# Patient Record
Sex: FEMALE | Race: WHITE | NOT HISPANIC OR LATINO | Employment: OTHER | URBAN - METROPOLITAN AREA
[De-identification: names, ages, dates, MRNs, and addresses within clinical notes are randomized per-mention and may not be internally consistent; named-entity substitution may affect disease eponyms.]

---

## 2017-01-03 ENCOUNTER — TELEPHONE (OUTPATIENT)
Dept: MEDICAL GROUP | Facility: PHYSICIAN GROUP | Age: 77
End: 2017-01-03

## 2017-01-03 ENCOUNTER — HOSPITAL ENCOUNTER (OUTPATIENT)
Facility: MEDICAL CENTER | Age: 77
End: 2017-01-03
Attending: INTERNAL MEDICINE
Payer: MEDICARE

## 2017-01-03 DIAGNOSIS — L84 FOOT CALLUS: ICD-10-CM

## 2017-01-03 PROCEDURE — 82274 ASSAY TEST FOR BLOOD FECAL: CPT

## 2017-01-03 NOTE — TELEPHONE ENCOUNTER
1. Caller Name: Reina                                       Call Back Number:       Patient approves a detailed voicemail message: yes     Patient requesting new referral to podiatry

## 2017-01-04 RX ORDER — PANTOPRAZOLE SODIUM 40 MG/1
TABLET, DELAYED RELEASE ORAL
Qty: 30 TAB | Refills: 0 | Status: SHIPPED | OUTPATIENT
Start: 2017-01-04 | End: 2017-01-31 | Stop reason: SDUPTHER

## 2017-01-05 DIAGNOSIS — Z12.11 SCREEN FOR COLON CANCER: ICD-10-CM

## 2017-01-07 LAB — HEMOCCULT STL QL IA: NEGATIVE

## 2017-01-09 ENCOUNTER — TELEPHONE (OUTPATIENT)
Dept: MEDICAL GROUP | Facility: PHYSICIAN GROUP | Age: 77
End: 2017-01-09

## 2017-01-09 NOTE — TELEPHONE ENCOUNTER
----- Message from Pablo Montalvo M.D. sent at 1/8/2017  5:21 PM PST -----  Your FIT test was negative which indicates with ~ 90% accuracy that there is no blood in the colon.  You can put off a colonoscopy for another year in light of these results.  - Dr. Montalvo

## 2017-01-09 NOTE — Clinical Note
January 9, 2017         Reina Munoz                     [address]  [place] [address]          Dear Reina:    The results of your recent stool cards are normal. This means they show no sign of blood in the stool.    If you have any questions or concerns, please don't hesitate to call.        Sincerely,          Pablo Montalvo M.D.    Electronically Signed

## 2017-01-31 RX ORDER — PANTOPRAZOLE SODIUM 40 MG/1
TABLET, DELAYED RELEASE ORAL
Qty: 90 TAB | Refills: 0 | Status: SHIPPED | OUTPATIENT
Start: 2017-01-31 | End: 2017-05-02 | Stop reason: SDUPTHER

## 2017-02-03 ENCOUNTER — OFFICE VISIT (OUTPATIENT)
Dept: MEDICAL GROUP | Facility: PHYSICIAN GROUP | Age: 77
End: 2017-02-03
Payer: MEDICARE

## 2017-02-03 VITALS
DIASTOLIC BLOOD PRESSURE: 98 MMHG | HEIGHT: 69 IN | WEIGHT: 195 LBS | SYSTOLIC BLOOD PRESSURE: 158 MMHG | OXYGEN SATURATION: 96 % | TEMPERATURE: 97.3 F | BODY MASS INDEX: 28.88 KG/M2 | HEART RATE: 54 BPM

## 2017-02-03 DIAGNOSIS — E03.8 OTHER SPECIFIED HYPOTHYROIDISM: ICD-10-CM

## 2017-02-03 DIAGNOSIS — E78.5 DYSLIPIDEMIA: ICD-10-CM

## 2017-02-03 DIAGNOSIS — R03.0 ELEVATED BP WITHOUT DIAGNOSIS OF HYPERTENSION: ICD-10-CM

## 2017-02-03 DIAGNOSIS — K21.9 GASTROESOPHAGEAL REFLUX DISEASE WITHOUT ESOPHAGITIS: ICD-10-CM

## 2017-02-03 PROCEDURE — 3014F SCREEN MAMMO DOC REV: CPT | Mod: 8P | Performed by: FAMILY MEDICINE

## 2017-02-03 PROCEDURE — 1101F PT FALLS ASSESS-DOCD LE1/YR: CPT | Performed by: FAMILY MEDICINE

## 2017-02-03 PROCEDURE — 3017F COLORECTAL CA SCREEN DOC REV: CPT | Performed by: FAMILY MEDICINE

## 2017-02-03 PROCEDURE — 99214 OFFICE O/P EST MOD 30 MIN: CPT | Performed by: FAMILY MEDICINE

## 2017-02-03 PROCEDURE — G8432 DEP SCR NOT DOC, RNG: HCPCS | Performed by: FAMILY MEDICINE

## 2017-02-03 PROCEDURE — 1036F TOBACCO NON-USER: CPT | Performed by: FAMILY MEDICINE

## 2017-02-03 PROCEDURE — G8484 FLU IMMUNIZE NO ADMIN: HCPCS | Performed by: FAMILY MEDICINE

## 2017-02-03 PROCEDURE — G8420 CALC BMI NORM PARAMETERS: HCPCS | Performed by: FAMILY MEDICINE

## 2017-02-03 PROCEDURE — 4040F PNEUMOC VAC/ADMIN/RCVD: CPT | Mod: 8P | Performed by: FAMILY MEDICINE

## 2017-02-03 NOTE — MR AVS SNAPSHOT
"Reina Munoz   2/3/2017 11:20 AM   Office Visit   MRN: [id number]    Department:  [facility]   Dept Phone:  [phone]    Description:  Female : 1940   Provider:  Shannen Eddy D.O.           Reason for Visit     Establish Care           Allergies as of 2/3/2017     Allergen Noted Reactions    Penicillins 2016       Sulfa Drugs 2016         You were diagnosed with     Gastroesophageal reflux disease without esophagitis   [276884]       Other specified hypothyroidism   [1865973]       Dyslipidemia   [491989]       Elevated BP without diagnosis of hypertension   [4056057]         Vital Signs     Blood Pressure Pulse Temperature Height Weight Body Mass Index    158/98 mmHg 54 36.3 °C (97.3 °F) 1.753 m (5' 9\") 88.451 kg (195 lb) 28.78 kg/m2    Oxygen Saturation Smoking Status                96% Never Smoker           Basic Information     Date Of Birth Sex Race Ethnicity Preferred Language    1940 Female White Non- English      Your appointments     Sep 11, 2017  2:40 PM   Established Patient with Shannen Eddy D.O.   [facility]    [address] [place] [id number] [phone]           You will be receiving a confirmation call a few days before your appointment from our automated call confirmation system.              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   2016 - Present    Elevated BP without diagnosis of hypertension R03.0   2/3/2017 - Present      Health Maintenance        Date Due Completion Dates    BONE DENSITY 2007 ---    MAMMOGRAM 2017 (Originally 1982) ---    IMM PNEUMOCOCCAL 65+ (ADULT) LOW/MEDIUM RISK SERIES (1 of 2 - PCV13) 2017 (Originally 2007) " ---    IMM INFLUENZA (1) 12/12/2017 (Originally 9/1/2016) 1/11/2016 (Declined)    Override on 1/11/2016: Patient Declined    IMM DTaP/Tdap/Td Vaccine (1 - Tdap) 12/12/2017 (Originally 11/30/1961) ---    IMM ZOSTER VACCINE 12/12/2017 (Originally 11/30/2002) ---    COLONOSCOPY 1/11/2026 1/11/2016 (Done)    Override on 1/11/2016: Done            Current Immunizations     No immunizations on file.      Below and/or attached are the medications your provider expects you to take. Review all of your home medications and newly ordered medications with your provider and/or pharmacist. Follow medication instructions as directed by your provider and/or pharmacist. Please keep your medication list with you and share with your provider. Update the information when medications are discontinued, doses are changed, or new medications (including over-the-counter products) are added; and carry medication information at all times in the event of emergency situations     Allergies:  PENICILLINS - (reactions not documented)     SULFA DRUGS - (reactions not documented)               Medications  Valid as of: February 03, 2017 - 11:48 AM    Generic Name Brand Name Tablet Size Instructions for use    Levothyroxine Sodium (Tab) SYNTHROID 75 MCG TAKE ONE TABLET BY MOUTH EVERY MORNING ON AN EMPTY STOMACH        Pantoprazole Sodium (Tablet Delayed Response) PROTONIX 40 MG TAKE ONE TABLET BY MOUTH DAILY        Simvastatin (Tab) ZOCOR 10 MG TAKE ONE TABLET BY MOUTH EVERY EVENING        .                 Medicines prescribed today were sent to:     [facility] #[id number] - [place], [place] - [address] [address]    Phone: [phone] Fax: [phone]    Open 24 Hours?: No      Medication refill instructions:       If your prescription bottle indicates you have medication refills left, it is not necessary to call your provider’s office. Please contact your pharmacy and they will refill your medication.      If your prescription bottle indicates you do not have any refills left, you may request refills at any time through one of the following ways: The online On[employer]ign system (except Urgent Care), by calling your provider’s office, or by asking your pharmacy to contact your provider’s office with a refill request. Medication refills are processed only during regular business hours and may not be available until the next business day. Your provider may request additional information or to have a follow-up visit with you prior to refilling your medication.   *Please Note: Medication refills are assigned a new Rx number when refilled electronically. Your pharmacy may indicate that no refills were authorized even though a new prescription for the same medication is available at the pharmacy. Please request the medicine by name with the pharmacy before contacting your provider for a refill.        Your To Do List     Future Labs/Procedures Complete By Expires    COMP METABOLIC PANEL  As directed 2/4/2018    FREE THYROXINE  As directed 2/4/2018    LIPID PROFILE  As directed 2/4/2018    TSH  As directed 2/4/2018    VITAMIN D,25 HYDROXY  As directed 2/4/2018         Oncodesign Access Code: Activation code not generated  Current Oncodesign Status: Active

## 2017-02-03 NOTE — ASSESSMENT & PLAN NOTE
Long-standing history. Patient reports 100% compliance with medication; she is currently on levothyroxine 75 µg daily. Patient denies any issues with temperature intolerance, depression, skin changes. Chart review shows that TSH and T4 at last check were within normal limits.

## 2017-02-03 NOTE — ASSESSMENT & PLAN NOTE
Long-standing history. Patient reports 100% compliance with medication; she is currently on Zocor 10 mg daily. Patient denies any issues with muscle cramps or weakness. Chart review shows a last lipid panel showed a normal total cholesterol but an elevated triglyceride level. Patient states that she tries to eat healthy and gets regular daily exercise.

## 2017-02-03 NOTE — ASSESSMENT & PLAN NOTE
Long-standing history. Patient reports 100% compliance with medication; she is currently on Protonix 40 mg daily. Patient reports that this manages her symptoms without any issue. Chart review shows that her kidney function is in the normal range.

## 2017-02-03 NOTE — PROGRESS NOTES
Subjective:   Reina Munoz is a 76 y.o. female here today for elevated TG; elevated BP; thyroid; GERD    Dyslipidemia  Long-standing history. Patient reports 100% compliance with medication; she is currently on Zocor 10 mg daily. Patient denies any issues with muscle cramps or weakness. Chart review shows a last lipid panel showed a normal total cholesterol but an elevated triglyceride level. Patient states that she tries to eat healthy and gets regular daily exercise.    Elevated BP without diagnosis of hypertension  Ongoing issue. Chart review shows that today's visit and her visit in December revealed elevated blood pressure with a systolic above 150. The patient denies any issues with headache, dizziness, chest pain. She does report that her home blood pressure monitor never reveals a high blood pressure.    Gastroesophageal reflux disease without esophagitis  Long-standing history. Patient reports 100% compliance with medication; she is currently on Protonix 40 mg daily. Patient reports that this manages her symptoms without any issue. Chart review shows that her kidney function is in the normal range.    Hypothyroidism  Long-standing history. Patient reports 100% compliance with medication; she is currently on levothyroxine 75 µg daily. Patient denies any issues with temperature intolerance, depression, skin changes. Chart review shows that TSH and T4 at last check were within normal limits.     Pt is here today to [facility]; she is transferring care from Dr. Willis.     Current medicines (including changes today)  Current Outpatient Prescriptions   Medication Sig Dispense Refill   • pantoprazole (PROTONIX) 40 MG Tablet Delayed Response TAKE ONE TABLET BY MOUTH DAILY 90 Tab 0   • simvastatin (ZOCOR) 10 MG Tab TAKE ONE TABLET BY MOUTH EVERY EVENING 30 Tab 2   • levothyroxine (SYNTHROID) 75 MCG Tab TAKE ONE TABLET BY MOUTH EVERY MORNING ON AN EMPTY STOMACH 30 Tab 5     No current facility-administered medications  "for this visit.     She  has a past medical history of Hypothyroidism; Dyslipidemia; and Pleomorphic adenoma of parotid gland (1992, 2004).    ROS   No chest pain, no shortness of breath, no abdominal pain       Objective:     Blood pressure 158/98, pulse 54, temperature 36.3 °C (97.3 °F), height 1.753 m (5' 9\"), weight 88.451 kg (195 lb), SpO2 96 %. Body mass index is 28.78 kg/(m^2).   Physical Exam:  Alert, oriented in no acute distress.  Eye contact is good, speech goal directed, affect calm  HEENT: conjunctiva non-injected, sclera non-icteric.  Pinna normal. TM pearly gray.   Oral mucous membranes pink and moist with no lesions.  Neck No adenopathy or masses in the neck or supraclavicular regions.  Lungs: clear to auscultation bilaterally with good excursion.  CV: regular rate and rhythm. Mild systolic ejection murmur noted  Abdomen: soft, nontender, No CVAT  Ext: no edema, color normal, vascularity normal, temperature normal  Neuro: CN 2-12 grossly intact      Assessment and Plan:   The following treatment plan was discussed     1. Gastroesophageal reflux disease without esophagitis      Stable. Continue current medications; patient denies need for refills. Monitor   2. Other specified hypothyroidism  TSH    FREE THYROXINE    Stable. Continue current medications; patient denies need for refill. Recheck labs in 6 months. Monitor   3. Dyslipidemia  COMP METABOLIC PANEL    LIPID PROFILE    VITAMIN D,25 HYDROXY    Stable. Continue current medications; patient denies need for refill. Recheck labs in 6 months. Monitor   4. Elevated BP without diagnosis of hypertension      Uncontrolled. Have asked patient to bring in her home blood pressure monitor for calibration. Recheck in 2 weeks. Monitor       Followup: Return in about 6 months (around 8/3/2017) for thyroid/lipids/labs, Short.            "

## 2017-02-03 NOTE — ASSESSMENT & PLAN NOTE
Ongoing issue. Chart review shows that today's visit and her visit in December revealed elevated blood pressure with a systolic above 150. The patient denies any issues with headache, dizziness, chest pain. She does report that her home blood pressure monitor never reveals a high blood pressure.

## 2017-02-10 ENCOUNTER — NON-PROVIDER VISIT (OUTPATIENT)
Dept: MEDICAL GROUP | Facility: PHYSICIAN GROUP | Age: 77
End: 2017-02-10
Payer: MEDICARE

## 2017-02-10 VITALS — DIASTOLIC BLOOD PRESSURE: 87 MMHG | SYSTOLIC BLOOD PRESSURE: 147 MMHG

## 2017-02-10 DIAGNOSIS — Z01.30 BLOOD PRESSURE CHECK: ICD-10-CM

## 2017-04-21 ENCOUNTER — TELEPHONE (OUTPATIENT)
Dept: PULMONOLOGY | Facility: HOSPICE | Age: 77
End: 2017-04-21

## 2017-04-21 DIAGNOSIS — G47.33 OBSTRUCTIVE SLEEP APNEA: ICD-10-CM

## 2017-04-21 NOTE — TELEPHONE ENCOUNTER
SWITCHING TO KEY FOR CPAP SUPPLIES    PLEASE SIGN UPDATED ORDER    SEND WITH NOTES, TESTING, AND DEMOS

## 2017-05-17 ENCOUNTER — SLEEP CENTER VISIT (OUTPATIENT)
Dept: SLEEP MEDICINE | Facility: MEDICAL CENTER | Age: 77
End: 2017-05-17
Payer: MEDICARE

## 2017-05-17 VITALS
WEIGHT: 200 LBS | HEART RATE: 54 BPM | DIASTOLIC BLOOD PRESSURE: 88 MMHG | OXYGEN SATURATION: 93 % | BODY MASS INDEX: 29.62 KG/M2 | HEIGHT: 69 IN | RESPIRATION RATE: 16 BRPM | SYSTOLIC BLOOD PRESSURE: 142 MMHG

## 2017-05-17 DIAGNOSIS — G47.33 OSA (OBSTRUCTIVE SLEEP APNEA): ICD-10-CM

## 2017-05-17 PROCEDURE — 99203 OFFICE O/P NEW LOW 30 MIN: CPT | Performed by: INTERNAL MEDICINE

## 2017-05-17 NOTE — PROGRESS NOTES
Chief Complaint   Patient presents with   • New Patient     LUKAS       HPI: This patient is a 76 y.o. Female who is referred for obstructive sleep apnea syndrome. She was diagnosed with LUKAS in [place] approximately 3 years ago, and prescribed AutoPap therapy. Her sleep study results were unavailable for review at time of consultation. CPAP compliance card confirms 100% usage on AutoPap 10-20 cm of water with mean pressures of 12 cm of water. She uses CPAP over 8 hours nightly, with normal AHI of 1.8. She sleeps great with CPAP, awakens feeling rested. Her [place] sleepiness score is 1. She is using a nasal mask, and requires changing DME to [facility] for CPAP supplies.      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      Social History Main Topics   • Smoking status: Never Smoker    • Smokeless tobacco: Never Used   • Alcohol Use: No   • Drug Use: No   • Sexual Activity:     Partners: Male     Other Topics Concern   • Not on file     Social History Narrative       Family History   Problem Relation Age of Onset   • Cancer Mother      multiple myeloma   • Heart Disease Mother    • Heart Attack Father    • Heart Disease Brother    • Lung Disease Brother    • Sleep Apnea Neg Hx        Current Outpatient Prescriptions on File Prior to Visit   Medication Sig Dispense Refill   • pantoprazole (PROTONIX) 40 MG Tablet Delayed Response TAKE ONE TABLET BY MOUTH DAILY 90 Tab 1   • levothyroxine (SYNTHROID) 75 MCG Tab TAKE ONE TABLET BY MOUTH EVERY MORNING ON AN EMPTY STOMACH 30 Tab 5   • simvastatin (ZOCOR) 10 MG Tab TAKE ONE TABLET BY MOUTH EVERY EVENING 30 Tab 5     No  "current facility-administered medications on file prior to visit.       Allergies: Penicillins and Sulfa drugs    ROS:   Constitutional: Denies fevers, chills, night sweats, fatigue or weight loss  Eyes: Denies vision loss, pain, drainage, double vision  Ears, Nose, Throat: Denies earache, difficulty hearing, tinnitus, nasal congestion, hoarseness  Cardiovascular: Denies chest pain, tightness, palpitations, orthopnea, +LEedema  Respiratory: Denies shortness of breath, cough, wheezing, hemoptysis  Sleep: Denies daytime sleepiness, snoring, apneas, insomnia, morning headaches  GI: Denies heartburn, dysphagia, nausea, abdominal pain, diarrhea or constipation  : Denies frequent urination, hematuria, discharge or painful urination  Musculoskeletal: Denies back pain, painful joints, sore muscles  Neurological: Denies weakness or headaches  Skin: No rashes    Blood pressure 142/88, pulse 54, resp. rate 16, height 1.753 m (5' 9.02\"), weight 90.719 kg (200 lb), SpO2 93 %.  Multi-Ox Readings  Multi Ox #1     O2 sat % at rest     O2 sat % on exertion     O2 sat average on exertion     Multi Ox #2     O2 sat % at rest     O2 sat % on exertion     O2 sat average on exertion       Oxygen Use     Oxygen Frequency     Duration of need     Is the patient mobile within the home?     CPAP Use?     BIPAP Use?     Servo Titration         Physical Exam:  Appearance: Well-nourished, well-developed, in no acute distress  HEENT: Normocephalic, atraumatic, white sclera, PERRLA, oropharynx clear, Mallampati 3  Neck: No adenopathy or masses  Respiratory: no intercostal retractions or accessory muscle use  Lungs auscultation: Clear to auscultation bilaterally  Cardiovascular: Regular rate rhythm. No murmurs, rubs or gallops.  + Compression stockings,+mild LE edema L>R  Abdomen: soft, nondistended  Gait: Normal  Digits: No clubbing, cyanosis  Motor: No focal deficits  Orientation: Oriented to time, person and place    Diagnosis:  1. LUKAS " (obstructive sleep apnea)     2. BMI 29.0-29.9,adult         Plan:  The patient has history of obstructive sleep apnea diagnosed in [place] 3 years ago. She shows excellent compliance and response to AutoPap 10-20 cm of water.   We will request her sleep study results from [place], and set her up with [facility] DME for her CPAP supplies.  Return in about 1 year (around 5/17/2018).

## 2017-05-17 NOTE — MR AVS SNAPSHOT
"Reina Munoz   2017 10:40 AM   Sleep Center Visit   MRN: [id number]    Department:  [facility]   Dept Phone:  [phone]    Description:  Female : 1940   Provider:  Roxanne Carmona M.D.           Reason for Visit     New Patient LUKAS      Allergies as of 2017     Allergen Noted Reactions    Penicillins 2016       Sulfa Drugs 2016         You were diagnosed with     LUKAS (obstructive sleep apnea)   [341849]       BMI 29.0-29.9,adult   [342790]         Vital Signs     Blood Pressure Pulse Respirations Height Weight Body Mass Index    142/88 mmHg 54 16 1.753 m (5' 9.02\") 90.719 kg (200 lb) 29.52 kg/m2    Oxygen Saturation Smoking Status                93% Never Smoker           Basic Information     Date Of Birth Sex Race Ethnicity Preferred Language    1940 Female White Non- English      Your appointments     Sep 11, 2017  2:40 PM   Established Patient with Shannen Eddy D.O.   [facility]    [address] [place] [id number] [phone]           You will be receiving a confirmation call a few days before your appointment from our automated call confirmation system.              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      Below and/or attached are the medications your provider expects you to take. Review all of your home medications and newly ordered medications with your provider and/or pharmacist. Follow medication instructions as directed by your provider and/or pharmacist. Please keep your medication list with you and share with your provider. Update the information when medications are discontinued, doses are changed, or new medications (including over-the-counter products) are added; and carry medication information at all times in the event of emergency situations     Allergies:  PENICILLINS - (reactions not documented)     SULFA DRUGS - (reactions not documented)               Medications  Valid as of: May 17, 2017 - 11:04 AM    Generic Name Brand Name Tablet Size Instructions for use    Levothyroxine Sodium (Tab) SYNTHROID 75 MCG TAKE ONE TABLET BY MOUTH EVERY MORNING ON AN EMPTY STOMACH        Pantoprazole Sodium (Tablet Delayed Response) PROTONIX 40 MG TAKE ONE TABLET BY MOUTH DAILY        Simvastatin (Tab) ZOCOR 10 MG TAKE ONE TABLET BY MOUTH EVERY EVENING        .                 Medicines prescribed today were sent to:     [facility] #[id number] [place], [place] - [address] [address]    Phone: [phone] Fax: [phone]    Open 24 Hours?: No      Medication refill instructions:       If your prescription bottle indicates you have medication refills left, it is not necessary to call your provider’s office. Please contact your pharmacy and they will refill your medication.    If your prescription bottle indicates you do not have any refills left, you may request refills at any time through one of the following ways: The online [employer] system (except Urgent Care), by calling your provider’s office, or by asking your pharmacy to contact  your provider’s office with a refill request. Medication refills are processed only during regular business hours and may not be available until the next business day. Your provider may request additional information or to have a follow-up visit with you prior to refilling your medication.   *Please Note: Medication refills are assigned a new Rx number when refilled electronically. Your pharmacy may indicate that no refills were authorized even though a new prescription for the same medication is available at the pharmacy. Please request the medicine by name with the pharmacy before contacting your provider for a refill.           [employer] Access Code: Activation code not generated  Current [employer]Pics (deja mi) Status: Active

## 2017-05-19 ENCOUNTER — APPOINTMENT (OUTPATIENT)
Dept: SLEEP MEDICINE | Facility: MEDICAL CENTER | Age: 77
End: 2017-05-19
Payer: MEDICARE

## 2017-06-07 ENCOUNTER — HOSPITAL ENCOUNTER (OUTPATIENT)
Dept: RADIOLOGY | Facility: MEDICAL CENTER | Age: 77
End: 2017-06-07
Attending: FAMILY MEDICINE
Payer: MEDICARE

## 2017-06-07 DIAGNOSIS — M79.662 PAIN OF LEFT LOWER LEG: ICD-10-CM

## 2017-06-07 PROCEDURE — 93971 EXTREMITY STUDY: CPT | Mod: LT

## 2017-06-12 ENCOUNTER — HOSPITAL ENCOUNTER (OUTPATIENT)
Dept: RADIOLOGY | Facility: MEDICAL CENTER | Age: 77
End: 2017-06-12
Attending: PHYSICIAN ASSISTANT
Payer: MEDICARE

## 2017-06-12 ENCOUNTER — HOSPITAL ENCOUNTER (OUTPATIENT)
Dept: LAB | Facility: MEDICAL CENTER | Age: 77
End: 2017-06-12
Attending: FAMILY MEDICINE
Payer: MEDICARE

## 2017-06-12 ENCOUNTER — OFFICE VISIT (OUTPATIENT)
Dept: URGENT CARE | Facility: PHYSICIAN GROUP | Age: 77
End: 2017-06-12
Payer: MEDICARE

## 2017-06-12 VITALS
HEART RATE: 53 BPM | DIASTOLIC BLOOD PRESSURE: 80 MMHG | OXYGEN SATURATION: 95 % | SYSTOLIC BLOOD PRESSURE: 140 MMHG | TEMPERATURE: 97.5 F

## 2017-06-12 DIAGNOSIS — M25.562 ACUTE PAIN OF LEFT KNEE: ICD-10-CM

## 2017-06-12 LAB
ALBUMIN SERPL BCP-MCNC: 4.3 G/DL (ref 3.2–4.9)
ALBUMIN/GLOB SERPL: 1.4 G/DL
ALP SERPL-CCNC: 75 U/L (ref 30–99)
ALT SERPL-CCNC: 44 U/L (ref 2–50)
ANION GAP SERPL CALC-SCNC: 7 MMOL/L (ref 0–11.9)
AST SERPL-CCNC: 42 U/L (ref 12–45)
BASOPHILS # BLD AUTO: 0.6 % (ref 0–1.8)
BASOPHILS # BLD: 0.03 K/UL (ref 0–0.12)
BILIRUB SERPL-MCNC: 0.7 MG/DL (ref 0.1–1.5)
BUN SERPL-MCNC: 15 MG/DL (ref 8–22)
CALCIUM SERPL-MCNC: 9.7 MG/DL (ref 8.5–10.5)
CHLORIDE SERPL-SCNC: 107 MMOL/L (ref 96–112)
CHOLEST SERPL-MCNC: 185 MG/DL (ref 100–199)
CO2 SERPL-SCNC: 27 MMOL/L (ref 20–33)
CREAT SERPL-MCNC: 0.89 MG/DL (ref 0.5–1.4)
EOSINOPHIL # BLD AUTO: 0.08 K/UL (ref 0–0.51)
EOSINOPHIL NFR BLD: 1.7 % (ref 0–6.9)
ERYTHROCYTE [DISTWIDTH] IN BLOOD BY AUTOMATED COUNT: 46.5 FL (ref 35.9–50)
GFR SERPL CREATININE-BSD FRML MDRD: >60 ML/MIN/1.73 M 2
GLOBULIN SER CALC-MCNC: 3 G/DL (ref 1.9–3.5)
GLUCOSE SERPL-MCNC: 90 MG/DL (ref 65–99)
HCT VFR BLD AUTO: 45.9 % (ref 37–47)
HDLC SERPL-MCNC: 52 MG/DL
HGB BLD-MCNC: 15.2 G/DL (ref 12–16)
IMM GRANULOCYTES # BLD AUTO: 0.01 K/UL (ref 0–0.11)
IMM GRANULOCYTES NFR BLD AUTO: 0.2 % (ref 0–0.9)
LDLC SERPL CALC-MCNC: 93 MG/DL
LYMPHOCYTES # BLD AUTO: 1.82 K/UL (ref 1–4.8)
LYMPHOCYTES NFR BLD: 39.2 % (ref 22–41)
MCH RBC QN AUTO: 30.6 PG (ref 27–33)
MCHC RBC AUTO-ENTMCNC: 33.1 G/DL (ref 33.6–35)
MCV RBC AUTO: 92.5 FL (ref 81.4–97.8)
MONOCYTES # BLD AUTO: 0.27 K/UL (ref 0–0.85)
MONOCYTES NFR BLD AUTO: 5.8 % (ref 0–13.4)
NEUTROPHILS # BLD AUTO: 2.43 K/UL (ref 2–7.15)
NEUTROPHILS NFR BLD: 52.5 % (ref 44–72)
NRBC # BLD AUTO: 0 K/UL
NRBC BLD AUTO-RTO: 0 /100 WBC
PLATELET # BLD AUTO: 166 K/UL (ref 164–446)
PMV BLD AUTO: 12.2 FL (ref 9–12.9)
POTASSIUM SERPL-SCNC: 3.8 MMOL/L (ref 3.6–5.5)
PROT SERPL-MCNC: 7.3 G/DL (ref 6–8.2)
RBC # BLD AUTO: 4.96 M/UL (ref 4.2–5.4)
SODIUM SERPL-SCNC: 141 MMOL/L (ref 135–145)
T4 FREE SERPL-MCNC: 0.84 NG/DL (ref 0.53–1.43)
TRIGL SERPL-MCNC: 199 MG/DL (ref 0–149)
TSH SERPL DL<=0.005 MIU/L-ACNC: 3.28 UIU/ML (ref 0.3–3.7)
WBC # BLD AUTO: 4.6 K/UL (ref 4.8–10.8)

## 2017-06-12 PROCEDURE — 73562 X-RAY EXAM OF KNEE 3: CPT | Mod: LT

## 2017-06-12 PROCEDURE — 99214 OFFICE O/P EST MOD 30 MIN: CPT | Performed by: PHYSICIAN ASSISTANT

## 2017-06-12 RX ORDER — NAPROXEN 500 MG/1
500 TABLET ORAL 2 TIMES DAILY WITH MEALS
Qty: 30 TAB | Refills: 0 | Status: SHIPPED | OUTPATIENT
Start: 2017-06-12 | End: 2018-03-14

## 2017-06-12 ASSESSMENT — PAIN SCALES - GENERAL: PAINLEVEL: 10=SEVERE PAIN

## 2017-06-12 NOTE — PROGRESS NOTES
Chief Complaint   Patient presents with   • Knee Pain     Left knee; Pain x 4 days; something popped in knee cap       HISTORY OF PRESENT ILLNESS: Patient is a 76 y.o. female who presents today for 4 days of left knee pain.  Patient reports that as she was walking she felt pain in her knee cap and then today she also stepped down wrong off of a curb and felt worsening pain and now she feels the pain is a 10/10 and can hardly bear weight on it.  She feels that it is swollen.   She denies previous history of injury to this knee or chronic issues with it.   No attempted interventions.     Patient Active Problem List    Diagnosis Date Noted   • Elevated BP without diagnosis of hypertension 2017   • Dyslipidemia 2016   • Gastroesophageal reflux disease without esophagitis 2016   • Tumor of soft tissue of neck 2016   • Hypothyroidism        Allergies:Penicillins and Sulfa drugs    Current Outpatient Prescriptions Ordered in [facility]   Medication Sig Dispense Refill   • pantoprazole (PROTONIX) 40 MG Tablet Delayed Response TAKE ONE TABLET BY MOUTH DAILY 90 Tab 1   • levothyroxine (SYNTHROID) 75 MCG Tab TAKE ONE TABLET BY MOUTH EVERY MORNING ON AN EMPTY STOMACH 30 Tab 5   • simvastatin (ZOCOR) 10 MG Tab TAKE ONE TABLET BY MOUTH EVERY EVENING 30 Tab 5     No current Epic-ordered facility-administered medications on file.       Past Medical History   Diagnosis Date   • Hypothyroidism    • Dyslipidemia    • Pleomorphic adenoma of parotid gland ,      recurrent, yearly MRI   • Sleep apnea        Social History   Substance Use Topics   • Smoking status: Never Smoker    • Smokeless tobacco: Never Used   • Alcohol Use: No       Family Status   Relation Status Death Age   • Mother     • Father       MI   • Brother       complications from heart surgery   • Brother     • Sister Alive    • Sister Alive    • Sister Alive    • Brother       suicide     Family History    Problem Relation Age of Onset   • Cancer Mother      multiple myeloma   • Heart Disease Mother    • Heart Attack Father    • Heart Disease Brother    • Lung Disease Brother    • Sleep Apnea Neg Hx        ROS:  Review of Systems   Constitutional: Negative for fever, chills, weight loss and malaise/fatigue.   HENT: Negative for ear pain, nosebleeds, congestion, sore throat and neck pain.    Eyes: Negative for blurred vision.   Respiratory: Negative for cough, sputum production, shortness of breath and wheezing.    Cardiovascular: Negative for chest pain, palpitations, orthopnea and leg swelling.   Musculoskeletal: SEE HPI  All other systems reviewed and are negative.       Exam:  Blood pressure 140/80, pulse 53, temperature 36.4 °C (97.5 °F), SpO2 95 %, not currently breastfeeding.  General:  Well nourished, well developed female in NAD  Eyes: PERRLA, EOM within normal limits, no conjunctival injection, no scleral icterus, visual fields and acuity grossly intact.  Mouth: reasonable hygiene, no erythema exudates or tonsillar enlargement.  Neck: no masses, range of motion within normal limits, no tracheal deviation. No lymphadenopathy  Pulmonary: Normal respiratory effort, no wheezes, crackles, or rhonchi.  Cardiovascular: regular rate and rhythm without murmurs, rubs, or gallops.  Skin: No visible rashes or lesion. Warm, pink, dry.   Extremities: no clubbing, cyanosis, or edema.  Left knee:  Mild swelling as compared to right knee diffusely. No ecchymosis.  TTP throughout however more laterally and superiorly.  Full flexion, pain with extension.  Unable to bear weight without pain.  Distally CMS is full and equal bilaterally.   Neuro: A&O x 3. Speech normal/clear.  Normal gait.     RAD    Impression        Negative knee series aside from a joint effusion.         Reading Provider Reading Date     Truman Watts M.D. Jun 12, 2017       Assessment/Plan:  1. Acute pain of left knee  DX-KNEE 3 VIEWS LEFT    naproxen  (NAPROSYN) 500 MG Tab        -suspect sprain.  -RICE therapy trial.  ACE wrap given,  crutches, patient shown how to use.   -Naproxen for inflammation, declines anything stronger for pain   Take with food and stay well hydrated  -patient admits she does not like to slow down but will trial rest as best as possible  -discussed Ortho or PCP follow up if no improvement in 7-10 days  -RTC precautions in meantime.     Supportive care, differential diagnoses, and indications for immediate follow-up discussed with patient.   Pathogenesis of diagnosis discussed including typical length and natural progression.   Instructed to return to clinic or nearest emergency department for any change in condition, further concerns, or worsening of symptoms.  Patient states understanding of the plan of care and discharge instructions.  Instructed to make an appointment, for follow up, with their primary care provider.      Terrie Muller PA-C

## 2017-08-10 ENCOUNTER — HOSPITAL ENCOUNTER (OUTPATIENT)
Dept: RADIOLOGY | Facility: MEDICAL CENTER | Age: 77
End: 2017-08-10
Attending: OTOLARYNGOLOGY
Payer: MEDICARE

## 2017-08-10 DIAGNOSIS — R22.1 NECK MASS: ICD-10-CM

## 2017-08-10 PROCEDURE — 70543 MRI ORBT/FAC/NCK W/O &W/DYE: CPT

## 2017-08-10 PROCEDURE — 700117 HCHG RX CONTRAST REV CODE 255: Performed by: OTOLARYNGOLOGY

## 2017-08-10 PROCEDURE — A9579 GAD-BASE MR CONTRAST NOS,1ML: HCPCS | Performed by: OTOLARYNGOLOGY

## 2017-08-10 RX ADMIN — GADODIAMIDE 20 ML: 287 INJECTION INTRAVENOUS at 13:30

## 2017-10-28 ENCOUNTER — HOSPITAL ENCOUNTER (OUTPATIENT)
Dept: RADIOLOGY | Facility: MEDICAL CENTER | Age: 77
End: 2017-10-28
Attending: ORTHOPAEDIC SURGERY
Payer: MEDICARE

## 2017-10-28 DIAGNOSIS — M17.12 OSTEOARTHRITIS OF LEFT KNEE, UNSPECIFIED OSTEOARTHRITIS TYPE: ICD-10-CM

## 2017-10-28 PROCEDURE — 73721 MRI JNT OF LWR EXTRE W/O DYE: CPT | Mod: LT

## 2018-01-09 RX ORDER — LEVOTHYROXINE SODIUM 0.07 MG/1
TABLET ORAL
Qty: 90 TAB | Refills: 0 | Status: SHIPPED | OUTPATIENT
Start: 2018-01-09 | End: 2019-02-19

## 2018-01-09 RX ORDER — PANTOPRAZOLE SODIUM 40 MG/1
TABLET, DELAYED RELEASE ORAL
Qty: 90 TAB | Refills: 0 | Status: SHIPPED | OUTPATIENT
Start: 2018-01-09 | End: 2019-02-19

## 2018-03-13 ENCOUNTER — PATIENT OUTREACH (OUTPATIENT)
Dept: HEALTH INFORMATION MANAGEMENT | Facility: OTHER | Age: 78
End: 2018-03-13

## 2018-03-13 NOTE — PROGRESS NOTES
1. Attempt #: 1    2. HealthConnect Verified: yes    3. Verify PCP: yes    4. Care Team Updated:       •   DME Company (gait device, O2, CPAP, etc.): YES       •   Other Specialists (eye doctor, derm, GYN, cardiology, endo, etc): YES    5.  Reviewed/Updated the following with patient:       •   Communication Preference Obtained? YES       •   Preferred Pharmacy? YES       •   Preferred Lab? YES       •   Family History (document living status of immediate family members and if + hx of cancer, diabetes, hypertension, hyperlipidemia, heart attack, stroke) YES. Was Abstract Encounter opened and chart updated? YES    6. [employer] Activation: already active    7. [employer] Zaira: no    8. Annual Wellness Visit Scheduling  Scheduling Status:Scheduled      9. Care Gap Scheduling (Attempt to Schedule EACH Overdue Care Gap!)     Health Maintenance Due   Topic Date Due   • Annual Wellness Visit  1940   • IMM DTaP/Tdap/Td Vaccine (1 - Tdap) 11/30/1961   • MAMMOGRAM  11/30/1982   • IMM ZOSTER VACCINE  11/30/2002   • BONE DENSITY  11/30/2007   • IMM PNEUMOCOCCAL 65+ (ADULT) LOW/MEDIUM RISK SERIES (1 of 2 - PCV13) 11/30/2007   • IMM INFLUENZA (1) 09/01/2017   • COLON CANCER SCREENING ANNUAL FIT  01/03/2018    discuss due maint with pcp    Scheduled patient for Annual Wellness Visit    10. Patient was advised: “This is a free wellness visit. The provider will screen for medical conditions to help you stay healthy. If you have other concerns to address you may be asked to discuss these at a separate visit or there may be an additional fee.”     11. Patient was informed to arrive 15 min prior to their scheduled appointment and bring in their medication bottles.

## 2018-03-14 ENCOUNTER — HOSPITAL ENCOUNTER (OUTPATIENT)
Dept: LAB | Facility: MEDICAL CENTER | Age: 78
End: 2018-03-14
Attending: NURSE PRACTITIONER
Payer: MEDICARE

## 2018-03-14 ENCOUNTER — OFFICE VISIT (OUTPATIENT)
Dept: MEDICAL GROUP | Facility: PHYSICIAN GROUP | Age: 78
End: 2018-03-14
Payer: MEDICARE

## 2018-03-14 VITALS
DIASTOLIC BLOOD PRESSURE: 82 MMHG | RESPIRATION RATE: 16 BRPM | TEMPERATURE: 97.1 F | HEART RATE: 50 BPM | BODY MASS INDEX: 29.23 KG/M2 | SYSTOLIC BLOOD PRESSURE: 146 MMHG | WEIGHT: 198 LBS | OXYGEN SATURATION: 96 %

## 2018-03-14 DIAGNOSIS — K21.9 GASTROESOPHAGEAL REFLUX DISEASE WITHOUT ESOPHAGITIS: ICD-10-CM

## 2018-03-14 DIAGNOSIS — I10 ESSENTIAL HYPERTENSION: ICD-10-CM

## 2018-03-14 DIAGNOSIS — E03.9 ACQUIRED HYPOTHYROIDISM: ICD-10-CM

## 2018-03-14 DIAGNOSIS — E78.5 DYSLIPIDEMIA: ICD-10-CM

## 2018-03-14 DIAGNOSIS — M79.89 LEFT LEG SWELLING: ICD-10-CM

## 2018-03-14 DIAGNOSIS — Z76.89 ENCOUNTER TO ESTABLISH CARE: ICD-10-CM

## 2018-03-14 DIAGNOSIS — Z12.31 VISIT FOR SCREENING MAMMOGRAM: ICD-10-CM

## 2018-03-14 DIAGNOSIS — Z12.11 SCREEN FOR COLON CANCER: ICD-10-CM

## 2018-03-14 LAB
ALBUMIN SERPL BCP-MCNC: 4.7 G/DL (ref 3.2–4.9)
ALBUMIN/GLOB SERPL: 1.6 G/DL
ALP SERPL-CCNC: 73 U/L (ref 30–99)
ALT SERPL-CCNC: 47 U/L (ref 2–50)
ANION GAP SERPL CALC-SCNC: 10 MMOL/L (ref 0–11.9)
AST SERPL-CCNC: 48 U/L (ref 12–45)
BASOPHILS # BLD AUTO: 0.4 % (ref 0–1.8)
BASOPHILS # BLD: 0.02 K/UL (ref 0–0.12)
BILIRUB SERPL-MCNC: 0.9 MG/DL (ref 0.1–1.5)
BUN SERPL-MCNC: 14 MG/DL (ref 8–22)
CALCIUM SERPL-MCNC: 9.9 MG/DL (ref 8.5–10.5)
CHLORIDE SERPL-SCNC: 106 MMOL/L (ref 96–112)
CHOLEST SERPL-MCNC: 199 MG/DL (ref 100–199)
CO2 SERPL-SCNC: 27 MMOL/L (ref 20–33)
CREAT SERPL-MCNC: 0.87 MG/DL (ref 0.5–1.4)
EOSINOPHIL # BLD AUTO: 0.05 K/UL (ref 0–0.51)
EOSINOPHIL NFR BLD: 1 % (ref 0–6.9)
ERYTHROCYTE [DISTWIDTH] IN BLOOD BY AUTOMATED COUNT: 46 FL (ref 35.9–50)
GLOBULIN SER CALC-MCNC: 2.9 G/DL (ref 1.9–3.5)
GLUCOSE SERPL-MCNC: 93 MG/DL (ref 65–99)
HCT VFR BLD AUTO: 46.4 % (ref 37–47)
HDLC SERPL-MCNC: 57 MG/DL
HGB BLD-MCNC: 15.2 G/DL (ref 12–16)
IMM GRANULOCYTES # BLD AUTO: 0.01 K/UL (ref 0–0.11)
IMM GRANULOCYTES NFR BLD AUTO: 0.2 % (ref 0–0.9)
LDLC SERPL CALC-MCNC: 104 MG/DL
LYMPHOCYTES # BLD AUTO: 2 K/UL (ref 1–4.8)
LYMPHOCYTES NFR BLD: 41.7 % (ref 22–41)
MCH RBC QN AUTO: 30.5 PG (ref 27–33)
MCHC RBC AUTO-ENTMCNC: 32.8 G/DL (ref 33.6–35)
MCV RBC AUTO: 93.2 FL (ref 81.4–97.8)
MONOCYTES # BLD AUTO: 0.24 K/UL (ref 0–0.85)
MONOCYTES NFR BLD AUTO: 5 % (ref 0–13.4)
NEUTROPHILS # BLD AUTO: 2.48 K/UL (ref 2–7.15)
NEUTROPHILS NFR BLD: 51.7 % (ref 44–72)
NRBC # BLD AUTO: 0 K/UL
NRBC BLD-RTO: 0 /100 WBC
PLATELET # BLD AUTO: 164 K/UL (ref 164–446)
PMV BLD AUTO: 12.9 FL (ref 9–12.9)
POTASSIUM SERPL-SCNC: 4 MMOL/L (ref 3.6–5.5)
PROT SERPL-MCNC: 7.6 G/DL (ref 6–8.2)
RBC # BLD AUTO: 4.98 M/UL (ref 4.2–5.4)
SODIUM SERPL-SCNC: 143 MMOL/L (ref 135–145)
T4 FREE SERPL-MCNC: 0.84 NG/DL (ref 0.53–1.43)
TRIGL SERPL-MCNC: 191 MG/DL (ref 0–149)
TSH SERPL DL<=0.005 MIU/L-ACNC: 2.01 UIU/ML (ref 0.38–5.33)
WBC # BLD AUTO: 4.8 K/UL (ref 4.8–10.8)

## 2018-03-14 PROCEDURE — 80061 LIPID PANEL: CPT

## 2018-03-14 PROCEDURE — 80053 COMPREHEN METABOLIC PANEL: CPT

## 2018-03-14 PROCEDURE — 84443 ASSAY THYROID STIM HORMONE: CPT

## 2018-03-14 PROCEDURE — 85025 COMPLETE CBC W/AUTO DIFF WBC: CPT

## 2018-03-14 PROCEDURE — 99203 OFFICE O/P NEW LOW 30 MIN: CPT | Performed by: NURSE PRACTITIONER

## 2018-03-14 PROCEDURE — 84439 ASSAY OF FREE THYROXINE: CPT

## 2018-03-14 PROCEDURE — 36415 COLL VENOUS BLD VENIPUNCTURE: CPT

## 2018-03-14 ASSESSMENT — PATIENT HEALTH QUESTIONNAIRE - PHQ9: CLINICAL INTERPRETATION OF PHQ2 SCORE: 0

## 2018-03-14 NOTE — PROGRESS NOTES
Chief Complaint   Patient presents with   • Establish Care     requesting lab work, new patient        HPI:    Reina Munoz is a 77 y.o. female here to establish care  Gastroesophageal reflux disease without esophagitis  Long-standing history of GERD with a normal EGD in the past. Has been well controlled with pantoprazole 40 mg daily. Denies any hoarseness, dysphagia, hematemesis.     Hypothyroidism  Long-standing hx of hypothyroid managed with levothyroxine 75 mcg daily. Last TSH stable June 2017. She has been losing her hair more over this past few months.     Dyslipidemia  Long-standing history of dyslipidemia managed with low intensity simvastatin 10 mg daily. No myalgias.    Ref. Range 6/12/2017 08:52   Cholesterol,Tot Latest Ref Range: 100 - 199 mg/dL 185   Triglycerides Latest Ref Range: 0 - 149 mg/dL 199 (H)   HDL Latest Ref Range: >=40 mg/dL 52   LDL Latest Ref Range: <100 mg/dL 93       Left leg swelling  Ongoing problem for a few years. Swelling only occurs in left leg. When laying supine, swelling goes down.     Hypertension  She has hx of elevated blood pressure readings but there has been no diagnosis of HTN. She used to monitor at home, but has not been doing this for many months.   Vitals 1/11/2016 1/20/2016 6/6/2016 12/12/2016 2/3/2017   SYSTOLIC 140 142 110 155 158   DIASTOLIC 84 94 64 88 98   PULSE 70 67 52 62 54     Vitals 2/10/2017 5/17/2017 6/12/2017 3/14/2018   SYSTOLIC 147 142 140 146   DIASTOLIC 87 88 80 82   PULSE  54 53 50     Current medicines (including changes today)  Current Outpatient Prescriptions   Medication Sig Dispense Refill   • pantoprazole (PROTONIX) 40 MG Tablet Delayed Response TAKE ONE TABLET BY MOUTH DAILY 90 Tab 0   • levothyroxine (SYNTHROID) 75 MCG Tab TAKE ONE TABLET BY MOUTH EVERY MORNING ON AN EMPTY STOMACH (SYNTHROID) 90 Tab 0   • simvastatin (ZOCOR) 10 MG Tab TAKE ONE TABLET BY MOUTH EVERY EVENING 30 Tab 5     No current facility-administered medications for  this visit.      She  has a past medical history of Chronic meniscal tear of knee; Dyslipidemia; GERD (gastroesophageal reflux disease); Hypothyroidism; Pleomorphic adenoma of parotid gland (, ); and Sleep apnea.  She  has a past surgical history that includes parotidectomy (Left, , ); cataract extraction with iol; and lumpectomy (Left).  Social History   Substance Use Topics   • Smoking status: Never Smoker   • Smokeless tobacco: Never Used   • Alcohol use No     Social History     Social History Narrative   • No narrative on file     Family History   Problem Relation Age of Onset   • Cancer Mother      multiple myeloma   • Heart Disease Mother    • Heart Attack Father    • Stroke Father    • Hypertension Father    • Heart Disease Brother    • Lung Disease Brother    • Sleep Apnea Neg Hx    • Diabetes Neg Hx      Family Status   Relation Status   • Mother     multiple myeloma   • Father     MI   • Brother     complications from heart surgery   • Brother    • Sister Alive   • Sister Alive   • Sister Alive   • Brother     suicide   • Neg Hx      Health Maintenance Topics with due status: Overdue       Topic Date Due    Annual Wellness Visit 1940    IMM DTaP/Tdap/Td Vaccine 1961    MAMMOGRAM 1982    IMM ZOSTER VACCINE 2002    BONE DENSITY 2007    IMM PNEUMOCOCCAL 65+ (ADULT) LOW/MEDIUM RISK SERIES 2007    IMM INFLUENZA 2017    COLON CANCER SCREENING ANNUAL FIT 2018        ROS  Gen: No weight gain or excessive fatigue  Neuro: No unusual headache or dizziness  CV: +LLE edema. No chest pain, ALMANZA  Pulm: No sob or dyspnea  Endo: +hair thinning. No change in nails/skin. No excessive fatigue or weight gain      Objective:     Blood pressure 146/82, pulse (!) 50, temperature 36.2 °C (97.1 °F), resp. rate 16, weight 89.8 kg (198 lb), SpO2 96 %. Body mass index is 29.23 kg/m².  Physical Exam:  Alert, oriented in no acute  distress.  Eye contact is good, speech goal directed, affect bright.  HEENT: EOMI, conjunctiva non-injected, sclera non-icteric. No lid edema or eye drainage.   Gross hearing intact   Neck: supple with no cervical or supraclavicular lymphadenopathy, palpable thyroid nodules or thyromegaly.  Lungs: unlabored, clear to auscultation bilaterally with good excursion.  CV: regular rate and rhythm, no murmurs, no carotid bruits  Lower extremities color normal, telangectasias present left more than right, 1+ NP LLE and hosea, temperature normal  Skin: No rashes or lesions in visible areas  Neuro: CNs grossly intact. Gait steady. Strength all extremities 5/5.         Assessment and Plan:   Assessment/Plan:  1. Encounter to establish care    2. Essential hypertension  Not controlled. Discussed that she has HTN. She will log bp daily for the next few weeks and bring log back in so she can visualize numbers for herself. Enc increasing physical activity. Likely will need anti-hypertensive added at next visit  - COMP METABOLIC PANEL; Future    3. Gastroesophageal reflux disease without esophagitis  Stable on PPI.   - CBC WITH DIFFERENTIAL; Future    4. Acquired hypothyroidism  Stable as of last year. TSH due again for monitoring  - TSH; Future  - FREE THYROXINE; Future    5. Dyslipidemia  Stable as of last year. Lipids due before AWV in a few weeks  - LIPID PROFILE; Future    6. Left leg swelling  Discussed likelihood of vascular insufficiency. Enc compression stockings and elevation     7. Screen for colon cancer  Kit provided today   - OCCULT BLOOD FECES IMMUNOASSAY; Future    8. Visit for screening mammogram  - MA-SCREEN MAMMO W/CAD-BILAT; Future       Follow up:  Return keep 3/30 appointment .    Educated in proper administration of medication(s) ordered today including safety, possible SE, risks, benefits, rationale and alternatives to therapy.   Supportive care, differential diagnoses, and indications for immediate follow-up  discussed with patient.    Pathogenesis of diagnosis discussed including typical length and natural progression.    Instructed to return to clinic or nearest emergency department for any change in condition, further concerns, or worsening of symptoms.  Patient states understanding of the plan of care and discharge instructions.    Please note that this dictation was created using voice recognition software. I have made every reasonable attempt to correct obvious errors, but I expect that there are errors of grammar and possibly content that I did not discover before finalizing the note.    Followup: Return keep 3/30 appointment . sooner should new symptoms or problems arise.

## 2018-03-14 NOTE — ASSESSMENT & PLAN NOTE
Ongoing problem for a few years. Swelling only occurs in left leg. When laying supine, swelling goes down.

## 2018-03-14 NOTE — ASSESSMENT & PLAN NOTE
She has hx of elevated blood pressure readings but there has been no diagnosis of HTN. She used to monitor at home, but has not been doing this for many months.   Vitals 1/11/2016 1/20/2016 6/6/2016 12/12/2016 2/3/2017   SYSTOLIC 140 142 110 155 158   DIASTOLIC 84 94 64 88 98   PULSE 70 67 52 62 54     Vitals 2/10/2017 5/17/2017 6/12/2017 3/14/2018   SYSTOLIC 147 142 140 146   DIASTOLIC 87 88 80 82   PULSE  54 53 50

## 2018-03-14 NOTE — ASSESSMENT & PLAN NOTE
Long-standing history of GERD with a normal EGD in the past. Has been well controlled with pantoprazole 40 mg daily. Denies any hoarseness, dysphagia, hematemesis.

## 2018-03-14 NOTE — ASSESSMENT & PLAN NOTE
Long-standing hx of hypothyroid managed with levothyroxine 75 mcg daily. Last TSH stable June 2017. She has been losing her hair more over this past few months.

## 2018-03-14 NOTE — ASSESSMENT & PLAN NOTE
Long-standing history of dyslipidemia managed with low intensity simvastatin 10 mg daily. No myalgias.    Ref. Range 6/12/2017 08:52   Cholesterol,Tot Latest Ref Range: 100 - 199 mg/dL 185   Triglycerides Latest Ref Range: 0 - 149 mg/dL 199 (H)   HDL Latest Ref Range: >=40 mg/dL 52   LDL Latest Ref Range: <100 mg/dL 93

## 2018-03-19 ENCOUNTER — HOSPITAL ENCOUNTER (OUTPATIENT)
Facility: MEDICAL CENTER | Age: 78
End: 2018-03-19
Attending: NURSE PRACTITIONER
Payer: MEDICARE

## 2018-03-19 PROCEDURE — 82274 ASSAY TEST FOR BLOOD FECAL: CPT

## 2018-03-25 DIAGNOSIS — Z12.11 SCREEN FOR COLON CANCER: ICD-10-CM

## 2018-03-25 LAB
AMBIGUOUS DTTM AMBI4: NORMAL
HEMOCCULT STL QL IA: NEGATIVE

## 2018-03-26 ENCOUNTER — PATIENT OUTREACH (OUTPATIENT)
Dept: HEALTH INFORMATION MANAGEMENT | Facility: OTHER | Age: 78
End: 2018-03-26

## 2018-03-26 NOTE — PROGRESS NOTES
1. Attempt #:Final  2. HealthConnect Verified: yes    3. Verify PCP: yes    4. Care Team Updated:       •   DME Company (gait device, O2, CPAP, etc.): NO       •   Other Specialists (eye doctor, derm, GYN, cardiology, endo, etc): YES    5.  Reviewed/Updated the following with patient:       •   Communication Preference Obtained? YES       •   Preferred Pharmacy? YES       •   Preferred Lab? YES       •   Family History (document living status of immediate family members and if + hx of cancer, diabetes, hypertension, hyperlipidemia, heart attack, stroke) YES. Was Abstract Encounter opened and chart updated? YES    6. [email or web address] Activation: already active    7. [email or web address] Zaira: yes    8. Annual Wellness Visit Scheduling  Scheduling Status:Scheduled      9. Care Gap Scheduling (Attempt to Schedule EACH Overdue Care Gap!)     Health Maintenance Due   Topic Date Due   • Annual Wellness Visit  1940   • IMM DTaP/Tdap/Td Vaccine (1 - Tdap) 11/30/1961   • MAMMOGRAM  11/30/1982 // Will schedule later.   • IMM ZOSTER VACCINE  11/30/2002   • BONE DENSITY  11/30/2007 //W ill schedule later   • IMM PNEUMOCOCCAL 65+ (ADULT) LOW/MEDIUM RISK SERIES (1 of 2 - PCV13) 11/30/2007   • IMM INFLUENZA (1) 09/01/2017        Scheduled patient for Annual Wellness Visit    10. Patient was advised: “This is a free wellness visit. The provider will screen for medical conditions to help you stay healthy. If you have other concerns to address you may be asked to discuss these at a separate visit or there may be an additional fee.”     11. Patient was informed to arrive 15 min prior to their scheduled appointment and bring in their medication bottles.

## 2018-04-26 ENCOUNTER — TELEPHONE (OUTPATIENT)
Dept: MEDICAL GROUP | Facility: PHYSICIAN GROUP | Age: 78
End: 2018-04-26

## 2018-04-26 NOTE — TELEPHONE ENCOUNTER
Future Appointments       Provider Department Center    4/30/2018 2:20 PM MAMADOU Maldonado; [facility] ANNUAL WELLNESS VISIT PRE-VISIT PLANNING WITH OUTREACH    1.  Immunizations were updated in Epic using WebIZ?:No WebIZ record       •  WebIZ Recommendations: No WebIZ record       •  Is patient due for Tdap? NO       •  Is patient due for Shingles?NO    2.  MDX printed for Provider? YES     NO CARE PATHS  OPEN ORDER FOR MAMMOGRAM    Health Maintenance Due   Topic Date Due   • Annual Wellness Visit  SCHEDULED FOR 4/30/18   • IMM DTaP/Tdap/Td Vaccine (1 - Tdap) 11/30/1961   • MAMMOGRAM  ORDERED ON 3/14/18   • BONE DENSITY  11/30/2007   • IMM PNEUMOCOCCAL 65+ (ADULT) LOW/MEDIUM RISK SERIES (1 of 2 - PCV13) 11/30/2007

## 2018-04-30 ENCOUNTER — OFFICE VISIT (OUTPATIENT)
Dept: MEDICAL GROUP | Facility: PHYSICIAN GROUP | Age: 78
End: 2018-04-30
Payer: MEDICARE

## 2018-04-30 VITALS
HEIGHT: 69 IN | DIASTOLIC BLOOD PRESSURE: 82 MMHG | BODY MASS INDEX: 28.88 KG/M2 | SYSTOLIC BLOOD PRESSURE: 124 MMHG | WEIGHT: 195 LBS | TEMPERATURE: 97.2 F | HEART RATE: 55 BPM | OXYGEN SATURATION: 95 %

## 2018-04-30 DIAGNOSIS — M79.89 LEFT LEG SWELLING: ICD-10-CM

## 2018-04-30 DIAGNOSIS — E78.5 DYSLIPIDEMIA: ICD-10-CM

## 2018-04-30 DIAGNOSIS — Z78.0 POSTMENOPAUSAL: ICD-10-CM

## 2018-04-30 DIAGNOSIS — D49.2 TUMOR OF SOFT TISSUE OF NECK: ICD-10-CM

## 2018-04-30 DIAGNOSIS — K21.9 GASTROESOPHAGEAL REFLUX DISEASE WITHOUT ESOPHAGITIS: ICD-10-CM

## 2018-04-30 DIAGNOSIS — Z00.00 MEDICARE ANNUAL WELLNESS VISIT, INITIAL: ICD-10-CM

## 2018-04-30 DIAGNOSIS — E03.9 ACQUIRED HYPOTHYROIDISM: ICD-10-CM

## 2018-04-30 PROBLEM — I10 HYPERTENSION: Status: RESOLVED | Noted: 2017-02-03 | Resolved: 2018-04-30

## 2018-04-30 PROCEDURE — 99999 PR ANNUAL WELLNESS VISIT-INCLUDES PPPS SUBSEQUE*: CPT | Performed by: NURSE PRACTITIONER

## 2018-04-30 PROCEDURE — G0439 PPPS, SUBSEQ VISIT: HCPCS | Performed by: NURSE PRACTITIONER

## 2018-04-30 ASSESSMENT — ACTIVITIES OF DAILY LIVING (ADL): BATHING_REQUIRES_ASSISTANCE: 0

## 2018-04-30 ASSESSMENT — PATIENT HEALTH QUESTIONNAIRE - PHQ9: CLINICAL INTERPRETATION OF PHQ2 SCORE: 0

## 2018-04-30 ASSESSMENT — PAIN SCALES - GENERAL: PAINLEVEL: NO PAIN

## 2018-04-30 NOTE — ASSESSMENT & PLAN NOTE
Chronic problem well controlled with pantoprazole. Followed by PCP. Monitor for worsening symptoms.

## 2018-04-30 NOTE — ASSESSMENT & PLAN NOTE
Chronic problem that remains stable managed with compression stockings and elevation. Followed by PCP. Monitor.

## 2018-04-30 NOTE — ASSESSMENT & PLAN NOTE
Chronic problem stable on levothyroxine based on TSH labs one month ago. Followed by PCP with TSH due in one year for monitoring.

## 2018-04-30 NOTE — ASSESSMENT & PLAN NOTE
Chronic problem stable. Followed by [facility] annually with MRI. Next MRI due Oct 2018.

## 2018-04-30 NOTE — PROGRESS NOTES
Chief Complaint   Patient presents with   • Annual Wellness Visit         HPI:  Reina is a 77 y.o. here for Medicare Annual Wellness Visit    Patient Active Problem List    Diagnosis Date Noted   • Left leg swelling 03/14/2018   • Dyslipidemia 12/12/2016   • Gastroesophageal reflux disease without esophagitis 12/12/2016   • Tumor of soft tissue of neck 12/12/2016   • Hypothyroidism        Current Outpatient Prescriptions   Medication Sig Dispense Refill   • pantoprazole (PROTONIX) 40 MG Tablet Delayed Response TAKE ONE TABLET BY MOUTH DAILY 90 Tab 0   • levothyroxine (SYNTHROID) 75 MCG Tab TAKE ONE TABLET BY MOUTH EVERY MORNING ON AN EMPTY STOMACH (SYNTHROID) 90 Tab 0   • simvastatin (ZOCOR) 10 MG Tab TAKE ONE TABLET BY MOUTH EVERY EVENING 30 Tab 5     No current facility-administered medications for this visit.         Patient is taking medications as noted in medication list.  Current supplements as per medication list.     Allergies: Penicillins and Sulfa drugs    Current social contact/activities: Rastafari, senior activities   Patient's perception of their health: excellent    Is patient current with immunizations? No, due for PREVNAR (PCV13)  and TDAP. Patient is interested in receiving NONE today.    She  reports that she has never smoked. She has never used smokeless tobacco. She reports that she does not drink alcohol or use drugs.  Counseling given: Not Answered        DPA/Advanced directive: Patient does not have an Advanced Directive.  A packet and workshop information was given on Advanced Directives.    ROS:    Gait: Uses no assistive device   Ostomy: No   Other tubes: No   Amputations: No   Chronic oxygen use: No   Last eye exam: 2018   Wears hearing aids: No   : Denies any urinary leakage during the last 6 months      Screening:  Depression Screening  Little interest or pleasure in doing things?  0 - not at all  Feeling down, depressed, or hopeless? 0 - not at all  Patient Health Questionnaire Score:  0    If depressive symptoms identified deferred to follow up visit unless specifically addressed in assessment and plan.    Interpretation of PHQ-9 Total Score   Score Severity   1-4 No Depression   5-9 Mild Depression   10-14 Moderate Depression   15-19 Moderately Severe Depression   20-27 Severe Depression    Screening for Cognitive Impairment  Three Minute Recall (apple, watch, karina)  3/3 Village, kitchen, baby  Draw clock face with all 12 numbers set to the hand to show 10 minutes past 11 o'clock  1 3 /5 time 3:40  If cognitive concerns identified, deferred for follow up unless specifically addressed in assessment and plan.    Fall Risk Assessment  Has the patient had two or more falls in the last year or any fall with injury in the last year?  No  If fall risk identified, deferred for follow up unless specifically addressed in assessment and plan.    Safety Assessment  Throw rugs on floor.  No  Handrails on all stairs.  Yes  Good lighting in all hallways.  Yes  Difficulty hearing.  No  Patient counseled about all safety risks that were identified.    Functional Assessment ADLs  Are there any barriers preventing you from cooking for yourself or meeting nutritional needs?  No.    Are there any barriers preventing you from driving safely or obtaining transportation?  No.    Are there any barriers preventing you from using a telephone or calling for help?  No.    Are there any barriers preventing you from shopping?  No.    Are there any barriers preventing you from taking care of your own finances?  No.    Are there any barriers preventing you from managing your medications?  No.    Are there any barriers preventing you from showering, bathing or dressing yourself? No.    Are you currently engaging any exercise or physical activity?  Yes.  Walks while working    Health Maintenance Summary                Annual Wellness Visit Overdue 1940     IMM DTaP/Tdap/Td Vaccine Overdue 11/30/1961     MAMMOGRAM Overdue  "11/30/1982     BONE DENSITY Overdue 11/30/2007     IMM PNEUMOCOCCAL 65+ (ADULT) LOW/MEDIUM RISK SERIES Overdue 11/30/2007     IMM INFLUENZA Next Due 9/1/2018      Patient Declined 1/11/2016     COLON CANCER SCREENING ANNUAL FIT Next Due 3/19/2019      Done 3/19/2018 OCCULT BLOOD FECES IMMUNOASSAY     Patient has more history with this topic...          Patient Care Team:  MAMADOU Maldonado as PCP - General (Family Medicine)  [facility] as Home Health Provider (DME Supplier)  Rafy Herndon M.D. as Consulting Physician (Ophthalmology)   Follows ENT at [facility], but unsure of name of provider     Social History   Substance Use Topics   • Smoking status: Never Smoker   • Smokeless tobacco: Never Used   • Alcohol use No     Family History   Problem Relation Age of Onset   • Cancer Mother      multiple myeloma   • Heart Disease Mother    • Heart Attack Father    • Stroke Father    • Heart Disease Father    • Heart Disease Brother    • Lung Disease Brother    • Stroke Sister    • Sleep Apnea Neg Hx    • Diabetes Neg Hx      She  has a past medical history of Chronic meniscal tear of knee; Dyslipidemia; GERD (gastroesophageal reflux disease); Hypothyroidism; Pleomorphic adenoma of parotid gland (1992, 2004); and Sleep apnea.   Past Surgical History:   Procedure Laterality Date   • CATARACT EXTRACTION WITH IOL     • LUMPECTOMY Left     non-cancerous   • PAROTIDECTOMY Left 1992, 2004    recurrent pleomorphic adenoma left parotid gland         Exam:   Blood pressure 124/82, pulse (!) 55, temperature 36.2 °C (97.2 °F), height 1.753 m (5' 9\"), weight 88.5 kg (195 lb), SpO2 95 %. Body mass index is 28.8 kg/m².    Hearing good.    Dentition good  Alert, oriented in no acute distress.  Eye contact is good, speech goal directed, affect calm      Assessment and Plan. The following treatment and monitoring plan is recommended:    1. Medicare annual wellness visit, initial  Annual Wellness Visit - Includes PPPS " Subsequent ()   2. Dyslipidemia  Chronic problem stable on simvastatin based on last lipids one month ago. Followed by PCP with next lipids due in one year   3. Gastroesophageal reflux disease without esophagitis  Chronic problem well controlled with pantoprazole. Followed by PCP. Monitor for worsening symptoms.    4. Acquired hypothyroidism  Chronic problem stable on levothyroxine based on TSH labs one month ago. Followed by PCP with TSH due in one year for monitoring.    5. Left leg swelling  Chronic problem that remains stable managed with compression stockings and elevation. Followed by PCP. Monitor.    6. Tumor of soft tissue of neck  Chronic problem stable. Followed by [facility] annually with MRI. Next MRI due Oct 2018.    7. Postmenopausal  DS-BONE DENSITY STUDY (DEXA)             Services suggested: No services needed at this time  Health Care Screening: Age-appropriate preventive services recommended by USPTF and ACIP covered by Medicare were discussed today. Services ordered if indicated and agreed upon by the patient.  Referrals offered: PT/OT/Nutrition counseling/Behavioral Health/Smoking cessation as per orders if indicated.    Discussion today about general wellness and lifestyle habits:    · Prevent falls and reduce trip hazards; Cautioned about securing or removing rugs.  · Have a working fire alarm and carbon monoxide detector;   · Engage in regular physical activity and social activities     Follow-up: Return in about 6 months (around 10/30/2018), or borderline HTN monitoring .     I attest that I saw the patient on this date. The attached note is mine, however, due to technical issues I am not showing as the author of the note.  SIMBA Maldonado.

## 2018-04-30 NOTE — ASSESSMENT & PLAN NOTE
Chronic problem stable on simvastatin based on last lipids one month ago. Followed by PCP with next lipids due in one year

## 2018-05-23 ENCOUNTER — HOSPITAL ENCOUNTER (OUTPATIENT)
Dept: RADIOLOGY | Facility: MEDICAL CENTER | Age: 78
End: 2018-05-23
Attending: NURSE PRACTITIONER
Payer: MEDICARE

## 2018-05-23 DIAGNOSIS — Z78.0 POSTMENOPAUSAL: ICD-10-CM

## 2018-05-23 DIAGNOSIS — Z12.31 VISIT FOR SCREENING MAMMOGRAM: ICD-10-CM

## 2018-05-23 PROCEDURE — 77067 SCR MAMMO BI INCL CAD: CPT

## 2018-05-23 PROCEDURE — 77080 DXA BONE DENSITY AXIAL: CPT

## 2018-05-29 ENCOUNTER — HOSPITAL ENCOUNTER (OUTPATIENT)
Dept: RADIOLOGY | Facility: MEDICAL CENTER | Age: 78
End: 2018-05-29

## 2018-05-30 ENCOUNTER — TELEPHONE (OUTPATIENT)
Dept: MEDICAL GROUP | Facility: PHYSICIAN GROUP | Age: 78
End: 2018-05-30

## 2018-05-30 NOTE — LETTER
May 30, 2018         Reina Munoz  [address]  [address]  [place] [address]        Dear Reina:  Your mammogram does show dense breast tissue but is otherwise normal. If there is no history of breast cancer, then based on age, you will not need to repeat this test any further.     Shannen Eddy D.O.   Covering for SIMBA Maldonado.     Below are the results from your recent visit:    Resulted Orders   MA-MAMMO SCREENING BILAT W/THERESA W/CAD    Narrative    5/23/2018 12:59 PM    HISTORY/REASON FOR EXAM:  Routine Mammographic Screening.      TECHNIQUE/EXAM DESCRIPTION:  Bilateral tomosynthesis screening mammography was performed with standard mammographic images generated from the data set. Images were reviewed and interpreted with CAD.    COMPARISON:   7/15/2013    FINDINGS:    There are scattered areas of fibroglandular density.  There is no dominant mass, suspicious calcification, or any secondary malignant sign.  Bilateral scattered benign calcifications are again seen.      Impression    1.  Breasts have scattered areas of fibroglandular density, with no radiographic evidence of malignancy.    2.  Screening mammogram in one year is recommended.      R2 Category 2: Benign Finding(s)           If you have any questions or concerns, please don't hesitate to call.    Sincerely,      MAMADOU Maldonado    Electronically Signed

## 2018-05-30 NOTE — TELEPHONE ENCOUNTER
----- Message from Shannen Eddy D.O. sent at 5/30/2018  7:49 AM PDT -----  Please advise pt that the mammogram does show dense breast tissue but is otherwise normal. If there is no history of breast cancer, then based on age, he will not need to repeat this test any further.    Shannen Eddy D.O.   Covering for MAMADOU Maldonado

## 2018-06-06 ENCOUNTER — OFFICE VISIT (OUTPATIENT)
Dept: URGENT CARE | Facility: CLINIC | Age: 78
End: 2018-06-06
Payer: MEDICARE

## 2018-06-06 VITALS
DIASTOLIC BLOOD PRESSURE: 80 MMHG | WEIGHT: 194 LBS | BODY MASS INDEX: 28.73 KG/M2 | SYSTOLIC BLOOD PRESSURE: 120 MMHG | HEART RATE: 62 BPM | OXYGEN SATURATION: 96 % | RESPIRATION RATE: 18 BRPM | HEIGHT: 69 IN | TEMPERATURE: 98.5 F

## 2018-06-06 DIAGNOSIS — H10.33 ACUTE BACTERIAL CONJUNCTIVITIS OF BOTH EYES: ICD-10-CM

## 2018-06-06 DIAGNOSIS — I87.2 VENOUS INSUFFICIENCY: ICD-10-CM

## 2018-06-06 DIAGNOSIS — J01.40 ACUTE NON-RECURRENT PANSINUSITIS: Primary | ICD-10-CM

## 2018-06-06 DIAGNOSIS — J06.9 URI WITH COUGH AND CONGESTION: ICD-10-CM

## 2018-06-06 PROCEDURE — 99214 OFFICE O/P EST MOD 30 MIN: CPT | Performed by: PHYSICIAN ASSISTANT

## 2018-06-06 RX ORDER — POLYMYXIN B SULFATE AND TRIMETHOPRIM 1; 10000 MG/ML; [USP'U]/ML
1 SOLUTION OPHTHALMIC EVERY 4 HOURS
Qty: 10 ML | Refills: 0 | Status: SHIPPED | OUTPATIENT
Start: 2018-06-06 | End: 2018-08-25

## 2018-06-06 RX ORDER — DOXYCYCLINE HYCLATE 100 MG
100 TABLET ORAL 2 TIMES DAILY
Qty: 14 TAB | Refills: 0 | Status: SHIPPED | OUTPATIENT
Start: 2018-06-06 | End: 2018-06-13

## 2018-06-06 RX ORDER — BENZONATATE 200 MG/1
200 CAPSULE ORAL 3 TIMES DAILY PRN
Qty: 60 CAP | Refills: 0 | Status: SHIPPED | OUTPATIENT
Start: 2018-06-06 | End: 2018-08-25

## 2018-06-06 NOTE — PROGRESS NOTES
Subjective:      Pt is a 77 y.o. female who presents with Sinus Problem (OVer 4 wks stuffy nose , postnasal dripping , dry cough , today puffy face .)            HPI  PT presents to  clinic today complaining of sore throat, pressure in ears, cough, red and watery eyes, fatigue, runny nose, post nasal drip, sinus pressure and headache. PT denies CP, SOB, NVD, abdominal pain, joint pain. PT states these symptoms began around 4 weeks ago. PT states the pain is a 5/10 with couging, aching in nature and worse at night.  Pt has not taken any RX medications for this condition. PT also noted chronic LLE edema in her leg which has a clear US for DVT and pt wishes to see specialist.  The pt's medication list, problem list, and allergies have been evaluated and reviewed during today's visit.    PMH:  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    • Dyslipidemia    • GERD (gastroesophageal reflux disease)    • Hypothyroidism    • Pleomorphic adenoma of parotid gland ,     recurrent, yearly MRI   • Sleep apnea        PSH:  Past Surgical History:   Procedure Laterality Date   • CATARACT EXTRACTION WITH IOL     • LUMPECTOMY Left     non-cancerous   • PAROTIDECTOMY Left 2004    recurrent pleomorphic adenoma left parotid gland       Fam Hx:    family history includes Cancer in her mother and sister; Heart Attack in her father; Heart Disease in her brother, father, and mother; Lung Disease in her brother; Stroke in her father and sister.  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      Social History Main Topics   • Smoking status: Never Smoker   • Smokeless tobacco: Never Used   • Alcohol use No   • Drug use: No   • Sexual activity: Yes     Partners: Male     Other Topics Concern   • Not on file     Social History Narrative   • No narrative on file         Medications:    Current Outpatient Prescriptions:   •  benzonatate (TESSALON) 200 MG capsule, Take 1 Cap by mouth 3 times a day as needed., Disp: 60 Cap, Rfl: 0  •  polymixin-trimethoprim (POLYTRIM) 87213-6.1 UNIT/ML-% Solution, Place 1 Drop in both eyes every 4 hours., Disp: 10 mL, Rfl: 0  •  doxycycline (VIBRAMYCIN) 100 MG Tab, Take 1 Tab by mouth 2 times a day for 7 days., Disp: 14 Tab, Rfl: 0  •  pantoprazole (PROTONIX) 40 MG Tablet Delayed Response, TAKE ONE TABLET BY MOUTH DAILY, Disp: 90 Tab, Rfl: 0  •  levothyroxine (SYNTHROID) 75 MCG Tab, TAKE ONE TABLET BY MOUTH EVERY MORNING ON AN EMPTY STOMACH (SYNTHROID), Disp: 90 Tab, Rfl: 0  •  simvastatin (ZOCOR) 10 MG Tab, TAKE ONE TABLET BY MOUTH EVERY EVENING, Disp: 30 Tab, Rfl: 5      Allergies:  Penicillins and Sulfa drugs    ROS  Review of Systems   Constitutional: Positive for  malaise/fatigue. Negative for fever.   HENT: Positive for congestion and sore throat.    Eyes: Negative for blurred vision, double vision and photophobia.   Respiratory: Positive for cough and sputum production. Negative for hemoptysis, shortness of breath and wheezing.    Cardiovascular: Negative for chest pain and palpitations.   Gastrointestinal: Negative for nausea, vomiting, abdominal pain, diarrhea and constipation.   Genitourinary: Negative for dysuria and flank pain.   Musculoskeletal: Negative for joint pain and myalgias. +LLE edema-chronic  Skin: Negative for itching and rash.   Neurological: Positive for headaches. Negative for dizziness and tingling.   Endo/Heme/Allergies: Does not bruise/bleed easily.   Psychiatric/Behavioral: Negative for  "depression. The patient is not nervous/anxious.           Objective:     /80   Pulse 62   Temp 36.9 °C (98.5 °F)   Resp 18   Ht 1.753 m (5' 9\")   Wt 88 kg (194 lb)   SpO2 96%   BMI 28.65 kg/m²      Physical Exam         Physical Exam   Constitutional: Pt is oriented to person, place, and time. Pt appears well-developed and well-nourished. No distress.   HENT:   Head: Normocephalic and atraumatic.   Right Ear: Hearing, tympanic membrane, external ear and ear canal normal.   Left Ear: Hearing, tympanic membrane, external ear and ear canal normal.   Nose: Mucosal edema, rhinorrhea and sinus tenderness present. No nose lacerations, nasal deformity, septal deviation or nasal septal hematoma. No epistaxis.  No foreign bodies. Right sinus exhibits maxillary sinus tenderness and frontal sinus tenderness. Left sinus exhibits maxillary sinus tenderness and frontal sinus tenderness.   Mouth/Throat: Oropharynx is clear and moist. No oropharyngeal exudate.   Eyes: EOM are normal. Conjunctiva on left and right are injected. Pupils are equal, round, and reactive to light. Right eye exhibits discharge. Left eye exhibits discharge. No scleral icterus.   Neck: Normal range of motion. Neck supple. No tracheal deviation present. No thyromegaly present.   Cardiovascular: Normal rate, regular rhythm, normal heart sounds and intact distal pulses.  Exam reveals no gallop and no friction rub.    No murmur heard.  Pulmonary/Chest: Effort normal and breath sounds normal. No respiratory distress. Pt has no wheezes. Pt has no rales. Pt exhibits no tenderness.   Abdominal: Soft. Bowel sounds are normal. Pt exhibits no distension and no mass. There is no tenderness. There is no rebound and no guarding.   Musculoskeletal: Normal range of motion. +LLE edema without changes to skin or tenderness  Lymphadenopathy:     Pt has no cervical adenopathy.   Neurological: Pt is alert and oriented to person, place, and time. Pt has normal " reflexes. Pt displays normal reflexes. No cranial nerve deficit. Pt exhibits normal muscle tone. Coordination normal.   Skin: Skin is warm and dry. No rash noted. No erythema.   Psychiatric: Pt has a normal mood and affect. Pt behavior is normal. Judgment and thought content normal.          Assessment/Plan:     1. Acute non-recurrent pansinusitis    - doxycycline (VIBRAMYCIN) 100 MG Tab; Take 1 Tab by mouth 2 times a day for 7 days.  Dispense: 14 Tab; Refill: 0    2. URI with cough and congestion    - benzonatate (TESSALON) 200 MG capsule; Take 1 Cap by mouth 3 times a day as needed.  Dispense: 60 Cap; Refill: 0    3. Acute bacterial conjunctivitis of both eyes    - polymixin-trimethoprim (POLYTRIM) 86142-1.1 UNIT/ML-% Solution; Place 1 Drop in both eyes every 4 hours.  Dispense: 10 mL; Refill: 0    4. Venous insufficiency-LLE chronic    - REFERRAL TO VASCULAR MEDICINE Reason for Referral: Established Vascular Disease    US has been performed on LLE swelling and cleared of DVT in past.  STRICT ER precautions discussed  PT wishes to see specialist regarding the LLE edema  Rest, fluids encouraged.  OTC decongestant for congestion/cough  AVS with medical info given.  Pt was in full understanding and agreement with the plan.  Follow-up as needed if symptoms worsen or fail to improve.

## 2018-06-13 ENCOUNTER — TELEPHONE (OUTPATIENT)
Dept: MEDICAL GROUP | Facility: PHYSICIAN GROUP | Age: 78
End: 2018-06-13

## 2018-06-13 ENCOUNTER — TELEPHONE (OUTPATIENT)
Dept: URGENT CARE | Facility: CLINIC | Age: 78
End: 2018-06-13

## 2018-06-13 RX ORDER — AMOXICILLIN AND CLAVULANATE POTASSIUM 875; 125 MG/1; MG/1
1 TABLET, FILM COATED ORAL 2 TIMES DAILY
Qty: 14 TAB | Refills: 0 | Status: SHIPPED | OUTPATIENT
Start: 2018-06-13 | End: 2018-08-25

## 2018-06-13 NOTE — TELEPHONE ENCOUNTER
Patient states she is not doing better and the antibiotic caused a severe upset stomach and sore tongue.  She has been informed your not here today and is going to try to get a hold of her PCP but if you get to it before she talks to her pcp she would like something different to be sent to her pharmacy for her sinus infection.

## 2018-06-14 NOTE — TELEPHONE ENCOUNTER
Patient called she stated that she has had a bad reaction to the doxycycline. Her tongue swell up and she had a lot of nausea. She would like for you to give her a new medication for the infection she has in her eyes.

## 2018-06-14 NOTE — TELEPHONE ENCOUNTER
Called patient to change antibiotic. States she had hives to penicillin in childhood years, but she has been able to tolerate amoxicillin without allergic side effects. I will send her in a prescription for Augmentin and have instructed her to have Benadryl on hand in case of any allergic reaction.    SIMBA Maldonado.

## 2018-06-15 ENCOUNTER — OFFICE VISIT (OUTPATIENT)
Dept: VASCULAR LAB | Facility: MEDICAL CENTER | Age: 78
End: 2018-06-15
Attending: INTERNAL MEDICINE
Payer: MEDICARE

## 2018-06-15 VITALS
WEIGHT: 200.5 LBS | DIASTOLIC BLOOD PRESSURE: 73 MMHG | SYSTOLIC BLOOD PRESSURE: 152 MMHG | BODY MASS INDEX: 29.7 KG/M2 | HEIGHT: 69 IN | HEART RATE: 63 BPM

## 2018-06-15 DIAGNOSIS — I87.2 EDEMA OF LEFT LOWER LEG DUE TO PERIPHERAL VENOUS INSUFFICIENCY: ICD-10-CM

## 2018-06-15 DIAGNOSIS — I87.2 VENOUS INSUFFICIENCY: Chronic | ICD-10-CM

## 2018-06-15 DIAGNOSIS — E78.5 DYSLIPIDEMIA: ICD-10-CM

## 2018-06-15 DIAGNOSIS — R60.0 EDEMA OF LEFT LOWER LEG DUE TO PERIPHERAL VENOUS INSUFFICIENCY: ICD-10-CM

## 2018-06-15 DIAGNOSIS — E03.9 ACQUIRED HYPOTHYROIDISM: ICD-10-CM

## 2018-06-15 PROCEDURE — 99202 OFFICE O/P NEW SF 15 MIN: CPT | Performed by: FAMILY MEDICINE

## 2018-06-15 PROCEDURE — 99214 OFFICE O/P EST MOD 30 MIN: CPT | Performed by: FAMILY MEDICINE

## 2018-06-15 ASSESSMENT — ENCOUNTER SYMPTOMS
COUGH: 0
NERVOUS/ANXIOUS: 0
TREMORS: 0
INSOMNIA: 0
WHEEZING: 0
DIZZINESS: 0
ABDOMINAL PAIN: 0
BLURRED VISION: 0
FEVER: 0
HEADACHES: 0
MYALGIAS: 0
PALPITATIONS: 0
CHILLS: 0
WEAKNESS: 0
SEIZURES: 0
SORE THROAT: 0
DIARRHEA: 0
ORTHOPNEA: 0
BLOOD IN STOOL: 0
VOMITING: 0
DEPRESSION: 0
DOUBLE VISION: 0
NAUSEA: 0
SHORTNESS OF BREATH: 0
HEMOPTYSIS: 0
BRUISES/BLEEDS EASILY: 0
FOCAL WEAKNESS: 0

## 2018-06-15 NOTE — PROGRESS NOTES
INITIAL VASCULAR VISIT  Subjective:   Reina Munoz is a 77 y.o. female who presents today 6/15/2018 for   Chief Complaint   Patient presents with   • New Patient     venous insufficiency   Referred by RAFAEL Yates from [facility].    HPI:  Seen by PCP and UC and evaluated and noted LLE edema.    Has noted worsening over last 6 mo.   Denies pain or radiation of pain.   Worsening with standing and notes increased fluid in foot and lower leg.  No blue toes, denies claudication.   Has associated meniscal tear Left knee, had MRI and eval by orthopedist.  No mechanical symptoms.    Had LLE u/s was negative in 6/2017 and 6/2016.   No hx of VTE or blood clotting disorder.   Has varicose veins for > 5 years.  Denies thrombosis or associated pain.  Has been using compression stockings OTC.  When wearing gets mild redness.     No recent changes to any meds.   Last TSH was normal.      No prior ASCVD.  Reports mild high BP but HBP log was normal.  Normal HBP 120s/70s.  Does not take anti-HTN meds.      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    • Dyslipidemia    • GERD (gastroesophageal reflux disease)    • Hypothyroidism    • Pleomorphic adenoma of parotid gland 1992, 2004    recurrent, yearly MRI   • Sleep apnea      Patient Active Problem List   Diagnosis   • Hypothyroidism   • Dyslipidemia   • Gastroesophageal reflux disease without esophagitis   • Tumor of soft tissue of neck   • Left leg swelling     Past Surgical History:   Procedure Laterality Date   • CATARACT EXTRACTION WITH IOL     • LUMPECTOMY Left     non-cancerous   • PAROTIDECTOMY Left 1992, 2004    recurrent pleomorphic adenoma left parotid gland     Family History   Problem Relation Age of Onset   • Cancer Mother      multiple myeloma   • Heart Disease Mother    • Heart Attack Father    • Stroke Father    • Heart Disease Father    • Heart Disease Brother    • Lung Disease Brother    • Stroke Sister       • Cancer Sister      breast   • Sleep Apnea Neg Hx    • Diabetes Neg Hx      History   Smoking Status   • Never Smoker   Smokeless Tobacco   • Never Used     Social History   Substance Use Topics   • Smoking status: Never Smoker   • Smokeless tobacco: Never Used   • Alcohol use No     Outpatient Encounter Prescriptions as of 6/15/2018   Medication Sig Dispense Refill   • amoxicillin-clavulanate (AUGMENTIN) 875-125 MG Tab Take 1 Tab by mouth 2 times a day. 14 Tab 0   • polymixin-trimethoprim (POLYTRIM) 67905-6.1 UNIT/ML-% Solution Place 1 Drop in both eyes every 4 hours. 10 mL 0   • levothyroxine (SYNTHROID) 75 MCG Tab TAKE ONE TABLET BY MOUTH EVERY MORNING ON AN EMPTY STOMACH (SYNTHROID) 90 Tab 0   • simvastatin (ZOCOR) 10 MG Tab TAKE ONE TABLET BY MOUTH EVERY EVENING 30 Tab 5   • benzonatate (TESSALON) 200 MG capsule Take 1 Cap by mouth 3 times a day as needed. 60 Cap 0   • pantoprazole (PROTONIX) 40 MG Tablet Delayed Response TAKE ONE TABLET BY MOUTH DAILY 90 Tab 0     No facility-administered encounter medications on file as of 6/15/2018.      Allergies   Allergen Reactions   • Doxycycline      Tongue swelling and nausea   • Penicillins      rash   • Sulfa Drugs      Cannot recall side effect     DIET AND EXERCISE:  Weight Change:stable, no fluctuations   Diet: common adult, low salt   Exercise: moderate regular exercise program     Review of Systems   Constitutional: Negative for chills, fever and malaise/fatigue.   HENT: Negative for nosebleeds, sore throat and tinnitus.    Eyes: Negative for blurred vision and double vision.   Respiratory: Negative for cough, hemoptysis, shortness of breath and wheezing.    Cardiovascular: Negative for chest pain, palpitations, orthopnea and leg swelling.   Gastrointestinal: Negative for abdominal pain, blood in stool, diarrhea, melena, nausea and vomiting.   Genitourinary: Negative for hematuria.   Musculoskeletal: Negative for joint pain and myalgias.   Skin: Negative for  "itching and rash.   Neurological: Negative for dizziness, tremors, focal weakness, seizures, weakness and headaches.   Endo/Heme/Allergies: Does not bruise/bleed easily.   Psychiatric/Behavioral: Negative for depression. The patient is not nervous/anxious and does not have insomnia.       Objective:     Vitals:    06/15/18 0826 06/15/18 0832   BP: 159/76 152/73   Pulse: 63 63   Weight: 91 kg (200 lb 9 oz) 90.9 kg (200 lb 8 oz)   Height: 1.753 m (5' 9\") 1.753 m (5' 9\")      BP Readings from Last 4 Encounters:   06/15/18 152/73   06/06/18 120/80   04/30/18 124/82   03/14/18 146/82     Pulse Readings from Last 4 Encounters:   06/15/18 63   06/06/18 62   04/30/18 (!) 55   03/14/18 (!) 50     Body mass index is 29.61 kg/m².   BMI Readings from Last 4 Encounters:   06/15/18 29.61 kg/m²   06/06/18 28.65 kg/m²   04/30/18 28.80 kg/m²   03/14/18 29.23 kg/m²     Physical Exam   Constitutional: She is oriented to person, place, and time. She appears well-developed and well-nourished. No distress.   HENT:   Head: Normocephalic and atraumatic.   Neck: Normal range of motion. Neck supple. No JVD present. Carotid bruit is not present. No thyromegaly present.   Cardiovascular: Normal rate, regular rhythm, normal heart sounds and intact distal pulses.  PMI is not displaced.  Exam reveals no gallop and no friction rub.    No murmur heard.  Pulses:       Carotid pulses are 2+ on the right side, and 2+ on the left side.       Radial pulses are 2+ on the right side, and 2+ on the left side.        Dorsalis pedis pulses are 2+ on the right side, and 2+ on the left side.        Posterior tibial pulses are 2+ on the right side, and 2+ on the left side.   Edema:  RLE:  0,  mid-tibial circ = 36.5  LLE:  1+ pitting to mid-tibial, mid-tib circ = 37.5  Spider telangectasia:     RLE:  scattered LLE: scattered   Varicosities:       RLE: diffuse, non-thrombosed, <3mm   LLE: diffuse, non-thrombosed, >3mm at calf, plethoric at thigh  Corona " phlebectatica:    RLE:  None      LLE:  None   Cording:       RLE:  None   LLE: None      Pulmonary/Chest: Effort normal and breath sounds normal.   Abdominal: Soft. Bowel sounds are normal. She exhibits no distension and no mass. There is no tenderness. There is no rebound and no guarding.   Musculoskeletal: Normal range of motion. She exhibits no edema or tenderness.   Neurological: She is alert and oriented to person, place, and time. No cranial nerve deficit. She exhibits normal muscle tone. Coordination normal.   Skin: Skin is warm and dry. No rash noted. She is not diaphoretic. No cyanosis. Nails show no clubbing.        Psychiatric: She has a normal mood and affect.     Lab Results   Component Value Date    CHOLSTRLTOT 199 03/14/2018     (H) 03/14/2018    HDL 57 03/14/2018    TRIGLYCERIDE 191 (H) 03/14/2018           Lab Results   Component Value Date    SODIUM 143 03/14/2018    POTASSIUM 4.0 03/14/2018    CHLORIDE 106 03/14/2018    CO2 27 03/14/2018    GLUCOSE 93 03/14/2018    BUN 14 03/14/2018    CREATININE 0.87 03/14/2018    IFAFRICA >60 03/14/2018    IFNOTAFR >60 03/14/2018        Lab Results   Component Value Date    WBC 4.8 03/14/2018    RBC 4.98 03/14/2018    HEMOGLOBIN 15.2 03/14/2018    HEMATOCRIT 46.4 03/14/2018    MCV 93.2 03/14/2018    MCH 30.5 03/14/2018    MCHC 32.8 (L) 03/14/2018    MPV 12.9 03/14/2018     VASCULAR IMAGING:     LLE DUPLEX 6/7/17  No deep venous thrombosis.    LLE DUPLEX 6/2016  1.  No evidence of Left  lower extremity deep venous thrombosis.  2.  There are no incidental findings.     Medical Decision Making:  Today's Assessment / Status / Plan:     1. Venous insufficiency  LE Venous Duplex    LLE   2. Edema of left lower leg due to peripheral venous insufficiency  LE Venous Duplex   3. Dyslipidemia      uncontrolled based upon NLA guidelines    4. Acquired hypothyroidism      stable      Patient Type: Primary Prevention    Etiology of Established CVD if Present:   None        Lipid Management: Qualifies for Statin Therapy Based on 2013 ACC/AHA Guidelines: yes  National Lipid Association classification:  ASCVD calculator (contraindicated if ASCVD+, [id number])   10yr-risk calc: 15.4%     - recommended for moderate to high intensity     Clinical evidence of ASCVD:  None   Major RFs:     1) Age (Men>44, women>54):  yes   2) Fhx early CAD (<55 men, <65 women):  no   3) cigarette smoking:  no   4) high BP >139/89 or on tx: no   5) Low HDL-C (<40 men, <50 women):  no    NLA Risk Category:   Moderate: 2 major RFs, risk scoring >10%, other risk scoring (CAC, advanced lipid, hsCRP)  Tx threshold:  non-HDL-C >159, LDL-C >129  Tx goal:  non-HDL <130, LDL-C <100 (optional: apoB<90)   At goal:  No, as of 3/2018: NFX=795, non-NMK=076  Tx plan:  - recheck lipid with possible advanced profile at next appointment   - continue moderate intensity simvastatin or transition to atorvastatin 10-20mg in the future should LDL continue >100, non-HDL >130    Blood Pressure Management:Goal: ACC/AHA (2017) goal <130/80  Home BP at goal:  yes  Office BP at goal:  no  Plan:   - continue home BP monitoring, reviewed correct technique:  Yes   - order 24h ABPM:  UNDECIDED, consider to eval for white coat HTN if persistent office>home BP    Glycemic Status: Normal  - continue heart healthy diet     Anti-Platelet/Anti-Coagulant Tx: due to CVI, recommend starting ASA 81mg     Smoking: continued complete avoidance of all tobacco products      Physical Activity: advised to engage in walking >150min/week    Weight Management and Nutrition: Dietary plan was discussed with patient at this visit including ongoing low CHO and fat, reduced salt diet     Other:   1) chronic venous insuff (moderate)  - check LLE venous reflux study  - start ASA   - horse chestnut seed extract 300mg BID for 3 to 6mo and assess efficacy  - compression stockings Rx 20-30mmHg given to wear as tolerated   - PM elevation of legs     2) varicose veins with  spiders - diffusely  - continue tx plan as above     Instructed to follow-up with PCP for remainder of adult medical needs: yes  We will partner with other providers in the management of established vascular disease and cardiometabolic risk factors.    Studies to Be Obtained:  Venous reflux study, LLE   Labs to Be Obtained:  Recheck CMP, lipid NMR at next appointment     Follow up in: 6 weeks with home BP log     Luis Miguel Ardon M.D.

## 2018-06-15 NOTE — PATIENT INSTRUCTIONS
1) start using compression stockings as tolerated     2) consider use of horse chestnut extract 300mg 2 times daily     3) check reflux vein study     4) keep BP log     5) follow-up in 6 to 8 weeks to review     6) continue all other medications, start aspirin 81mg daily with food

## 2018-06-19 ENCOUNTER — TELEPHONE (OUTPATIENT)
Dept: URGENT CARE | Facility: CLINIC | Age: 78
End: 2018-06-19

## 2018-06-19 NOTE — TELEPHONE ENCOUNTER
PT noted doxycycline caused her upset stomach and sore tongue and she had her PCP place her on Augmentin and is doing better now.   Updated chart with issue with Doxycycline.  Pt thankful for the phone call.  Jacob Londono PA-C

## 2018-06-20 ENCOUNTER — HOSPITAL ENCOUNTER (OUTPATIENT)
Dept: RADIOLOGY | Facility: MEDICAL CENTER | Age: 78
End: 2018-06-20
Attending: FAMILY MEDICINE
Payer: MEDICARE

## 2018-06-20 DIAGNOSIS — R60.0 EDEMA OF LEFT LOWER LEG DUE TO PERIPHERAL VENOUS INSUFFICIENCY: ICD-10-CM

## 2018-06-20 DIAGNOSIS — I87.2 EDEMA OF LEFT LOWER LEG DUE TO PERIPHERAL VENOUS INSUFFICIENCY: ICD-10-CM

## 2018-06-20 DIAGNOSIS — I87.2 VENOUS INSUFFICIENCY: Chronic | ICD-10-CM

## 2018-06-20 PROCEDURE — 93971 EXTREMITY STUDY: CPT

## 2018-06-21 ENCOUNTER — TELEPHONE (OUTPATIENT)
Dept: VASCULAR LAB | Facility: MEDICAL CENTER | Age: 78
End: 2018-06-21

## 2018-06-28 RX ORDER — SIMVASTATIN 10 MG
10 TABLET ORAL EVERY EVENING
Qty: 90 TAB | Refills: 3 | Status: SHIPPED | OUTPATIENT
Start: 2018-06-28 | End: 2019-05-16 | Stop reason: SDUPTHER

## 2018-08-23 ENCOUNTER — OFFICE VISIT (OUTPATIENT)
Dept: VASCULAR LAB | Facility: MEDICAL CENTER | Age: 78
End: 2018-08-23
Attending: FAMILY MEDICINE
Payer: MEDICARE

## 2018-08-23 VITALS
DIASTOLIC BLOOD PRESSURE: 76 MMHG | HEIGHT: 69 IN | HEART RATE: 61 BPM | BODY MASS INDEX: 29.62 KG/M2 | WEIGHT: 200 LBS | SYSTOLIC BLOOD PRESSURE: 131 MMHG

## 2018-08-23 DIAGNOSIS — I87.2 VENOUS INSUFFICIENCY: ICD-10-CM

## 2018-08-23 DIAGNOSIS — E03.9 ACQUIRED HYPOTHYROIDISM: ICD-10-CM

## 2018-08-23 DIAGNOSIS — R60.0 EDEMA OF LEFT LOWER LEG DUE TO PERIPHERAL VENOUS INSUFFICIENCY: ICD-10-CM

## 2018-08-23 DIAGNOSIS — I87.2 EDEMA OF LEFT LOWER LEG DUE TO PERIPHERAL VENOUS INSUFFICIENCY: ICD-10-CM

## 2018-08-23 DIAGNOSIS — E78.5 DYSLIPIDEMIA: ICD-10-CM

## 2018-08-23 PROCEDURE — 99212 OFFICE O/P EST SF 10 MIN: CPT | Performed by: FAMILY MEDICINE

## 2018-08-23 PROCEDURE — 99214 OFFICE O/P EST MOD 30 MIN: CPT | Performed by: FAMILY MEDICINE

## 2018-08-23 RX ORDER — ASPIRIN 81 MG/1
81 TABLET, CHEWABLE ORAL DAILY
COMMUNITY
End: 2018-08-25

## 2018-08-23 ASSESSMENT — ENCOUNTER SYMPTOMS
INSOMNIA: 0
ORTHOPNEA: 0
NAUSEA: 0
MYALGIAS: 0
SORE THROAT: 0
SEIZURES: 0
NERVOUS/ANXIOUS: 0
HEMOPTYSIS: 0
FEVER: 0
HEADACHES: 0
DIZZINESS: 0
DEPRESSION: 0
CHILLS: 0
TREMORS: 0
BLURRED VISION: 0
BRUISES/BLEEDS EASILY: 0
COUGH: 0
FOCAL WEAKNESS: 0
ABDOMINAL PAIN: 0
DOUBLE VISION: 0
SHORTNESS OF BREATH: 0
WEAKNESS: 0
BLOOD IN STOOL: 0
WHEEZING: 0
VOMITING: 0
PALPITATIONS: 0
DIARRHEA: 0

## 2018-08-23 NOTE — PATIENT INSTRUCTIONS
1) reduce salt, increase protein and reduce carbohydrations   2) use plate method   3) work on 10-15lb weight loss over next 1 year   3) keep BP log, looks ok for now   4) noticing low heart rate, call our office or PCP to discuss if any symptoms.  5) check labs and echocardiogram

## 2018-08-23 NOTE — PROGRESS NOTES
FOLLOW-UP VASCULAR MED VISIT  Subjective:   Reina Munoz is a 77 y.o. female who presents today 18 for   Chief Complaint   Patient presents with   • Follow-Up     Venous Insufficiency   Referred by RAFAEL Yates from [facility].    HPI:    LLE: started on compression hose and horse chestnut extract at last visit.  Felt recurrent nausea and stopped.  Made no difference.    No hx of VTE or blood clotting disorder.   Has varicose veins for > 5 years.  Denies thrombosis or associated pain.  Has been using compression stockings OTC.  When wearing gets mild redness.     HLD:  Continues on simvastatin w/o myalgia     BP:  No prior ASCVD.  Reports mild high BP but HBP log was normal.  Normal home BP lo-160/60-70s.  Average: 120s/70s.   Does not take anti-HTN meds.  Has lost     Seen by PCP and UC and evaluated and noted LLE edema.    Has noted worsening over last 6 mo.   Denies pain or radiation of pain.     ASCVD calculator (contraindicated if ASCVD+, [id number])   10yr-risk calc: 15.4%     - recommended for moderate to high intensity     Clinical evidence of ASCVD:  None   Major RFs:     1) Age (Men>44, women>54):  yes   2) Fhx early CAD (<55 men, <65 women):  no   3) cigarette smoking:  no   4) high BP >139/89 or on tx: no   5) Low HDL-C (<40 men, <50 women):  no    History   Smoking Status   • Never Smoker   Smokeless Tobacco   • Never Used     Social History   Substance Use Topics   • Smoking status: Never Smoker   • Smokeless tobacco: Never Used   • Alcohol use No     Outpatient Encounter Prescriptions as of 2018   Medication Sig Dispense Refill   • aspirin (ASA) 81 MG Chew Tab chewable tablet Take 81 mg by mouth every day.     • simvastatin (ZOCOR) 10 MG Tab Take 1 Tab by mouth every evening. 90 Tab 3   • pantoprazole (PROTONIX) 40 MG Tablet Delayed Response TAKE ONE TABLET BY MOUTH DAILY 90 Tab 0   • levothyroxine (SYNTHROID) 75 MCG Tab TAKE ONE TABLET BY MOUTH EVERY MORNING ON AN EMPTY  "STOMACH (SYNTHROID) 90 Tab 0   • amoxicillin-clavulanate (AUGMENTIN) 875-125 MG Tab Take 1 Tab by mouth 2 times a day. 14 Tab 0   • benzonatate (TESSALON) 200 MG capsule Take 1 Cap by mouth 3 times a day as needed. 60 Cap 0   • polymixin-trimethoprim (POLYTRIM) 00165-1.1 UNIT/ML-% Solution Place 1 Drop in both eyes every 4 hours. 10 mL 0     No facility-administered encounter medications on file as of 8/23/2018.      Allergies   Allergen Reactions   • Doxycycline      Tongue swelling and nausea   • Penicillins      rash   • Sulfa Drugs      Cannot recall side effect     DIET AND EXERCISE:  Weight Change:stable, no fluctuations   Diet: common adult, low salt   Exercise: moderate regular exercise program     Review of Systems   Constitutional: Negative for chills, fever and malaise/fatigue.   HENT: Negative for nosebleeds, sore throat and tinnitus.    Eyes: Negative for blurred vision and double vision.   Respiratory: Negative for cough, hemoptysis, shortness of breath and wheezing.    Cardiovascular: Negative for chest pain, palpitations, orthopnea and leg swelling.   Gastrointestinal: Negative for abdominal pain, blood in stool, diarrhea, melena, nausea and vomiting.   Genitourinary: Negative for hematuria.   Musculoskeletal: Negative for joint pain and myalgias.   Skin: Negative for itching and rash.   Neurological: Negative for dizziness, tremors, focal weakness, seizures, weakness and headaches.   Endo/Heme/Allergies: Does not bruise/bleed easily.   Psychiatric/Behavioral: Negative for depression. The patient is not nervous/anxious and does not have insomnia.       Objective:     Vitals:    08/23/18 0837 08/23/18 0845   BP: 142/71 131/76   Pulse: 61 61   Weight: 90.7 kg (200 lb) 90.7 kg (200 lb)   Height: 1.753 m (5' 9\") 1.753 m (5' 9\")      BP Readings from Last 4 Encounters:   08/23/18 131/76   06/15/18 152/73   06/06/18 120/80   04/30/18 124/82     Pulse Readings from Last 4 Encounters:   08/23/18 61   "   06/15/18 63   06/06/18 62   04/30/18 (!) 55     Body mass index is 29.53 kg/m².   BMI Readings from Last 4 Encounters:   08/23/18 29.53 kg/m²   06/15/18 29.61 kg/m²   06/06/18 28.65 kg/m²   04/30/18 28.80 kg/m²     Physical Exam   Constitutional: She is oriented to person, place, and time. She appears well-developed and well-nourished. No distress.   HENT:   Head: Normocephalic and atraumatic.   Neck: Normal range of motion. Neck supple. No JVD present. Carotid bruit is not present. No thyromegaly present.   Cardiovascular: Normal rate, regular rhythm, normal heart sounds and intact distal pulses.  PMI is not displaced.  Exam reveals no gallop and no friction rub.    No murmur heard.  Pulses:       Carotid pulses are 2+ on the right side, and 2+ on the left side.       Radial pulses are 2+ on the right side, and 2+ on the left side.        Dorsalis pedis pulses are 2+ on the right side, and 2+ on the left side.        Posterior tibial pulses are 2+ on the right side, and 2+ on the left side.   Edema:  RLE:  none  LLE:  Resolved, minimal   Spider telangectasia:     RLE:  scattered LLE: scattered, notable at dorsum of ankle  Varicosities:       RLE: diffuse, non-thrombosed, <3mm   LLE: diffuse, non-thrombosed, >3mm at calf, plethoric at thigh  Corona phlebectatica:    RLE:  None      LLE:  None   Cording:       RLE:  None   LLE: None      Pulmonary/Chest: Effort normal and breath sounds normal.   Abdominal: Soft. Bowel sounds are normal. She exhibits no distension and no mass. There is no tenderness. There is no rebound and no guarding.   Musculoskeletal: Normal range of motion. She exhibits no edema or tenderness.   Neurological: She is alert and oriented to person, place, and time. No cranial nerve deficit. She exhibits normal muscle tone. Coordination normal.   Skin: Skin is warm and dry. No rash noted. She is not diaphoretic. No cyanosis. Nails show no clubbing.        Psychiatric: She has a normal mood and  affect.     Lab Results   Component Value Date    CHOLSTRLTOT 199 03/14/2018     (H) 03/14/2018    HDL 57 03/14/2018    TRIGLYCERIDE 191 (H) 03/14/2018           Lab Results   Component Value Date    SODIUM 143 03/14/2018    POTASSIUM 4.0 03/14/2018    CHLORIDE 106 03/14/2018    CO2 27 03/14/2018    GLUCOSE 93 03/14/2018    BUN 14 03/14/2018    CREATININE 0.87 03/14/2018    IFAFRICA >60 03/14/2018    IFNOTAFR >60 03/14/2018        Lab Results   Component Value Date    WBC 4.8 03/14/2018    RBC 4.98 03/14/2018    HEMOGLOBIN 15.2 03/14/2018    HEMATOCRIT 46.4 03/14/2018    MCV 93.2 03/14/2018    MCH 30.5 03/14/2018    MCHC 32.8 (L) 03/14/2018    MPV 12.9 03/14/2018     VASCULAR IMAGING:     LLE DUPLEX 6/7/17  No deep venous thrombosis.    LLE DUPLEX 6/2016  1.  No evidence of Left  lower extremity deep venous thrombosis.  2.  There are no incidental findings.     BLE DUPLEX 6/20/18   Normal bilateral superficial and deep venous examination of the lower    extremities.    Medical Decision Making:  Today's Assessment / Status / Plan:     1. Venous insufficiency  BTYPE NATRIURETIC PEPTIDE   2. Edema of left lower leg due to peripheral venous insufficiency  BTYPE NATRIURETIC PEPTIDE    ECHOCARDIOGRAM COMP W/O CONT   3. Dyslipidemia  COMP METABOLIC PANEL    NMR LIPO PROFILE   4. Acquired hypothyroidism       Patient Type: Primary Prevention    Etiology of Established CVD if Present:   None     Lipid Management: Qualifies for Statin Therapy Based on 2013 ACC/AHA Guidelines: yes  NLA Risk Category:   Moderate: 2 major RFs, risk scoring >10%, other risk scoring (CAC, advanced lipid, hsCRP)  Tx threshold:  non-HDL-C >159, LDL-C >129  Tx goal:  non-HDL <130, LDL-C <100 (optional: apoB<90)   At goal:  No, as of 3/2018: KCS=516, non-JUY=237  Tx plan:  - check NMR lipoprofile   - continue moderate intensity simvastatin or transition to atorvastatin 10-20mg in the future should LDL continue >100, non-HDL >130    Blood  Pressure Management:Goal: ACC/AHA (2017) goal <130/80  Home BP at goal:  yes  Office BP at goal:  No  Overall good home averages.  Will continue to monitor   Plan:   - continue home BP monitoring, reviewed correct technique:  Yes   - order 24h ABPM:  UNDECIDED, consider to eval for white coat HTN if persistent office>home BP  - consider addition of ACEI/ARB vs thiazide if BP remains uncontrolled     Glycemic Status: Normal  - continue heart healthy diet     Anti-Platelet/Anti-Coagulant Tx: due to CVI, continue ASA 81mg     Smoking: continued complete avoidance of all tobacco products      Physical Activity: advised to engage in walking >150min/week    Weight Management and Nutrition: Dietary plan was discussed with patient at this visit including ongoing low CHO and fat, reduced salt diet.   Reviewed plate method   - target 10-15lb weight loss over next 6-12mo     Other:   1) chronic venous insuff (moderate)  - ASA 81mg  - stop horse chestnut due to intolerance   - compression stockings Rx 20-30mmHg given to wear as tolerated   - PM elevation of legs     2) edema, LLE - duplex is negative, does not appear to be primarily venous based upon studies  - check BNP and echo     3) varicose veins with spiders - diffusely  - continue tx plan as above     4) hypothyroidism - stable, continue levothyroxine 75mcg  - monitor labs annually and clinical symptomology     5) asymptomatic bradycardia - continue to monitor, call if sx   - avoid B-blockers and nonDHP-CCB   - consider cardiology evaluation     Instructed to follow-up with PCP for remainder of adult medical needs: yes  We will partner with other providers in the management of established vascular disease and cardiometabolic risk factors.    Studies to Be Obtained:  Echo  Labs to Be Obtained:  CMP, NMR, BNP    Follow up in: 3 months with GABBIE Ardon M.D.

## 2018-08-25 ENCOUNTER — APPOINTMENT (OUTPATIENT)
Dept: RADIOLOGY | Facility: MEDICAL CENTER | Age: 78
End: 2018-08-25
Attending: EMERGENCY MEDICINE
Payer: MEDICARE

## 2018-08-25 ENCOUNTER — HOSPITAL ENCOUNTER (OUTPATIENT)
Facility: MEDICAL CENTER | Age: 78
End: 2018-08-26
Attending: EMERGENCY MEDICINE | Admitting: INTERNAL MEDICINE
Payer: MEDICARE

## 2018-08-25 DIAGNOSIS — R55 NEAR SYNCOPE: ICD-10-CM

## 2018-08-25 PROBLEM — R42 POSTURAL DIZZINESS WITH PRESYNCOPE: Status: ACTIVE | Noted: 2018-08-25

## 2018-08-25 PROBLEM — R00.1 BRADYCARDIA: Status: ACTIVE | Noted: 2018-08-25

## 2018-08-25 LAB
ALBUMIN SERPL BCP-MCNC: 4.4 G/DL (ref 3.2–4.9)
ALBUMIN/GLOB SERPL: 1.6 G/DL
ALP SERPL-CCNC: 71 U/L (ref 30–99)
ALT SERPL-CCNC: 43 U/L (ref 2–50)
ANION GAP SERPL CALC-SCNC: 9 MMOL/L (ref 0–11.9)
APPEARANCE UR: CLEAR
AST SERPL-CCNC: 44 U/L (ref 12–45)
BASOPHILS # BLD AUTO: 0.9 % (ref 0–1.8)
BASOPHILS # BLD: 0.05 K/UL (ref 0–0.12)
BILIRUB SERPL-MCNC: 0.7 MG/DL (ref 0.1–1.5)
BILIRUB UR QL STRIP.AUTO: NEGATIVE
BNP SERPL-MCNC: 32 PG/ML (ref 0–100)
BUN SERPL-MCNC: 15 MG/DL (ref 8–22)
CALCIUM SERPL-MCNC: 9.6 MG/DL (ref 8.5–10.5)
CHLORIDE SERPL-SCNC: 106 MMOL/L (ref 96–112)
CO2 SERPL-SCNC: 26 MMOL/L (ref 20–33)
COLOR UR: YELLOW
CREAT SERPL-MCNC: 0.9 MG/DL (ref 0.5–1.4)
EKG IMPRESSION: NORMAL
EOSINOPHIL # BLD AUTO: 0.12 K/UL (ref 0–0.51)
EOSINOPHIL NFR BLD: 2.2 % (ref 0–6.9)
ERYTHROCYTE [DISTWIDTH] IN BLOOD BY AUTOMATED COUNT: 45 FL (ref 35.9–50)
GLOBULIN SER CALC-MCNC: 2.7 G/DL (ref 1.9–3.5)
GLUCOSE SERPL-MCNC: 119 MG/DL (ref 65–99)
GLUCOSE UR STRIP.AUTO-MCNC: NEGATIVE MG/DL
HCT VFR BLD AUTO: 42.8 % (ref 37–47)
HGB BLD-MCNC: 14.5 G/DL (ref 12–16)
IMM GRANULOCYTES # BLD AUTO: 0.01 K/UL (ref 0–0.11)
IMM GRANULOCYTES NFR BLD AUTO: 0.2 % (ref 0–0.9)
KETONES UR STRIP.AUTO-MCNC: NEGATIVE MG/DL
LEUKOCYTE ESTERASE UR QL STRIP.AUTO: NEGATIVE
LYMPHOCYTES # BLD AUTO: 1.83 K/UL (ref 1–4.8)
LYMPHOCYTES NFR BLD: 34.2 % (ref 22–41)
MCH RBC QN AUTO: 30.8 PG (ref 27–33)
MCHC RBC AUTO-ENTMCNC: 33.9 G/DL (ref 33.6–35)
MCV RBC AUTO: 90.9 FL (ref 81.4–97.8)
MICRO URNS: NORMAL
MONOCYTES # BLD AUTO: 0.32 K/UL (ref 0–0.85)
MONOCYTES NFR BLD AUTO: 6 % (ref 0–13.4)
NEUTROPHILS # BLD AUTO: 3.02 K/UL (ref 2–7.15)
NEUTROPHILS NFR BLD: 56.5 % (ref 44–72)
NITRITE UR QL STRIP.AUTO: NEGATIVE
NRBC # BLD AUTO: 0 K/UL
NRBC BLD-RTO: 0 /100 WBC
PH UR STRIP.AUTO: 7 [PH]
PLATELET # BLD AUTO: 159 K/UL (ref 164–446)
PMV BLD AUTO: 11.5 FL (ref 9–12.9)
POTASSIUM SERPL-SCNC: 3.8 MMOL/L (ref 3.6–5.5)
PROT SERPL-MCNC: 7.1 G/DL (ref 6–8.2)
PROT UR QL STRIP: NEGATIVE MG/DL
RBC # BLD AUTO: 4.71 M/UL (ref 4.2–5.4)
RBC UR QL AUTO: NEGATIVE
SODIUM SERPL-SCNC: 141 MMOL/L (ref 135–145)
SP GR UR STRIP.AUTO: 1.01
T4 FREE SERPL-MCNC: 0.8 NG/DL (ref 0.53–1.43)
TROPONIN I SERPL-MCNC: <0.01 NG/ML (ref 0–0.04)
TSH SERPL DL<=0.005 MIU/L-ACNC: 1.38 UIU/ML (ref 0.38–5.33)
UROBILINOGEN UR STRIP.AUTO-MCNC: 0.2 MG/DL
WBC # BLD AUTO: 5.4 K/UL (ref 4.8–10.8)

## 2018-08-25 PROCEDURE — 71045 X-RAY EXAM CHEST 1 VIEW: CPT

## 2018-08-25 PROCEDURE — 83880 ASSAY OF NATRIURETIC PEPTIDE: CPT

## 2018-08-25 PROCEDURE — 93005 ELECTROCARDIOGRAM TRACING: CPT | Performed by: EMERGENCY MEDICINE

## 2018-08-25 PROCEDURE — 85025 COMPLETE CBC W/AUTO DIFF WBC: CPT

## 2018-08-25 PROCEDURE — A9270 NON-COVERED ITEM OR SERVICE: HCPCS | Performed by: INTERNAL MEDICINE

## 2018-08-25 PROCEDURE — 80053 COMPREHEN METABOLIC PANEL: CPT

## 2018-08-25 PROCEDURE — G0378 HOSPITAL OBSERVATION PER HR: HCPCS

## 2018-08-25 PROCEDURE — 84443 ASSAY THYROID STIM HORMONE: CPT

## 2018-08-25 PROCEDURE — 84484 ASSAY OF TROPONIN QUANT: CPT

## 2018-08-25 PROCEDURE — 94660 CPAP INITIATION&MGMT: CPT

## 2018-08-25 PROCEDURE — 99220 PR INITIAL OBSERVATION CARE,LEVL III: CPT | Performed by: INTERNAL MEDICINE

## 2018-08-25 PROCEDURE — 700102 HCHG RX REV CODE 250 W/ 637 OVERRIDE(OP): Performed by: INTERNAL MEDICINE

## 2018-08-25 PROCEDURE — 36415 COLL VENOUS BLD VENIPUNCTURE: CPT

## 2018-08-25 PROCEDURE — 700105 HCHG RX REV CODE 258: Performed by: INTERNAL MEDICINE

## 2018-08-25 PROCEDURE — 81003 URINALYSIS AUTO W/O SCOPE: CPT

## 2018-08-25 PROCEDURE — 99285 EMERGENCY DEPT VISIT HI MDM: CPT

## 2018-08-25 PROCEDURE — 93005 ELECTROCARDIOGRAM TRACING: CPT

## 2018-08-25 PROCEDURE — 84439 ASSAY OF FREE THYROXINE: CPT

## 2018-08-25 RX ORDER — SODIUM CHLORIDE 9 MG/ML
INJECTION, SOLUTION INTRAVENOUS CONTINUOUS
Status: DISCONTINUED | OUTPATIENT
Start: 2018-08-25 | End: 2018-08-26 | Stop reason: HOSPADM

## 2018-08-25 RX ORDER — POLYETHYLENE GLYCOL 3350 17 G/17G
1 POWDER, FOR SOLUTION ORAL
Status: DISCONTINUED | OUTPATIENT
Start: 2018-08-25 | End: 2018-08-26 | Stop reason: HOSPADM

## 2018-08-25 RX ORDER — LEVOTHYROXINE SODIUM 0.07 MG/1
75 TABLET ORAL
Status: DISCONTINUED | OUTPATIENT
Start: 2018-08-26 | End: 2018-08-26 | Stop reason: HOSPADM

## 2018-08-25 RX ORDER — ONDANSETRON 2 MG/ML
4 INJECTION INTRAMUSCULAR; INTRAVENOUS EVERY 4 HOURS PRN
Status: DISCONTINUED | OUTPATIENT
Start: 2018-08-25 | End: 2018-08-26 | Stop reason: HOSPADM

## 2018-08-25 RX ORDER — ONDANSETRON 4 MG/1
4 TABLET, ORALLY DISINTEGRATING ORAL EVERY 4 HOURS PRN
Status: DISCONTINUED | OUTPATIENT
Start: 2018-08-25 | End: 2018-08-26 | Stop reason: HOSPADM

## 2018-08-25 RX ORDER — ACETAMINOPHEN 325 MG/1
650 TABLET ORAL EVERY 6 HOURS PRN
Status: DISCONTINUED | OUTPATIENT
Start: 2018-08-25 | End: 2018-08-26 | Stop reason: HOSPADM

## 2018-08-25 RX ORDER — AMOXICILLIN 250 MG
2 CAPSULE ORAL 2 TIMES DAILY
Status: DISCONTINUED | OUTPATIENT
Start: 2018-08-25 | End: 2018-08-26 | Stop reason: HOSPADM

## 2018-08-25 RX ORDER — OMEPRAZOLE 20 MG/1
20 CAPSULE, DELAYED RELEASE ORAL DAILY
Status: DISCONTINUED | OUTPATIENT
Start: 2018-08-26 | End: 2018-08-26 | Stop reason: HOSPADM

## 2018-08-25 RX ORDER — BISACODYL 10 MG
10 SUPPOSITORY, RECTAL RECTAL
Status: DISCONTINUED | OUTPATIENT
Start: 2018-08-25 | End: 2018-08-26 | Stop reason: HOSPADM

## 2018-08-25 RX ORDER — SIMVASTATIN 20 MG
10 TABLET ORAL EVERY EVENING
Status: DISCONTINUED | OUTPATIENT
Start: 2018-08-25 | End: 2018-08-26 | Stop reason: HOSPADM

## 2018-08-25 RX ADMIN — SIMVASTATIN 10 MG: 20 TABLET, FILM COATED ORAL at 21:22

## 2018-08-25 RX ADMIN — SODIUM CHLORIDE: 9 INJECTION, SOLUTION INTRAVENOUS at 18:46

## 2018-08-25 RX ADMIN — ACETAMINOPHEN 650 MG: 325 TABLET, FILM COATED ORAL at 18:52

## 2018-08-25 ASSESSMENT — PATIENT HEALTH QUESTIONNAIRE - PHQ9
SUM OF ALL RESPONSES TO PHQ9 QUESTIONS 1 AND 2: 0
2. FEELING DOWN, DEPRESSED, IRRITABLE, OR HOPELESS: NOT AT ALL
1. LITTLE INTEREST OR PLEASURE IN DOING THINGS: NOT AT ALL

## 2018-08-25 ASSESSMENT — ENCOUNTER SYMPTOMS
HEARTBURN: 0
FOCAL WEAKNESS: 0
EYE PAIN: 0
SHORTNESS OF BREATH: 0
FEVER: 0
INSOMNIA: 0
COUGH: 0
HEADACHES: 0
VOMITING: 0
ABDOMINAL PAIN: 0
STRIDOR: 0
NERVOUS/ANXIOUS: 0
DIZZINESS: 1
SEIZURES: 0
ORTHOPNEA: 0
PALPITATIONS: 0
EYE DISCHARGE: 0
BACK PAIN: 0
WEIGHT LOSS: 0
MYALGIAS: 0
DIARRHEA: 0
SPUTUM PRODUCTION: 0
DEPRESSION: 0
NECK PAIN: 0
BLURRED VISION: 0
CHILLS: 0
EYE REDNESS: 0
NAUSEA: 0

## 2018-08-25 ASSESSMENT — PAIN SCALES - GENERAL
PAINLEVEL_OUTOF10: 6
PAINLEVEL_OUTOF10: 0
PAINLEVEL_OUTOF10: 2
PAINLEVEL_OUTOF10: 0

## 2018-08-25 ASSESSMENT — LIFESTYLE VARIABLES
DO YOU DRINK ALCOHOL: NO
EVER_SMOKED: NEVER

## 2018-08-25 NOTE — ED PROVIDER NOTES
CHIEF COMPLAINT  Chief Complaint   Patient presents with   • Near Syncopal       HPI  Reina Munoz is a 77 y.o. female here for evaluation of near syncope and chest pain.  The patient states that she works at [employer], as a sample organizer, and has been standing for long hours.  She states that she does not attempt to stay hydrated, but sometimes this is difficult.  She states that she began to get dizzy and lightheaded, but had no headache or vomiting.  She denies any fever.  She states that she had a brief episode of right upper chest and right shoulder pain, that lasted seconds and was gone.  She had no diaphoresis, no shortness of breath.  She has no cardiac history, and has not been evaluated for any type of arrhythmia or chest pain in the past.  She does report that her father passed away at the age of 72 from a massive heart attack.  Nothing alleviates or exacerbates her symptoms, and at the time of this interview she is asymptomatic.    PAST MEDICAL HISTORY   has a past medical history of Chronic meniscal tear of knee; Dyslipidemia; GERD (gastroesophageal reflux disease); Hypothyroidism; Pleomorphic adenoma of parotid gland (1992, 2004); and Sleep apnea.    SOCIAL HISTORY  Social History     Social History Main Topics   • Smoking status: Never Smoker   • Smokeless tobacco: Never Used   • Alcohol use No   • Drug use: No   • Sexual activity: Yes     Partners: Male       SURGICAL HISTORY   has a past surgical history that includes parotidectomy (Left, 1992, 2004); cataract extraction with iol; and lumpectomy (Left).    CURRENT MEDICATIONS  Home Medications     Reviewed by Farzana Mejia (Pharmacy Tech) on 08/25/18 at 1625  Med List Status: Complete   Medication Last Dose Status   aspirin EC (ECOTRIN) 81 MG Tablet Delayed Response 8/24/2018 Active   levothyroxine (SYNTHROID) 75 MCG Tab 8/25/2018 Active   pantoprazole (PROTONIX) 40 MG Tablet Delayed Response 8/25/2018 Active   simvastatin (ZOCOR) 10  "MG Tab 8/24/2018 Active                ALLERGIES  Allergies   Allergen Reactions   • Doxycycline Nausea and Swelling     Rxn - 2018  Tongue swelling   • Penicillins Rash     Rxn - years ago   Pt tolerated Augmentin 6/2018   • Sulfa Drugs Rash     Rxn - many years ago to an unknown med       REVIEW OF SYSTEMS  See HPI for further details. Review of systems as above, otherwise all other systems are negative.     PHYSICAL EXAM  VITAL SIGNS: BP (!) 179/94   Pulse 72   Temp 36.6 °C (97.9 °F)   Resp 16   Ht 1.753 m (5' 9\")   Wt 91.7 kg (202 lb 2.6 oz)   SpO2 97%   BMI 29.85 kg/m²     Constitutional: Well developed, well nourished. No acute distress.  HEENT: Normocephalic, atraumatic. MMM  Neck: Supple, Full range of motion   Chest/Pulmonary:  No respiratory distress.  Equal expansion   Back; nontender midline, no CVAT.  Abdomen; soft, nontender, no guarding, no peritoneal signs.  Musculoskeletal: No deformity, no edema, neurovascular intact.   Neuro: Clear speech, appropriate, cooperative, cranial nerves II-XII grossly intact.  Psych: Normal mood and affect    DX-CHEST-LIMITED (1 VIEW)    (Results Pending)     Results for orders placed or performed during the hospital encounter of 08/25/18   CBC w/ Differential   Result Value Ref Range    WBC 5.4 4.8 - 10.8 K/uL    RBC 4.71 4.20 - 5.40 M/uL    Hemoglobin 14.5 12.0 - 16.0 g/dL    Hematocrit 42.8 37.0 - 47.0 %    MCV 90.9 81.4 - 97.8 fL    MCH 30.8 27.0 - 33.0 pg    MCHC 33.9 33.6 - 35.0 g/dL    RDW 45.0 35.9 - 50.0 fL    Platelet Count 159 (L) 164 - 446 K/uL    MPV 11.5 9.0 - 12.9 fL    Neutrophils-Polys 56.50 44.00 - 72.00 %    Lymphocytes 34.20 22.00 - 41.00 %    Monocytes 6.00 0.00 - 13.40 %    Eosinophils 2.20 0.00 - 6.90 %    Basophils 0.90 0.00 - 1.80 %    Immature Granulocytes 0.20 0.00 - 0.90 %    Nucleated RBC 0.00 /100 WBC    Neutrophils (Absolute) 3.02 2.00 - 7.15 K/uL    Lymphs (Absolute) 1.83 1.00 - 4.80 K/uL    Monos (Absolute) 0.32 0.00 - 0.85 K/uL    " Eos (Absolute) 0.12 0.00 - 0.51 K/uL    Baso (Absolute) 0.05 0.00 - 0.12 K/uL    Immature Granulocytes (abs) 0.01 0.00 - 0.11 K/uL    NRBC (Absolute) 0.00 K/uL   Complete Metabolic Panel (CMP)   Result Value Ref Range    Sodium 141 135 - 145 mmol/L    Potassium 3.8 3.6 - 5.5 mmol/L    Chloride 106 96 - 112 mmol/L    Co2 26 20 - 33 mmol/L    Anion Gap 9.0 0.0 - 11.9    Glucose 119 (H) 65 - 99 mg/dL    Bun 15 8 - 22 mg/dL    Creatinine 0.90 0.50 - 1.40 mg/dL    Calcium 9.6 8.5 - 10.5 mg/dL    AST(SGOT) 44 12 - 45 U/L    ALT(SGPT) 43 2 - 50 U/L    Alkaline Phosphatase 71 30 - 99 U/L    Total Bilirubin 0.7 0.1 - 1.5 mg/dL    Albumin 4.4 3.2 - 4.9 g/dL    Total Protein 7.1 6.0 - 8.2 g/dL    Globulin 2.7 1.9 - 3.5 g/dL    A-G Ratio 1.6 g/dL   Troponin STAT   Result Value Ref Range    Troponin I <0.01 0.00 - 0.04 ng/mL   Btype Natriuretic Peptide   Result Value Ref Range    B Natriuretic Peptide 32 0 - 100 pg/mL   ESTIMATED GFR   Result Value Ref Range    GFR If African American >60 >60 mL/min/1.73 m 2    GFR If Non African American >60 >60 mL/min/1.73 m 2   EKG (ER)   Result Value Ref Range    Report       [facility] Emergency Dept.    Test Date:  2018  Pt Name:    NIA MCLEAN                Department: ER  MRN:        [id number]                      Room:  Gender:     Female                       Technician: [id number]  :        [id number]                   Requested By:ER TRIAGE PROTOCOL  Order #:    [id number]                    Evelio MD:    Measurements  Intervals                                Axis  Rate:       65                           P:          63  TN:         148                          QRS:        5  QRSD:       100                          T:          128  QT:         492  QTc:        512    Interpretive Statements  SINUS RHYTHM  MULTIPLE VENTRICULAR PREMATURE COMPLEXES  NONSPECIFIC REPOL ABNORMALITY, DIFFUSE LEADS  PROLONGED QT INTERVAL  No previous ECG available for comparison              PROCEDURES     MEDICAL RECORD  I have reviewed patient's medical record and pertinent results are listed above.    COURSE & MEDICAL DECISION MAKING  I have reviewed any medical record information, laboratory studies and radiographic results as noted above.    I you have had any blood pressure issues while here in the emergency department, please see your doctor for a further evaluation or work up.    5:16 PM   will admit the pt to the obs unit.     Differential diagnoses include but not limited to: MI, PE, pneumonia, pneumothorax      FINAL IMPRESSION  1. Near syncope    Electronically signed by: Jakub Ronquillo, 8/25/2018 5:07 PM

## 2018-08-25 NOTE — ED TRIAGE NOTES
EKG completed in triage. Pt states that she was at work when she suddenly felt faint, SOB, weak, and sharp pain in right shoulder blade. -LOC. Pt denies CP.     Chief Complaint   Patient presents with   • Near Syncopal     Pt placed in lobby, updated on triage process. Pt educated to notified RN or triage tech if changes in condition.

## 2018-08-25 NOTE — ED NOTES
The Medication Reconciliation process has been completed by interviewing the patient    Allergies have been reviewed  Antibiotic use in 30 days - none    Home Pharmacy:  [facility]

## 2018-08-25 NOTE — ED NOTES
Pt ambulated from waiting room. Pt connected to monitor, IV started and blood sent to lab. Chart up for ERP eval

## 2018-08-26 VITALS
HEIGHT: 69 IN | DIASTOLIC BLOOD PRESSURE: 77 MMHG | BODY MASS INDEX: 29.39 KG/M2 | SYSTOLIC BLOOD PRESSURE: 159 MMHG | TEMPERATURE: 96.8 F | HEART RATE: 51 BPM | WEIGHT: 198.41 LBS | RESPIRATION RATE: 20 BRPM | OXYGEN SATURATION: 94 %

## 2018-08-26 PROBLEM — R55 POSTURAL DIZZINESS WITH PRESYNCOPE: Status: RESOLVED | Noted: 2018-08-25 | Resolved: 2018-08-26

## 2018-08-26 PROBLEM — R42 POSTURAL DIZZINESS WITH PRESYNCOPE: Status: RESOLVED | Noted: 2018-08-25 | Resolved: 2018-08-26

## 2018-08-26 LAB
ALBUMIN SERPL BCP-MCNC: 3.1 G/DL (ref 3.2–4.9)
ALBUMIN/GLOB SERPL: 1.4 G/DL
ALP SERPL-CCNC: 48 U/L (ref 30–99)
ALT SERPL-CCNC: 28 U/L (ref 2–50)
ANION GAP SERPL CALC-SCNC: 8 MMOL/L (ref 0–11.9)
AST SERPL-CCNC: 31 U/L (ref 12–45)
BILIRUB SERPL-MCNC: 0.8 MG/DL (ref 0.1–1.5)
BUN SERPL-MCNC: 12 MG/DL (ref 8–22)
CALCIUM SERPL-MCNC: 8 MG/DL (ref 8.5–10.5)
CHLORIDE SERPL-SCNC: 114 MMOL/L (ref 96–112)
CO2 SERPL-SCNC: 21 MMOL/L (ref 20–33)
CREAT SERPL-MCNC: 0.78 MG/DL (ref 0.5–1.4)
ERYTHROCYTE [DISTWIDTH] IN BLOOD BY AUTOMATED COUNT: 46.3 FL (ref 35.9–50)
GLOBULIN SER CALC-MCNC: 2.2 G/DL (ref 1.9–3.5)
GLUCOSE SERPL-MCNC: 93 MG/DL (ref 65–99)
HCT VFR BLD AUTO: 38.3 % (ref 37–47)
HGB BLD-MCNC: 13.1 G/DL (ref 12–16)
LV EJECT FRACT  99904: 75
LV EJECT FRACT MOD 2C 99903: 89.57
LV EJECT FRACT MOD 4C 99902: 66.53
LV EJECT FRACT MOD BP 99901: 82.3
MCH RBC QN AUTO: 31.6 PG (ref 27–33)
MCHC RBC AUTO-ENTMCNC: 34.2 G/DL (ref 33.6–35)
MCV RBC AUTO: 92.5 FL (ref 81.4–97.8)
PLATELET # BLD AUTO: 140 K/UL (ref 164–446)
PMV BLD AUTO: 11.4 FL (ref 9–12.9)
POTASSIUM SERPL-SCNC: 3.7 MMOL/L (ref 3.6–5.5)
PROT SERPL-MCNC: 5.3 G/DL (ref 6–8.2)
RBC # BLD AUTO: 4.14 M/UL (ref 4.2–5.4)
SODIUM SERPL-SCNC: 143 MMOL/L (ref 135–145)
WBC # BLD AUTO: 5 K/UL (ref 4.8–10.8)

## 2018-08-26 PROCEDURE — 700111 HCHG RX REV CODE 636 W/ 250 OVERRIDE (IP): Performed by: INTERNAL MEDICINE

## 2018-08-26 PROCEDURE — 700102 HCHG RX REV CODE 250 W/ 637 OVERRIDE(OP): Performed by: INTERNAL MEDICINE

## 2018-08-26 PROCEDURE — 94660 CPAP INITIATION&MGMT: CPT

## 2018-08-26 PROCEDURE — A9270 NON-COVERED ITEM OR SERVICE: HCPCS | Performed by: INTERNAL MEDICINE

## 2018-08-26 PROCEDURE — 36415 COLL VENOUS BLD VENIPUNCTURE: CPT

## 2018-08-26 PROCEDURE — 85027 COMPLETE CBC AUTOMATED: CPT

## 2018-08-26 PROCEDURE — 80053 COMPREHEN METABOLIC PANEL: CPT

## 2018-08-26 PROCEDURE — G0378 HOSPITAL OBSERVATION PER HR: HCPCS

## 2018-08-26 PROCEDURE — 700105 HCHG RX REV CODE 258: Performed by: INTERNAL MEDICINE

## 2018-08-26 PROCEDURE — 93306 TTE W/DOPPLER COMPLETE: CPT | Mod: 26 | Performed by: INTERNAL MEDICINE

## 2018-08-26 PROCEDURE — 99217 PR OBSERVATION CARE DISCHARGE: CPT | Performed by: INTERNAL MEDICINE

## 2018-08-26 PROCEDURE — 93306 TTE W/DOPPLER COMPLETE: CPT

## 2018-08-26 RX ADMIN — LEVOTHYROXINE SODIUM 75 MCG: 75 TABLET ORAL at 05:08

## 2018-08-26 RX ADMIN — ASPIRIN 81 MG: 81 TABLET, DELAYED RELEASE ORAL at 05:09

## 2018-08-26 RX ADMIN — SODIUM CHLORIDE: 9 INJECTION, SOLUTION INTRAVENOUS at 05:08

## 2018-08-26 RX ADMIN — OMEPRAZOLE 20 MG: 20 CAPSULE, DELAYED RELEASE ORAL at 05:09

## 2018-08-26 RX ADMIN — ENOXAPARIN SODIUM 40 MG: 40 INJECTION, SOLUTION INTRAVENOUS; SUBCUTANEOUS at 05:08

## 2018-08-26 ASSESSMENT — PAIN SCALES - GENERAL: PAINLEVEL_OUTOF10: 0

## 2018-08-26 NOTE — DISCHARGE SUMMARY
Discharge Summary    CHIEF COMPLAINT ON ADMISSION  Chief Complaint   Patient presents with   • Near Syncopal       Reason for Admission  Near Syncope     Admission Date  8/25/2018    CODE STATUS  Full Code    HPI & HOSPITAL COURSE  This is a 77 y.o. female with a past medical history of hypothyroidism, dyslipidemia here after having a episode of lightheadedness to the point of almost passing out.  On admission the patient's symptoms had resolved.  She was found to be bradycardic with a heart rate in the 40s-50s.  The patient maintain his heart rate over admission and was not symptomatic of this as it is likely her baseline heart rate.  Her troponin remained negative and her EKG was stable.  At this time she is able to ambulate at baseline with no further symptoms of dizziness or lightheadedness.  She was likely dehydrated and this is what was causing her symptoms.  She was fluid resuscitated.  She is encouraged to have adequate oral fluid intake as an outpatient.  At this time she has no further need for acute intervention.       Therefore, she is discharged in good and stable condition to home with close outpatient follow-up.    Discharge Date  08/26/2018    DISCHARGE DIAGNOSES  Active Problems:    Hypothyroidism POA: Yes    Dyslipidemia POA: Yes    Bradycardia POA: Unknown  Resolved Problems:    Postural dizziness with presyncope POA: Yes      FOLLOW UP  Future Appointments  Date Time Provider Department Center   9/6/2018 8:45 AM ECHO [facility] RM[facility] [place]   11/14/2018 1:20 PM VASCULAR NURSE PRACTITIONER [facility] None     No follow-up provider specified.    MEDICATIONS ON DISCHARGE     Medication List      CONTINUE taking these medications      Instructions   aspirin EC 81 MG Tbec  Commonly known as:  ECOTRIN   Take 81 mg by mouth every day.  Dose:  81 mg     levothyroxine 75 MCG Tabs  Commonly known as:  SYNTHROID   Doctor's comments:  Pt needs appointment for further refills  TAKE ONE TABLET BY MOUTH EVERY  MORNING ON AN EMPTY STOMACH (SYNTHROID)     pantoprazole 40 MG Tbec  Commonly known as:  PROTONIX   Doctor's comments:  Pt needs appointment for further refills  TAKE ONE TABLET BY MOUTH DAILY     simvastatin 10 MG Tabs  Commonly known as:  ZOCOR   Take 1 Tab by mouth every evening.  Dose:  10 mg            Allergies  Allergies   Allergen Reactions   • Doxycycline Nausea and Swelling     Rxn - 2018  Tongue swelling   • Penicillins Rash     Rxn - years ago   Pt tolerated Augmentin 6/2018   • Sulfa Drugs Rash     Rxn - many years ago to an unknown med       DIET  Orders Placed This Encounter   Procedures   • Diet Order Regular     Standing Status:   Standing     Number of Occurrences:   1     Order Specific Question:   Diet:     Answer:   Regular [1]       ACTIVITY  As tolerated.    LABORATORY  Lab Results   Component Value Date    SODIUM 143 08/26/2018    POTASSIUM 3.7 08/26/2018    CHLORIDE 114 (H) 08/26/2018    CO2 21 08/26/2018    GLUCOSE 93 08/26/2018    BUN 12 08/26/2018    CREATININE 0.78 08/26/2018        Lab Results   Component Value Date    WBC 5.0 08/26/2018    HEMOGLOBIN 13.1 08/26/2018    HEMATOCRIT 38.3 08/26/2018    PLATELETCT 140 (L) 08/26/2018        Total time of the discharge process was 36 minutes.

## 2018-08-26 NOTE — CARE PLAN
Problem: Safety  Goal: Will remain free from falls  Outcome: PROGRESSING AS EXPECTED  Interventions: Assess risk factors for fall (hypotension, bradycardia) Implement safety and fall precautions (fall risk signs in place, frequent rounding, clutter free environment, pt belongings within reach, non skid socks in place, call light within reach)          Problem: Knowledge Deficit  Goal: Knowledge of disease process/condition, treatment plan, diagnostic tests, and medications will improve  Outcome: PROGRESSING AS EXPECTED  Interventions: Assess patient's knowledge of condition. Explain and provide information on treatment plan, diagnostic tests and medications. Allow for questions and encourage to verbalize concerns.

## 2018-08-26 NOTE — ASSESSMENT & PLAN NOTE
Likely related to bradycardia/arrhythmia versus dehydration  Monitor on telemetry closely  Check TSH and free T4  Check echocardiogram  On IV fluids  We will need cardiac monitor upon discharge if above tests come back normal

## 2018-08-26 NOTE — PROGRESS NOTES
Patient requesting CPAP machine at bedtime. Paged Dr. Goncalves with orders made. RT notified, will set up machine.

## 2018-08-26 NOTE — H&P
Hospital Medicine History and Physical      Date of Service  8/25/2018    Chief Complaint  Chief Complaint   Patient presents with   • Near Syncopal       History of Presenting Illness  Tammy is a 77 y.o. female PMH of hypothyroid, dyslipidemia , who presents with above.  She stated that while she was at work, she started feeling dizziness and weakness.  She denied vertigo-like symptom.  She stated that because of lightheadedness she almost passed out.  She has a few similar episode in the past but not as severe as this one.  So she decided to come to ER.  Her symptom has resolved currently.  She stated that she only drinks about 4 glasses of water a day.  She denies any chest pain or palpitation.    Primary Care Physician  SIMBA Maldonado.      Code Status  Full code    Review of Systems  Review of Systems   Constitutional: Negative for chills, fever and weight loss.   HENT: Negative for congestion and nosebleeds.    Eyes: Negative for blurred vision, pain, discharge and redness.   Respiratory: Negative for cough, sputum production, shortness of breath and stridor.    Cardiovascular: Negative for chest pain, palpitations and orthopnea.   Gastrointestinal: Negative for abdominal pain, diarrhea, heartburn, nausea and vomiting.   Genitourinary: Negative for dysuria, frequency and urgency.   Musculoskeletal: Negative for back pain, myalgias and neck pain.   Skin: Negative for itching and rash.   Neurological: Positive for dizziness. Negative for focal weakness, seizures and headaches.   Psychiatric/Behavioral: Negative for depression. The patient is not nervous/anxious and does not have insomnia.      Please see HPI, all other systems were reviewed and are negative (AMA/CMS criteria)     Past Medical History  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    • Dyslipidemia    • GERD (gastroesophageal reflux disease)    • Hypothyroidism    • Pleomorphic adenoma of  parotid gland ,     recurrent, yearly MRI   • Sleep apnea        Surgical History  Past Surgical History:   Procedure Laterality Date   • CATARACT EXTRACTION WITH IOL     • LUMPECTOMY Left     non-cancerous   • PAROTIDECTOMY Left 2004    recurrent pleomorphic adenoma left parotid gland       Medications  No current facility-administered medications on file prior to encounter.      Current Outpatient Prescriptions on File Prior to Encounter   Medication Sig Dispense Refill   • simvastatin (ZOCOR) 10 MG Tab Take 1 Tab by mouth every evening. 90 Tab 3   • pantoprazole (PROTONIX) 40 MG Tablet Delayed Response TAKE ONE TABLET BY MOUTH DAILY 90 Tab 0   • levothyroxine (SYNTHROID) 75 MCG Tab TAKE ONE TABLET BY MOUTH EVERY MORNING ON AN EMPTY STOMACH (SYNTHROID) 90 Tab 0     Family History  Family History   Problem Relation Age of Onset   • Cancer Mother         multiple myeloma   • Heart Disease Mother    • Heart Attack Father    • Stroke Father    • Heart Disease Father    • Heart Disease Brother    • Lung Disease Brother    • Stroke Sister    • Cancer Sister         breast   • Sleep Apnea Neg Hx    • Diabetes Neg Hx          Social History  Social History   Substance Use Topics   • Smoking status: Never Smoker   • Smokeless tobacco: Never Used   • Alcohol use No       Allergies  Allergies   Allergen Reactions   • Doxycycline Nausea and Swelling     Rxn -   Tongue swelling   • Penicillins Rash     Rxn - years ago   Pt tolerated Augmentin 2018   • Sulfa Drugs Rash     Rxn - many years ago to an unknown med        Physical Exam  Laboratory   Hemodynamics  Temp (24hrs), Av.6 °C (97.9 °F), Min:36.6 °C (97.9 °F), Max:36.6 °C (97.9 °F)   Temperature: 36.6 °C (97.9 °F)  Pulse  Av  Min: 72  Max: 72    Blood Pressure : (!) 179/94      Respiratory      Respiration: 16, Pulse Oximetry: 97 %             Physical Exam   Constitutional: She is oriented to person, place, and time. No distress.   HENT:    Head: Normocephalic and atraumatic.   Mouth/Throat: Oropharynx is clear and moist.   Eyes: Pupils are equal, round, and reactive to light. Conjunctivae and EOM are normal.   Neck: Normal range of motion. Neck supple. No tracheal deviation present. No thyromegaly present.   Cardiovascular: Regular rhythm.    No murmur heard.  Bradycardia   Pulmonary/Chest: Effort normal and breath sounds normal. No respiratory distress. She has no wheezes.   Abdominal: Soft. Bowel sounds are normal. She exhibits no distension. There is no tenderness.   Musculoskeletal: She exhibits no edema or tenderness.   Neurological: She is alert and oriented to person, place, and time. No cranial nerve deficit.   Skin: Skin is warm and dry. She is not diaphoretic. No erythema.   Psychiatric: She has a normal mood and affect. Her behavior is normal. Thought content normal.       Recent Labs      08/25/18   1545   WBC  5.4   RBC  4.71   HEMOGLOBIN  14.5   HEMATOCRIT  42.8   MCV  90.9   MCH  30.8   MCHC  33.9   RDW  45.0   PLATELETCT  159*   MPV  11.5     Recent Labs      08/25/18   1545   SODIUM  141   POTASSIUM  3.8   CHLORIDE  106   CO2  26   GLUCOSE  119*   BUN  15   CREATININE  0.90   CALCIUM  9.6     Recent Labs      08/25/18   1545   ALTSGPT  43   ASTSGOT  44   ALKPHOSPHAT  71   TBILIRUBIN  0.7   GLUCOSE  119*         Recent Labs      08/25/18   1545   BNPBTYPENAT  32         Lab Results   Component Value Date    TROPONINI <0.01 08/25/2018       Imaging  DX-CHEST-LIMITED (1 VIEW)   Final Result         No acute cardiopulmonary abnormalities are identified.      ECHOCARDIOGRAM COMP W/O CONT    (Results Pending)     EKG  per my independant read:  QTc: 512, HR: 65, Normal Sinus Rhythm, no ST/T changes     Assessment/Plan     I anticipate this patient is appropriate for observation status at this time.    Postural dizziness with presyncope- (present on admission)   Assessment & Plan    Likely related to bradycardia/arrhythmia versus  dehydration  Monitor on telemetry closely  Check TSH and free T4  Check echocardiogram  On IV fluids  We will need cardiac monitor upon discharge if above tests come back normal        Bradycardia   Assessment & Plan    Plan as above        Dyslipidemia- (present on admission)   Assessment & Plan    On statin        Hypothyroidism- (present on admission)   Assessment & Plan    On levothyroxine            Prophylaxis:  lovenox

## 2018-08-26 NOTE — PROGRESS NOTES
1800 Pt into unit from ED via gurney transported by ED RN, on zoll monitor. Pt AOx4, ambulatory w/ steady gait, denies any dizziness, lightheadedness or SOB at the moment. Patient oriented to unit, room and BR location. Assisted to the bathroom. Urine sample collected and sent to lab. Weight and VS taken. Attached to cardiac monitoring. Admit profile and initial assessment done. Plan of care discussed w/ patient, verbalizes understanding. Safety and comfort measures provided. Fall precautions in place. Patient questions answered. Encouraged to verbalize feelings and needs. Call light within reach. Will continue to assess and monitor.

## 2018-08-26 NOTE — DISCHARGE INSTRUCTIONS
Discharge Instructions    Discharged to home by car with relative. Discharged via walking, hospital escort: Yes.  Special equipment needed: Not Applicable    Be sure to schedule a follow-up appointment with your primary care doctor or any specialists as instructed.     Discharge Plan:   Diet Plan: Discussed  Activity Level: Discussed  Confirmed Follow up Appointment: Patient to Call and Schedule Appointment  Confirmed Symptoms Management: Discussed  Medication Reconciliation Updated: Yes  Pneumococcal Vaccine Administered/Refused: Not given - Patient refused pneumococcal vaccine  Influenza Vaccine Indication: Patient Refuses, Indicated: Not available from distributor/    I understand that a diet low in cholesterol, fat, and sodium is recommended for good health. Unless I have been given specific instructions below for another diet, I accept this instruction as my diet prescription.   Other diet: low fat    Special Instructions: None    · Is patient discharged on Warfarin / Coumadin?   No   Dehydration, Adult  Dehydration is a condition in which there is not enough fluid or water in the body. This happens when you lose more fluids than you take in. Important organs, such as the kidneys, brain, and heart, cannot function without a proper amount of fluids. Any loss of fluids from the body can lead to dehydration.  Dehydration can range from mild to severe. This condition should be treated right away to prevent it from becoming severe.  What are the causes?  This condition may be caused by:  · Vomiting.  · Diarrhea.  · Excessive sweating, such as from heat exposure or exercise.  · Not drinking enough fluid, especially:  ¨ When ill.  ¨ While doing activity that requires a lot of energy.  · Excessive urination.  · Fever.  · Infection.  · Certain medicines, such as medicines that cause the body to lose excess fluid (diuretics).  · Inability to access safe drinking water.  · Reduced physical ability to get  adequate water and food.  What increases the risk?  This condition is more likely to develop in people:  · Who have a poorly controlled long-term (chronic) illness, such as diabetes, heart disease, or kidney disease.  · Who are age 65 or older.  · Who are disabled.  · Who live in a place with high altitude.  · Who play endurance sports.  What are the signs or symptoms?  Symptoms of mild dehydration may include:  · Thirst.  · Dry lips.  · Slightly dry mouth.  · Dry, warm skin.  · Dizziness.  Symptoms of moderate dehydration may include:  · Very dry mouth.  · Muscle cramps.  · Dark urine. Urine may be the color of tea.  · Decreased urine production.  · Decreased tear production.  · Heartbeat that is irregular or faster than normal (palpitations).  · Headache.  · Light-headedness, especially when you stand up from a sitting position.  · Fainting (syncope).  Symptoms of severe dehydration may include:  · Changes in skin, such as:  ¨ Cold and clammy skin.  ¨ Blotchy (mottled) or pale skin.  ¨ Skin that does not quickly return to normal after being lightly pinched and released (poor skin turgor).  · Changes in body fluids, such as:  ¨ Extreme thirst.  ¨ No tear production.  ¨ Inability to sweat when body temperature is high, such as in hot weather.  ¨ Very little urine production.  · Changes in vital signs, such as:  ¨ Weak pulse.  ¨ Pulse that is more than 100 beats a minute when sitting still.  ¨ Rapid breathing.  ¨ Low blood pressure.  · Other changes, such as:  ¨ Sunken eyes.  ¨ Cold hands and feet.  ¨ Confusion.  ¨ Lack of energy (lethargy).  ¨ Difficulty waking up from sleep.  ¨ Short-term weight loss.  ¨ Unconsciousness.  How is this diagnosed?  This condition is diagnosed based on your symptoms and a physical exam. Blood and urine tests may be done to help confirm the diagnosis.  How is this treated?  Treatment for this condition depends on the severity. Mild or moderate dehydration can often be treated at home.  Treatment should be started right away. Do not wait until dehydration becomes severe. Severe dehydration is an emergency and it needs to be treated in a hospital.  Treatment for mild dehydration may include:  · Drinking more fluids.  · Replacing salts and minerals in your blood (electrolytes) that you may have lost.  Treatment for moderate dehydration may include:  · Drinking an oral rehydration solution (ORS). This is a drink that helps you replace fluids and electrolytes (rehydrate). It can be found at pharmacies and retail stores.  Treatment for severe dehydration may include:  · Receiving fluids through an IV tube.  · Receiving an electrolyte solution through a feeding tube that is passed through your nose and into your stomach (nasogastric tube, or NG tube).  · Correcting any abnormalities in electrolytes.  · Treating the underlying cause of dehydration.  Follow these instructions at home:  · If directed by your health care provider, drink an ORS:  ¨ Make an ORS by following instructions on the package.  ¨ Start by drinking small amounts, about ½ cup (120 mL) every 5-10 minutes.  ¨ Slowly increase how much you drink until you have taken the amount recommended by your health care provider.  · Drink enough clear fluid to keep your urine clear or pale yellow. If you were told to drink an ORS, finish the ORS first, then start slowly drinking other clear fluids. Drink fluids such as:  ¨ Water. Do not drink only water. Doing that can lead to having too little salt (sodium) in the body (hyponatremia).  ¨ Ice chips.  ¨ Fruit juice that you have added water to (diluted fruit juice).  ¨ Low-calorie sports drinks.  · Avoid:  ¨ Alcohol.  ¨ Drinks that contain a lot of sugar. These include high-calorie sports drinks, fruit juice that is not diluted, and soda.  ¨ Caffeine.  ¨ Foods that are greasy or contain a lot of fat or sugar.  · Take over-the-counter and prescription medicines only as told by your health care  provider.  · Do not take sodium tablets. This can lead to having too much sodium in the body (hypernatremia).  · Eat foods that contain a healthy balance of electrolytes, such as bananas, oranges, potatoes, tomatoes, and spinach.  · Keep all follow-up visits as told by your health care provider. This is important.  Contact a health care provider if:  · You have abdominal pain that:  ¨ Gets worse.  ¨ Stays in one area ([facility]).  · You have a rash.  · You have a stiff neck.  · You are more irritable than usual.  · You are sleepier or more difficult to wake up than usual.  · You feel weak or dizzy.  · You feel very thirsty.  · You have urinated only a small amount of very dark urine over 6-8 hours.  Get help right away if:  · You have symptoms of severe dehydration.  · You cannot drink fluids without vomiting.  · Your symptoms get worse with treatment.  · You have a fever.  · You have a severe headache.  · You have vomiting or diarrhea that:  ¨ Gets worse.  ¨ Does not go away.  · You have blood or green matter (bile) in your vomit.  · You have blood in your stool. This may cause stool to look black and tarry.  · You have not urinated in 6-8 hours.  · You faint.  · Your heart rate while sitting still is over 100 beats a minute.  · You have trouble breathing.  This information is not intended to replace advice given to you by your health care provider. Make sure you discuss any questions you have with your health care provider.  Document Released: 12/18/2006 Document Revised: 07/14/2017 Document Reviewed: 02/10/2017  [employer] Interactive Patient Education © 2017 [employer]      Depression / Suicide Risk    As you are discharged from this [facility] facility, it is important to learn how to keep safe from harming yourself.    Recognize the warning signs:  · Abrupt changes in personality, positive or negative- including increase in energy   · Giving away possessions  · Change in eating patterns- significant weight  changes-  positive or negative  · Change in sleeping patterns- unable to sleep or sleeping all the time   · Unwillingness or inability to communicate  · Depression  · Unusual sadness, discouragement and loneliness  · Talk of wanting to die  · Neglect of personal appearance   · Rebelliousness- reckless behavior  · Withdrawal from people/activities they love  · Confusion- inability to concentrate     If you or a loved one observes any of these behaviors or has concerns about self-harm, here's what you can do:  · Talk about it- your feelings and reasons for harming yourself  · Remove any means that you might use to hurt yourself (examples: pills, rope, extension cords, firearm)  · Get professional help from the community (Mental Health, Substance Abuse, psychological counseling)  · Do not be alone:Call your Safe Contact- someone whom you trust who will be there for you.  · Call your local CRISIS HOTLINE 316-8899 or 194-776-6778  · Call your local Children's Mobile Crisis Response Team [place] (389) 295-1100 or www.Veebox  · Call the toll free National Suicide Prevention Hotlines   · National Suicide Prevention Lifeline 668-254-DTTE (8715)  · National Hope Line Network 800-SUICIDE (168-8521)

## 2018-08-30 ENCOUNTER — OFFICE VISIT (OUTPATIENT)
Dept: URGENT CARE | Facility: PHYSICIAN GROUP | Age: 78
End: 2018-08-30
Payer: MEDICARE

## 2018-08-30 VITALS
DIASTOLIC BLOOD PRESSURE: 90 MMHG | HEIGHT: 69 IN | WEIGHT: 200 LBS | SYSTOLIC BLOOD PRESSURE: 138 MMHG | RESPIRATION RATE: 14 BRPM | HEART RATE: 50 BPM | BODY MASS INDEX: 29.62 KG/M2 | OXYGEN SATURATION: 94 % | TEMPERATURE: 97 F

## 2018-08-30 DIAGNOSIS — R42 DIZZY SPELLS: ICD-10-CM

## 2018-08-30 PROCEDURE — 99214 OFFICE O/P EST MOD 30 MIN: CPT | Performed by: PHYSICIAN ASSISTANT

## 2018-08-30 ASSESSMENT — ENCOUNTER SYMPTOMS
NAUSEA: 0
PHOTOPHOBIA: 0
SEIZURES: 0
DOUBLE VISION: 0
COUGH: 0
CHILLS: 0
EYE PAIN: 0
PALPITATIONS: 0
HEADACHES: 0
DIZZINESS: 1
LOSS OF CONSCIOUSNESS: 0
FEVER: 0
SHORTNESS OF BREATH: 0
SENSORY CHANGE: 0
NECK PAIN: 0
VOMITING: 0
FOCAL WEAKNESS: 0
BLURRED VISION: 0

## 2018-08-30 NOTE — PATIENT INSTRUCTIONS
Vasovagal Syncope, Adult  Syncope, which is commonly known as fainting or passing out, is a temporary loss of consciousness. It occurs when the blood flow to the brain is reduced. Vasovagal syncope, also called neurocardiogenic syncope, is a fainting spell that happens when blood flow to the brain is reduced because of a sudden drop in heart rate and blood pressure.  Vasovagal syncope is usually harmless. However, you can get injured if you fall during a fainting spell.  What are the causes?  This condition is caused by a drop in heart rate and blood pressure, usually in response to a trigger. Many things and situations can trigger an episode, including:  · Pain.  · Fear.  · The sight of blood. This may occur during medical procedures, such as when blood is being drawn from a vein.  · Common activities, such as coughing, swallowing, stretching, or going to the bathroom.  · Emotional stress.  · Being in a confined space.  · Prolonged standing, especially in a warm environment.  · Lack of sleep or rest.  · Not eating for a long time.  · Not drinking enough liquids.  · Recent illness.  · Drinking alcohol.  · Taking drugs that affect blood pressure, such as marijuana, cocaine, opiates, or inhalants.  What are the signs or symptoms?  Before a fainting episode, you may:  · Feel dizzy or light-headed.  · Become pale.  · Sense that you are going to faint.  · Feel like the room is spinning.  · Only see directly ahead (tunnel vision).  · Feel sick to your stomach (nauseous).  · See spots.  · Slowly lose vision.  · Hear ringing in your ears.  · Have a headache.  · Feel warm and sweaty.  · Feel a sensation of pins and needles.  During the fainting spell, you may twitch or make jerky movements. Fainting spells usually last no longer than a few minutes before you wake up. If you get up too quickly before your body can recover, you may faint again.  How is this diagnosed?  This condition is diagnosed based on your symptoms, your  medical history, and a physical exam. Tests may be done to rule out other causes of fainting. Tests may include:  · Blood tests.  · Heart tests, such as an electrocardiogram (ECG), echocardiogram, or electrophysiology study.  · A test to check your response to changes in position (tilt table test).  How is this treated?  Usually, treatment is not needed for this condition. Your health care provider may suggest ways to help prevent fainting episodes. These may include:  · Drinking additional fluids if you are exposed to a trigger.  · Sitting or lying down if you notice signs that an episode is coming.  If your fainting spells continue, your health care provider may recommend that you:  · Take medicines to prevent fainting or to help reduce further episodes of fainting.  · Do certain exercises.  · Wear compression stockings.  · Have surgery to place a pacemaker in your body (rare).  Follow these instructions at home:  · Learn to identify the signs that an episode is coming.  · Sit or lie down at the first sign of a fainting spell. If you sit down, put your head down between your legs. If you lie down, swing your legs up in the air to increase blood flow to the brain.  · Avoid hot tubs and saunas.  · Avoid standing for a long time. If you have to stand for a long time, try:  ¨ Crossing your legs.  ¨ Flexing and stretching your leg muscles.  ¨ Squatting.  ¨ Moving your legs.  ¨ Bending over.  · Drink enough fluid to keep your urine clear or pale yellow.  · Make changes to your diet that your health care provider recommends. You may be told to:  ¨ Avoid caffeine.  ¨ Eat more salt.  · Take over-the-counter and prescription medicines only as told by your health care provider.  Contact a health care provider if:  · You continue to have fainting spells despite treatment.  · You faint more often despite treatment.  · You lose consciousness for more than a few minutes.  · You faint during or after exercising or after being  startled.  · You have twitching or jerky movements for longer than a few seconds during a fainting spell.  · You have an episode of twitching or jerky movements without fainting.  Get help right away if:  · A fainting spell leads to an injury or bleeding.  · You have new symptoms that occur with the fainting spells, such as:  ¨ Shortness of breath.  ¨ Chest pain.  ¨ Irregular heartbeat.  · You twitch or make jerky movements for more than 5 minutes.  · You twitch or make jerky movements during more than one fainting spell.  This information is not intended to replace advice given to you by your health care provider. Make sure you discuss any questions you have with your health care provider.  Document Released: 12/04/2013 Document Revised: 05/31/2017 Document Reviewed: 10/15/2016  [employer] Interactive Patient Education © 2017 [employer]

## 2018-08-30 NOTE — LETTER
August 31, 2018         Patient: Reina Munoz   YOB: 1940   Date of Visit: 8/30/2018           To Whom it May Concern:    Reina Munoz was seen in my clinic on 8/30/2018. She is excused from work 08/30/2018, 08/31/2018 and 9/03/2018.    If you have any questions or concerns, please don't hesitate to call.        Sincerely,           Devan Choudhary P.A.-C.  Electronically Signed

## 2018-08-31 NOTE — PROGRESS NOTES
Subjective:      Reina Munoz is a 77 y.o. female who presents with Dizziness (dry mouth, fatigue  )            Dizziness   This is a recurrent problem. The current episode started 1 to 4 weeks ago. The problem occurs intermittently. The problem has been waxing and waning. Pertinent negatives include no chest pain, chills, coughing, fever, headaches, nausea, neck pain or vomiting. The symptoms are aggravated by standing. She has tried nothing for the symptoms.       Review of Systems   Constitutional: Negative for chills and fever.   HENT: Negative for ear pain, hearing loss and tinnitus.    Eyes: Negative for blurred vision, double vision, photophobia and pain.   Respiratory: Negative for cough and shortness of breath.    Cardiovascular: Negative for chest pain and palpitations.   Gastrointestinal: Negative for nausea and vomiting.   Musculoskeletal: Negative for neck pain.   Neurological: Positive for dizziness. Negative for sensory change, focal weakness, seizures, loss of consciousness and headaches.   All other systems reviewed and are negative.    PMH:  has a past medical history of Chronic meniscal tear of knee; Dyslipidemia; GERD (gastroesophageal reflux disease); Hypothyroidism; Pleomorphic adenoma of parotid gland (1992, 2004); and Sleep apnea.  MEDS:   Current Outpatient Prescriptions:   •  aspirin EC (ECOTRIN) 81 MG Tablet Delayed Response, Take 81 mg by mouth every day., Disp: , Rfl:   •  simvastatin (ZOCOR) 10 MG Tab, Take 1 Tab by mouth every evening., Disp: 90 Tab, Rfl: 3  •  pantoprazole (PROTONIX) 40 MG Tablet Delayed Response, TAKE ONE TABLET BY MOUTH DAILY, Disp: 90 Tab, Rfl: 0  •  levothyroxine (SYNTHROID) 75 MCG Tab, TAKE ONE TABLET BY MOUTH EVERY MORNING ON AN EMPTY STOMACH (SYNTHROID), Disp: 90 Tab, Rfl: 0  ALLERGIES:   Allergies   Allergen Reactions   • Doxycycline Nausea and Swelling     Rxn - 2018  Tongue swelling   • Penicillins Rash     Rxn - years ago   Pt tolerated Augmentin  "6/2018   • Sulfa Drugs Rash     Rxn - many years ago to an unknown med     SURGHX:   Past Surgical History:   Procedure Laterality Date   • CATARACT EXTRACTION WITH IOL     • LUMPECTOMY Left     non-cancerous   • PAROTIDECTOMY Left 1992, 2004    recurrent pleomorphic adenoma left parotid gland     SOCHX:  reports that she has never smoked. She has never used smokeless tobacco. She reports that she does not drink alcohol or use drugs.  FH: Family history was reviewed, no pertinent findings to report  Medications, Allergies, and current problem list reviewed today in Epic       Objective:     /90   Pulse (!) 50   Temp 36.1 °C (97 °F)   Resp 14   Ht 1.753 m (5' 9\")   Wt 90.7 kg (200 lb)   SpO2 94%   BMI 29.53 kg/m²      Physical Exam   Constitutional: She is oriented to person, place, and time. She appears well-developed and well-nourished.   HENT:   Head: Normocephalic and atraumatic. Head is without raccoon's eyes, without Gallegos's sign and without contusion.   Right Ear: Hearing, tympanic membrane, external ear and ear canal normal.   Left Ear: Hearing, tympanic membrane, external ear and ear canal normal.   Nose: Nose normal.   Mouth/Throat: Oropharynx is clear and moist.   Eyes: Pupils are equal, round, and reactive to light. Conjunctivae and EOM are normal.   Neck: Normal range of motion. Neck supple.   Cardiovascular: Normal rate, regular rhythm and normal heart sounds.  Exam reveals no gallop and no friction rub.    No murmur heard.  Pulmonary/Chest: Effort normal and breath sounds normal.   Abdominal: Soft. Bowel sounds are normal.   Musculoskeletal: Normal range of motion.   Neurological: She is alert and oriented to person, place, and time. She has normal strength and normal reflexes. She displays normal reflexes. No cranial nerve deficit or sensory deficit. She exhibits normal muscle tone. She displays a negative Romberg sign. Coordination and gait normal. GCS eye subscore is 4. GCS verbal " subscore is 5. GCS motor subscore is 6.   CN II-XII intact. Cerebellar function  intact. Motor coordination intact.  strength strong and symmetrical bilaterally. Proprioception intact. Strength 5/5 equal in the upper and lower extremities. Facial features symmetric with equal movement. No focal deficits. Lower extremities normal- no sensory deficit. Distal n/v intact     Skin: Skin is warm and dry.   Psychiatric: She has a normal mood and affect. Her behavior is normal. Judgment and thought content normal.   Vitals reviewed.              Assessment/Plan:   Pt is a 77 yr old female who presents with intermittent dizzy spells for weeks.  She was seen and admitted in the hospital 7 days ago for this problem.  Numerous tests of her heart were done. No clinical signs of stroke are apparent.  She has scheduled [facility].  She states that she had an episode today at work after standing for a long time.  The dizzy spell lasted approx 20 min.  She is asymptomatic at this time. Vitals normal. Neuro exam normal.  Given her history of episodes occurring while standing for long periods and extensive workup in the hospital this could be vasovagal.    1. Dizzy spells  - Home care supportive care    Differential diagnosis, natural history, supportive care discussed. Follow-up with primary care provider within 7-10 days, emergency room precautions discussed.  Patient and/or family appears understanding of information.  Handout and review of patients diagnosis and treatment was discussed extensively.

## 2018-09-07 ENCOUNTER — OFFICE VISIT (OUTPATIENT)
Dept: VASCULAR LAB | Facility: MEDICAL CENTER | Age: 78
End: 2018-09-07
Attending: INTERNAL MEDICINE
Payer: MEDICARE

## 2018-09-07 VITALS
WEIGHT: 200 LBS | HEART RATE: 66 BPM | BODY MASS INDEX: 29.62 KG/M2 | HEIGHT: 69 IN | SYSTOLIC BLOOD PRESSURE: 151 MMHG | DIASTOLIC BLOOD PRESSURE: 84 MMHG

## 2018-09-07 DIAGNOSIS — I87.2 VENOUS INSUFFICIENCY: ICD-10-CM

## 2018-09-07 DIAGNOSIS — R55 NEAR SYNCOPE: ICD-10-CM

## 2018-09-07 DIAGNOSIS — R93.1 ECHOCARDIOGRAM ABNORMAL: ICD-10-CM

## 2018-09-07 DIAGNOSIS — R60.0 EDEMA OF LEFT LOWER LEG DUE TO PERIPHERAL VENOUS INSUFFICIENCY: ICD-10-CM

## 2018-09-07 DIAGNOSIS — E78.5 DYSLIPIDEMIA: ICD-10-CM

## 2018-09-07 DIAGNOSIS — I87.2 EDEMA OF LEFT LOWER LEG DUE TO PERIPHERAL VENOUS INSUFFICIENCY: ICD-10-CM

## 2018-09-07 DIAGNOSIS — R00.1 BRADYCARDIA: ICD-10-CM

## 2018-09-07 DIAGNOSIS — E03.9 ACQUIRED HYPOTHYROIDISM: ICD-10-CM

## 2018-09-07 PROCEDURE — 99214 OFFICE O/P EST MOD 30 MIN: CPT | Performed by: FAMILY MEDICINE

## 2018-09-07 PROCEDURE — 99212 OFFICE O/P EST SF 10 MIN: CPT | Performed by: FAMILY MEDICINE

## 2018-09-07 RX ORDER — LISINOPRIL 5 MG/1
5 TABLET ORAL DAILY
Qty: 90 TAB | Refills: 3 | Status: SHIPPED | OUTPATIENT
Start: 2018-09-07 | End: 2019-05-16 | Stop reason: SDUPTHER

## 2018-09-07 ASSESSMENT — ENCOUNTER SYMPTOMS
COUGH: 0
PALPITATIONS: 0
ORTHOPNEA: 0
VOMITING: 0
DIARRHEA: 0
HEMOPTYSIS: 0
INSOMNIA: 0
CHILLS: 0
NAUSEA: 0
HEADACHES: 0
WEAKNESS: 0
BRUISES/BLEEDS EASILY: 0
FEVER: 0
ABDOMINAL PAIN: 0
SORE THROAT: 0
DIZZINESS: 0
DEPRESSION: 0
DOUBLE VISION: 0
MYALGIAS: 0
BLOOD IN STOOL: 0
BLURRED VISION: 0
WHEEZING: 0
SEIZURES: 0
NERVOUS/ANXIOUS: 0
FOCAL WEAKNESS: 0
SHORTNESS OF BREATH: 0
TREMORS: 0

## 2018-09-07 NOTE — PROGRESS NOTES
FOLLOW-UP VASCULAR MED VISIT  Subjective:   Reina Munoz is a 77 y.o. female who presents today 18 for   Chief Complaint   Patient presents with   • Follow-Up     Venous Insufficiency   Referred by RAFAEL Yates from [facility].    HPI:    LLE edema:  Improving overall.  Tolerating compression hose.  Walking without pain. No hx of VTE or blood clotting disorder.   Has varicose veins for > 5 years.  Denies thrombosis or associated pain.  Has been using compression stockings OTC.  When wearing gets mild redness.     HLD:  Continues on simvastatin w/o myalgia     BP:  No prior ASCVD.  Reports mild high BP on home BP log in 130-140/80s  Normal home BP lo-160/60-70s.  Average: 120s/70s.   Does not take anti-HTN meds.      Seen by PCP and UC and evaluated and noted LLE edema.    Has noted worsening over last 6 mo.   Denies pain or radiation of pain.     ASCVD calculator (contraindicated if ASCVD+, [id number])   10yr-risk calc: 15.4%     - recommended for moderate to high intensity     Clinical evidence of ASCVD:  None   Major RFs:     1) Age (Men>44, women>54):  yes   2) Fhx early CAD (<55 men, <65 women):  no   3) cigarette smoking:  no   4) high BP >139/89 or on tx: no   5) Low HDL-C (<40 men, <50 women):  no    History   Smoking Status   • Never Smoker   Smokeless Tobacco   • Never Used     Social History   Substance Use Topics   • Smoking status: Never Smoker   • Smokeless tobacco: Never Used   • Alcohol use No     Outpatient Encounter Prescriptions as of 2018   Medication Sig Dispense Refill   • lisinopril (PRINIVIL) 5 MG Tab Take 1 Tab by mouth every day for 360 days. 90 Tab 3   • aspirin EC (ECOTRIN) 81 MG Tablet Delayed Response Take 81 mg by mouth every day.     • simvastatin (ZOCOR) 10 MG Tab Take 1 Tab by mouth every evening. 90 Tab 3   • pantoprazole (PROTONIX) 40 MG Tablet Delayed Response TAKE ONE TABLET BY MOUTH DAILY 90 Tab 0   • levothyroxine (SYNTHROID) 75 MCG Tab TAKE ONE TABLET BY  "MOUTH EVERY MORNING ON AN EMPTY STOMACH (SYNTHROID) 90 Tab 0     No facility-administered encounter medications on file as of 9/7/2018.      Allergies   Allergen Reactions   • Doxycycline Nausea and Swelling     Rxn - 2018  Tongue swelling   • Penicillins Rash     Rxn - years ago   Pt tolerated Augmentin 6/2018   • Sulfa Drugs Rash     Rxn - many years ago to an unknown med     DIET AND EXERCISE:  Weight Change:stable, no fluctuations   Diet: common adult, low salt   Exercise: moderate regular exercise program     Review of Systems   Constitutional: Negative for chills, fever and malaise/fatigue.   HENT: Negative for nosebleeds, sore throat and tinnitus.    Eyes: Negative for blurred vision and double vision.   Respiratory: Negative for cough, hemoptysis, shortness of breath and wheezing.    Cardiovascular: Negative for chest pain, palpitations, orthopnea and leg swelling.   Gastrointestinal: Negative for abdominal pain, blood in stool, diarrhea, melena, nausea and vomiting.   Genitourinary: Negative for hematuria.   Musculoskeletal: Negative for joint pain and myalgias.   Skin: Negative for itching and rash.   Neurological: Negative for dizziness, tremors, focal weakness, seizures, weakness and headaches.   Endo/Heme/Allergies: Does not bruise/bleed easily.   Psychiatric/Behavioral: Negative for depression. The patient is not nervous/anxious and does not have insomnia.       Objective:     Vitals:    09/07/18 0859 09/07/18 0904   BP: 158/79 151/84   Pulse: 62 66   Weight: 90.7 kg (200 lb) 90.7 kg (200 lb)   Height: 1.753 m (5' 9\") 1.753 m (5' 9\")      BP Readings from Last 4 Encounters:   09/07/18 151/84   08/30/18 138/90   08/26/18 159/77   08/23/18 131/76     Pulse Readings from Last 4 Encounters:   09/07/18 66   08/30/18 (!) 50   08/26/18 (!) 51   08/23/18 61     Body mass index is 29.53 kg/m².   BMI Readings from Last 4 Encounters:   09/07/18 29.53 kg/m²   08/30/18 29.53 kg/m²   08/25/18 29.30 kg/m²   08/23/18 " 29.53 kg/m²     Physical Exam   Constitutional: She is oriented to person, place, and time. She appears well-developed and well-nourished. No distress.   HENT:   Head: Normocephalic and atraumatic.   Neck: Normal range of motion. Neck supple. No JVD present. Carotid bruit is not present. No thyromegaly present.   Cardiovascular: Normal rate, regular rhythm, normal heart sounds and intact distal pulses.  PMI is not displaced.  Exam reveals no gallop and no friction rub.    No murmur heard.  Pulses:       Carotid pulses are 2+ on the right side, and 2+ on the left side.       Radial pulses are 2+ on the right side, and 2+ on the left side.        Dorsalis pedis pulses are 2+ on the right side, and 2+ on the left side.        Posterior tibial pulses are 2+ on the right side, and 2+ on the left side.   Edema:  RLE:  none  LLE:  Resolved, minimal   Spider telangectasia:     RLE:  scattered LLE: scattered, notable at dorsum of ankle  Varicosities:       RLE: diffuse, non-thrombosed, <3mm   LLE: diffuse, non-thrombosed, >3mm at calf, plethoric at thigh  Corona phlebectatica:    RLE:  None      LLE:  None   Cording:       RLE:  None   LLE: None      Pulmonary/Chest: Effort normal and breath sounds normal.   Abdominal: Soft. Bowel sounds are normal. She exhibits no distension and no mass. There is no tenderness. There is no rebound and no guarding.   Musculoskeletal: Normal range of motion. She exhibits no edema or tenderness.   Neurological: She is alert and oriented to person, place, and time. No cranial nerve deficit. She exhibits normal muscle tone. Coordination normal.   Skin: Skin is warm and dry. No rash noted. She is not diaphoretic. No cyanosis. Nails show no clubbing.        Psychiatric: She has a normal mood and affect.     Lab Results   Component Value Date    CHOLSTRLTOT 199 03/14/2018     (H) 03/14/2018    HDL 57 03/14/2018    TRIGLYCERIDE 191 (H) 03/14/2018           Lab Results   Component Value Date       SODIUM 143 08/26/2018    POTASSIUM 3.7 08/26/2018    CHLORIDE 114 (H) 08/26/2018    CO2 21 08/26/2018    GLUCOSE 93 08/26/2018    BUN 12 08/26/2018    CREATININE 0.78 08/26/2018    IFAFRICA >60 08/26/2018    IFNOTAFR >60 08/26/2018        Lab Results   Component Value Date    WBC 5.0 08/26/2018    RBC 4.14 (L) 08/26/2018    HEMOGLOBIN 13.1 08/26/2018    HEMATOCRIT 38.3 08/26/2018    MCV 92.5 08/26/2018    MCH 31.6 08/26/2018    MCHC 34.2 08/26/2018    MPV 11.4 08/26/2018     VASCULAR IMAGING:     LLE DUPLEX 6/7/17  No deep venous thrombosis.    LLE DUPLEX 6/2016  1.  No evidence of Left  lower extremity deep venous thrombosis.  2.  There are no incidental findings.     BLE DUPLEX 6/20/18   Normal bilateral superficial and deep venous examination of the lower    extremities.    ECHO 8/26/18  Hyperdynamic left ventricular systolic function.  Moderate mitral regurgitation.  Moderately elevated estimated right-sided pressures.  No prior study is available for comparison.    Medical Decision Making:  Today's Assessment / Status / Plan:     1. Venous insufficiency     2. Edema of left lower leg due to peripheral venous insufficiency     3. Dyslipidemia     4. Acquired hypothyroidism     5. Bradycardia  REFERRAL TO CARDIOLOGY   6. Near syncope  REFERRAL TO CARDIOLOGY   7. Echocardiogram abnormal  REFERRAL TO CARDIOLOGY     Patient Type: Primary Prevention    Etiology of Established CVD if Present:   None     Lipid Management: Qualifies for Statin Therapy Based on 2013 ACC/AHA Guidelines: yes  NLA Risk Category:   Moderate: 2 major RFs, risk scoring >10%, other risk scoring (CAC, advanced lipid, hsCRP)  Tx threshold:  non-HDL-C >159, LDL-C >129  Tx goal:  non-HDL <130, LDL-C <100 (optional: apoB<90)   At goal:  No, as of 3/2018: WQU=934, non-TBH=605  Tx plan:  - check NMR lipoprofile   - continue moderate intensity simvastatin or transition to atorvastatin 10-20mg in the future should LDL continue >100, non-HDL  >130    Blood Pressure Management:Goal: ACC/AHA (2017) goal <130/80  Home BP at goal:  yes  Office BP at goal:  No  Overall good home averages.  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   Reviewed plate method   - target 10-15lb weight loss over next 6-12mo     Other:   1) chronic venous insuff (moderate)  - ASA 81mg  - stop horse chestnut due to intolerance   - compression stockings Rx 20-30mmHg given to wear as tolerated   - PM elevation of legs     2) edema, LLE - duplex is negative, does not appear to be primarily venous based upon studies  - check BNP and echo     3) varicose veins with spiders - diffusely  - continue tx plan as above     4) hypothyroidism - stable, continue levothyroxine 75mcg  - monitor labs annually and clinical symptomology     5) asymptomatic bradycardia - continue to monitor, call if sx   - avoid B-blockers and nonDHP-CCB   - f/u with cardiology for additional evaluation      Instructed to follow-up with PCP for remainder of adult medical needs: yes  We will partner with other providers in the management of established vascular disease and cardiometabolic risk factors.    Studies to Be Obtained:  None  Labs to Be Obtained:  CMP, NMR, BNP    Follow up in: 3 months with GABBIE Ardon M.D.

## 2018-09-07 NOTE — PATIENT INSTRUCTIONS
1) start lisinopril 5mg once daily, keep monitoring BP     2) visit with cardiology to discuss your echo results and low Heart rate     3) complete last labs

## 2018-09-17 ENCOUNTER — APPOINTMENT (OUTPATIENT)
Dept: MEDICAL GROUP | Facility: PHYSICIAN GROUP | Age: 78
End: 2018-09-17
Payer: MEDICARE

## 2018-09-17 ENCOUNTER — HOSPITAL ENCOUNTER (OUTPATIENT)
Dept: LAB | Facility: MEDICAL CENTER | Age: 78
End: 2018-09-17
Attending: FAMILY MEDICINE
Payer: MEDICARE

## 2018-09-17 DIAGNOSIS — E78.5 DYSLIPIDEMIA: ICD-10-CM

## 2018-09-17 DIAGNOSIS — I87.2 VENOUS INSUFFICIENCY: ICD-10-CM

## 2018-09-17 DIAGNOSIS — I87.2 EDEMA OF LEFT LOWER LEG DUE TO PERIPHERAL VENOUS INSUFFICIENCY: ICD-10-CM

## 2018-09-17 DIAGNOSIS — R60.0 EDEMA OF LEFT LOWER LEG DUE TO PERIPHERAL VENOUS INSUFFICIENCY: ICD-10-CM

## 2018-09-17 LAB
ALBUMIN SERPL BCP-MCNC: 4.1 G/DL (ref 3.2–4.9)
ALBUMIN/GLOB SERPL: 1.5 G/DL
ALP SERPL-CCNC: 66 U/L (ref 30–99)
ALT SERPL-CCNC: 52 U/L (ref 2–50)
ANION GAP SERPL CALC-SCNC: 8 MMOL/L (ref 0–11.9)
AST SERPL-CCNC: 46 U/L (ref 12–45)
BILIRUB SERPL-MCNC: 0.8 MG/DL (ref 0.1–1.5)
BNP SERPL-MCNC: 27 PG/ML (ref 0–100)
BUN SERPL-MCNC: 13 MG/DL (ref 8–22)
CALCIUM SERPL-MCNC: 9.5 MG/DL (ref 8.5–10.5)
CHLORIDE SERPL-SCNC: 105 MMOL/L (ref 96–112)
CO2 SERPL-SCNC: 27 MMOL/L (ref 20–33)
CREAT SERPL-MCNC: 0.84 MG/DL (ref 0.5–1.4)
FASTING STATUS PATIENT QL REPORTED: NORMAL
GLOBULIN SER CALC-MCNC: 2.7 G/DL (ref 1.9–3.5)
GLUCOSE SERPL-MCNC: 85 MG/DL (ref 65–99)
POTASSIUM SERPL-SCNC: 4.4 MMOL/L (ref 3.6–5.5)
PROT SERPL-MCNC: 6.8 G/DL (ref 6–8.2)
SODIUM SERPL-SCNC: 140 MMOL/L (ref 135–145)

## 2018-09-17 PROCEDURE — 83704 LIPOPROTEIN BLD QUAN PART: CPT

## 2018-09-17 PROCEDURE — 36415 COLL VENOUS BLD VENIPUNCTURE: CPT

## 2018-09-17 PROCEDURE — 83880 ASSAY OF NATRIURETIC PEPTIDE: CPT

## 2018-09-17 PROCEDURE — 80053 COMPREHEN METABOLIC PANEL: CPT

## 2018-09-17 PROCEDURE — 80061 LIPID PANEL: CPT | Mod: XU

## 2018-09-21 LAB
CHOLEST SERPL-MCNC: 202 MG/DL
FASTING STATUS PATIENT QL REPORTED: NORMAL
HDL PARTICAL NO Q4363: 32.3 UMOL/L
HDL SIZE Q4361: 8.9 NM
HDLC SERPL-MCNC: 50 MG/DL (ref 40–59)
HLD.LARGE SERPL-SCNC: 6.3 UMOL/L
L VLDL PART NO Q4357: 6.7 NMOL/L
LDL SERPL QN: 21 NM
LDL SERPL-SCNC: 1476 NMOL/L
LDL SMALL SERPL-SCNC: 407 NMOL/L
LDLC SERPL CALC-MCNC: 111 MG/DL
PATHOLOGY STUDY: ABNORMAL
TRIGL SERPL-MCNC: 206 MG/DL (ref 30–149)
VLDL SIZE Q4362: 48.2 NM

## 2018-10-08 ENCOUNTER — TELEPHONE (OUTPATIENT)
Dept: MEDICAL GROUP | Facility: PHYSICIAN GROUP | Age: 78
End: 2018-10-08

## 2018-10-08 DIAGNOSIS — G47.30 SLEEP APNEA WITH USE OF CONTINUOUS POSITIVE AIRWAY PRESSURE (CPAP): ICD-10-CM

## 2018-10-08 NOTE — TELEPHONE ENCOUNTER
1. Caller Name: Reina Munoz                                           Call Back Number: [phone] (home)         Patient approves a detailed voicemail message: N\A    pt called she stated we need to [facility] a [facility] her yearly [facility]-Pack supplies . Their fax # is [phone]. please advise?

## 2018-10-09 NOTE — TELEPHONE ENCOUNTER
1. Caller Name: Reina Munoz                                           Call Back Number: [phone] (home)         Patient approves a detailed voicemail message: N\A    pt states she had a z-pack machine at home and yearly they sent her new filters and tubing and she needs an order [facility]ed [facility].

## 2018-10-11 PROBLEM — G47.30 SLEEP APNEA WITH USE OF CONTINUOUS POSITIVE AIRWAY PRESSURE (CPAP): Status: ACTIVE | Noted: 2018-10-11

## 2018-10-12 ENCOUNTER — OFFICE VISIT (OUTPATIENT)
Dept: URGENT CARE | Facility: CLINIC | Age: 78
End: 2018-10-12
Payer: MEDICARE

## 2018-10-12 VITALS
TEMPERATURE: 98.2 F | OXYGEN SATURATION: 95 % | WEIGHT: 199 LBS | BODY MASS INDEX: 29.47 KG/M2 | RESPIRATION RATE: 16 BRPM | HEIGHT: 69 IN | HEART RATE: 56 BPM | DIASTOLIC BLOOD PRESSURE: 72 MMHG | SYSTOLIC BLOOD PRESSURE: 138 MMHG

## 2018-10-12 DIAGNOSIS — Z28.21 INFLUENZA VACCINATION DECLINED: ICD-10-CM

## 2018-10-12 DIAGNOSIS — Z28.39 INFLUENZA VACCINATION NOT UP TO DATE: ICD-10-CM

## 2018-10-12 DIAGNOSIS — J01.90 ACUTE SINUSITIS, RECURRENCE NOT SPECIFIED, UNSPECIFIED LOCATION: ICD-10-CM

## 2018-10-12 PROCEDURE — 99214 OFFICE O/P EST MOD 30 MIN: CPT | Performed by: NURSE PRACTITIONER

## 2018-10-12 RX ORDER — AMOXICILLIN AND CLAVULANATE POTASSIUM 875; 125 MG/1; MG/1
1 TABLET, FILM COATED ORAL 2 TIMES DAILY
Qty: 14 TAB | Refills: 0 | Status: SHIPPED | OUTPATIENT
Start: 2018-10-12 | End: 2018-10-19

## 2018-10-12 RX ORDER — FLUTICASONE PROPIONATE 50 MCG
2 SPRAY, SUSPENSION (ML) NASAL DAILY
Qty: 1 BOTTLE | Refills: 0 | Status: SHIPPED | OUTPATIENT
Start: 2018-10-12 | End: 2019-07-09

## 2018-10-12 ASSESSMENT — ENCOUNTER SYMPTOMS
VOMITING: 0
HEADACHES: 0
NECK PAIN: 0
BLURRED VISION: 0
SORE THROAT: 1
SINUS PAIN: 1
FEVER: 0
DIZZINESS: 0
CHILLS: 1
PALPITATIONS: 0
BACK PAIN: 0
TINGLING: 0
NAUSEA: 0
EYE DISCHARGE: 0
EYE REDNESS: 0
STRIDOR: 0

## 2018-10-12 ASSESSMENT — LIFESTYLE VARIABLES: SUBSTANCE_ABUSE: 0

## 2018-10-12 NOTE — PROGRESS NOTES
Subjective:      Reina Munoz is a 77 y.o. female who presents with Sinus Problem (x3 days, also left ear pain, drainage, congestion, runny nose, cough, pt. states it is going down into her lungs and she wants to prevent bronchitis. )        PFSH reviewed and updated as necessary in EPIC electronic record with patient today.  Medications including OTC medications reviewed with patient.     Allergies   Allergen Reactions   • Doxycycline Nausea and Swelling     Rxn - 2018  Tongue swelling   • Penicillins Rash     Rxn - years ago   Pt tolerated Augmentin 6/2018   • Sulfa Drugs Rash     Rxn - many years ago to an unknown med         HPI this is a new problem. C/o sinus pain and pressure, chest congestion. Drainage in her throat and now coughing. + headache/ frontal and in her cheeks. + ear pain bilaterally. + non-productive cough. + sore throat with drainage.   Taking Vitamin C, garlic and hot tea to help her symptoms but nothing is helping.  She gets sinus problems and bronchitis twice a year and this is very similar but she tried to wait longer this time before coming into care. She was unable to get an appt with her PCP today or next week.   No other aggravating or alleviating factors.         Review of Systems   Constitutional: Positive for chills. Negative for fever and malaise/fatigue.   HENT: Positive for congestion, sinus pain and sore throat. Negative for ear discharge, ear pain and tinnitus.    Eyes: Negative for blurred vision, discharge and redness.   Respiratory: Negative for stridor.    Cardiovascular: Negative for chest pain, palpitations and leg swelling.   Gastrointestinal: Negative for nausea and vomiting.   Musculoskeletal: Negative for back pain and neck pain.   Neurological: Negative for dizziness, tingling and headaches.   Endo/Heme/Allergies: Negative for environmental allergies.   Psychiatric/Behavioral: Negative for substance abuse.          Objective:     /72 (BP Location: Right  "arm, Patient Position: Sitting, BP Cuff Size: Adult)   Pulse (!) 56   Temp 36.8 °C (98.2 °F) (Temporal)   Resp 16   Ht 1.753 m (5' 9\")   Wt 90.3 kg (199 lb)   SpO2 95%   Breastfeeding? No   BMI 29.39 kg/m²      Physical Exam   Constitutional: She is oriented to person, place, and time. Vital signs are normal. She appears well-developed and well-nourished.  Non-toxic appearance. No distress.   HENT:   Head: Normocephalic.   Right Ear: Hearing, tympanic membrane, external ear and ear canal normal.   Left Ear: Hearing, tympanic membrane, external ear and ear canal normal.   Nose: Sinus tenderness present. No mucosal edema or rhinorrhea. Right sinus exhibits frontal sinus tenderness. Right sinus exhibits no maxillary sinus tenderness. Left sinus exhibits frontal sinus tenderness. Left sinus exhibits no maxillary sinus tenderness.   Mouth/Throat: Uvula is midline. No oropharyngeal exudate.   Eyes: Pupils are equal, round, and reactive to light. Right conjunctiva is injected. Left conjunctiva is injected.   Neck: Trachea normal, normal range of motion, full passive range of motion without pain and phonation normal. Neck supple.   Cardiovascular: Normal rate, regular rhythm, intact distal pulses and normal pulses.  PMI is not displaced.    Pulmonary/Chest: Effort normal and breath sounds normal.   Lymphadenopathy:     She has no cervical adenopathy.        Right: No supraclavicular adenopathy present.        Left: No supraclavicular adenopathy present.   Neurological: She is alert and oriented to person, place, and time. Gait normal.   Skin: Skin is warm and dry. Capillary refill takes less than 2 seconds. She is not diaphoretic.   Psychiatric: She has a normal mood and affect. Her speech is normal and behavior is normal. Thought content normal. Cognition and memory are normal.   Nursing note and vitals reviewed.              Assessment/Plan:     1. Acute sinusitis, recurrence not specified, unspecified location  " amoxicillin-clavulanate (AUGMENTIN) 875-125 MG Tab    fluticasone (FLONASE) 50 MCG/ACT nasal spray   2. Influenza vaccination declined     3. Influenza vaccination not up to date       Aftercare instructions given to pt/ caregiver. .   Educated in proper administration of medication(s) ordered today including safety, possible SE, risks, benefits, rationale and alternatives to therapy.   Return to clinic or PCP  5-7 days if current symptoms are not resolving in a satisfactory manner or sooner if new or worsening symptoms occur.   Patient  advised differential diagnoses, signs and symptoms which would warrant further evaluation and /or emergent evaluation.    Patient was in agreement with this treatment plan and seemed to understand without barriers.   Questions were encouraged and answered to patients satisfaction.   Keep well hydrated   Humidifier at night prn

## 2018-10-29 ENCOUNTER — TELEPHONE (OUTPATIENT)
Dept: MEDICAL GROUP | Facility: PHYSICIAN GROUP | Age: 78
End: 2018-10-29

## 2018-10-29 NOTE — TELEPHONE ENCOUNTER
VOICEMAIL  1. Caller Name: Reina Munoz                      Call Back Number: [phone] (home)     2. Message: patient needs an order for her yearly c pap supplies, olvin put the form in your inbasket.     3. Patient approves office to leave a detailed voicemail/MyChart message: N\A

## 2018-12-03 ENCOUNTER — OFFICE VISIT (OUTPATIENT)
Dept: MEDICAL GROUP | Facility: LAB | Age: 78
End: 2018-12-03
Payer: MEDICARE

## 2018-12-03 VITALS
HEART RATE: 58 BPM | RESPIRATION RATE: 16 BRPM | HEIGHT: 69 IN | WEIGHT: 196.2 LBS | SYSTOLIC BLOOD PRESSURE: 122 MMHG | TEMPERATURE: 97.8 F | DIASTOLIC BLOOD PRESSURE: 68 MMHG | OXYGEN SATURATION: 95 % | BODY MASS INDEX: 29.06 KG/M2

## 2018-12-03 DIAGNOSIS — H65.92 LEFT NON-SUPPURATIVE OTITIS MEDIA: Primary | ICD-10-CM

## 2018-12-03 PROBLEM — H92.02 LEFT EAR PAIN: Status: ACTIVE | Noted: 2018-12-03

## 2018-12-03 PROCEDURE — 99214 OFFICE O/P EST MOD 30 MIN: CPT | Performed by: INTERNAL MEDICINE

## 2018-12-03 RX ORDER — AZITHROMYCIN 250 MG/1
250 TABLET, FILM COATED ORAL DAILY
Qty: 6 TAB | Refills: 0 | Status: SHIPPED | OUTPATIENT
Start: 2018-12-03 | End: 2018-12-08

## 2018-12-03 NOTE — PROGRESS NOTES
Chief Complaint   Patient presents with   • Otalgia     l ear pain       Subjective:     HPI:   Reina presents today with the following.    Left non-suppurative otitis media  New to discuss, uncontrolled problem.  Started about 3 days ago with a left ear pain, described as throbbing in nature on the inside of her ear, radiating to the back of her ear, 7/10 intensity, improved with taken Tylenol 1000 mg.  She has a history of a recent sinus infection that was treated mid October with Augmentin 7 days course.  Her sinuses are improving but she continued to have pressure-like sensation, postnasal drip that is yellowish and sometimes has bloody streaks on it.  She continued to use Flonase.    Denied fever, chills. denies left ear discharge but reported impaired hearing.  Denied cough, shortness of breath or wheezing.    Denies sick contacts.  She is allergic to doxycycline, penicillin and sulfa.      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 Take 2 tabs on day 1 6 Tab 0   • fluticasone (FLONASE) 50 MCG/ACT nasal spray Spray 2 Sprays in nose every day. 1 Bottle 0   • Misc. Devices Misc Tubing, mask of patient's choice, and filters for CPAP machine to [facility] 1 Each 0   • lisinopril (PRINIVIL) 5 MG Tab Take 1 Tab by mouth every day for 360 days. 90 Tab 3   • aspirin EC (ECOTRIN) 81 MG Tablet Delayed Response Take 81 mg by mouth every day.     • simvastatin (ZOCOR) 10 MG Tab Take 1 Tab by mouth every  evening. 90 Tab 3   • pantoprazole (PROTONIX) 40 MG Tablet Delayed Response TAKE ONE TABLET BY MOUTH DAILY 90 Tab 0   • levothyroxine (SYNTHROID) 75 MCG Tab TAKE ONE TABLET BY MOUTH EVERY MORNING ON AN EMPTY STOMACH (SYNTHROID) 90 Tab 0     No current facility-administered medications for this visit.        Allergies as of 12/03/2018 - Reviewed 12/03/2018   Allergen Reaction Noted   • Doxycycline Nausea and Swelling 06/13/2018   • Penicillins Rash 01/11/2016   • Sulfa drugs Rash 01/11/2016        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    • Dyslipidemia    • GERD (gastroesophageal reflux disease)    • Hypothyroidism    • Pleomorphic adenoma of parotid gland 1992, 2004    recurrent, yearly MRI   • Sleep apnea        Past Surgical History:   Procedure Laterality Date   • CATARACT EXTRACTION WITH IOL     • LUMPECTOMY Left     non-cancerous   • PAROTIDECTOMY Left 1992, 2004    recurrent pleomorphic adenoma left parotid gland       Social History   Substance Use Topics   • Smoking status: Never Smoker   • Smokeless tobacco: Never Used   • Alcohol use No       Family History   Problem Relation Age of Onset   • Cancer Mother         multiple myeloma   • Heart Disease Mother    • Heart Attack Father    • Stroke Father    • Heart Disease Father    • Heart Disease Brother    • Lung Disease Brother    • Stroke Sister    • Cancer Sister         breast   • Sleep Apnea Neg Hx    • Diabetes Neg Hx        ROS:     - Constitutional: Negative for fever, chills, unexpected weight change, and fatigue/generalized weakness.     - HEENT: Negative for headaches, vision changes, hearing changes, ear pain, ear discharge, sinus congestion, or sore throat.     - Respiratory: Negative for cough, sputum production, dyspnea and wheezing.    - Cardiovascular: Negative for chest pain or palpitations.      - Gastrointestinal: Negative for heartburn, nausea, vomiting, abdominal pain, diarrhea or  "constipation.     - Genitourinary: Negative for dysuria, polyuria or urinary urgency.    - Musculoskeletal: Negative for myalgias, back pain, and joint pain.     - Skin: Negative for rash, itching, cyanotic skin color change.     - Psychiatric/Behavioral: Negative for depression or suicidal/homicidal ideation.       Physical Exam:     Blood pressure 122/68, pulse (!) 58, temperature 36.6 °C (97.8 °F), temperature source Temporal, resp. rate 16, height 1.753 m (5' 9\"), weight 89 kg (196 lb 3.2 oz), SpO2 95 %, not currently breastfeeding. Body mass index is 28.97 kg/m².   Gen:         Alert and oriented, No apparent distress.  HEENT:    PERRLA, congeal erythema without edema or exudates.  Bilateral ear canals with mild erythema, left tympanic membrane is erythematous without bulging.  Noted a left parotid gland enlargement that is chronic per patient.  Neck:        No Lymphadenopathy or Bruits.  Lungs:     Clear to auscultation bilaterally  CV:          Regular rate and rhythm. No murmurs, rubs or gallops.       Ext:          No clubbing, cyanosis, edema.    Assessment and Plan:     78 y.o. female with the following issues.    1. Left non-suppurative otitis media  New, uncontrolled problem  Clinical evaluation is suggestive of acute otitis media, will proceed with a Z-Ivan as below.  Recommended Tylenol as needed for pain.  If no improvement for 1 week, should make a follow-up visit.      - azithromycin (ZITHROMAX Z-IVAN) 250 MG Tab; Take 1 Tab by mouth every day for 5 days. Take 2 tabs on day 1  Dispense: 6 Tab; Refill: 0    Follow Up:      Return for PRN with PCP.      Please note that this dictation was created using voice recognition software. I have made every reasonable attempt to correct obvious errors, but I expect that there are errors of grammar and possibly content that I did not discover before finalizing the note.    Signed by: Evangelina Giles M.D.  "

## 2018-12-03 NOTE — ASSESSMENT & PLAN NOTE
New to discuss, uncontrolled problem.  Started about 3 days ago with a left ear pain, described as throbbing in nature on the inside of her ear, radiating to the back of her ear, 7/10 intensity, improved with taken Tylenol 1000 mg.  She has a history of a recent sinus infection that was treated mid October with Augmentin 7 days course.  Her sinuses are improving but she continued to have pressure-like sensation, postnasal drip that is yellowish and sometimes has bloody streaks on it.  She continued to use Flonase.    Denied fever, chills. denies left ear discharge but reported impaired hearing.  Denied cough, shortness of breath or wheezing.    Denies sick contacts.  She is allergic to doxycycline, penicillin and sulfa.

## 2018-12-07 ENCOUNTER — TELEPHONE (OUTPATIENT)
Dept: MEDICAL GROUP | Facility: PHYSICIAN GROUP | Age: 78
End: 2018-12-07

## 2018-12-07 DIAGNOSIS — R00.1 BRADYCARDIA: ICD-10-CM

## 2018-12-07 NOTE — TELEPHONE ENCOUNTER
2. SPECIFIC Action To Be Taken: Referral pending, please sign.    3. Diagnosis/Clinical Reason for Request:R00.1, I10, I34.0    4. Specialty & Provider Name/Lab/Imaging Location: [facility] Cardiology Dr. Martinez    5. Is appointment scheduled for requested order/referral: yes - 12/11/18    Patient was not informed they will receive a return phone call from the office ONLY if there are any questions before processing their request. Advised to call back if they haven't received a call from the referral department in 5 days.

## 2019-01-28 ENCOUNTER — PATIENT OUTREACH (OUTPATIENT)
Dept: HEALTH INFORMATION MANAGEMENT | Facility: OTHER | Age: 79
End: 2019-01-28

## 2019-01-28 NOTE — PROGRESS NOTES
1. Attempt #:1    2. HealthConnect Verified: no    3. Verify PCP: yes    4. Review Care Team: yes    · 5. WebIZ Checked & Epic Updated: NA    6. Reviewed/Updated the following with patient:       •   Communication Preference Obtained? YES       •   Preferred Pharmacy? YES       •   Preferred Lab? YES       •   Family History (document living status of immediate family members and if + hx of cancer, diabetes, hypertension, hyperlipidemia, heart attack, stroke) YES    7. Annual Wellness Visit Scheduling  · Scheduling Status:Scheduled     8. Care Gap Scheduling (Attempt to Schedule EACH Overdue Care Gap!)     Health Maintenance Due   Topic Date Due   • IMM DTaP/Tdap/Td Vaccine (1 - Tdap) 11/30/1959   • IMM ZOSTER VACCINES (1 of 2) 11/30/1992   • IMM PNEUMOCOCCAL 65+ (ADULT) LOW/MEDIUM RISK SERIES (1 of 2 - PCV13) 11/30/2007   • IMM INFLUENZA (1) 09/01/2018        Scheduled patient for Annual Wellness Visit  Patient declined Immunizations: FLU, PNEUMOVAX (PPSV23) and TDAP.     9. Mobii Activation: already active    10. Mobii Zaira: no    11. Virtual Visits: no    12. Opt In to Text Messages: yes    13. Patient was advised: “This is a free wellness visit. The provider will screen for medical conditions to help you stay healthy. If you have other concerns to address you may be asked to discuss these at a separate visit or there may be an additional fee.”     14. Patient was informed to arrive 15 min prior to their scheduled appointment and bring in their medication bottles.

## 2019-02-08 ENCOUNTER — TELEPHONE (OUTPATIENT)
Dept: VASCULAR LAB | Facility: MEDICAL CENTER | Age: 79
End: 2019-02-08

## 2019-02-12 ENCOUNTER — TELEPHONE (OUTPATIENT)
Dept: MEDICAL GROUP | Facility: PHYSICIAN GROUP | Age: 79
End: 2019-02-12

## 2019-02-12 NOTE — TELEPHONE ENCOUNTER
1. Caller Name: Reina Munoz                                           Call Back Number: [phone] (home)         Patient approves a detailed voicemail message: N\A    pt called she is coming in on wednesday but would like an order put today to have her thyroid check so she can stop by tommorrow morning pt stated she stop taking her thyroid med three days ago because she had side effects like unable to sleep and feeling week. so now she wants to know if she even needs to be on her thyroid med. please advise?

## 2019-02-13 NOTE — TELEPHONE ENCOUNTER
She needs to be off thyroid medication for 6 weeks before we can order labs to make that determination    SIMBA Maldonado.

## 2019-02-19 ENCOUNTER — OFFICE VISIT (OUTPATIENT)
Dept: MEDICAL GROUP | Facility: PHYSICIAN GROUP | Age: 79
End: 2019-02-19
Payer: MEDICARE

## 2019-02-19 ENCOUNTER — TELEPHONE (OUTPATIENT)
Dept: MEDICAL GROUP | Facility: PHYSICIAN GROUP | Age: 79
End: 2019-02-19

## 2019-02-19 ENCOUNTER — HOSPITAL ENCOUNTER (OUTPATIENT)
Dept: LAB | Facility: MEDICAL CENTER | Age: 79
End: 2019-02-19
Attending: NURSE PRACTITIONER
Payer: MEDICARE

## 2019-02-19 VITALS
BODY MASS INDEX: 29.47 KG/M2 | HEART RATE: 64 BPM | SYSTOLIC BLOOD PRESSURE: 122 MMHG | TEMPERATURE: 97.4 F | OXYGEN SATURATION: 94 % | DIASTOLIC BLOOD PRESSURE: 82 MMHG | WEIGHT: 199 LBS | HEIGHT: 69 IN

## 2019-02-19 DIAGNOSIS — D49.2 TUMOR OF SOFT TISSUE OF NECK: ICD-10-CM

## 2019-02-19 DIAGNOSIS — E03.9 ACQUIRED HYPOTHYROIDISM: ICD-10-CM

## 2019-02-19 DIAGNOSIS — R00.1 BRADYCARDIA: ICD-10-CM

## 2019-02-19 DIAGNOSIS — M54.31 RIGHT SIDED SCIATICA: ICD-10-CM

## 2019-02-19 DIAGNOSIS — Z13.1 SCREENING FOR DIABETES MELLITUS: ICD-10-CM

## 2019-02-19 DIAGNOSIS — K21.9 GASTROESOPHAGEAL REFLUX DISEASE WITHOUT ESOPHAGITIS: ICD-10-CM

## 2019-02-19 DIAGNOSIS — R20.8 BURNING SENSATION OF FEET: ICD-10-CM

## 2019-02-19 PROBLEM — H65.92 LEFT NON-SUPPURATIVE OTITIS MEDIA: Status: RESOLVED | Noted: 2018-12-03 | Resolved: 2019-02-19

## 2019-02-19 LAB
ANION GAP SERPL CALC-SCNC: 6 MMOL/L (ref 0–11.9)
BUN SERPL-MCNC: 15 MG/DL (ref 8–22)
CALCIUM SERPL-MCNC: 10.6 MG/DL (ref 8.5–10.5)
CHLORIDE SERPL-SCNC: 105 MMOL/L (ref 96–112)
CO2 SERPL-SCNC: 29 MMOL/L (ref 20–33)
CREAT SERPL-MCNC: 0.86 MG/DL (ref 0.5–1.4)
EST. AVERAGE GLUCOSE BLD GHB EST-MCNC: 128 MG/DL
GLUCOSE SERPL-MCNC: 91 MG/DL (ref 65–99)
HBA1C MFR BLD: 6.1 % (ref 0–5.6)
POTASSIUM SERPL-SCNC: 4.3 MMOL/L (ref 3.6–5.5)
SODIUM SERPL-SCNC: 140 MMOL/L (ref 135–145)
T3 SERPL-MCNC: 108.1 NG/DL (ref 60–181)
T4 FREE SERPL-MCNC: 0.73 NG/DL (ref 0.53–1.43)
TSH SERPL DL<=0.005 MIU/L-ACNC: 4.83 UIU/ML (ref 0.38–5.33)
VIT B12 SERPL-MCNC: 1150 PG/ML (ref 211–911)

## 2019-02-19 PROCEDURE — 84443 ASSAY THYROID STIM HORMONE: CPT

## 2019-02-19 PROCEDURE — 8041 PR SCP AHA: Performed by: NURSE PRACTITIONER

## 2019-02-19 PROCEDURE — 80048 BASIC METABOLIC PNL TOTAL CA: CPT

## 2019-02-19 PROCEDURE — 84480 ASSAY TRIIODOTHYRONINE (T3): CPT

## 2019-02-19 PROCEDURE — 84439 ASSAY OF FREE THYROXINE: CPT

## 2019-02-19 PROCEDURE — 83036 HEMOGLOBIN GLYCOSYLATED A1C: CPT

## 2019-02-19 PROCEDURE — 82607 VITAMIN B-12: CPT

## 2019-02-19 PROCEDURE — 86038 ANTINUCLEAR ANTIBODIES: CPT

## 2019-02-19 PROCEDURE — 84425 ASSAY OF VITAMIN B-1: CPT

## 2019-02-19 PROCEDURE — 36415 COLL VENOUS BLD VENIPUNCTURE: CPT

## 2019-02-19 PROCEDURE — 99214 OFFICE O/P EST MOD 30 MIN: CPT | Performed by: NURSE PRACTITIONER

## 2019-02-19 RX ORDER — DIAZEPAM 5 MG/1
TABLET ORAL
Qty: 1 TAB | Refills: 0 | Status: SHIPPED
Start: 2019-02-19 | End: 2021-03-10 | Stop reason: SDUPTHER

## 2019-02-19 ASSESSMENT — PATIENT HEALTH QUESTIONNAIRE - PHQ9: CLINICAL INTERPRETATION OF PHQ2 SCORE: 0

## 2019-02-19 NOTE — ASSESSMENT & PLAN NOTE
This is a new problem to me. It started a few years ago. It comes and goes. It is both lower extremities equally. Not occurring daily. Having burning, but no numbness present.

## 2019-02-19 NOTE — TELEPHONE ENCOUNTER
DOCUMENTATION OF PAR STATUS:    1. Name of Medication & Dose: esomeprazole (NEXIUM) 20 MG capsule     2. Name of Prescription Coverage Company & phone #: [employer] part D [phone] Fax [phone]    3. Date Prior Auth Submitted: 02/19/19    4. What information was given to obtain insurance decision? Dx code and length of treatment    5. Prior Auth Status? Pending    6. Patient Notified: N\A    Cover my Meds:  [employer] [id number]

## 2019-02-19 NOTE — ASSESSMENT & PLAN NOTE
She has discontinued her levothyroxine on her own. She became tired of having insomnia and felt that maybe it was her thyroid medication.  She has been getting insomnia, dry skin, increased appetite, and weight gain. Sometimes she feels heart pounding.

## 2019-02-19 NOTE — ASSESSMENT & PLAN NOTE
Bradycardia has been stabilized with lisinopril 2.5 mg BID. She is doing well with this, but sometimes having heart pounding sensation very rarely. It only lasts a few seconds.

## 2019-02-19 NOTE — ASSESSMENT & PLAN NOTE
This is an ongoing very intermittent episode. She went on a road trip this past weekend and has developed right sided sciatica pain now for the past few days. She is doing home stretches and has contact with chiropractor who has helped her when this occurred before. Also taking Aleve occasionally.

## 2019-02-19 NOTE — ASSESSMENT & PLAN NOTE
Chronic problem stable. Followed by  [facility] with MRI. Next MRI was due Oct 2018, but she never followed up with this. They do not want to do surgery

## 2019-02-19 NOTE — PROGRESS NOTES
Subjective:     Chief Complaint   Patient presents with   • Thyroid Problem     pt stop taking thyroid med        HPI  Reina Munoz is a 78 y.o. female here today for concern about thyroid medication     Hypothyroidism  She has discontinued her levothyroxine on her own. She became tired of having insomnia and felt that maybe it was her thyroid medication.  She has been getting insomnia, dry skin, increased appetite, and weight gain. Sometimes she feels heart pounding.     Bradycardia  Bradycardia has been stabilized with lisinopril 2.5 mg BID. She is doing well with this, but sometimes having heart pounding sensation very rarely. It only lasts a few seconds.     Right sided sciatica  This is an ongoing very intermittent episode. She went on a road trip this past weekend and has developed right sided sciatica pain now for the past few days. She is doing home stretches and has contact with chiropractor who has helped her when this occurred before. Also taking Aleve occasionally.     Gastroesophageal reflux disease without esophagitis  Chronic problem that has been managed with pantoprazole for many years. She believes it is not working as well any longer. She is now experiencing more days of reflux symptoms. She does not coffee triggers this to be worse. Nexium works well. 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 Followed by [facility] with MRI. Next MRI was due Oct 2018, but she never followed up with this. They do not want to do surgery     Burning sensation of feet  This is a new problem to me. It started a few years ago. It comes and goes. It is both lower extremities equally. Not occurring daily. Having burning, but no numbness present.      Annual Health Assessment Questions:    1.  Are you currently engaging in any exercise or physical activity? No    2.  How would you describe your mood or emotional well-being today?  good    3.  Have you had any falls in the last year? No    4.  Have you noticed any problems with your balance or had difficulty walking? No    5.  In the last six months have you experienced any leakage of urine? No    6. DPA/Advanced Directive: Patient does not have an Advanced Directive.  A packet and workshop information was given on Advanced Directives.     Diagnoses of Acquired hypothyroidism, Bradycardia, Right sided sciatica, Gastroesophageal reflux disease without esophagitis, Tumor of soft tissue of neck, Burning sensation of feet, and Screening for diabetes mellitus were pertinent to this visit.    Allergies: Doxycycline; Penicillins; and Sulfa drugs  Current medicines (including changes today)  Current Outpatient Prescriptions   Medication Sig Dispense Refill   • esomeprazole (NEXIUM) 20 MG capsule Take 1 Cap by mouth every morning before breakfast. 90 Cap 3   • diazePAM (VALIUM) 5 MG Tab Take 1 tab po once 60 minutes before MRI 1 Tab 0   • fluticasone (FLONASE) 50 MCG/ACT nasal spray Spray 2 Sprays in nose every day. 1 Bottle 0   • Misc. Devices Misc Tubing, mask of patient's choice, and filters for CPAP machine to preferred homecare 1 Each 0   • lisinopril (PRINIVIL) 5 MG Tab Take 1 Tab by mouth every day for 360 days. 90 Tab 3   • aspirin EC (ECOTRIN) 81 MG Tablet Delayed Response Take 81 mg by mouth every day.     • simvastatin (ZOCOR) 10 MG Tab Take 1 Tab by mouth every evening. 90 Tab 3     No current facility-administered medications for this visit.        She  has a past medical history of Chronic meniscal tear of knee; Dyslipidemia; GERD (gastroesophageal reflux disease); Hypothyroidism; Pleomorphic adenoma of parotid gland (1992, 2004); and Sleep apnea.        ROS  As stated in HPI and additionally  Gen: No fatigue, malaise, weight loss  Neuro: +burning in feet. No numbness, tingling, or extremity weakness  GI: +reflux. No abd pain, hoarseness, dysphagia  CV: +pounding heart beat sometimes. No  "palpitations, chest pain, syncope, rapid heart rate, LE edema       Objective:     Blood pressure 122/82, pulse 64, temperature 36.3 °C (97.4 °F), temperature source Temporal, height 1.753 m (5' 9\"), weight 90.3 kg (199 lb), SpO2 94 %, not currently breastfeeding. Body mass index is 29.39 kg/m².  Physical Exam:  General: Alert, oriented, in no acute distress.  Eye contact is good, speech goal directed, affect calm  CNs grossly intact.  HEENT: conjunctiva non-injected, sclera non-icteric, EOMs intact. No lid edema or eye drainage.   Gross hearing intact.  Neck: Supple. No adenopathy or masses in the cervical or supraclavicular regions. No thyromegaly  CV: regular rate and rhythm.  Ext: no edema  Skin: No rashes or lesions in visible areas  Gait steady.     Assessment and Plan:   Assessment/Plan:  1. Acquired hypothyroidism  Check labs now. Determine consider placing back on same dose. Educated her on sx of hypo vs hyper   - TSH; Future  - FREE THYROXINE; Future  - TRIIDOTHYRONINE; Future    2. Bradycardia  stable  - Basic Metabolic Panel; Future    3. Right sided sciatica  She will f/u with chiropractor for this like she usually does    4. Gastroesophageal reflux disease without esophagitis  Not controlled. Switch to nexium as this has worked better for her than pantoprazole.   - esomeprazole (NEXIUM) 20 MG capsule; Take 1 Cap by mouth every morning before breakfast.  Dispense: 90 Cap; Refill: 3    5. Tumor of soft tissue of neck  MRI due for monitoring   - Basic Metabolic Panel; Future  - MR-SOFT TISSUE NECK-WITH & W/O; Future  - diazePAM (VALIUM) 5 MG Tab; Take 1 tab po once 60 minutes before MRI  Dispense: 1 Tab; Refill: 0    6. Burning sensation of feet  New problem. Check labs   - VITAMIN B12; Future  - HEMOGLOBIN A1C; Future  - VITAMIN B1; Future  - VIOLET REFLEXIVE PROFILE; Future    7. Screening for diabetes mellitus  - HEMOGLOBIN A1C; Future       Follow up:  Return keep may appointment.    Educated in proper " administration of medication(s) ordered today including safety, possible SE, risks, benefits, rationale and alternatives to therapy.   Supportive care, differential diagnoses, and indications for immediate follow-up discussed with patient.    Pathogenesis of diagnosis discussed including typical length and natural progression.    Instructed to return to clinic or nearest emergency department for any change in condition, further concerns, or worsening of symptoms.  Patient states understanding of the plan of care and discharge instructions.      Please note that this dictation was created using voice recognition software. I have made every reasonable attempt to correct obvious errors, but I expect that there are errors of grammar and possibly content that I did not discover before finalizing the note.    Followup: Return keep may appointment. sooner should new symptoms or problems arise.

## 2019-02-19 NOTE — ASSESSMENT & PLAN NOTE
Chronic problem that has been managed with pantoprazole for many years. She believes it is not working as well any longer. She is now experiencing more days of reflux symptoms. She does not coffee triggers this to be worse. Nexium works well. She feels this works better she has used the OTC dose

## 2019-02-21 LAB — NUCLEAR IGG SER QL IA: NORMAL

## 2019-02-22 LAB — VIT B1 BLD-MCNC: 137 NMOL/L (ref 70–180)

## 2019-02-26 NOTE — TELEPHONE ENCOUNTER
FINAL PRIOR AUTHORIZATION STATUS:    1.  Name of Medication & Dose: esomeprazole (NEXIUM) 20 MG capsule     2. Prior Auth Status: Approved through 02/24/2020     3. Action Taken: Pharmacy Notified: yes Patient Notified: N\A

## 2019-03-04 ENCOUNTER — PATIENT MESSAGE (OUTPATIENT)
Dept: MEDICAL GROUP | Facility: PHYSICIAN GROUP | Age: 79
End: 2019-03-04

## 2019-03-04 ENCOUNTER — TELEPHONE (OUTPATIENT)
Dept: VASCULAR LAB | Facility: MEDICAL CENTER | Age: 79
End: 2019-03-04

## 2019-03-04 DIAGNOSIS — R73.02 IMPAIRED GLUCOSE TOLERANCE: ICD-10-CM

## 2019-03-04 DIAGNOSIS — E03.9 ACQUIRED HYPOTHYROIDISM: ICD-10-CM

## 2019-03-06 ENCOUNTER — TELEPHONE (OUTPATIENT)
Dept: VASCULAR LAB | Facility: MEDICAL CENTER | Age: 79
End: 2019-03-06

## 2019-03-06 NOTE — TELEPHONE ENCOUNTER
Patient overdue for follow-up appt with vascular care.  Medical assistant has been unable to reach and reschedule  Multiple messages have been left  Await further patient contact.  Pending further contact, will defer all vascular care, including management of cardiac vascular risk factors, to PCP    Michael J. Bloch, MD  Vascular Care    Cc:  SERJIO Rg

## 2019-04-09 ENCOUNTER — HOSPITAL ENCOUNTER (OUTPATIENT)
Dept: RADIOLOGY | Facility: MEDICAL CENTER | Age: 79
End: 2019-04-09
Attending: NURSE PRACTITIONER
Payer: MEDICARE

## 2019-04-09 DIAGNOSIS — D49.2 TUMOR OF SOFT TISSUE OF NECK: ICD-10-CM

## 2019-04-09 PROCEDURE — 70543 MRI ORBT/FAC/NCK W/O &W/DYE: CPT

## 2019-04-09 PROCEDURE — 700117 HCHG RX CONTRAST REV CODE 255: Performed by: NURSE PRACTITIONER

## 2019-04-09 PROCEDURE — A9585 GADOBUTROL INJECTION: HCPCS | Performed by: NURSE PRACTITIONER

## 2019-04-09 RX ORDER — GADOBUTROL 604.72 MG/ML
9 INJECTION INTRAVENOUS ONCE
Status: COMPLETED | OUTPATIENT
Start: 2019-04-09 | End: 2019-04-09

## 2019-04-09 RX ADMIN — GADOBUTROL 9 ML: 604.72 INJECTION INTRAVENOUS at 11:08

## 2019-04-11 DIAGNOSIS — E04.1 LEFT THYROID NODULE: ICD-10-CM

## 2019-04-12 ENCOUNTER — PATIENT MESSAGE (OUTPATIENT)
Dept: MEDICAL GROUP | Facility: PHYSICIAN GROUP | Age: 79
End: 2019-04-12

## 2019-04-22 ENCOUNTER — OFFICE VISIT (OUTPATIENT)
Dept: MEDICAL GROUP | Facility: PHYSICIAN GROUP | Age: 79
End: 2019-04-22
Payer: MEDICARE

## 2019-04-22 VITALS
DIASTOLIC BLOOD PRESSURE: 72 MMHG | RESPIRATION RATE: 16 BRPM | HEART RATE: 54 BPM | OXYGEN SATURATION: 99 % | WEIGHT: 195 LBS | TEMPERATURE: 97 F | SYSTOLIC BLOOD PRESSURE: 118 MMHG | BODY MASS INDEX: 28.88 KG/M2 | HEIGHT: 69 IN

## 2019-04-22 DIAGNOSIS — H93.8X2 CONGESTION OF LEFT EAR: ICD-10-CM

## 2019-04-22 PROCEDURE — 99214 OFFICE O/P EST MOD 30 MIN: CPT | Performed by: NURSE PRACTITIONER

## 2019-04-22 RX ORDER — AZITHROMYCIN 250 MG/1
TABLET, FILM COATED ORAL
Qty: 6 TAB | Refills: 0 | Status: SHIPPED | OUTPATIENT
Start: 2019-04-22 | End: 2019-04-27

## 2019-04-22 NOTE — ASSESSMENT & PLAN NOTE
Reports 2 weeks of itching of ears, congestion and cough.  No fever reported at this time.  Has been taking allegra D without improvement.  Reports coughing up phlegm,  Left ear pressure, no drainage reported.  Headaches and facial pressure.  Reports that she gets this every year when the pollen starts.  Reports that a z pack has been helpful in the past.

## 2019-04-22 NOTE — PROGRESS NOTES
"Chief Complaint   Patient presents with   • Sinus Problem       Subjective:   Reina Munoz is a 78 y.o. Female patient of KAZ Martinez,  here today for evaluation and management of:    Congestion of left ear  Reports 2 weeks of itching of ears, congestion and cough.  No fever reported at this time.  Has been taking allegra D without improvement.  Reports coughing up phlegm,  Left ear pressure, no drainage reported.  Headaches and facial pressure.  Reports that she gets this every year when the pollen starts.  Reports that a z pack has been helpful in the past.           Current medicines (including changes today)  Current Outpatient Prescriptions   Medication Sig Dispense Refill   • azithromycin (ZITHROMAX) 250 MG Tab 2 tabs by mouth day 1, 1 tab by mouth days 2-5 6 Tab 0   • esomeprazole (NEXIUM) 20 MG capsule Take 1 Cap by mouth every morning before breakfast. 90 Cap 3   • fluticasone (FLONASE) 50 MCG/ACT nasal spray Spray 2 Sprays in nose every day. 1 Bottle 0   • Misc. Devices Misc Tubing, mask of patient's choice, and filters for CPAP machine to preferred homecare 1 Each 0   • lisinopril (PRINIVIL) 5 MG Tab Take 1 Tab by mouth every day for 360 days. 90 Tab 3   • aspirin EC (ECOTRIN) 81 MG Tablet Delayed Response Take 81 mg by mouth every day.     • simvastatin (ZOCOR) 10 MG Tab Take 1 Tab by mouth every evening. 90 Tab 3     No current facility-administered medications for this visit.      She  has a past medical history of Chronic meniscal tear of knee; Dyslipidemia; GERD (gastroesophageal reflux disease); Hypothyroidism; Pleomorphic adenoma of parotid gland (1992, 2004); and Sleep apnea.    ROS as stated in hpi  No chest pain, no shortness of breath, no abdominal pain       Objective:     /72 (BP Location: Left arm, Patient Position: Sitting, BP Cuff Size: Adult)   Pulse (!) 54   Temp 36.1 °C (97 °F) (Temporal)   Resp 16   Ht 1.753 m (5' 9\")   Wt 88.5 kg (195 lb)   SpO2 99%  Body mass " index is 28.8 kg/m². afebile  Physical Exam:  Constitutional: Alert, no distress.  Skin: Warm, dry, good turgor,no cyanosis, no rashes in visible areas.  Eye: Equal, round and reactive, conjunctiva clear, lids normal.  Ears: No tenderness, no discharge.  External canals are without any significant edema or erythema.  Tympanic membranes are without any inflammation, no effusion.  Gross auditory acuity is intact.  Nose: symmetrical.  Nasal passages red and swollen, yellow drainage   Mouth/Throat: lips without lesion.  Oropharynx clear.  Throat without erythema, exudates or tonsillar enlargement.  Neck: Trachea midline, no masses, no obvious thyroid enlargement.. No cervical or supraclavicular lymphadenopathy. Range of motion within normal limits.  Neuro: Cranial nerves 2-12 grossly intact.  No sensory deficit.  Respiratory: Unlabored respiratory effort, lungs clear to auscultation, no wheezes, no ronchi.  Cardiovascular: Normal S1, S2, no murmur, no edema.  Psych: Alert and oriented x3, normal affect and mood and judgement.        Assessment and Plan:   The following treatment plan was discussed    1. Congestion of left ear  This is a new problem to me.  Acute.  Most likely a allergic rhinosinusitis that has developed a secondary infection.  Continue allegra, add flonase and mucinex.  Will cover with Z pack.  Red flag warnings reviewed.  Monitor.       Followup: Return if symptoms worsen or fail to improve.         Educated in proper administration of medication(s) ordered today including safety, possible SE, risks, benefits, rationale and alternatives to therapy.     Please note that this dictation was created using voice recognition software. I have made every reasonable attempt to correct obvious errors, but I expect that there are errors of grammar and possibly content that I did not discover before finalizing the note.

## 2019-05-02 ENCOUNTER — APPOINTMENT (OUTPATIENT)
Dept: RADIOLOGY | Facility: MEDICAL CENTER | Age: 79
End: 2019-05-02
Attending: EMERGENCY MEDICINE
Payer: MEDICARE

## 2019-05-02 ENCOUNTER — HOSPITAL ENCOUNTER (EMERGENCY)
Facility: MEDICAL CENTER | Age: 79
End: 2019-05-02
Attending: EMERGENCY MEDICINE
Payer: MEDICARE

## 2019-05-02 ENCOUNTER — OFFICE VISIT (OUTPATIENT)
Dept: URGENT CARE | Facility: PHYSICIAN GROUP | Age: 79
End: 2019-05-02
Payer: MEDICARE

## 2019-05-02 VITALS
OXYGEN SATURATION: 93 % | HEART RATE: 52 BPM | RESPIRATION RATE: 17 BRPM | WEIGHT: 194 LBS | BODY MASS INDEX: 28.73 KG/M2 | HEIGHT: 69 IN | SYSTOLIC BLOOD PRESSURE: 180 MMHG | DIASTOLIC BLOOD PRESSURE: 100 MMHG | TEMPERATURE: 97.4 F

## 2019-05-02 VITALS
BODY MASS INDEX: 28.88 KG/M2 | HEIGHT: 69 IN | SYSTOLIC BLOOD PRESSURE: 138 MMHG | TEMPERATURE: 98.3 F | DIASTOLIC BLOOD PRESSURE: 88 MMHG | RESPIRATION RATE: 16 BRPM | HEART RATE: 64 BPM | WEIGHT: 195 LBS | OXYGEN SATURATION: 93 %

## 2019-05-02 DIAGNOSIS — R42 DIZZINESS: ICD-10-CM

## 2019-05-02 DIAGNOSIS — R07.9 CHEST PAIN, UNSPECIFIED TYPE: ICD-10-CM

## 2019-05-02 LAB
ALBUMIN SERPL BCP-MCNC: 4.5 G/DL (ref 3.2–4.9)
ALBUMIN/GLOB SERPL: 1.3 G/DL
ALP SERPL-CCNC: 89 U/L (ref 30–99)
ALT SERPL-CCNC: 62 U/L (ref 2–50)
ANION GAP SERPL CALC-SCNC: 11 MMOL/L (ref 0–11.9)
AST SERPL-CCNC: 59 U/L (ref 12–45)
BASOPHILS # BLD AUTO: 0.5 % (ref 0–1.8)
BASOPHILS # BLD: 0.03 K/UL (ref 0–0.12)
BILIRUB SERPL-MCNC: 0.8 MG/DL (ref 0.1–1.5)
BNP SERPL-MCNC: 42 PG/ML (ref 0–100)
BUN SERPL-MCNC: 17 MG/DL (ref 8–22)
CALCIUM SERPL-MCNC: 9.6 MG/DL (ref 8.4–10.2)
CHLORIDE SERPL-SCNC: 102 MMOL/L (ref 96–112)
CO2 SERPL-SCNC: 25 MMOL/L (ref 20–33)
CREAT SERPL-MCNC: 0.91 MG/DL (ref 0.5–1.4)
D DIMER PPP IA.FEU-MCNC: <0.4 UG/ML (FEU) (ref 0–0.5)
EKG IMPRESSION: NORMAL
EOSINOPHIL # BLD AUTO: 0.14 K/UL (ref 0–0.51)
EOSINOPHIL NFR BLD: 2.2 % (ref 0–6.9)
ERYTHROCYTE [DISTWIDTH] IN BLOOD BY AUTOMATED COUNT: 44.8 FL (ref 35.9–50)
GLOBULIN SER CALC-MCNC: 3.4 G/DL (ref 1.9–3.5)
GLUCOSE SERPL-MCNC: 102 MG/DL (ref 65–99)
HCT VFR BLD AUTO: 44.2 % (ref 37–47)
HGB BLD-MCNC: 14.9 G/DL (ref 12–16)
IMM GRANULOCYTES # BLD AUTO: 0.03 K/UL (ref 0–0.11)
IMM GRANULOCYTES NFR BLD AUTO: 0.5 % (ref 0–0.9)
LYMPHOCYTES # BLD AUTO: 1.98 K/UL (ref 1–4.8)
LYMPHOCYTES NFR BLD: 31.5 % (ref 22–41)
MCH RBC QN AUTO: 30.7 PG (ref 27–33)
MCHC RBC AUTO-ENTMCNC: 33.7 G/DL (ref 33.6–35)
MCV RBC AUTO: 91.1 FL (ref 81.4–97.8)
MONOCYTES # BLD AUTO: 0.41 K/UL (ref 0–0.85)
MONOCYTES NFR BLD AUTO: 6.5 % (ref 0–13.4)
NEUTROPHILS # BLD AUTO: 3.7 K/UL (ref 2–7.15)
NEUTROPHILS NFR BLD: 58.8 % (ref 44–72)
NRBC # BLD AUTO: 0 K/UL
NRBC BLD-RTO: 0 /100 WBC
PLATELET # BLD AUTO: 175 K/UL (ref 164–446)
PMV BLD AUTO: 11.1 FL (ref 9–12.9)
POTASSIUM SERPL-SCNC: 3.6 MMOL/L (ref 3.6–5.5)
PROT SERPL-MCNC: 7.9 G/DL (ref 6–8.2)
RBC # BLD AUTO: 4.85 M/UL (ref 4.2–5.4)
SODIUM SERPL-SCNC: 138 MMOL/L (ref 135–145)
TROPONIN I SERPL-MCNC: 0.02 NG/ML (ref 0–0.04)
TROPONIN I SERPL-MCNC: <0.02 NG/ML (ref 0–0.04)
WBC # BLD AUTO: 6.3 K/UL (ref 4.8–10.8)

## 2019-05-02 PROCEDURE — 36415 COLL VENOUS BLD VENIPUNCTURE: CPT

## 2019-05-02 PROCEDURE — 99213 OFFICE O/P EST LOW 20 MIN: CPT | Performed by: FAMILY MEDICINE

## 2019-05-02 PROCEDURE — 85379 FIBRIN DEGRADATION QUANT: CPT

## 2019-05-02 PROCEDURE — 80053 COMPREHEN METABOLIC PANEL: CPT

## 2019-05-02 PROCEDURE — 83880 ASSAY OF NATRIURETIC PEPTIDE: CPT

## 2019-05-02 PROCEDURE — 93005 ELECTROCARDIOGRAM TRACING: CPT

## 2019-05-02 PROCEDURE — 84484 ASSAY OF TROPONIN QUANT: CPT

## 2019-05-02 PROCEDURE — 85025 COMPLETE CBC W/AUTO DIFF WBC: CPT

## 2019-05-02 PROCEDURE — 99285 EMERGENCY DEPT VISIT HI MDM: CPT

## 2019-05-02 PROCEDURE — 71045 X-RAY EXAM CHEST 1 VIEW: CPT

## 2019-05-02 PROCEDURE — 93005 ELECTROCARDIOGRAM TRACING: CPT | Performed by: EMERGENCY MEDICINE

## 2019-05-02 ASSESSMENT — ENCOUNTER SYMPTOMS
FEVER: 0
VOMITING: 0
HEADACHES: 0
PALPITATIONS: 1
SORE THROAT: 0
SHORTNESS OF BREATH: 1

## 2019-05-02 ASSESSMENT — LIFESTYLE VARIABLES: DO YOU DRINK ALCOHOL: NO

## 2019-05-02 NOTE — PROGRESS NOTES
"Subjective:     Reina Munoz is a 78 y.o. female who presents for Hypertension (x today, fatigue, dizzy, sob, poss. hbp)    HPI  Pt presents for evaluation of a new problem   Pt with some fatigue and dizziness today while at work   She was feeling that she became short of breath quickly while walking around   Symptoms all became worse with exertion and improved with rest  Works at [employer] and had to leave work early due to this  Feels that her heart is beating irregularly at times  Denies any chest pain  Has not been feeling ill recently  Has had \"spells\" like this in the past and evaluated by the ER with a negative workup about a year ago      Review of Systems   Constitutional: Negative for fever.   HENT: Negative for sore throat.    Respiratory: Positive for shortness of breath.    Cardiovascular: Positive for palpitations and leg swelling. Negative for chest pain.   Gastrointestinal: Negative for vomiting.   Skin: Negative for rash.   Neurological: Negative for headaches.     PMH:  has a past medical history of Chronic meniscal tear of knee; Dyslipidemia; GERD (gastroesophageal reflux disease); Hypothyroidism; Pleomorphic adenoma of parotid gland (1992, 2004); and Sleep apnea.  MEDS:   Current Outpatient Prescriptions:   •  esomeprazole (NEXIUM) 20 MG capsule, Take 1 Cap by mouth every morning before breakfast., Disp: 90 Cap, Rfl: 3  •  lisinopril (PRINIVIL) 5 MG Tab, Take 1 Tab by mouth every day for 360 days., Disp: 90 Tab, Rfl: 3  •  aspirin EC (ECOTRIN) 81 MG Tablet Delayed Response, Take 81 mg by mouth every day., Disp: , Rfl:   •  simvastatin (ZOCOR) 10 MG Tab, Take 1 Tab by mouth every evening., Disp: 90 Tab, Rfl: 3  •  fluticasone (FLONASE) 50 MCG/ACT nasal spray, Spray 2 Sprays in nose every day., Disp: 1 Bottle, Rfl: 0  •  Misc. Devices Misc, Tubing, mask of patient's choice, and filters for CPAP machine to preferred homecare, Disp: 1 Each, Rfl: 0  ALLERGIES:   Allergies   Allergen Reactions   • " "Doxycycline Nausea and Swelling     Rxn - 2018  Tongue swelling   • Penicillins Rash     Rxn - years ago   Pt tolerated Augmentin 6/2018   • Sulfa Drugs Rash     Rxn - many years ago to an unknown med     SURGHX:   Past Surgical History:   Procedure Laterality Date   • CATARACT EXTRACTION WITH IOL     • LUMPECTOMY Left     non-cancerous   • PAROTIDECTOMY Left 1992, 2004    recurrent pleomorphic adenoma left parotid gland     SOCHX:  reports that she has never smoked. She has never used smokeless tobacco. She reports that she does not drink alcohol or use drugs.  FH: Family history was reviewed, not contributing to acute dizziness      Objective:   /88   Pulse 64   Temp 36.8 °C (98.3 °F) (Temporal)   Resp 16   Ht 1.753 m (5' 9\")   Wt 88.5 kg (195 lb)   SpO2 93%   BMI 28.80 kg/m²     Physical Exam   Constitutional: She appears well-developed and well-nourished. No distress.   HENT:   Head: Normocephalic and atraumatic.   Right Ear: External ear normal.   Left Ear: External ear normal.   Nose: Nose normal.   Eyes: Conjunctivae and EOM are normal. Right eye exhibits no discharge. Left eye exhibits no discharge. No scleral icterus.   Neck: Normal range of motion. No tracheal deviation present.   Cardiovascular:   3/6 systolic ejection murmur, intermittently has S4 heart tone present, normal rate   Pulmonary/Chest: Effort normal and breath sounds normal. No respiratory distress. She has no wheezes. She has no rales.   Neurological: She is alert. Coordination normal.   Skin: Skin is warm and dry. No rash noted. She is not diaphoretic.   Psychiatric: She has a normal mood and affect. Her behavior is normal. Judgment and thought content normal.     Assessment/Plan:   Assessment    1. Dizziness  Patient is a 78-year-old female with intermittent dizziness, shortness of breath, and irregular heart rate.  On exam, she does have an S4 heart tones present at times.  EKG with multiple PVCs with some minor ST depression " in the lateral leads.  Oxygen remaining above 91% in office.  Advised further evaluation in the ER as her symptoms are concerning.  Advised transportation with ambulance which patient declines.  Patient chose to be transported via personal car driven by her .

## 2019-05-02 NOTE — ED NOTES
"Pt is referred to our facility from [facility] for further evaluation and management of fatigue, dizziness, hypertension, and dyspnea at rest for an undetermined period of time.  She had to leave work earlier today, for worsening symptomatology.   Chief Complaint   Patient presents with   • Sent from Urgent Care   • Shortness of Breath   • Hypertension   • Palpitations     BP (!) 180/100   Pulse 70   Temp 36.3 °C (97.4 °F) (Temporal)   Resp 20   Ht 1.753 m (5' 9\")   Wt 88 kg (194 lb 0.1 oz)   SpO2 96%   BMI 28.65 kg/m²     "

## 2019-05-03 NOTE — ED PROVIDER NOTES
"ED Provider Note    ED Provider Note    Primary care provider: MAMADOU Maldonado  Means of arrival: Walk-in  History obtained from: Patient    CHIEF COMPLAINT  Chief Complaint   Patient presents with   • Sent from Urgent Care   • Shortness of Breath   • Hypertension   • Palpitations     Seen at 5:02 PM.   HPI  Reina Munoz is a 78 y.o. female who presents to the Emergency Department with shortness of breath.  Over the past week she has had a cough but it has slowly gotten better.  She was being treated with azithromycin and is completed the antibiotic.  Overall her symptoms have nearly resolved until today when she developed some shortness of breath at work.  The dyspnea began fairly suddenly.  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  She also felt a \"funny feeling in her chest \".  She denies any pain at this time or funny feeling currently.  She does not have any shortness of breath at rest at this time.    She denies any change in medications or change in diet.  She does not smoke or drink alcohol.  She has hypertension and takes 5 mg of lisinopril daily.    She denies any headache, visual changes, hemoptysis, chest pain, abdominal pain, nausea, vomiting, numbness, weakness, bleeding diathesis or impaired immunity.  No recent fevers or chills.    REVIEW OF SYSTEMS  See HPI,   Remainder of ROS negative.     PAST MEDICAL HISTORY   has a past medical history of Chronic meniscal tear of knee; Dyslipidemia; GERD (gastroesophageal reflux disease); Hypothyroidism; Pleomorphic adenoma of parotid gland (1992, 2004); and Sleep apnea.    SURGICAL HISTORY   has a past surgical history that includes parotidectomy (Left, 1992, 2004); cataract extraction with iol; and lumpectomy (Left).    SOCIAL HISTORY  Social History   Substance Use Topics   • Smoking status: Never Smoker   • Smokeless tobacco: Never Used   • Alcohol use " "No      History   Drug Use No       FAMILY HISTORY  Family History   Problem Relation Age of Onset   • Cancer Mother         multiple myeloma   • Heart Disease Mother    • Heart Attack Father    • Stroke Father    • Heart Disease Father    • Hypertension Father    • Heart Disease Brother    • Lung Disease Brother    • Stroke Sister    • Cancer Sister         breast   • Sleep Apnea Neg Hx    • Diabetes Neg Hx        CURRENT MEDICATIONS  Reviewed.  See Encounter Summary.     ALLERGIES  Allergies   Allergen Reactions   • Doxycycline Nausea and Swelling     Rxn - 2018  Tongue swelling   • Penicillins Rash     Rxn - years ago   Pt tolerated Augmentin 6/2018   • Sulfa Drugs Rash     Rxn - many years ago to an unknown med       PHYSICAL EXAM  VITAL SIGNS: BP (!) 180/100   Pulse (!) 52   Temp 36.3 °C (97.4 °F) (Temporal)   Resp 17   Ht 1.753 m (5' 9\")   Wt 88 kg (194 lb 0.1 oz)   SpO2 93%   BMI 28.65 kg/m²   Constitutional: Awake, alert in no apparent distress.  Well-appearing elderly female.  HENT: Normocephalic, Bilateral external ears normal. Nose normal.   Eyes: Conjunctiva normal, non-icteric, EOMI.    Thorax & Lungs: Easy unlabored respirations, Clear to ascultation bilaterally.  Cardiovascular: Regular rate, Regular rhythm, No murmurs, rubs or gallops.  Abdomen:  Soft, nontender, nondistended, normal active bowel sounds.   :    Skin: Visualized skin is  Dry, No erythema, No rash.   Musculoskeletal:   No cyanosis, clubbing or edema.  Neurologic: Alert, Grossly non-focal.   Psychiatric: Normal affect, Normal mood  Lymphatic:  No cervical LAD    EKG   12 lead Interpreted by me  Rhythm:  Normal sinus rhythm   Rate: 62  Axis: normal  Ectopy: none  Conduction: normal  ST Segments: no acute change  T Waves: no acute change  Clinical Impression:  flat T waves throughout, normal sinus rhythm, no changes compared to prior EKG    RADIOLOGY  DX-CHEST-PORTABLE (1 VIEW)   Final Result      No acute cardiac or pulmonary " abnormalities are identified.            COURSE & MEDICAL DECISION MAKING  Pertinent Labs & Imaging studies reviewed. (See chart for details)    Differential diagnoses include but are not limited to: ACS, PE, bronchitis    5:02 PM - Medical record reviewed, patient seen and examined at bedside.  Upon review the prior records, the patient does not have blood pressure is elevated as today.    5:36 PM-discussed the unremarkable test results thus far and treatment options of admission versus discharge.  The patient would like to go home, therefore we will do a second troponin and discharge her.  I contacted the ER  to arrange cardiology follow-up.      Decision Making:  This is a 78 y.o. year old female who presents with a funny feeling the chest with associated dyspnea on exertion.  She also has some elevated blood pressure.  The blood pressure in the emergency department has improved without treatment.  She does not have any signs of endorgan dysfunction.  There is no sign of hypertensive emergency.  Chest x-ray does not show signs of fluid overload or CHF.  BNP is less than 100 which rules out CHF.  D-dimer is negative and the patient is low well's criteria so this rules out acute pulmonary embolus.  Troponin is negative x2.  Chest x-ray does not show any signs of pneumonia, the patient recent completed a dose of antibiotics.  She does not have any wheezes, does not appear to be a respiratory issue.    Ultimately I see no emergent process, the etiology of the patient's dyspnea is unclear.  I offered admission for serial troponins and possible stress echo, the patient would like to go home and therefore the next best option would be to arrange prompt outpatient follow-up which has been done.  She is directed return for any severe chest pain, severe shortness of breath or any other concern.    Discharge Medications:  Discharge Medication List as of 5/2/2019  7:13 PM          The patient was discharged home  (see d/c instructions) and parent was told to return immediately for any signs or symptoms listed, or any worsening at all.  The patient's parent verbally agreed to the discharge precautions and follow-up plan which is documented in EPIC.    The patient's blood pressure is elevated today. >120/80. I have referred them to primary care for follow up.       FINAL IMPRESSION  1. Chest pain, unspecified type

## 2019-05-03 NOTE — ED NOTES
92% on RA  Pt discharged, reviewed all discharge instructions including medications and follow up, pt verbalizes understanding, and denies questions.   Escorted to lobby.

## 2019-05-16 ENCOUNTER — OFFICE VISIT (OUTPATIENT)
Dept: MEDICAL GROUP | Facility: PHYSICIAN GROUP | Age: 79
End: 2019-05-16
Payer: MEDICARE

## 2019-05-16 VITALS
HEIGHT: 69 IN | WEIGHT: 195 LBS | SYSTOLIC BLOOD PRESSURE: 130 MMHG | DIASTOLIC BLOOD PRESSURE: 82 MMHG | HEART RATE: 44 BPM | BODY MASS INDEX: 28.88 KG/M2 | OXYGEN SATURATION: 96 % | TEMPERATURE: 97.6 F

## 2019-05-16 DIAGNOSIS — E78.5 DYSLIPIDEMIA: ICD-10-CM

## 2019-05-16 DIAGNOSIS — R20.8 BURNING SENSATION OF FEET: ICD-10-CM

## 2019-05-16 DIAGNOSIS — Z00.00 MEDICARE ANNUAL WELLNESS VISIT, SUBSEQUENT: ICD-10-CM

## 2019-05-16 DIAGNOSIS — R00.1 BRADYCARDIA: ICD-10-CM

## 2019-05-16 DIAGNOSIS — G47.30 SLEEP APNEA WITH USE OF CONTINUOUS POSITIVE AIRWAY PRESSURE (CPAP): ICD-10-CM

## 2019-05-16 DIAGNOSIS — M54.31 RIGHT SIDED SCIATICA: ICD-10-CM

## 2019-05-16 DIAGNOSIS — E03.9 ACQUIRED HYPOTHYROIDISM: ICD-10-CM

## 2019-05-16 DIAGNOSIS — Z12.11 SCREEN FOR COLON CANCER: ICD-10-CM

## 2019-05-16 DIAGNOSIS — D49.2 TUMOR OF SOFT TISSUE OF NECK: ICD-10-CM

## 2019-05-16 DIAGNOSIS — K21.9 GASTROESOPHAGEAL REFLUX DISEASE WITHOUT ESOPHAGITIS: ICD-10-CM

## 2019-05-16 DIAGNOSIS — E04.1 LEFT THYROID NODULE: ICD-10-CM

## 2019-05-16 DIAGNOSIS — M79.89 LEFT LEG SWELLING: ICD-10-CM

## 2019-05-16 PROBLEM — H93.8X2 CONGESTION OF LEFT EAR: Status: RESOLVED | Noted: 2019-04-22 | Resolved: 2019-05-16

## 2019-05-16 PROCEDURE — G0439 PPPS, SUBSEQ VISIT: HCPCS | Performed by: NURSE PRACTITIONER

## 2019-05-16 RX ORDER — SIMVASTATIN 10 MG
10 TABLET ORAL EVERY EVENING
Qty: 90 TAB | Refills: 3 | Status: SHIPPED | OUTPATIENT
Start: 2019-05-16 | End: 2020-05-26 | Stop reason: SDUPTHER

## 2019-05-16 RX ORDER — LISINOPRIL 5 MG/1
2.5 TABLET ORAL 2 TIMES DAILY
Qty: 90 TAB | Refills: 3 | Status: SHIPPED | OUTPATIENT
Start: 2019-05-16 | End: 2019-05-16

## 2019-05-16 RX ORDER — LISINOPRIL 5 MG/1
5 TABLET ORAL DAILY
Qty: 90 TAB | Refills: 3 | Status: SHIPPED | OUTPATIENT
Start: 2019-05-16 | End: 2019-05-16 | Stop reason: SDUPTHER

## 2019-05-16 RX ORDER — LISINOPRIL 2.5 MG/1
2.5 TABLET ORAL 2 TIMES DAILY
Qty: 180 TAB | Refills: 3 | Status: SHIPPED | OUTPATIENT
Start: 2019-05-16 | End: 2019-07-09 | Stop reason: SDUPTHER

## 2019-05-16 ASSESSMENT — PATIENT HEALTH QUESTIONNAIRE - PHQ9: CLINICAL INTERPRETATION OF PHQ2 SCORE: 0

## 2019-05-16 ASSESSMENT — ENCOUNTER SYMPTOMS: GENERAL WELL-BEING: GOOD

## 2019-05-16 ASSESSMENT — ACTIVITIES OF DAILY LIVING (ADL): BATHING_REQUIRES_ASSISTANCE: 0

## 2019-05-16 NOTE — ASSESSMENT & PLAN NOTE
Chronic issue managed with lisinopril, followed by cardiology. Stable without symptoms, but has further testing ordered with her cardiologist

## 2019-05-16 NOTE — ASSESSMENT & PLAN NOTE
New problem. Currently followed by PCP. US has been ordered for monitoring to determine plan from here. She has to schedule this.

## 2019-05-16 NOTE — ASSESSMENT & PLAN NOTE
Chronic problem followed annually with MRI via [facility].  Last MRI completed 1 month ago that was stable.  Continue to monitor annually

## 2019-05-16 NOTE — ASSESSMENT & PLAN NOTE
Ongoing chronic issue intermittently occurring. Stable without treatment. Followed by PCP. Monitor for worsening.

## 2019-05-16 NOTE — ASSESSMENT & PLAN NOTE
Chronic problem well controlled with Nexium currently. Stable without red flag symptoms. Followed by PCP. Monitor

## 2019-05-16 NOTE — ASSESSMENT & PLAN NOTE
Chronic problem that remains stable managed with compression stockings and elevation. Followed by PCP and cardiology who has ordered venous ultrasound for monitoring.

## 2019-05-16 NOTE — ASSESSMENT & PLAN NOTE
Chronic problem well-controlled with CPAP.  Followed by pulmonology monitoring pressure settings on as-needed basis.

## 2019-05-16 NOTE — ASSESSMENT & PLAN NOTE
Chronic problem generally stable without symptoms with only occasional flares managed with home stretches when active. Followed by PCP. Monitor.

## 2019-05-16 NOTE — ASSESSMENT & PLAN NOTE
Chronic issue that is well controlled with simvastatin for treatment.  Last lipid panel completed in August for monitoring.  Followed by PCP

## 2019-05-16 NOTE — PROGRESS NOTES
Chief Complaint   Patient presents with   • Annual Wellness Visit         HPI:  Reina is a 78 y.o. 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 90 Tab 3   • lisinopril (PRINIVIL) 2.5 MG Tab Take 1 Tab by mouth 2 times a day. 180 Tab 3   • esomeprazole (NEXIUM) 20 MG capsule Take 1 Cap by mouth every morning before breakfast. 90 Cap 3   • fluticasone (FLONASE) 50 MCG/ACT nasal spray Spray 2 Sprays in nose every day. 1 Bottle 0   • aspirin EC (ECOTRIN) 81 MG Tablet Delayed Response Take 81 mg by mouth every day.       No current facility-administered medications for this visit.         Patient is taking medications as noted in medication list.  Current supplements as per medication list.     Allergies: Doxycycline; Penicillins; and Sulfa drugs    Current social contact/activities: works part time, visits family and friends, shopping     Is patient current with immunizations? No, due for PREVNAR (PCV13) , TDAP and SHINGRIX (Shingles). Patient is interested in receiving NONE today.    She  reports that she has never smoked. She has never used smokeless tobacco. She reports that she does not drink alcohol or use drugs.  Counseling given: Not Answered        DPA/Advanced directive: Patient does not have an Advanced Directive.  A packet and workshop information was given on Advanced Directives.    ROS:    Gait: Uses no assistive device   Ostomy: No    Other tubes: No   Amputations: No   Chronic oxygen use No   Last eye exam 2019   Wears hearing aids: No   : Denies any urinary leakage during the last 6 months      Screening:    Depression Screening    Little interest or pleasure in doing things?  0 - not at all  Feeling down, depressed, or hopeless? 0 - not at all  Patient Health Questionnaire Score: 0    If depressive symptoms identified deferred to follow up visit unless specifically addressed in assessment and plan.    Interpretation of PHQ-9 Total Score   Score Severity   1-4 No Depression   5-9 Mild Depression   10-14 Moderate Depression   15-19 Moderately Severe Depression   20-27 Severe Depression    Screening for Cognitive Impairment    Three Minute Recall (village, kitchen, baby)  3/3 Village, kitchen, baby  [place]tar clock face with all 12 numbers and set the hands to show 10 past 10.  Yes 3 /5 time 10:10  If cognitive concerns identified, deferred for follow up unless specifically addressed in assessment and plan.    Fall Risk Assessment    Has the patient had two or more falls in the last year or any fall with injury in the last year?  No  If fall risk identified, deferred for follow up unless specifically addressed in assessment and plan.    Safety Assessment    Throw rugs on floor.  No  Handrails on all stairs.  Yes  Good lighting in all hallways.  Yes  Difficulty hearing.  No  Patient counseled about all safety risks that were identified.    Functional Assessment ADLs    Are there any barriers preventing you from cooking for yourself or meeting nutritional needs?  No.    Are there any barriers preventing you from driving safely or obtaining transportation?  No.    Are there any barriers preventing you from using a telephone or calling for help?  No.    Are there any barriers preventing you from shopping?  No.    Are there any barriers preventing you from taking care of your own finances?  No.    Are there any barriers preventing you from managing  your medications?  No.    Are there any barriers preventing you from showering, bathing or dressing yourself?  No.    Are you currently engaging in any exercise or physical activity?  Yes.  Walking, works part time  What is your perception of your health?  Good.    Health Maintenance Summary                IMM DTaP/Tdap/Td Vaccine Overdue 11/30/1961     IMM ZOSTER VACCINES Overdue 11/30/1992     IMM PNEUMOCOCCAL 65+ (ADULT) LOW/MEDIUM RISK SERIES Overdue 11/30/2007     COLON CANCER SCREENING ANNUAL FIT Overdue 3/19/2019      Done 3/19/2018 OCCULT BLOOD FECES IMMUNOASSAY     Patient has more history with this topic...    Annual Wellness Visit Overdue 5/1/2019      Done 4/30/2018 Visit Dx: Medicare annual wellness visit, initial    MAMMOGRAM Next Due 5/23/2019      Done 5/23/2018 MA-MAMMO SCREENING BILAT W/TOMOSYNTHESIS W/CAD    IMM INFLUENZA Next Due 9/1/2019      Patient Declined 1/11/2016     BONE DENSITY Next Due 5/23/2023      Done 5/23/2018 DS-BONE DENSITY STUDY (DEXA)          Patient Care Team:  MAMADOU Maldonado as PCP - General (Family Medicine)  [facility] as Home Health Provider (DME Supplier)  Rafy Herndon M.D. as Consulting Physician (Ophthalmology)  Bry Martinez D.O. as Consulting Physician (Cardiology)  [facility] as Home Health Provider (DME Supplier)    Social History   Substance Use Topics   • Smoking status: Never Smoker   • Smokeless tobacco: Never Used   • Alcohol use No     Family History   Problem Relation Age of Onset   • Cancer Mother         multiple myeloma   • Heart Disease Mother    • Heart Attack Father    • Stroke Father    • Heart Disease Father    • Hypertension Father    • Heart Disease Brother    • Lung Disease Brother    • Stroke Sister    • Cancer Sister         breast   • Sleep Apnea Neg Hx    • Diabetes Neg Hx      She  has a past medical history of Chronic meniscal tear of knee; Dyslipidemia; GERD (gastroesophageal reflux disease);  "Hypothyroidism; Pleomorphic adenoma of parotid gland (1992, 2004); and Sleep apnea.   Past Surgical History:   Procedure Laterality Date   • CATARACT EXTRACTION WITH IOL     • LUMPECTOMY Left     non-cancerous   • PAROTIDECTOMY Left 1992, 2004    recurrent pleomorphic adenoma left parotid gland           Exam:     /82 (BP Location: Left arm, Patient Position: Sitting)   Pulse (!) 44   Temp 36.4 °C (97.6 °F)   Ht 1.753 m (5' 9\")   Wt 88.5 kg (195 lb)   SpO2 96%  Body mass index is 28.8 kg/m².    Hearing good.    Dentition good  Alert, oriented in no acute distress.  Eye contact is good, speech goal directed, affect calm      Assessment and Plan. The following treatment and monitoring plan is recommended:    1. Medicare annual wellness visit, subsequent     2. Dyslipidemia  Chronic issue that is well controlled with simvastatin for treatment.  Last lipid panel completed in August for monitoring.  Followed by PCP   3. Sleep apnea with use of continuous positive airway pressure (CPAP)  Chronic problem well-controlled with CPAP.  Followed by pulmonology monitoring pressure settings on as-needed basis.   4. Tumor of soft tissue of neck  Chronic problem followed annually with MRI via [facility].  Last MRI completed 1 month ago that was stable.  Continue to monitor annually   5. Bradycardia  Chronic issue managed with lisinopril, followed by cardiology. Stable without symptoms, but has further testing ordered with her cardiologist   6. Gastroesophageal reflux disease without esophagitis  Chronic problem well controlled with Nexium currently. Stable without red flag symptoms. Followed by PCP. Monitor    7. Left leg swelling  Chronic problem that remains stable managed with compression stockings and elevation. Followed by PCP and cardiology who has ordered venous ultrasound for monitoring.    8. Left thyroid nodule  New problem. Currently followed by PCP. US has been ordered for monitoring to determine plan from " here. She has to schedule this.    9. Right sided sciatica  Chronic problem generally stable without symptoms with only occasional flares managed with home stretches when active. Followed by PCP. Monitor.    10. Burning sensation of feet  Ongoing chronic issue intermittently occurring. Stable without treatment. Followed by PCP. Monitor for worsening.    11. Screen for colon cancer  Kit provided today   OCCULT BLOOD FECES IMMUNOASSAY       Services suggested: No services needed at this time  Health Care Screening recommendations as per orders if indicated.  Referrals offered: PT/OT/Nutrition counseling/Behavioral Health/Smoking cessation as per orders if indicated.    Discussion today about general wellness and lifestyle habits:    · Prevent falls and reduce trip hazards; Cautioned about securing or removing rugs.  · Have a working fire alarm and carbon monoxide detector;   · Engage in regular physical activity and social activities       Follow-up: Return to establish .

## 2019-05-22 ENCOUNTER — HOSPITAL ENCOUNTER (OUTPATIENT)
Facility: MEDICAL CENTER | Age: 79
End: 2019-05-22
Attending: NURSE PRACTITIONER
Payer: MEDICARE

## 2019-05-22 PROCEDURE — 82274 ASSAY TEST FOR BLOOD FECAL: CPT

## 2019-05-24 DIAGNOSIS — Z12.11 SCREEN FOR COLON CANCER: ICD-10-CM

## 2019-05-24 LAB
AMBIGUOUS DTTM AMBI4: NORMAL
HEMOCCULT STL QL IA: NEGATIVE

## 2019-06-18 ENCOUNTER — HOSPITAL ENCOUNTER (OUTPATIENT)
Dept: RADIOLOGY | Facility: MEDICAL CENTER | Age: 79
End: 2019-06-18
Attending: NURSE PRACTITIONER
Payer: MEDICARE

## 2019-06-18 ENCOUNTER — HOSPITAL ENCOUNTER (OUTPATIENT)
Dept: RADIOLOGY | Facility: MEDICAL CENTER | Age: 79
End: 2019-06-18
Attending: INTERNAL MEDICINE
Payer: MEDICARE

## 2019-06-18 DIAGNOSIS — E04.1 LEFT THYROID NODULE: ICD-10-CM

## 2019-06-18 DIAGNOSIS — R06.02 SHORTNESS OF BREATH: ICD-10-CM

## 2019-06-18 DIAGNOSIS — R60.9 EDEMA, UNSPECIFIED TYPE: ICD-10-CM

## 2019-06-18 PROCEDURE — 76536 US EXAM OF HEAD AND NECK: CPT

## 2019-06-18 PROCEDURE — 93971 EXTREMITY STUDY: CPT | Mod: LT

## 2019-06-20 ENCOUNTER — HOSPITAL ENCOUNTER (OUTPATIENT)
Dept: RADIOLOGY | Facility: MEDICAL CENTER | Age: 79
End: 2019-06-20
Attending: INTERNAL MEDICINE
Payer: MEDICARE

## 2019-06-20 DIAGNOSIS — R60.9 EDEMA, UNSPECIFIED TYPE: ICD-10-CM

## 2019-06-20 DIAGNOSIS — R06.02 SHORTNESS OF BREATH: ICD-10-CM

## 2019-06-20 PROCEDURE — 700111 HCHG RX REV CODE 636 W/ 250 OVERRIDE (IP)

## 2019-06-20 PROCEDURE — 78452 HT MUSCLE IMAGE SPECT MULT: CPT | Mod: 26 | Performed by: INTERNAL MEDICINE

## 2019-06-20 PROCEDURE — A9502 TC99M TETROFOSMIN: HCPCS

## 2019-06-20 PROCEDURE — 93018 CV STRESS TEST I&R ONLY: CPT | Performed by: INTERNAL MEDICINE

## 2019-06-20 RX ORDER — REGADENOSON 0.08 MG/ML
INJECTION, SOLUTION INTRAVENOUS
Status: COMPLETED
Start: 2019-06-20 | End: 2019-06-20

## 2019-06-20 RX ADMIN — REGADENOSON 0.4 MG: 0.08 INJECTION, SOLUTION INTRAVENOUS at 10:04

## 2019-06-20 NOTE — PROGRESS NOTES
Nursing care plan includes knowledge deficit, potential for discomfort, potential for compromised cardiac output.  POC includes teaching, comfort measures and reassurance, and access to code cart, cardiology stand by and availability of rapid response team.  Pt verbalizes good understanding of benefits and risks of pharmacological cardiac stress test.  Informed consent obtained.  Lexiscan given, pt developed the following signs/symptoms:sob, legs heavy, flushed, dizzy.    VS stable, symptoms resolved.  To waiting room, fluids and/or snack given, awaiting second scan.  Nursing goals met.

## 2019-06-24 ENCOUNTER — TELEPHONE (OUTPATIENT)
Dept: MEDICAL GROUP | Facility: PHYSICIAN GROUP | Age: 79
End: 2019-06-24

## 2019-06-24 NOTE — TELEPHONE ENCOUNTER
Future Appointments       Provider Department Center    7/9/2019 10:00 AM Joselin Kwon M.D. [facility] [place]        ESTABLISHED PATIENT PRE-VISIT PLANNING     Patient was NOT contacted to complete PVP.    1.  Reviewed notes from the last few office visits within the medical group: Yes 5/16/19    2.  If any orders were placed at last visit or intended to be done for this visit (i.e. 6 mos follow-up), do we have Results/Consult Notes?        •  Labs - Labs ordered, completed on 5/24/19 but still needs to complete half of the labs ordered and results are in chart.       •  Imaging - Imaging ordered, completed and results are in chart.       •  Referrals - No referrals were ordered at last office visit.    3. Is this appointment scheduled as a Hospital Follow-Up? No    4.  Immunizations were updated in Epic using WebIZ?: No WebIZ record    5.  Patient is due for the following Health Maintenance Topics:   Health Maintenance Due   Topic Date Due   • IMM DTaP/Tdap/Td Vaccine (1 - Tdap) 11/30/1961   • IMM ZOSTER VACCINES (1 of 2) 11/30/1992   • IMM PNEUMOCOCCAL 65+ (ADULT) LOW/MEDIUM RISK SERIES (1 of 2 - PCV13) 11/30/2007   • MAMMOGRAM  05/23/2019       6. Orders for overdue Health Maintenance topics pended in Pre-Charting? NO    7.  AHA (MDX) form printed for Provider? No, already completed    8.  Patient was NOT informed to arrive 15 min prior to their scheduled appointment and bring in their medication bottles.

## 2019-07-09 ENCOUNTER — OFFICE VISIT (OUTPATIENT)
Dept: MEDICAL GROUP | Facility: PHYSICIAN GROUP | Age: 79
End: 2019-07-09
Payer: MEDICARE

## 2019-07-09 ENCOUNTER — HOSPITAL ENCOUNTER (OUTPATIENT)
Dept: LAB | Facility: MEDICAL CENTER | Age: 79
End: 2019-07-09
Attending: FAMILY MEDICINE
Payer: MEDICARE

## 2019-07-09 VITALS
WEIGHT: 206 LBS | BODY MASS INDEX: 30.51 KG/M2 | HEIGHT: 69 IN | SYSTOLIC BLOOD PRESSURE: 130 MMHG | TEMPERATURE: 98.6 F | RESPIRATION RATE: 20 BRPM | OXYGEN SATURATION: 95 % | DIASTOLIC BLOOD PRESSURE: 78 MMHG | HEART RATE: 60 BPM

## 2019-07-09 DIAGNOSIS — E78.5 DYSLIPIDEMIA: ICD-10-CM

## 2019-07-09 DIAGNOSIS — E03.9 ACQUIRED HYPOTHYROIDISM: ICD-10-CM

## 2019-07-09 DIAGNOSIS — I10 ESSENTIAL HYPERTENSION: ICD-10-CM

## 2019-07-09 DIAGNOSIS — K21.9 GASTROESOPHAGEAL REFLUX DISEASE WITHOUT ESOPHAGITIS: ICD-10-CM

## 2019-07-09 LAB
T4 FREE SERPL-MCNC: 0.68 NG/DL (ref 0.53–1.43)
TSH SERPL DL<=0.005 MIU/L-ACNC: 6.36 UIU/ML (ref 0.38–5.33)

## 2019-07-09 PROCEDURE — 99214 OFFICE O/P EST MOD 30 MIN: CPT | Performed by: FAMILY MEDICINE

## 2019-07-09 PROCEDURE — 84443 ASSAY THYROID STIM HORMONE: CPT

## 2019-07-09 PROCEDURE — 84439 ASSAY OF FREE THYROXINE: CPT

## 2019-07-09 PROCEDURE — 36415 COLL VENOUS BLD VENIPUNCTURE: CPT

## 2019-07-09 RX ORDER — MELOXICAM 7.5 MG/1
7.5 TABLET ORAL
COMMUNITY
End: 2019-07-09

## 2019-07-09 RX ORDER — LEVOTHYROXINE SODIUM 0.07 MG/1
TABLET ORAL
COMMUNITY
Start: 2015-01-05 | End: 2019-07-09

## 2019-07-09 RX ORDER — FUROSEMIDE 20 MG/1
TABLET ORAL
COMMUNITY
End: 2019-07-09

## 2019-07-09 RX ORDER — LISINOPRIL 2.5 MG/1
2.5 TABLET ORAL 2 TIMES DAILY
Qty: 180 TAB | Refills: 3 | Status: SHIPPED | OUTPATIENT
Start: 2019-07-09 | End: 2020-11-16

## 2019-07-09 RX ORDER — SIMVASTATIN 10 MG
TABLET ORAL
COMMUNITY
End: 2019-07-09

## 2019-07-09 RX ORDER — SIMVASTATIN 10 MG
10 TABLET ORAL
COMMUNITY
Start: 2014-12-08 | End: 2019-07-09

## 2019-07-09 RX ORDER — PANTOPRAZOLE SODIUM 40 MG/1
TABLET, DELAYED RELEASE ORAL
COMMUNITY
End: 2019-08-13

## 2019-07-09 RX ORDER — TRIAMTERENE AND HYDROCHLOROTHIAZIDE 37.5; 25 MG/1; MG/1
CAPSULE ORAL
COMMUNITY
Start: 2019-05-14 | End: 2019-07-09

## 2019-07-09 RX ORDER — LEVOTHYROXINE SODIUM 0.07 MG/1
TABLET ORAL
COMMUNITY
End: 2019-07-09

## 2019-07-09 RX ORDER — LEVOTHYROXINE SODIUM 0.07 MG/1
75 TABLET ORAL
COMMUNITY
Start: 2015-10-24 | End: 2019-07-09

## 2019-07-09 RX ORDER — OMEPRAZOLE 20 MG/1
20 CAPSULE, DELAYED RELEASE ORAL
COMMUNITY
Start: 2015-10-24 | End: 2019-07-09

## 2019-07-09 RX ORDER — CHLORAL HYDRATE 500 MG
1000 CAPSULE ORAL
COMMUNITY
Start: 2014-02-27 | End: 2019-11-06

## 2019-07-09 NOTE — PROGRESS NOTES
CC:  hypertension    HISTORY OF THE PRESENT ILLNESS: Patient is a 78 y.o. female. This pleasant patient is here today to establish care.  She was previously followed by Romel Rg.    1.  Hypertension  Chronic medical problem.  She follows up with cardiology, Dr. Navdeep Chung.  Has an appointment coming up with him on July 12.  Mentions that she recently had a nuclear stress test done and he will discuss those reports with her.  He had also ordered a pulmonary function test but it does not seem that it was processed.  She is concerned that she has asthma.  Continues to follow with pulmonary for her obstructive sleep apnea.  Denies any chest pain or shortness of breath.  Currently taking lisinopril 2.5 mg twice a day.  She also has a prescription for Maxide 37.5-25 mg daily.  This was given to her a few months ago however she is currently not taking it.  Blood pressure today is 130/78.    2.  Hypothyroid  This is a chronic medical problem for her.  She was previously on levothyroxine 75 mcg daily.  Mentions that this was discontinued a few months ago.  Previous labs from every 2019 show TSH 4.8, free T4 0.73 and T3 of 108.1.  All within normal range.She had a recent thyroid ultrasound in June with results of small colloid cyst in the inferior left thyroid lobe.  No further follow-up was recommended.    3.  GERD  Chronic medical problem.  Currently alternating between Protonix and Nexium.  Denies any heartburn, nausea vomiting or abdominal pain.    4.  Dyslipidemia  Chronic medical problem.  Currently taking fish oil and Zocor 10 mg every evening.  Most recent labs from September 2018 total cholesterol 202, triglycerides 206, HDL 50, and .      Allergies: Doxycycline; Penicillins; and Sulfa drugs    Current Outpatient Prescriptions Ordered in [facility]   Medication Sig Dispense Refill   • Omega-3 Fatty Acids (FISH OIL) 1000 MG Cap capsule Take 1,000 mg by mouth.     • pantoprazole (PROTONIX) 40 MG Tablet Delayed  "Response pantoprazole 40 mg tablet,delayed release     • lisinopril (PRINIVIL) 2.5 MG Tab Take 1 Tab by mouth 2 times a day. 180 Tab 3   • simvastatin (ZOCOR) 10 MG Tab Take 1 Tab by mouth every evening. 90 Tab 3   • esomeprazole (NEXIUM) 20 MG capsule Take 1 Cap by mouth every morning before breakfast. 90 Cap 3     No current Epic-ordered facility-administered medications on file.          ROS:  See above and     - Constitutional: Negative for fever, chills, and fatigue   - Eyes:  Negative blurry vision or eye pain    - ENT: Negative for ear pain, rhinorrhea, sinus congestion, sore throat    - Respiratory: Negative for cough or shortness of breath    - Cardiovascular: Negative for chest pain, palpitations    - Gastrointestinal: Negative for heartburn, nausea, vomiting, abdominal pain,     - Genitourinary: Negative for dysuria    - Musculoskeletal: Negative for myalgias    - Skin: Negative for rash, itching    - Neurological: Negative for headaches, dizziness    - Endo:  Negative for increased thirst or polyuria    - Heme/Lymph: Does not bruise/bleed easily.     - Psychiatric/Behavioral: Negative for depression and anxiety   .  Exam: /78 (BP Location: Left arm, Patient Position: Sitting, BP Cuff Size: Adult)   Pulse 60   Temp 37 °C (98.6 °F) (Temporal)   Resp 20   Ht 1.753 m (5' 9\")   Wt 93.4 kg (206 lb)   SpO2 95%   BMI 30.42 kg/m²      General:  Normal appearing. No distress.  Eyes:  normal inspection of conjunctivae and lids, EOMI   ENMT:  External ears and nose are normal  Neck:  Supple   Pulmonary:  Clear to ausculation.  Normal effort. No rales, ronchi, or wheezing.  Cardiovascular:  Regular rate and rhythm, no LE edema  Abdomen:  Soft, nontender, nondistended. Normal bowel sounds.  Neurologic:  No tremors  Lymph:  No cervical, supraclavicular  lymph nodes are palpable  Skin:  Warm and dry.  No obvious lesions.  Musculoskeletal:  Normal gait. No extremity cyanosis, clubbing, or edema.  Psych:   " Normal mood and affect. Alert and oriented x3. Judgment and insight is normal.    Please note that this dictation was created using voice recognition software. I have made every reasonable attempt to correct obvious errors, but I expect that there are errors of grammar and possibly content that I did not discover before finalizing the note.      Assessment/Plan  Reina was seen today for establish care, asthma and medication refill.    Diagnoses and all orders for this visit:    Essential hypertension  Chronic medical problem.  Stable with lisinopril 2.5 mg twice a day.  Discussed with her that she should discuss with Dr. Chung regarding her Maxzide-     lisinopril (PRINIVIL) 2.5 MG Tab; Take 1 Tab by mouth 2 times a day.    Acquired hypothyroidism  Chronic medical problem.  Has been off medications for a few months now.  Recent thyroid ultrasound was benign.  We will recheck labs and determine if she needs to go back on thyroid replacement therapy.  -     TSH; Future  -     FREE THYROXINE; Future    Gastroesophageal reflux disease without esophagitis  Chronic medical condition.  Stable with meds.    Dyslipidemia  Chronic medical condition.  Stable.  Continue with fish oil and Zocor.      Return in about 6 weeks (around 8/20/2019) for hypothyroid.

## 2019-07-10 DIAGNOSIS — E03.8 SUBCLINICAL HYPOTHYROIDISM: ICD-10-CM

## 2019-07-29 ENCOUNTER — OFFICE VISIT (OUTPATIENT)
Dept: URGENT CARE | Facility: CLINIC | Age: 79
End: 2019-07-29
Payer: MEDICARE

## 2019-07-29 VITALS
HEIGHT: 69 IN | BODY MASS INDEX: 30.51 KG/M2 | TEMPERATURE: 98.4 F | WEIGHT: 206 LBS | OXYGEN SATURATION: 95 % | RESPIRATION RATE: 18 BRPM | HEART RATE: 68 BPM | SYSTOLIC BLOOD PRESSURE: 112 MMHG | DIASTOLIC BLOOD PRESSURE: 70 MMHG

## 2019-07-29 DIAGNOSIS — J01.41 ACUTE RECURRENT PANSINUSITIS: ICD-10-CM

## 2019-07-29 PROCEDURE — 99214 OFFICE O/P EST MOD 30 MIN: CPT | Performed by: PHYSICIAN ASSISTANT

## 2019-07-29 RX ORDER — FLUTICASONE PROPIONATE 50 MCG
1 SPRAY, SUSPENSION (ML) NASAL DAILY
Qty: 16 G | Refills: 0 | Status: SHIPPED
Start: 2019-07-29 | End: 2020-06-29

## 2019-07-29 RX ORDER — AMOXICILLIN AND CLAVULANATE POTASSIUM 875; 125 MG/1; MG/1
1 TABLET, FILM COATED ORAL 2 TIMES DAILY
Qty: 10 TAB | Refills: 0 | Status: SHIPPED | OUTPATIENT
Start: 2019-07-29 | End: 2019-08-03

## 2019-07-29 ASSESSMENT — ENCOUNTER SYMPTOMS
VOMITING: 0
HEADACHES: 1
FEVER: 0
EYE PAIN: 0
NAUSEA: 0
SHORTNESS OF BREATH: 0
DIARRHEA: 0
PALPITATIONS: 0
SINUS PRESSURE: 1
ABDOMINAL PAIN: 0
CHILLS: 0
MYALGIAS: 0
SINUS PAIN: 1
SORE THROAT: 1
COUGH: 0
BLURRED VISION: 0
DIZZINESS: 0

## 2019-07-29 NOTE — PROGRESS NOTES
Subjective:      Reina Munoz is a 78 y.o. female who presents with Sinus Problem    Sinus Problem   This is a new problem. The current episode started in the past 7 days. The problem has been gradually worsening since onset. There has been no fever. The pain is moderate. Associated symptoms include congestion, ear pain, headaches, sinus pressure, sneezing and a sore throat. Pertinent negatives include no chills, coughing or shortness of breath. Past treatments include oral decongestants. The treatment provided no relief.       Review of Systems   Constitutional: Positive for malaise/fatigue. Negative for chills and fever.   HENT: Positive for congestion, ear pain, sinus pain, sinus pressure, sneezing and sore throat.    Eyes: Negative for blurred vision and pain.   Respiratory: Negative for cough and shortness of breath.    Cardiovascular: Negative for chest pain and palpitations.   Gastrointestinal: Negative for abdominal pain, diarrhea, nausea and vomiting.   Musculoskeletal: Negative for myalgias.   Skin: Negative for rash.   Neurological: Positive for headaches. Negative for dizziness.   Endo/Heme/Allergies: Positive for environmental allergies.       PMH:  has a past medical history of Chronic meniscal tear of knee; Dyslipidemia; GERD (gastroesophageal reflux disease); Hypothyroidism; Pleomorphic adenoma of parotid gland (1992, 2004); and Sleep apnea.  MEDS:   Current Outpatient Prescriptions:   •  amoxicillin-clavulanate (AUGMENTIN) 875-125 MG Tab, Take 1 Tab by mouth 2 times a day for 5 days., Disp: 10 Tab, Rfl: 0  •  fluticasone (FLONASE) 50 MCG/ACT nasal spray, Spray 1 Spray in nose every day., Disp: 16 g, Rfl: 0  •  Omega-3 Fatty Acids (FISH OIL) 1000 MG Cap capsule, Take 1,000 mg by mouth., Disp: , Rfl:   •  lisinopril (PRINIVIL) 2.5 MG Tab, Take 1 Tab by mouth 2 times a day., Disp: 180 Tab, Rfl: 3  •  simvastatin (ZOCOR) 10 MG Tab, Take 1 Tab by mouth every evening., Disp: 90 Tab, Rfl: 3  •   "esomeprazole (NEXIUM) 20 MG capsule, Take 1 Cap by mouth every morning before breakfast., Disp: 90 Cap, Rfl: 3  •  pantoprazole (PROTONIX) 40 MG Tablet Delayed Response, pantoprazole 40 mg tablet,delayed release, Disp: , Rfl:   ALLERGIES:   Allergies   Allergen Reactions   • Doxycycline Nausea and Swelling     Rxn - 2018  Tongue swelling   • Penicillins Rash     Rxn - years ago   Pt tolerated Augmentin 6/2018   • Sulfa Drugs Rash     Rxn - many years ago to an unknown med     SURGHX:   Past Surgical History:   Procedure Laterality Date   • CATARACT EXTRACTION WITH IOL     • LUMPECTOMY Left     non-cancerous   • PAROTIDECTOMY Left 1992, 2004    recurrent pleomorphic adenoma left parotid gland     SOCHX:  reports that she has never smoked. She has never used smokeless tobacco. She reports that she does not drink alcohol or use drugs.  FH: Family history was reviewed, no pertinent findings to report     Objective:     /70   Pulse 68   Temp 36.9 °C (98.4 °F) (Temporal)   Resp 18   Ht 1.753 m (5' 9\")   Wt 93.4 kg (206 lb)   SpO2 95%   BMI 30.42 kg/m²        Physical Exam   Constitutional: She is oriented to person, place, and time. She appears well-developed and well-nourished.   HENT:   Head: Normocephalic and atraumatic.   Right Ear: Tympanic membrane, external ear and ear canal normal.   Left Ear: Tympanic membrane, external ear and ear canal normal.   Nose: Mucosal edema and rhinorrhea present. Right sinus exhibits frontal sinus tenderness. Right sinus exhibits no maxillary sinus tenderness. Left sinus exhibits maxillary sinus tenderness and frontal sinus tenderness.   Mouth/Throat: Uvula is midline, oropharynx is clear and moist and mucous membranes are normal.   Eyes: Pupils are equal, round, and reactive to light. Conjunctivae are normal.   Neck: Normal range of motion.   Cardiovascular: Normal rate, regular rhythm and normal heart sounds.    No murmur heard.  Pulmonary/Chest: Effort normal and breath " sounds normal. She has no wheezes.   Lymphadenopathy:     She has cervical adenopathy.   Neurological: She is alert and oriented to person, place, and time.   Skin: Skin is warm and dry. Capillary refill takes less than 2 seconds.   Psychiatric: She has a normal mood and affect. Her behavior is normal. Judgment normal.          Assessment/Plan:     1. Acute recurrent pansinusitis  - amoxicillin-clavulanate (AUGMENTIN) 875-125 MG Tab; Take 1 Tab by mouth 2 times a day for 5 days.  Dispense: 10 Tab; Refill: 0  - fluticasone (FLONASE) 50 MCG/ACT nasal spray; Spray 1 Spray in nose every day.  Dispense: 16 g; Refill: 0          Differential Diagnosis, natural history, and supportive care discussed. Return to the Urgent Care or follow up with your PCP if symptoms fail to resolve, or for any new or worsening symptoms. Emergency room precautions discussed. Patient and/or family appears understanding of information.

## 2019-07-29 NOTE — PATIENT INSTRUCTIONS

## 2019-08-05 ENCOUNTER — TELEPHONE (OUTPATIENT)
Dept: MEDICAL GROUP | Facility: PHYSICIAN GROUP | Age: 79
End: 2019-08-05

## 2019-08-05 NOTE — TELEPHONE ENCOUNTER
Future Appointments       Provider Department Center    8/27/2019 9:20 AM Joselin Kwon M.D. [facility] [place]        ESTABLISHED PATIENT PRE-VISIT PLANNING     Patient was NOT contacted to complete PVP.    1.  Reviewed notes from the last few office visits within the medical group: Yes 7/9/19    2.  If any orders were placed at last visit or intended to be done for this visit (i.e. 6 mos follow-up), do we have Results/Consult Notes?        •  Labs - Labs ordered, completed on 7/9/19 and results are in chart.       •  Imaging - Imaging was not ordered at last office visit.       •  Referrals - No referrals were ordered at last office visit.    3. Is this appointment scheduled as a Hospital Follow-Up? No    4.  Immunizations were updated in Epic using WebIZ?: No WebIZ record     5.  Patient is due for the following Health Maintenance Topics:   Health Maintenance Due   Topic Date Due   • Annual Wellness Visit  1940   • IMM DTaP/Tdap/Td Vaccine (1 - Tdap) 11/30/1961   • IMM ZOSTER VACCINES (1 of 2) 11/30/1992   • IMM PNEUMOCOCCAL VACCINE: 65+ Years (1 of 2 - PCV13) 11/30/2007   • MAMMOGRAM  05/23/2019     6. Orders for overdue Health Maintenance topics pended in Pre-Charting? NO    7.  AHA (MDX) form printed for Provider? No, already completed    8.  Patient was NOT informed to arrive 15 min prior to their scheduled appointment and bring in their medication bottles.

## 2019-08-07 ENCOUNTER — HOSPITAL ENCOUNTER (OUTPATIENT)
Dept: LAB | Facility: MEDICAL CENTER | Age: 79
End: 2019-08-07
Attending: NURSE PRACTITIONER
Payer: MEDICARE

## 2019-08-07 DIAGNOSIS — E03.9 ACQUIRED HYPOTHYROIDISM: ICD-10-CM

## 2019-08-07 DIAGNOSIS — R00.1 BRADYCARDIA: ICD-10-CM

## 2019-08-07 DIAGNOSIS — E78.5 DYSLIPIDEMIA: ICD-10-CM

## 2019-08-07 LAB
CHOLEST SERPL-MCNC: 209 MG/DL (ref 100–199)
FASTING STATUS PATIENT QL REPORTED: NORMAL
HDLC SERPL-MCNC: 44 MG/DL
LDLC SERPL CALC-MCNC: 124 MG/DL
T4 FREE SERPL-MCNC: 0.73 NG/DL (ref 0.53–1.43)
TRIGL SERPL-MCNC: 207 MG/DL (ref 0–149)
TSH SERPL DL<=0.005 MIU/L-ACNC: 5.97 UIU/ML (ref 0.38–5.33)

## 2019-08-07 PROCEDURE — 84443 ASSAY THYROID STIM HORMONE: CPT

## 2019-08-07 PROCEDURE — 80061 LIPID PANEL: CPT

## 2019-08-07 PROCEDURE — 36415 COLL VENOUS BLD VENIPUNCTURE: CPT

## 2019-08-07 PROCEDURE — 84439 ASSAY OF FREE THYROXINE: CPT

## 2019-08-13 ENCOUNTER — OFFICE VISIT (OUTPATIENT)
Dept: MEDICAL GROUP | Facility: PHYSICIAN GROUP | Age: 79
End: 2019-08-13
Payer: MEDICARE

## 2019-08-13 VITALS
RESPIRATION RATE: 12 BRPM | TEMPERATURE: 97.9 F | SYSTOLIC BLOOD PRESSURE: 130 MMHG | DIASTOLIC BLOOD PRESSURE: 72 MMHG | BODY MASS INDEX: 30.21 KG/M2 | OXYGEN SATURATION: 94 % | WEIGHT: 204 LBS | HEIGHT: 69 IN | HEART RATE: 60 BPM

## 2019-08-13 DIAGNOSIS — I10 ESSENTIAL HYPERTENSION: ICD-10-CM

## 2019-08-13 DIAGNOSIS — E78.5 DYSLIPIDEMIA: ICD-10-CM

## 2019-08-13 DIAGNOSIS — D11.0 PLEOMORPHIC ADENOMA OF PAROTID GLAND: ICD-10-CM

## 2019-08-13 DIAGNOSIS — E03.9 ACQUIRED HYPOTHYROIDISM: ICD-10-CM

## 2019-08-13 PROBLEM — M79.89 LEFT LEG SWELLING: Status: RESOLVED | Noted: 2018-03-14 | Resolved: 2019-08-13

## 2019-08-13 PROCEDURE — 99214 OFFICE O/P EST MOD 30 MIN: CPT | Performed by: FAMILY MEDICINE

## 2019-08-13 RX ORDER — TRIAMTERENE AND HYDROCHLOROTHIAZIDE 37.5; 25 MG/1; MG/1
CAPSULE ORAL
COMMUNITY
Start: 2019-05-14 | End: 2019-11-06

## 2019-08-13 RX ORDER — LEVOTHYROXINE SODIUM 0.03 MG/1
25 TABLET ORAL
Qty: 30 TAB | Refills: 3 | Status: SHIPPED | OUTPATIENT
Start: 2019-08-13 | End: 2019-11-07 | Stop reason: SDUPTHER

## 2019-08-13 NOTE — PROGRESS NOTES
cc: High cholesterol    Subjective:     Reina Munoz is a 78 y.o. female presenting for follow-up to discuss her chronic medical problems occluding high cholesterol and hypothyroid.  She was seen in the urgent care on July 29 and diagnosed with a sinus infection.  She completed a 5-day course of Augmentin.  Has been using Flonase as needed.  Is starting to feel better.    1. Pleomorphic adenoma of parotid gland  Chronic diagnosis.  She has a history of a left parotid pleomorphic adenoma s/p parotidectomy in 1992.  She had a recurrence in 2002 and underwent a revision procedure.  In 2016 she had a surveillance MRI that did show a mass in the left parotid with an FNA consistent with pleomorphic adenoma.  She was seen by ENT 2 years ago and had a recent follow-up on July 19 with Dr. Solitario Balderas.  Repeat MRI from April 2019 did show stable recurrent pleomorphic adenomas.  His recommendations were to follow-up with him in 2 years.      2. Dyslipidemia  Chronic medical problem.  Has not been taking Zocor for the last year.  Most recent labs from August 2019 were reviewed with her and a copy was given for her to discuss with her cardiologist today.    3. Acquired hypothyroidism  Chronic medical diagnosis.  She was previously on levothyroxine 75 mcg daily.  This was discontinued 6 months ago due to her labs being within the normal range.  She has noticed that she is lacking energy since being off this medication.  Upon reviewing her ENT consult patient notes there was a head and neck ultrasound done in June 2019 which did show a small collate cyst in the inferior left thyroid lobe.  Follow-up was recommended.  She did have some recent labs in August with a TSH of 5.97 and a free T4 0.73.      4. Essential hypertension  Chronic medical problem. Continues to check her blood pressure twice a day.  Mentions that at home this morning it was 122/64.  Denies any headaches, chest pain or lower extremity edema.  She has not  "been taking her lisinopril or Maxide on a routine basis.  Has an appointment with cardiology, Dr. Chung today.    Review of systems:  See above and negative for fever chills, chest pain, shortness of breath  Allergies   Allergen Reactions   • Doxycycline Nausea and Swelling     Rxn - 2018  Tongue swelling   • Penicillins Rash     Rxn - years ago   Pt tolerated Augmentin 6/2018   • Sulfa Drugs Rash     Rxn - many years ago to an unknown med         Current Outpatient Medications:   •  triamterene/hctz (MAXZIDE-25/DYAZIDE) 37.5-25 MG Cap, , Disp: , Rfl:   •  levothyroxine (SYNTHROID) 25 MCG Tab, Take 1 Tab by mouth Every morning on an empty stomach., Disp: 30 Tab, Rfl: 3  •  fluticasone (FLONASE) 50 MCG/ACT nasal spray, Spray 1 Spray in nose every day., Disp: 16 g, Rfl: 0  •  Omega-3 Fatty Acids (FISH OIL) 1000 MG Cap capsule, Take 1,000 mg by mouth., Disp: , Rfl:   •  lisinopril (PRINIVIL) 2.5 MG Tab, Take 1 Tab by mouth 2 times a day., Disp: 180 Tab, Rfl: 3  •  simvastatin (ZOCOR) 10 MG Tab, Take 1 Tab by mouth every evening., Disp: 90 Tab, Rfl: 3  •  esomeprazole (NEXIUM) 20 MG capsule, Take 1 Cap by mouth every morning before breakfast., Disp: 90 Cap, Rfl: 3    Allergies, past medical history, past surgical history, family history, social history reviewed and updated    Objective:     Vitals: /72 (BP Location: Right arm, Patient Position: Sitting, BP Cuff Size: Adult)   Pulse 60   Temp 36.6 °C (97.9 °F) (Temporal)   Resp 12   Ht 1.753 m (5' 9\")   Wt 92.5 kg (204 lb)   SpO2 94%   BMI 30.13 kg/m²   General:  Alert, pleasant, NAD  Eyes:  normal inspection of conjunctivae and lids, EOMI,   ENMT:  External ears and nose are normal.    Neck  supple,   Heart:  Regular rate and rhythm,  No LE edema  Respiratory: Normal respiratory effort, Clear to auscultation bilaterally.  Abdomen:   soft, Non-distended,   Skin:  Warm, dry, no rashes,   Musculoskeletal:  Normal gait, Normal digits and nails.  Neurological: " No tremors,   Psych:   Affect/mood is normal, judgement is good, memory is intact, grooming is appropriate.    Assessment/Plan:     Reina was seen today for follow-up and results.    Diagnoses and all orders for this visit:    Pleomorphic adenoma of parotid gland  Chronic diagnosis.  Stable.  Follow-up with ENT in 2 years.    Dyslipidemia  Chronic.  Not well controlled.  Encouraged to restart Zocor.    Acquired hypothyroidism  Chronic.  Discussed with her the options of monitoring labs or starting levothyroxine at a low dose.  We will start at 25 mcg and repeat labs in 6 weeks.  -     levothyroxine (SYNTHROID) 25 MCG Tab; Take 1 Tab by mouth Every morning on an empty stomach.  -     FREE THYROXINE; Future  -     TSH; Future    Essential hypertension  Chronic medical diagnosis.  Stable.  She is currently not taking lisinopril or Maxide. Encouraged to discuss with her cardiologist today.      Return in about 3 months (around 11/13/2019) for Subclinical hypothyroid.

## 2019-11-01 ENCOUNTER — HOSPITAL ENCOUNTER (OUTPATIENT)
Dept: LAB | Facility: MEDICAL CENTER | Age: 79
End: 2019-11-01
Attending: FAMILY MEDICINE
Payer: MEDICARE

## 2019-11-01 DIAGNOSIS — E03.8 SUBCLINICAL HYPOTHYROIDISM: ICD-10-CM

## 2019-11-01 DIAGNOSIS — E03.9 ACQUIRED HYPOTHYROIDISM: ICD-10-CM

## 2019-11-01 LAB
T4 FREE SERPL-MCNC: 0.8 NG/DL (ref 0.53–1.43)
TSH SERPL DL<=0.005 MIU/L-ACNC: 5.2 UIU/ML (ref 0.38–5.33)

## 2019-11-01 PROCEDURE — 84443 ASSAY THYROID STIM HORMONE: CPT

## 2019-11-01 PROCEDURE — 84439 ASSAY OF FREE THYROXINE: CPT

## 2019-11-01 PROCEDURE — 36415 COLL VENOUS BLD VENIPUNCTURE: CPT

## 2019-11-06 ENCOUNTER — OFFICE VISIT (OUTPATIENT)
Dept: MEDICAL GROUP | Facility: PHYSICIAN GROUP | Age: 79
End: 2019-11-06
Payer: MEDICARE

## 2019-11-06 ENCOUNTER — HOSPITAL ENCOUNTER (OUTPATIENT)
Facility: MEDICAL CENTER | Age: 79
End: 2019-11-06
Attending: FAMILY MEDICINE
Payer: MEDICARE

## 2019-11-06 VITALS
WEIGHT: 204 LBS | HEART RATE: 70 BPM | BODY MASS INDEX: 30.21 KG/M2 | HEIGHT: 69 IN | SYSTOLIC BLOOD PRESSURE: 152 MMHG | TEMPERATURE: 97.9 F | DIASTOLIC BLOOD PRESSURE: 90 MMHG | RESPIRATION RATE: 20 BRPM | OXYGEN SATURATION: 94 %

## 2019-11-06 DIAGNOSIS — E03.9 ACQUIRED HYPOTHYROIDISM: ICD-10-CM

## 2019-11-06 DIAGNOSIS — E78.5 DYSLIPIDEMIA: ICD-10-CM

## 2019-11-06 DIAGNOSIS — R30.0 DYSURIA: Primary | ICD-10-CM

## 2019-11-06 DIAGNOSIS — I10 ESSENTIAL HYPERTENSION: ICD-10-CM

## 2019-11-06 DIAGNOSIS — R30.0 DYSURIA: ICD-10-CM

## 2019-11-06 LAB
APPEARANCE UR: CLEAR
BILIRUB UR STRIP-MCNC: NEGATIVE MG/DL
COLOR UR AUTO: YELLOW
GLUCOSE UR STRIP.AUTO-MCNC: NEGATIVE MG/DL
KETONES UR STRIP.AUTO-MCNC: NEGATIVE MG/DL
LEUKOCYTE ESTERASE UR QL STRIP.AUTO: NEGATIVE
NITRITE UR QL STRIP.AUTO: NEGATIVE
PH UR STRIP.AUTO: 6.5 [PH] (ref 5–8)
PROT UR QL STRIP: NEGATIVE MG/DL
RBC UR QL AUTO: NEGATIVE
SP GR UR STRIP.AUTO: 1
UROBILINOGEN UR STRIP-MCNC: 0.2 MG/DL

## 2019-11-06 PROCEDURE — 99214 OFFICE O/P EST MOD 30 MIN: CPT | Performed by: FAMILY MEDICINE

## 2019-11-06 PROCEDURE — 87086 URINE CULTURE/COLONY COUNT: CPT

## 2019-11-06 PROCEDURE — 81002 URINALYSIS NONAUTO W/O SCOPE: CPT | Performed by: FAMILY MEDICINE

## 2019-11-06 NOTE — PROGRESS NOTES
cc:  hypothyroid    Subjective:     Reina Munoz is a 78 y.o. female presenting for follow-up of her thyroid and concern for a bladder infection.    1. Acquired hypothyroidism  Chronic medical diagnosis.  She had been taking levothyroxine 75 mcg in the past but this was discontinued.  Previous labs she was restarted at 25 mcg daily.  She denies any dry skin or hair loss.  Denies any fatigue.  Mentions that she is doing well on this dose.  Labs checked recently have improved TSH of 5.2 and free T4 normal at 0.80.    2. Essential hypertension  Chronic medical diagnosis.  Continues to take lisinopril 2.5 mg twice a day per recommendations of cardiology, Dr. Chung.  Denies any palpitations or chest pain.  She continues to check her blood pressure on a daily basis.  Last night it was 119/73.  However in the office today it is 152/90.  Mentions that she has not taken her medications this morning.  Does complain of infrequent headaches.    dysuria  New medical problem to the examiner.  Over the last 2 to 3 days she has been experiencing lower abdominal pain radiating to the lower back.  Denies any fevers or chills.  Does complain of some dysuria.  Denies any hematuria.  Does not recall when her last UTI was.  States that it was many many years ago.  Urinalysis checked in the office today is negative.    Dyslipidemia  Chronic medical diagnosis.  Currently taking simvastatin 10 mg daily.  Recent labs from August have total cholesterol 209, triglycerides 207, HDL 44, and .  Denies any lower extremity muscle cramps.    Review of systems:  See above  Allergies   Allergen Reactions   • Doxycycline Nausea and Swelling     Rxn - 2018  Tongue swelling   • Penicillins Rash     Rxn - years ago   Pt tolerated Augmentin 6/2018   • Sulfa Drugs Rash     Rxn - many years ago to an unknown med         Current Outpatient Medications:   •  Multiple Vitamins-Minerals (MULTIVITAMIN ADULT PO), Take  by mouth., Disp: , Rfl:   •   "KRILL OIL PO, Take  by mouth., Disp: , Rfl:   •  levothyroxine (SYNTHROID) 25 MCG Tab, Take 1 Tab by mouth Every morning on an empty stomach., Disp: 30 Tab, Rfl: 3  •  fluticasone (FLONASE) 50 MCG/ACT nasal spray, Spray 1 Spray in nose every day., Disp: 16 g, Rfl: 0  •  lisinopril (PRINIVIL) 2.5 MG Tab, Take 1 Tab by mouth 2 times a day., Disp: 180 Tab, Rfl: 3  •  simvastatin (ZOCOR) 10 MG Tab, Take 1 Tab by mouth every evening., Disp: 90 Tab, Rfl: 3  •  esomeprazole (NEXIUM) 20 MG capsule, Take 1 Cap by mouth every morning before breakfast., Disp: 90 Cap, Rfl: 3    Allergies, past medical history, past surgical history, family history, social history reviewed and updated    Objective:     Vitals: /90 (BP Location: Left arm, Patient Position: Sitting, BP Cuff Size: Adult)   Pulse 70   Temp 36.6 °C (97.9 °F) (Temporal)   Resp 20   Ht 1.753 m (5' 9\")   Wt 92.5 kg (204 lb)   SpO2 94%   BMI 30.13 kg/m²   General:  Alert, pleasant, NAD  Eyes:  normal inspection of conjunctivae and lids, EOMI,   ENMT:  External ears and nose are normal.    Neck  supple,   Heart:  Regular rate and rhythm,  No LE edema  Respiratory:  Normal respiratory effort, Clear to auscultation bilaterally.  Abdomen:   soft, Non-distended,   Skin:  Warm, dry, no rashes,   Musculoskeletal:  Normal gait, Normal digits and nails.  Neurological: No tremors,   Psych:   Affect/mood is normal, judgement is good, memory is intact, grooming is appropriate.    Assessment/Plan:     Reina was seen today for follow-up and cystitis.    Diagnoses and all orders for this visit:    Dysuria  New medical problem.  Not improving.  Urinalysis done in the office is negative.  We will go ahead and send for culture.  -     URINE CULTURE(NEW); Future  -     POCT Urinalysis    Acquired hypothyroidism  Chronic medical diagnosis.  Stable.  Continue with current dose of levothyroxine at 25 mcg daily.  -     TSH; Future  -     FREE THYROXINE; Future    Essential " hypertension  Chronic medical diagnosis.  Overall stable.  Continue with lisinopril 2.5 mg twice a day.  Encouraged not to skip doses.    Dyslipidemia  Chronic medical diagnosis.  Continue with simvastatin 10 mg daily.      Return in about 4 months (around 3/6/2020) for hypothryoid.  This note was created using voice recognition software (Dragon). The accuracy of the dictation is limited by the abilities of the software. I have reviewed the note prior to signing, however some errors in grammar and context are still possible. If you have any questions related to this note please do not hesitate to contact our office.

## 2019-11-07 DIAGNOSIS — E03.9 ACQUIRED HYPOTHYROIDISM: ICD-10-CM

## 2019-11-07 NOTE — TELEPHONE ENCOUNTER
Was the patient seen in the last year in this department? Yes LOV 11/06/19    Does patient have an active prescription for medications requested? No     Received Request Via: Pharmacy

## 2019-11-08 LAB
BACTERIA UR CULT: NORMAL
SIGNIFICANT IND 70042: NORMAL
SITE SITE: NORMAL
SOURCE SOURCE: NORMAL

## 2019-11-08 RX ORDER — LEVOTHYROXINE SODIUM 0.03 MG/1
25 TABLET ORAL
Qty: 90 TAB | Refills: 2 | Status: SHIPPED | OUTPATIENT
Start: 2019-11-08 | End: 2020-08-31

## 2019-11-08 NOTE — TELEPHONE ENCOUNTER
Requested Prescriptions     Pending Prescriptions Disp Refills   • levothyroxine (SYNTHROID) 25 MCG Tab 90 Tab 2     Sig: Take 1 Tab by mouth Every morning on an empty stomach.   Joselin Kwon M.D.

## 2019-11-15 ENCOUNTER — TELEPHONE (OUTPATIENT)
Dept: MEDICAL GROUP | Facility: PHYSICIAN GROUP | Age: 79
End: 2019-11-15

## 2019-12-05 DIAGNOSIS — E03.9 ACQUIRED HYPOTHYROIDISM: ICD-10-CM

## 2020-01-28 ENCOUNTER — TELEPHONE (OUTPATIENT)
Dept: CARDIOLOGY | Facility: MEDICAL CENTER | Age: 80
End: 2020-01-28

## 2020-01-28 NOTE — TELEPHONE ENCOUNTER
Records request faxed to [facility]  [phone]  For recent office visit notes, EKG, and any cardio related imaging & procedure reports.

## 2020-02-06 ENCOUNTER — OFFICE VISIT (OUTPATIENT)
Dept: CARDIOLOGY | Facility: MEDICAL CENTER | Age: 80
End: 2020-02-06
Payer: MEDICARE

## 2020-02-06 VITALS
HEART RATE: 61 BPM | OXYGEN SATURATION: 93 % | HEIGHT: 69 IN | DIASTOLIC BLOOD PRESSURE: 88 MMHG | SYSTOLIC BLOOD PRESSURE: 152 MMHG | WEIGHT: 203 LBS | BODY MASS INDEX: 30.07 KG/M2

## 2020-02-06 DIAGNOSIS — R00.1 BRADYCARDIA: Primary | ICD-10-CM

## 2020-02-06 DIAGNOSIS — I34.0 MITRAL VALVE INSUFFICIENCY, UNSPECIFIED ETIOLOGY: ICD-10-CM

## 2020-02-06 LAB — EKG IMPRESSION: NORMAL

## 2020-02-06 PROCEDURE — 93000 ELECTROCARDIOGRAM COMPLETE: CPT | Performed by: INTERNAL MEDICINE

## 2020-02-06 PROCEDURE — 99204 OFFICE O/P NEW MOD 45 MIN: CPT | Performed by: INTERNAL MEDICINE

## 2020-02-06 ASSESSMENT — ENCOUNTER SYMPTOMS
PND: 0
CHILLS: 0
INSOMNIA: 0
BLURRED VISION: 0
LOSS OF CONSCIOUSNESS: 0
DIZZINESS: 0
PALPITATIONS: 0
ABDOMINAL PAIN: 0
SHORTNESS OF BREATH: 0
ORTHOPNEA: 0
MYALGIAS: 0
FEVER: 0

## 2020-02-06 NOTE — PROGRESS NOTES
"Chief Complaint   Patient presents with   • Bradycardia     NP       Subjective:   Reina Munoz is a 79 y.o. female who presents today for a cardiology consultation to establish ongoing cardiac care.    The patient previously been followed by Dr. Navdeep Chung and Dr. Bry Martinez at [facility] for bradycardia and a \"leaky heart valve\".    The patient had been on long-term thyroid supplementation.  Last year she developed symptoms excessive heat and \"burning up\".  Her PCP at that time discontinued her thyroid.  She then developed episodes of near syncope with low heart rates on cardiac monitoring of down to 32.  She was discovered to be hypothyroid and was restarted on her thyroid medications with complete resolutions of her near syncopal episodes.    She also had a nuclear stress test in 6/2019 that showed a fixed anterior lateral defect and was told that everything was okay.    The patient works at [employer] and walks throughout the day without any symptoms of chest discomfort or shortness of breath.    The patient has additional significant past medical history of hypertension, LUKAS on CPAP and dyslipidemia.  Never smoked cigarettes.    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    • Dyslipidemia    • GERD (gastroesophageal reflux disease)    • Hypothyroidism     Resolved 2018   • Pleomorphic adenoma of parotid gland 1992, 2004    recurrent, yearly MRI   • Sleep apnea      Past Surgical History:   Procedure Laterality Date   • CATARACT EXTRACTION WITH IOL     • LUMPECTOMY Left     non-cancerous   • PAROTIDECTOMY Left 1992, 2004    recurrent pleomorphic adenoma left parotid gland     Family History   Problem Relation Age of Onset   • Cancer Mother         multiple myeloma   • Heart Disease Mother    • Heart Attack Father    • Stroke Father    • Heart Disease Father    • Hypertension Father    • Heart Disease Brother    • Lung Disease Brother    • Stroke " Sister    • Cancer Sister         breast   • Sleep Apnea Neg Hx    • Diabetes Neg Hx      Social History     Socioeconomic History   • Marital status:      Spouse name: Not on file   • Number of children: 3   • Years of education: Not on file   • Highest education level: Not on file   Occupational History   • 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   Social Needs   • Financial resource strain: Not on file   • Food insecurity:     Worry: Not on file     Inability: Not on file   • Transportation needs:     Medical: Not on file     Non-medical: Not on file   Tobacco Use   • Smoking status: Never Smoker   • Smokeless tobacco: Never Used   Substance and Sexual Activity   • Alcohol use: No     Alcohol/week: 0.0 oz   • Drug use: No   • Sexual activity: Yes     Partners: Male     Comment: , [employer], 3 kids   Lifestyle   • Physical activity:     Days per week: Not on file     Minutes per session: Not on file   • Stress: Not on file   Relationships   • Social connections:     Talks on phone: Not on file     Gets together: Not on file     Attends [employer] service: Not on file     Active member of club or organization: Not on file     Attends meetings of clubs or organizations: Not on file     Relationship status: Not on file   • Intimate partner violence:     Fear of current or ex partner: Not on file     Emotionally abused: Not on file     Physically abused: Not on file     Forced sexual activity: Not on file   Other Topics Concern   • Not on file   Social History Narrative   • Not on file     Allergies   Allergen Reactions   • Doxycycline Nausea and Swelling     Rxn - 2018  Tongue swelling   • Penicillins Rash     Rxn - years ago   Pt tolerated Augmentin 6/2018   • Sulfa Drugs Rash     Rxn - many years ago to an unknown med     Outpatient Encounter Medications as of 2/6/2020   Medication Sig Dispense Refill   • levothyroxine (SYNTHROID) 25 MCG Tab Take 1 Tab by mouth Every morning on an empty  "stomach. 90 Tab 2   • Multiple Vitamins-Minerals (MULTIVITAMIN ADULT PO) Take  by mouth.     • KRILL OIL PO Take  by mouth.     • fluticasone (FLONASE) 50 MCG/ACT nasal spray Spray 1 Spray in nose every day. 16 g 0   • lisinopril (PRINIVIL) 2.5 MG Tab Take 1 Tab by mouth 2 times a day. 180 Tab 3   • simvastatin (ZOCOR) 10 MG Tab Take 1 Tab by mouth every evening. 90 Tab 3   • esomeprazole (NEXIUM) 20 MG capsule Take 1 Cap by mouth every morning before breakfast. 90 Cap 3     No facility-administered encounter medications on file as of 2/6/2020.      Review of Systems   Constitutional: Negative for chills and fever.   HENT: Negative for congestion.    Eyes: Negative for blurred vision.   Respiratory: Negative for shortness of breath.    Cardiovascular: Negative for chest pain, palpitations, orthopnea, leg swelling and PND.   Gastrointestinal: Negative for abdominal pain.   Genitourinary: Negative for dysuria.   Musculoskeletal: Negative for joint pain and myalgias.   Skin: Negative for rash.   Neurological: Negative for dizziness and loss of consciousness.   Psychiatric/Behavioral: The patient does not have insomnia.         Objective:   /88 (BP Location: Left arm, Patient Position: Sitting, BP Cuff Size: Adult)   Pulse 61   Ht 1.753 m (5' 9\")   Wt 92.1 kg (203 lb)   SpO2 93%   BMI 29.98 kg/m²     Physical Exam   Constitutional: She is oriented to person, place, and time. She appears well-developed and well-nourished. No distress.   Eyes: Pupils are equal, round, and reactive to light. Conjunctivae and EOM are normal.   Neck: Normal range of motion. Neck supple. No JVD present.   Cardiovascular: Normal rate, regular rhythm, normal heart sounds and intact distal pulses. Exam reveals no gallop and no friction rub.   No murmur heard.  Pulses:       Carotid pulses are 2+ on the right side and 2+ on the left side.  Pulmonary/Chest: Effort normal and breath sounds normal. No accessory muscle usage. No " respiratory distress. She has no wheezes. She has no rales.   Abdominal: Soft. She exhibits no distension and no mass. There is no hepatosplenomegaly. There is no tenderness.   Musculoskeletal:         General: No edema.   Neurological: She is alert and oriented to person, place, and time.   Skin: Skin is warm, dry and intact. No rash noted. No cyanosis. Nails show no clubbing.   Psychiatric: She has a normal mood and affect. Her behavior is normal.   Vitals reviewed.    ECHOCARDIOGRAM 08/26/2018  Hyperdynamic left ventricular systolic function.  Moderate mitral regurgitation.  Moderately elevated estimated right-sided pressures.  No prior study is available for comparison.      MPI 06/20/2019  * Small sized, equivocal, mild severity, non-reversible defect in the mid    anterolateral wall. No reversible ischemia. SSS 1.  SDS 0.    * Normal left ventricular size, ejection fraction, and wall motion.    Calculated LVEF 68%.    ECG INTERPRETATION   Negative stress ECG for ischemia.    EKG 02/06/2020 normal sinus rhythm, rate 61.  Personally reviewed and interpreted.    Assessment:     1. Bradycardia  EKG   2. Mitral valve insufficiency, unspecified etiology         Medical Decision Making:  Today's Assessment / Status / Plan:     Assessment  1.  Near syncope bradycardia hypothyroid induced resolved on thyroid supplementation.  2.  Mitral regurgitation, moderate.  3.  Hypothyroidism.  4.  LUKAS on CPAP.  4.  Hypertension.  5.  Dyslipidemia.    Recommendation Discussion  1.  I reviewed her current cardiac status which is normal.  2.  I reviewed the echocardiogram images and results showing her mitral regurgitation which will be monitored clinically and if necessary with follow-up echocardiogram.  3.  Continue current therapy with no additional cardiac recommendations.  4. Follow-up 1 year.

## 2020-02-18 DIAGNOSIS — K21.9 GASTROESOPHAGEAL REFLUX DISEASE WITHOUT ESOPHAGITIS: ICD-10-CM

## 2020-02-18 NOTE — TELEPHONE ENCOUNTER
Received request via: Pharmacy     Was the patient seen in the last year in this department? Yes    Does the patient have an active prescription (recently filled or refills available) for medication(s) requested? No     Per insurance current Med is not a covered Item Please select alternative.

## 2020-02-18 NOTE — TELEPHONE ENCOUNTER
Requested Prescriptions     Pending Prescriptions Disp Refills   • esomeprazole (NEXIUM) 20 MG capsule 90 Cap 2     Sig: Take 1 Cap by mouth every morning before breakfast.   Joselin Kwon M.D.

## 2020-02-27 ENCOUNTER — HOSPITAL ENCOUNTER (OUTPATIENT)
Dept: LAB | Facility: MEDICAL CENTER | Age: 80
End: 2020-02-27
Attending: FAMILY MEDICINE
Payer: MEDICARE

## 2020-02-27 DIAGNOSIS — E03.9 ACQUIRED HYPOTHYROIDISM: ICD-10-CM

## 2020-02-27 LAB
T4 FREE SERPL-MCNC: 0.74 NG/DL (ref 0.53–1.43)
TSH SERPL DL<=0.005 MIU/L-ACNC: 6.43 UIU/ML (ref 0.38–5.33)

## 2020-02-27 PROCEDURE — 36415 COLL VENOUS BLD VENIPUNCTURE: CPT

## 2020-02-27 PROCEDURE — 84439 ASSAY OF FREE THYROXINE: CPT

## 2020-02-27 PROCEDURE — 84443 ASSAY THYROID STIM HORMONE: CPT

## 2020-03-10 ENCOUNTER — OFFICE VISIT (OUTPATIENT)
Dept: MEDICAL GROUP | Facility: PHYSICIAN GROUP | Age: 80
End: 2020-03-10
Payer: MEDICARE

## 2020-03-10 VITALS
SYSTOLIC BLOOD PRESSURE: 140 MMHG | HEIGHT: 69 IN | HEART RATE: 62 BPM | DIASTOLIC BLOOD PRESSURE: 90 MMHG | WEIGHT: 204 LBS | TEMPERATURE: 97.9 F | RESPIRATION RATE: 16 BRPM | OXYGEN SATURATION: 98 % | BODY MASS INDEX: 30.21 KG/M2

## 2020-03-10 DIAGNOSIS — R73.09 ELEVATED HEMOGLOBIN A1C: ICD-10-CM

## 2020-03-10 DIAGNOSIS — E78.5 DYSLIPIDEMIA: ICD-10-CM

## 2020-03-10 DIAGNOSIS — N95.1 MENOPAUSAL VAGINAL DRYNESS: ICD-10-CM

## 2020-03-10 DIAGNOSIS — E03.9 ACQUIRED HYPOTHYROIDISM: ICD-10-CM

## 2020-03-10 DIAGNOSIS — N64.4 BREAST PAIN: ICD-10-CM

## 2020-03-10 PROBLEM — R00.1 BRADYCARDIA: Status: RESOLVED | Noted: 2018-08-25 | Resolved: 2020-03-10

## 2020-03-10 PROCEDURE — 8041 PR SCP AHA: Performed by: FAMILY MEDICINE

## 2020-03-10 PROCEDURE — 99214 OFFICE O/P EST MOD 30 MIN: CPT | Performed by: FAMILY MEDICINE

## 2020-03-10 RX ORDER — LEVOTHYROXINE SODIUM 0.03 MG/1
TABLET ORAL
COMMUNITY
End: 2020-03-10

## 2020-03-10 RX ORDER — ESTRADIOL 10 UG/1
INSERT VAGINAL
Qty: 30 TAB | Refills: 1 | Status: SHIPPED
Start: 2020-03-10 | End: 2020-09-02

## 2020-03-10 ASSESSMENT — FIBROSIS 4 INDEX: FIB4 SCORE: 3.3

## 2020-03-10 ASSESSMENT — PATIENT HEALTH QUESTIONNAIRE - PHQ9: CLINICAL INTERPRETATION OF PHQ2 SCORE: 0

## 2020-03-10 NOTE — PROGRESS NOTES
Annual Health Assessment Questions:    1.  Are you currently engaging in any exercise or physical activity? Yes    2.  How would you describe your mood or emotional well-being today? Excellent    3.  Have you had any falls in the last year? No    4.  Have you noticed any problems with your balance or had difficulty walking? No    5.  In the last six months have you experienced any leakage of urine? No    6. DPA/Advanced Directive: Patient does not have an Advanced Directive.  A packet and workshop information was given on Advanced Directives.     cc: Hypothyroid and left breast pain    Subjective:     Reina Munoz is a 79 y.o. female presenting for follow-up of her hypothyroid and requesting an order for a mammogram as she has been experiencing left breast pain.    Left breast pain  This is a new medical problem which started approximately a few weeks ago.  Denies any history of trauma.  Mentions that approximately 20 years ago she did have a left breast biopsy which was benign.  Denies any discoloration or discharge.  Denies any lumps or tenderness in her axillary region.  Last mammogram was in May 2018.  She would like a referral for a new one.     Hypothyroid  Chronic medical diagnosis.  Currently taking Synthroid 25mcg.  Feels like she has more energy now.  Less hair loss now. Started on meds in August.  Was on it previusly in 2015.  Recent labs do show slightly increased TSH at 6.43 and free T4 0.74.    Dyslipidemia   Chronic medical diagnosis.  Currently taking simvastatin 10 mg and Krill oil.  Denies any lower extremity muscle cramps .  Recent labs from August 2019 have total cholesterol 209, triglycerides 207, HDL 44, and .    Elevated hemoglobin A1c   Chronic medical diagnosis.  Previous hemoglobin A1c from a year ago was at 6.1%.    Vaginal dryness  Chronic medical diagnosis.  She is to be on topical estrogen cream in the past.  Mentions that she use it approximately 4 years ago.  Requesting a  "new prescription.  Mentions that she has lots of dryness.  Denies any discharge.     Review of systems:  See above and negative for fever chills, shortness of breath.  Allergies   Allergen Reactions   • Doxycycline Nausea and Swelling     Rxn - 2018  Tongue swelling   • Penicillins Rash     Rxn - years ago   Pt tolerated Augmentin 6/2018   • Sulfa Drugs Rash     Rxn - many years ago to an unknown med         Current Outpatient Medications:   •  estradiol (VAGIFEM) 10 MCG Tab, Insert 1 tablet intravaginally once daily for 2 weeks, then insert 1 tablet intravaginally twice weekly., Disp: 30 Tab, Rfl: 1  •  esomeprazole (NEXIUM) 20 MG capsule, Take 1 Cap by mouth every morning before breakfast., Disp: 90 Cap, Rfl: 2  •  levothyroxine (SYNTHROID) 25 MCG Tab, Take 1 Tab by mouth Every morning on an empty stomach., Disp: 90 Tab, Rfl: 2  •  Multiple Vitamins-Minerals (MULTIVITAMIN ADULT PO), Take  by mouth., Disp: , Rfl:   •  KRILL OIL PO, Take  by mouth., Disp: , Rfl:   •  fluticasone (FLONASE) 50 MCG/ACT nasal spray, Spray 1 Spray in nose every day., Disp: 16 g, Rfl: 0  •  lisinopril (PRINIVIL) 2.5 MG Tab, Take 1 Tab by mouth 2 times a day., Disp: 180 Tab, Rfl: 3  •  simvastatin (ZOCOR) 10 MG Tab, Take 1 Tab by mouth every evening., Disp: 90 Tab, Rfl: 3    Allergies, past medical history, past surgical history, family history, social history reviewed and updated    Objective:     Vitals: /90 (BP Location: Left arm, Patient Position: Sitting, BP Cuff Size: Adult)   Pulse 62   Temp 36.6 °C (97.9 °F) (Temporal)   Resp 16   Ht 1.753 m (5' 9\")   Wt 92.5 kg (204 lb)   SpO2 98%   BMI 30.13 kg/m²   General:  Alert, pleasant, NAD  Eyes:  normal inspection of conjunctivae and lids, EOMI,   ENMT:  External ears and nose are normal.    Neck  supple,   Heart:  Regular rate and rhythm,  No LE edema  Breast: Breasts examined seated and supine. No skin changes, peau d'orange or nipple retraction. No discharge. No axillary " or supraclavicular adenopathy. No masses or nodularity palpable.   Respiratory:  Normal respiratory effort, Clear to auscultation bilaterally.  Abdomen:   soft, Non-distended,   Skin:  Warm, dry, no rashes,   Musculoskeletal:  Normal gait, Normal digits and nails.  Neurological: No tremors,   Psych:   Affect/mood is normal, judgement is good, memory is intact, grooming is appropriate.    A chaperone was offered to the patient during today's exam. Chaperone name: Gloria was present.    Assessment/Plan:     Reina was seen today for results and orders needed.    Diagnoses and all orders for this visit:    Breast pain  New medical problem.  Not improving.  We will start off by getting a mammogram.    Acquired hypothyroidism  Chronic diagnosis.  Recent labs do show increase in TSH.  However patient remains clinically stable and has noticed an improvement.  We will continue with current dose and repeat labs in 2 months  -     TSH; Future  -     FREE THYROXINE; Future    Dyslipidemia  Chronic medical diagnosis.  Not well controlled.  Continue with Zocor for now.  We will recheck labs.  -     Comp Metabolic Panel; Future  -     Lipid Profile; Future    Elevated hemoglobin A1c  Chronic medical diagnosis.  A1c is at 6.1%.  We will recheck labs.  Increased risk of developing diabetes discussed with patient in detail.  -     HEMOGLOBIN A1C; Future    Menopausal vaginal dryness  Chronic diagnosis.  Not improving.  Requesting refill for topical estrogen.  -     estradiol (VAGIFEM) 10 MCG Tab; Insert 1 tablet intravaginally once daily for 2 weeks, then insert 1 tablet intravaginally twice weekly.          Return in about 3 months (around 6/10/2020).  This note was created using voice recognition software (Dragon). The accuracy of the dictation is limited by the abilities of the software. I have reviewed the note prior to signing, however some errors in grammar and context are still possible. If you have any questions related to this  note please do not hesitate to contact our office.

## 2020-03-18 ENCOUNTER — TELEPHONE (OUTPATIENT)
Dept: HEALTH INFORMATION MANAGEMENT | Facility: OTHER | Age: 80
End: 2020-03-18

## 2020-03-18 NOTE — TELEPHONE ENCOUNTER
1. Caller Name: pt                      Call Back Number: [phone]  [facility] PCP or Specialty Provider: Yes         2.  Does patient have any active symptoms of respiratory illness (fever OR cough OR shortness of breath)? Yes, the patient reports the following respiratory symptoms: fever of at least 100.4°F (38°C) or greater, cough and shortness of breath. Some body aches yesterday, improved today.     3.  Does patient have any comoribidities? None     4.  In the last 30 days, has the patient traveled outside of the country OR in a high risk area within the  OR have any known contact with someone who has or is suspected to have COVID-19?  No.    5. Disposition: Advised to perform self care, monitor for worsening symptoms and to call back in 3 days if no improvement /Provided [phone]. [facility] number provided. Encouraged to go to urgent care if any worsening symptoms.     Note routed to PCP: CALEB only.

## 2020-03-24 ENCOUNTER — TELEPHONE (OUTPATIENT)
Dept: HEALTH INFORMATION MANAGEMENT | Facility: OTHER | Age: 80
End: 2020-03-24

## 2020-03-24 NOTE — TELEPHONE ENCOUNTER
1. Caller Name: Reina Munoz                    Call Back Number: [phone]  [facility] PCP or Specialty Provider: Yes         2.  Does patient have any active symptoms of respiratory illness (fever OR cough OR shortness of breath OR sore throat)? Yes, the patient reports the following respiratory symptoms: cough dry X 2 weeks. Diarrhea yesterday, low appetite. Fatigue. No fever    3.  Does patient have any comoribidities? None     4.  Has the patient traveled in the last 14 days OR had any known contact with someone who is suspected or confirmed to have COVID-19?  No.    5. Disposition: Advised to perform self care, monitor for worsening symptoms and to call back in 3 days if no improvement. Gave teledoc #    Note routed to [facility] Provider: CALEB only.

## 2020-05-26 DIAGNOSIS — E78.5 DYSLIPIDEMIA: ICD-10-CM

## 2020-05-26 RX ORDER — SIMVASTATIN 10 MG
10 TABLET ORAL EVERY EVENING
Qty: 90 TAB | Refills: 3 | Status: SHIPPED | OUTPATIENT
Start: 2020-05-26 | End: 2021-11-16 | Stop reason: SDUPTHER

## 2020-05-26 RX ORDER — SIMVASTATIN 10 MG
10 TABLET ORAL EVERY EVENING
Qty: 100 TAB | Refills: 2 | Status: SHIPPED
Start: 2020-05-26 | End: 2020-05-26

## 2020-05-26 NOTE — PROGRESS NOTES
Requested Prescriptions     Signed Prescriptions Disp Refills   • simvastatin (ZOCOR) 10 MG Tab 90 Tab 3     Sig: Take 1 Tab by mouth every evening.   Joselin Kwon M.D.

## 2020-05-26 NOTE — TELEPHONE ENCOUNTER
Requested Prescriptions     Pending Prescriptions Disp Refills   • simvastatin (ZOCOR) 10 MG Tab 100 Tab 2     Sig: Take 1 Tab by mouth every evening.   Joselin Kwon M.D.

## 2020-05-26 NOTE — TELEPHONE ENCOUNTER
Received request via: Pharmacy    Was the patient seen in the last year in this department? Yes LOV 04/14/2020    Does the patient have an active prescription (recently filled or refills available) for medication(s) requested? No

## 2020-06-19 ENCOUNTER — TELEPHONE (OUTPATIENT)
Dept: MEDICAL GROUP | Facility: PHYSICIAN GROUP | Age: 80
End: 2020-06-19

## 2020-06-19 NOTE — TELEPHONE ENCOUNTER
ESTABLISHED PATIENT PRE-VISIT PLANNING     Patient was contacted to complete PVP.    1.  Reviewed notes from the last few office visits within the medical group: Yes  LOV 04/14/20  2.  If any orders were placed at last visit or intended to be done for this visit (i.e. 6 mos follow-up), do we have Results/Consult Notes?        •  Labs - Labs ordered, NOT completed. Patient advised to complete prior to next appointment.       •  Imaging - Imaging was not ordered at last office visit.       •  Referrals - No referrals were ordered at last office visit.    3. Is this appointment scheduled as a Hospital Follow-Up? No    4.  Immunizations were updated in [facility] using WebIZ?: Yes       •  Web Iz Recommendations: SHINGRIX (Shingles)    5.  Patient is due for the following Health Maintenance Topics:   Health Maintenance Due   Topic Date Due   • IMM ZOSTER VACCINES (1 of 2) 11/30/1992   • MAMMOGRAM  05/23/2019   • Annual Wellness Visit  05/16/2020   • COLON CANCER SCREENING ANNUAL FIT  05/22/2020     6. Orders for overdue Health Maintenance topics pended in Pre-Charting? NO    7.  AHA (MDX) form printed for Provider? YES    8.  Patient was NOT informed to arrive 15 min prior to their scheduled appointment and bring in their medication bottles.

## 2020-06-23 ENCOUNTER — HOSPITAL ENCOUNTER (OUTPATIENT)
Dept: LAB | Facility: MEDICAL CENTER | Age: 80
End: 2020-06-23
Attending: FAMILY MEDICINE
Payer: MEDICARE

## 2020-06-23 DIAGNOSIS — E03.9 ACQUIRED HYPOTHYROIDISM: ICD-10-CM

## 2020-06-23 DIAGNOSIS — E78.5 DYSLIPIDEMIA: ICD-10-CM

## 2020-06-23 DIAGNOSIS — R73.09 ELEVATED HEMOGLOBIN A1C: ICD-10-CM

## 2020-06-23 LAB
25(OH)D3 SERPL-MCNC: 38 NG/ML (ref 30–100)
ALBUMIN SERPL BCP-MCNC: 4.2 G/DL (ref 3.2–4.9)
ALBUMIN/GLOB SERPL: 1.6 G/DL
ALP SERPL-CCNC: 69 U/L (ref 30–99)
ALT SERPL-CCNC: 47 U/L (ref 2–50)
ANION GAP SERPL CALC-SCNC: 11 MMOL/L (ref 7–16)
AST SERPL-CCNC: 48 U/L (ref 12–45)
BILIRUB SERPL-MCNC: 0.7 MG/DL (ref 0.1–1.5)
BUN SERPL-MCNC: 12 MG/DL (ref 8–22)
CALCIUM SERPL-MCNC: 9.7 MG/DL (ref 8.4–10.2)
CHLORIDE SERPL-SCNC: 106 MMOL/L (ref 96–112)
CHOLEST SERPL-MCNC: 183 MG/DL (ref 100–199)
CO2 SERPL-SCNC: 25 MMOL/L (ref 20–33)
CREAT SERPL-MCNC: 0.79 MG/DL (ref 0.5–1.4)
EST. AVERAGE GLUCOSE BLD GHB EST-MCNC: 123 MG/DL
FASTING STATUS PATIENT QL REPORTED: NORMAL
GLOBULIN SER CALC-MCNC: 2.7 G/DL (ref 1.9–3.5)
GLUCOSE SERPL-MCNC: 109 MG/DL (ref 65–99)
HBA1C MFR BLD: 5.9 % (ref 0–5.6)
HDLC SERPL-MCNC: 54 MG/DL
LDLC SERPL CALC-MCNC: 97 MG/DL
POTASSIUM SERPL-SCNC: 4.1 MMOL/L (ref 3.6–5.5)
PROT SERPL-MCNC: 6.9 G/DL (ref 6–8.2)
SODIUM SERPL-SCNC: 142 MMOL/L (ref 135–145)
T4 FREE SERPL-MCNC: 1.11 NG/DL (ref 0.93–1.7)
TRIGL SERPL-MCNC: 160 MG/DL (ref 0–149)
TSH SERPL DL<=0.005 MIU/L-ACNC: 6.15 UIU/ML (ref 0.38–5.33)

## 2020-06-23 PROCEDURE — 82306 VITAMIN D 25 HYDROXY: CPT

## 2020-06-23 PROCEDURE — 80061 LIPID PANEL: CPT

## 2020-06-23 PROCEDURE — 80053 COMPREHEN METABOLIC PANEL: CPT

## 2020-06-23 PROCEDURE — 84439 ASSAY OF FREE THYROXINE: CPT

## 2020-06-23 PROCEDURE — 36415 COLL VENOUS BLD VENIPUNCTURE: CPT

## 2020-06-23 PROCEDURE — 83036 HEMOGLOBIN GLYCOSYLATED A1C: CPT

## 2020-06-23 PROCEDURE — 84443 ASSAY THYROID STIM HORMONE: CPT

## 2020-06-29 ENCOUNTER — OFFICE VISIT (OUTPATIENT)
Dept: MEDICAL GROUP | Facility: PHYSICIAN GROUP | Age: 80
End: 2020-06-29
Payer: MEDICARE

## 2020-06-29 VITALS
WEIGHT: 205 LBS | HEART RATE: 70 BPM | SYSTOLIC BLOOD PRESSURE: 148 MMHG | OXYGEN SATURATION: 95 % | TEMPERATURE: 97.7 F | HEIGHT: 69 IN | BODY MASS INDEX: 30.36 KG/M2 | RESPIRATION RATE: 20 BRPM | DIASTOLIC BLOOD PRESSURE: 84 MMHG

## 2020-06-29 DIAGNOSIS — R73.09 ELEVATED HEMOGLOBIN A1C: ICD-10-CM

## 2020-06-29 DIAGNOSIS — Z12.12 SCREENING FOR COLORECTAL CANCER: ICD-10-CM

## 2020-06-29 DIAGNOSIS — E78.5 DYSLIPIDEMIA: ICD-10-CM

## 2020-06-29 DIAGNOSIS — I10 ESSENTIAL HYPERTENSION: ICD-10-CM

## 2020-06-29 DIAGNOSIS — M79.89 LEFT LEG SWELLING: ICD-10-CM

## 2020-06-29 DIAGNOSIS — E03.9 ACQUIRED HYPOTHYROIDISM: ICD-10-CM

## 2020-06-29 DIAGNOSIS — Z12.11 SCREENING FOR COLORECTAL CANCER: ICD-10-CM

## 2020-06-29 PROBLEM — R30.0 DYSURIA: Status: RESOLVED | Noted: 2019-11-06 | Resolved: 2020-06-29

## 2020-06-29 PROCEDURE — 99214 OFFICE O/P EST MOD 30 MIN: CPT | Performed by: FAMILY MEDICINE

## 2020-06-29 ASSESSMENT — FIBROSIS 4 INDEX: FIB4 SCORE: 3.08

## 2020-06-29 NOTE — PROGRESS NOTES
CC: Left leg edema and hypertension                                                                                                                               Reina presents today to discuss her left leg edema and hypertension.    Left leg swelling   Chronic medical diagnosis.  Hx left leg swelling x years. No pain. Hx left knee injections by Dr Pelletier 4 months ago with some relief. Pain going up and down hill or stairs.  Left leg Dopplers done in June 2019 were negative for blood clot.  She states that her sister who lives in [place] has been taking a natural medication to help with edema.  She will find out the name and let us know.    Hypertension  Chronic medical condition. BP today is 148/84.  Checks BP at home with range of 111-120s/70. Currently taking lisinopril 2.5 mg twice a day.  Denies symptoms of chest pain, headaches, or shortness of breath.     Dyslipidemia  Chronic medical condition.  Currently taking Zocor 10 mg.  Tolerating the medication well without any side effects.  States in the past she has had lower extremity muscle cramps with these medications patient denies chest pain or lower extremity muscle cramps.  Last set of labs from June 2020 were cholesterol improved at 183, triglycerides improved at 160, hdl 54, and ldl improved at 97..    Hypothyroid  Chronic condition.  Continues to take levothyroxine 25 mcg  Denies palpitations, skin changes, temperature intolerance, or changes in bowel habits. Last TSH was 6.15 and free T4 1.11 in June 2020.    Elevated hemoglobin A1c  Chronic medical diagnosis.  Recent labs do show an improvement in A1c down to 5.9%.  Previously it was 6.1% in 2019.        Patient Active Problem List    Diagnosis Date Noted   • Elevated hemoglobin A1c 03/10/2020   • Breast pain 03/10/2020   • Menopausal vaginal dryness 03/10/2020   • Mitral regurgitation 02/06/2020   • Pleomorphic adenoma of parotid gland 07/19/2019   • Left thyroid nodule 04/11/2019   • Right sided  "sciatica 02/19/2019   • Burning sensation of feet 02/19/2019   • Sleep apnea with use of continuous positive airway pressure (CPAP) 10/11/2018   • Left leg swelling 03/14/2018   • Essential hypertension 02/03/2017   • Dyslipidemia 12/12/2016   • Gastroesophageal reflux disease without esophagitis 12/12/2016   • 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 90 Tab 3   • esomeprazole (NEXIUM) 20 MG capsule Take 1 Cap by mouth every morning before breakfast. 90 Cap 2   • levothyroxine (SYNTHROID) 25 MCG Tab Take 1 Tab by mouth Every morning on an empty stomach. 90 Tab 2   • lisinopril (PRINIVIL) 2.5 MG Tab Take 1 Tab by mouth 2 times a day. 180 Tab 3   • estradiol (VAGIFEM) 10 MCG Tab Insert 1 tablet intravaginally once daily for 2 weeks, then insert 1 tablet intravaginally twice weekly. 30 Tab 1   • Multiple Vitamins-Minerals (MULTIVITAMIN ADULT PO) Take  by mouth.     • KRILL OIL PO Take  by mouth.       No current facility-administered medications for this visit.          Allergies as of 06/29/2020 - Reviewed 06/29/2020   Allergen Reaction Noted   • Doxycycline Nausea and Swelling 06/13/2018   • Penicillins Rash 01/11/2016   • Sulfa drugs Rash 01/11/2016          /84 (BP Location: Left arm, Patient Position: Sitting, BP Cuff Size: Adult)   Pulse 70   Temp 36.5 °C (97.7 °F) (Temporal)   Resp 20   Ht 1.753 m (5' 9\")   Wt 93 kg (205 lb)   SpO2 95%   BMI 30.27 kg/m²     Physical Exam:  Gen:         Alert and oriented, No apparent distress.  Neck:        supple, No Lymphadenopathy  Lungs:     Clear to auscultation bilaterally  CV:          Regular rate and rhythm. Left 1+ LE edema             Ext:          No clubbing, cyanosis, left knee tenderness      Assessment and Plan.   79 y.o. female with the following issues.    Reina was seen today for " follow-up, hypertension and edema.    Diagnoses and all orders for this visit:    Left leg swelling  Chronic.  Not improving.  She continues to follow-up with Ortho and is planning on following up with [facility] to see there is some improvement.  She will check with her sister for the name of the natural medication for edema and let us know.    Dyslipidemia  Chronic.  Improving.  Continue with Zocor.    Acquired hypothyroidism  Chronic.  TSH remains slightly elevated she is asymptomatic.  We will continue with current dose of levothyroxine at 25 mcg daily.    Essential hypertension  Chronic.  Not well controlled.  States that her home blood pressure readings are lower.  States Cardiologist has her on lisinopril twice a day.  Encouraged to continue checking blood pressures at home and to MyChart the readings in the next few weeks.    Screening for colorectal cancer  -     OCCULT BLOOD FECES IMMUNOASSAY (FIT); Future    Elevated hemoglobin A1c  Chronic.  Improving.  We will continue to monitor.

## 2020-07-09 ENCOUNTER — HOSPITAL ENCOUNTER (OUTPATIENT)
Dept: RADIOLOGY | Facility: MEDICAL CENTER | Age: 80
End: 2020-07-09
Attending: FAMILY MEDICINE
Payer: MEDICARE

## 2020-07-09 DIAGNOSIS — Z12.31 VISIT FOR SCREENING MAMMOGRAM: ICD-10-CM

## 2020-07-09 PROCEDURE — 77067 SCR MAMMO BI INCL CAD: CPT

## 2020-08-29 DIAGNOSIS — E03.9 ACQUIRED HYPOTHYROIDISM: ICD-10-CM

## 2020-08-31 RX ORDER — LEVOTHYROXINE SODIUM 0.03 MG/1
TABLET ORAL
Qty: 90 TAB | Refills: 3 | Status: SHIPPED | OUTPATIENT
Start: 2020-08-31 | End: 2021-08-16

## 2020-08-31 NOTE — TELEPHONE ENCOUNTER
Requested Prescriptions     Pending Prescriptions Disp Refills   • levothyroxine (SYNTHROID) 25 MCG Tab [Pharmacy Med Name: LEVOTHYROXINE 25 MCG TABLET] 90 Tab 3     Sig: TAKE ONE TABLET BY MOUTH EVERY MORNING ON AN EMPTY STOMACH   Joselin Kwon M.D.

## 2020-09-01 ENCOUNTER — TELEPHONE (OUTPATIENT)
Dept: MEDICAL GROUP | Facility: PHYSICIAN GROUP | Age: 80
End: 2020-09-01

## 2020-09-01 NOTE — TELEPHONE ENCOUNTER
Future Appointments       Provider Department Center    9/2/2020 2:00 PM Joselin Kwon M.D.; [facility]        ANNUAL WELLNESS VISIT PRE-VISIT PLANNING WITHOUT OUTREACH    1.  Reviewed note from last office visit with PCP: YES,  06/29/2020    2.  If any orders were placed at last visit, do we have Results/Consult Notes?        •  Labs - Labs ordered, NOT completed. Patient advised to complete prior to next appointment.       •  Imaging - Imaging was not ordered at last office visit.       •  Referrals - No referrals were ordered at last office visit.    3.  Immunizations were updated in [place] using WebIZ?: Yes       •  WebIZ Recommendations: FLU and SHINGRIX (Shingles)        •  Is patient due for Tdap? NO       •  Is patient due for Shingrix? YES. Patient was notified of copay/out of pocket cost.     4.  Patient is due for the following Health Maintenance Topics:   Health Maintenance Due   Topic Date Due   • IMM ZOSTER VACCINES (1 of 2) 11/30/1992   • Annual Wellness Visit  05/16/2020   • COLON CANCER SCREENING ANNUAL FIT  05/22/2020   • IMM INFLUENZA (1) 09/01/2020     5.  Reviewed/Updated the following with patient:       •   Preferred Pharmacy? YES       •   Preferred Lab? YES       •   Preferred Communication? YES       •   Allergies? YES       •   Medications? YES. Was Abstract Encounter opened and chart updated? YES       •   Social History? YES. Was Abstract Encounter opened and chart updated? YES       •   Family History (document living status of immediate family members and if + hx of  cancer, diabetes, hypertension, hyperlipidemia, heart attack, stroke) YES. Was Abstract Encounter opened and chart updated? YES    6.  Care Team Updated:       •   DME Company (gait device, O2, CPAP, etc.): YES,pt does have a CPAP machine, requesting referral to Pulmonary       •   Other Specialists (eye doctor, derm, GYN, cardiology, endo, etc): NO    7. Orders for overdue Health  Maintenance topics pended in Pre-Charting? NO    8.  Patient has the following Care Path diagnoses on Problem List:  NONE    9.  Patient was advised: “This is a free wellness visit. The provider will screen for medical conditions to help you stay healthy. If you have other concerns to address you may be asked to discuss these at a separate visit or there may be an additional fee.”     10.  Patient was informed to arrive 15 min prior to their scheduled appointment and bring in their medication bottles.

## 2020-09-02 ENCOUNTER — OFFICE VISIT (OUTPATIENT)
Dept: MEDICAL GROUP | Facility: PHYSICIAN GROUP | Age: 80
End: 2020-09-02
Payer: MEDICARE

## 2020-09-02 VITALS
OXYGEN SATURATION: 95 % | HEIGHT: 69 IN | BODY MASS INDEX: 29.92 KG/M2 | RESPIRATION RATE: 16 BRPM | HEART RATE: 64 BPM | DIASTOLIC BLOOD PRESSURE: 82 MMHG | WEIGHT: 202 LBS | TEMPERATURE: 98.1 F | SYSTOLIC BLOOD PRESSURE: 134 MMHG

## 2020-09-02 DIAGNOSIS — E04.1 LEFT THYROID NODULE: ICD-10-CM

## 2020-09-02 DIAGNOSIS — M79.89 LEFT LEG SWELLING: ICD-10-CM

## 2020-09-02 DIAGNOSIS — Z12.11 SCREEN FOR COLON CANCER: ICD-10-CM

## 2020-09-02 DIAGNOSIS — R74.9 ABNORMAL SERUM ENZYME LEVEL, UNSPECIFIED: ICD-10-CM

## 2020-09-02 DIAGNOSIS — Z00.00 MEDICARE ANNUAL WELLNESS VISIT, SUBSEQUENT: ICD-10-CM

## 2020-09-02 DIAGNOSIS — E03.9 ACQUIRED HYPOTHYROIDISM: ICD-10-CM

## 2020-09-02 DIAGNOSIS — E78.5 DYSLIPIDEMIA: ICD-10-CM

## 2020-09-02 DIAGNOSIS — D11.0 PLEOMORPHIC ADENOMA OF PAROTID GLAND: ICD-10-CM

## 2020-09-02 DIAGNOSIS — R73.09 ELEVATED HEMOGLOBIN A1C: ICD-10-CM

## 2020-09-02 DIAGNOSIS — I34.0 MITRAL VALVE INSUFFICIENCY, UNSPECIFIED ETIOLOGY: ICD-10-CM

## 2020-09-02 DIAGNOSIS — K21.9 GASTROESOPHAGEAL REFLUX DISEASE WITHOUT ESOPHAGITIS: ICD-10-CM

## 2020-09-02 DIAGNOSIS — I10 ESSENTIAL HYPERTENSION: ICD-10-CM

## 2020-09-02 DIAGNOSIS — G47.30 SLEEP APNEA WITH USE OF CONTINUOUS POSITIVE AIRWAY PRESSURE (CPAP): ICD-10-CM

## 2020-09-02 PROBLEM — N95.1 MENOPAUSAL VAGINAL DRYNESS: Status: RESOLVED | Noted: 2020-03-10 | Resolved: 2020-09-02

## 2020-09-02 PROBLEM — N64.4 BREAST PAIN: Status: RESOLVED | Noted: 2020-03-10 | Resolved: 2020-09-02

## 2020-09-02 PROBLEM — M54.31 RIGHT SIDED SCIATICA: Status: RESOLVED | Noted: 2019-02-19 | Resolved: 2020-09-02

## 2020-09-02 PROBLEM — R20.8 BURNING SENSATION OF FEET: Status: RESOLVED | Noted: 2019-02-19 | Resolved: 2020-09-02

## 2020-09-02 PROCEDURE — G0439 PPPS, SUBSEQ VISIT: HCPCS | Performed by: FAMILY MEDICINE

## 2020-09-02 ASSESSMENT — ACTIVITIES OF DAILY LIVING (ADL): BATHING_REQUIRES_ASSISTANCE: 0

## 2020-09-02 ASSESSMENT — PATIENT HEALTH QUESTIONNAIRE - PHQ9: CLINICAL INTERPRETATION OF PHQ2 SCORE: 0

## 2020-09-02 ASSESSMENT — ENCOUNTER SYMPTOMS: GENERAL WELL-BEING: EXCELLENT

## 2020-09-02 ASSESSMENT — FIBROSIS 4 INDEX: FIB4 SCORE: 3.08

## 2020-09-02 NOTE — PROGRESS NOTES
Chief Complaint   Patient presents with   • Annual Wellness Visit     HC/AWV         HPI:  Reina is a 79 y.o. 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 90 Tab 3   • esomeprazole (NEXIUM) 20 MG capsule Take 1 Cap by mouth every morning before breakfast. 90 Cap 2   • Multiple Vitamins-Minerals (MULTIVITAMIN ADULT PO) Take  by mouth.     • KRILL OIL PO Take  by mouth.     • lisinopril (PRINIVIL) 2.5 MG Tab Take 1 Tab by mouth 2 times a day. 180 Tab 3     No current facility-administered medications for this visit.         Patient is taking medications as noted in medication list.  Current supplements as per medication list.     Allergies: Doxycycline, Penicillins, and Sulfa drugs    Current social contact/activities: visits with friends and family, helps with food bank,      Is patient current with immunizations? No, due for does not want . Patient is interested in receiving does not want today.    She  reports that she has never smoked. She has never used smokeless tobacco. She reports that she does not drink alcohol or use drugs.  Counseling given: Not Answered        DPA/Advanced directive: Patient does not have an Advanced Directive.  A packet  and workshop information was given on Advanced Directives.    ROS:    Gait: Uses no assistive device   Ostomy: No   Other tubes: No   Amputations: No   Chronic oxygen use No   Last eye exam 08/2020   Wears hearing aids: No   : Denies any urinary leakage during the last 6 months    Screening:    Depression Screening    Little interest or pleasure in doing things?  0 - not at all  Feeling down, depressed, or hopeless? 0 - not at all  Trouble falling or staying asleep, or sleeping too much?     Feeling tired or having little energy?     Poor appetite or overeating?     Feeling bad about yourself - or that you are a failure or have let yourself or your family down?    Trouble concentrating on things, such as reading the newspaper or watching television?    Moving or speaking so slowly that other people could have noticed.  Or the opposite - being so fidgety or restless that you have been moving around a lot more than usual?     Thoughts that you would be better off dead, or of hurting yourself?     Patient Health Questionnaire Score:        If depressive symptoms identified deferred to follow up visit unless specifically addressed in assessment and plan.    Interpretation of PHQ-9 Total Score   Score Severity   1-4 No Depression   5-9 Mild Depression   10-14 Moderate Depression   15-19 Moderately Severe Depression   20-27 Severe Depression      Screening for Cognitive Impairment    Three Minute Recall (river, nation, finger)  3/3 River, nation, finger   3/3  Draw clock face with all 12 numbers and set the hands to show 10 past 11.  Yes Clock set to 11:10    5/5  If cognitive concerns identified, deferred for follow up unless specifically addressed in assessment and plan.    Fall Risk Assessment    Has the patient had two or more falls in the last year or any fall with injury in the last year?  No  If fall risk identified, deferred for follow up unless specifically addressed in assessment and plan.      Safety  Assessment    Throw rugs on floor.  No  Handrails on all stairs.  Yes  Good lighting in all hallways.  Yes  Difficulty hearing.  No  Patient counseled about all safety risks that were identified.    Functional Assessment ADLs    Are there any barriers preventing you from cooking for yourself or meeting nutritional needs?  No.    Are there any barriers preventing you from driving safely or obtaining transportation?  No.    Are there any barriers preventing you from using a telephone or calling for help?  No.    Are there any barriers preventing you from shopping?  No.    Are there any barriers preventing you from taking care of your own finances?  No.    Are there any barriers preventing you from managing your medications?    No.    Are there any barriers preventing you from showering, bathing or dressing yourself?  No.    Are you currently engaging in any exercise or physical activity?  Yes.  Walks, cooks, does a lot of chores   What is your perception of your health?  Excellent.    Health Maintenance Summary                Annual Wellness Visit Overdue 5/16/2020      Done 5/16/2019 Visit Dx: Medicare annual wellness visit, subsequent     Patient has more history with this topic...    COLON CANCER SCREENING ANNUAL FIT Overdue 5/22/2020      Done 5/22/2019 OCCULT BLOOD FECES IMMUNOASSAY     Patient has more history with this topic...    IMM INFLUENZA Overdue 9/1/2020      Patient Declined 1/11/2016     IMM DTaP/Tdap/Td Vaccine Postponed 11/6/2020 Originally 11/30/1959. Patient Refused    IMM PNEUMOCOCCAL VACCINE: 65+ Years Postponed 11/6/2020 Originally 11/30/2007. Patient Refused    MAMMOGRAM Next Due 7/9/2021      Done 7/9/2020 MA-SCREENING MAMMO BILAT W/TOMOSYNTHESIS W/CAD     Patient has more history with this topic...    BONE DENSITY Next Due 5/23/2023      Done 5/23/2018 DS-BONE DENSITY STUDY (DEXA)          Patient Care Team:  Joselin Kwon M.D. as PCP - General (Family Medicine)  [facility] as Home  "Health Provider (DME Supplier)  Rafy Herndon M.D. as Consulting Physician (Ophthalmology)  [facility] as Home Health Provider (DME Supplier)  Roxanne Carmona M.D. (Pulmonary Medicine)      Social History     Tobacco Use   • Smoking status: Never Smoker   • Smokeless tobacco: Never Used   Substance Use Topics   • Alcohol use: No     Alcohol/week: 0.0 oz   • Drug use: No     Family History   Problem Relation Age of Onset   • Cancer Mother         multiple myeloma   • Heart Disease Mother    • Heart Attack Father    • Stroke Father    • Heart Disease Father    • Hypertension Father    • Heart Disease Brother    • Lung Disease Brother    • Stroke Sister    • Cancer Sister         breast   • Sleep Apnea Neg Hx    • Diabetes Neg Hx      She  has a past medical history of Chronic meniscal tear of knee, Dyslipidemia, GERD (gastroesophageal reflux disease), Hypothyroidism, Pleomorphic adenoma of parotid gland (1992, 2004), and Sleep apnea.   Past Surgical History:   Procedure Laterality Date   • CATARACT EXTRACTION WITH IOL     • LUMPECTOMY Left     non-cancerous   • PAROTIDECTOMY Left 1992, 2004    recurrent pleomorphic adenoma left parotid gland         Exam:     /82 (BP Location: Left arm, Patient Position: Sitting, BP Cuff Size: Adult)   Pulse 64   Temp 36.7 °C (98.1 °F) (Temporal)   Resp 16   Ht 1.753 m (5' 9\")   Wt 91.6 kg (202 lb)   SpO2 95%  Body mass index is 29.83 kg/m².    Hearing good.    Dentition good  Alert, oriented in no acute distress.  Eye contact is good, speech goal directed, affect calm      Assessment and Plan. The following treatment and monitoring plan is recommended:    1. Medicare annual wellness visit, subsequent  - Subsequent Annual Wellness Visit - Includes PPPS ()    2. Essential hypertension  Chronic.  Stable with lisinopril 2.5 twice a day.  States that she has not taken her blood pressure medication today.  Many days she only takes it once a day.  - Subsequent " Annual Wellness Visit - Includes PPPS ()  - CBC WITH DIFFERENTIAL; Future  - FERRITIN; Future    3. Dyslipidemia  Chronic medical diagnosis.  Stable.  Continue with simvastatin 10 mg daily.  - Subsequent Annual Wellness Visit - Includes PPPS ()    4. Acquired hypothyroidism  Chronic medical diagnosis.  Previous TSH was slightly elevated with a normal free T4.  She continues to complain of hair loss going on for approximately the last year.  - Subsequent Annual Wellness Visit - Includes PPPS ()  - TSH WITH REFLEX TO FT4; Future    5. Sleep apnea with use of continuous positive airway pressure (CPAP)  Chronic medical diagnosis.  Requesting new CPAP.  States that she did have a sleep study done in [place].  That was several years ago when she lived there.  Will refer her back to pulmonary for sleep studies.  - REFERRAL TO PULMONARY AND SLEEP MEDICINE Sleep Medicine  - Subsequent Annual Wellness Visit - Includes PPPS ()    6. Gastroesophageal reflux disease without esophagitis  Chronic.  Stable.  Continue with Nexium.  - Subsequent Annual Wellness Visit - Includes PPPS ()  - VITAMIN B12; Future    7. Elevated hemoglobin A1c  Chronic.  Improving.  Recent labs have A1c improving to 5.9.  - Subsequent Annual Wellness Visit - Includes PPPS ()    8. Mitral valve insufficiency, unspecified etiology  Chronic. moderate mitral regurgitation with previous echocardiogram done in august 2018.   - Subsequent Annual Wellness Visit - Includes PPPS ()    9. Left thyroid nodule  Chronic diagnosis.  Had a thyroid sonogram in June 2019.  This was read as a small colloid cyst with no further follow-up recommended.  - Subsequent Annual Wellness Visit - Includes PPPS ()    10. Left leg swelling  Chronic medical diagnosis.  Has been going on for approximately 2 years.  She had an ultrasound of the left leg done 2019- for DVT.    11. 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   She continues to follow-up with ENT at [facility].  She is scheduled for repeat MRI next year.    12. Abnormal serum enzyme level, unspecified   Chronic.  Recent June labs have AST elevated but improving at 48.  Prior to that her AST and ALT are elevated at 59 and 62 respectively.  - FERRITIN; Future  - IRON/TOTAL IRON BIND; Future    13. Screen for colon cancer  - OCCULT BLOOD FECES IMMUNOASSAY; Future  1. Medicare annual wellness visit, subsequent  Subsequent Annual Wellness Visit - Includes PPPS ()   2. Essential hypertension  Subsequent Annual Wellness Visit - Includes PPPS ()    CBC WITH DIFFERENTIAL    FERRITIN   3. Dyslipidemia  Subsequent Annual Wellness Visit - Includes PPPS ()   4. Acquired hypothyroidism  Subsequent Annual Wellness Visit - Includes PPPS ()    TSH WITH REFLEX TO FT4   5. Sleep apnea with use of continuous positive airway pressure (CPAP)  REFERRAL TO PULMONARY AND SLEEP MEDICINE Sleep Medicine    Subsequent Annual Wellness Visit - Includes PPPS ()   6. Gastroesophageal reflux disease without esophagitis  Subsequent Annual Wellness Visit - Includes PPPS ()    VITAMIN B12   7. Elevated hemoglobin A1c  Subsequent Annual Wellness Visit - Includes PPPS ()   8. Mitral valve insufficiency, unspecified etiology  Subsequent Annual Wellness Visit - Includes PPPS ()   9. Left thyroid nodule  Subsequent Annual Wellness Visit - Includes PPPS ()   10. Left leg swelling     11. Pleomorphic adenoma of parotid gland     12. Abnormal serum enzyme level, unspecified   FERRITIN    IRON/TOTAL IRON BIND   13. Screen for colon cancer  OCCULT BLOOD FECES IMMUNOASSAY     Services suggested: No services needed at this time  Health Care Screening recommendations as per orders if indicated.  Referrals offered: PT/OT/Nutrition counseling/Behavioral Health/Smoking cessation as per orders if indicated.    Discussion today about general wellness and lifestyle habits:    · Prevent  falls and reduce trip hazards; Cautioned about securing or removing rugs.  · Have a working fire alarm and carbon monoxide detector;   · Engage in regular physical activity and social activities       Follow-up: Return in about 3 months (around 12/2/2020).

## 2020-09-09 ENCOUNTER — HOSPITAL ENCOUNTER (OUTPATIENT)
Dept: LAB | Facility: MEDICAL CENTER | Age: 80
End: 2020-09-09
Attending: FAMILY MEDICINE
Payer: MEDICARE

## 2020-09-09 ENCOUNTER — HOSPITAL ENCOUNTER (OUTPATIENT)
Facility: MEDICAL CENTER | Age: 80
End: 2020-09-09
Attending: FAMILY MEDICINE
Payer: MEDICARE

## 2020-09-09 DIAGNOSIS — I10 ESSENTIAL HYPERTENSION: ICD-10-CM

## 2020-09-09 DIAGNOSIS — K21.9 GASTROESOPHAGEAL REFLUX DISEASE WITHOUT ESOPHAGITIS: ICD-10-CM

## 2020-09-09 DIAGNOSIS — E03.9 ACQUIRED HYPOTHYROIDISM: ICD-10-CM

## 2020-09-09 DIAGNOSIS — R74.9 ABNORMAL SERUM ENZYME LEVEL, UNSPECIFIED: ICD-10-CM

## 2020-09-09 LAB
BASOPHILS # BLD AUTO: 0.6 % (ref 0–1.8)
BASOPHILS # BLD: 0.03 K/UL (ref 0–0.12)
EOSINOPHIL # BLD AUTO: 0.09 K/UL (ref 0–0.51)
EOSINOPHIL NFR BLD: 1.7 % (ref 0–6.9)
ERYTHROCYTE [DISTWIDTH] IN BLOOD BY AUTOMATED COUNT: 45.6 FL (ref 35.9–50)
FERRITIN SERPL-MCNC: 124 NG/ML (ref 10–291)
HCT VFR BLD AUTO: 40.6 % (ref 37–47)
HGB BLD-MCNC: 13.9 G/DL (ref 12–16)
IMM GRANULOCYTES # BLD AUTO: 0.01 K/UL (ref 0–0.11)
IMM GRANULOCYTES NFR BLD AUTO: 0.2 % (ref 0–0.9)
IRON SATN MFR SERPL: 20 % (ref 15–55)
IRON SERPL-MCNC: 67 UG/DL (ref 40–170)
LYMPHOCYTES # BLD AUTO: 2 K/UL (ref 1–4.8)
LYMPHOCYTES NFR BLD: 36.8 % (ref 22–41)
MCH RBC QN AUTO: 32.9 PG (ref 27–33)
MCHC RBC AUTO-ENTMCNC: 34.2 G/DL (ref 33.6–35)
MCV RBC AUTO: 96 FL (ref 81.4–97.8)
MONOCYTES # BLD AUTO: 0.31 K/UL (ref 0–0.85)
MONOCYTES NFR BLD AUTO: 5.7 % (ref 0–13.4)
NEUTROPHILS # BLD AUTO: 2.99 K/UL (ref 2–7.15)
NEUTROPHILS NFR BLD: 55 % (ref 44–72)
NRBC # BLD AUTO: 0 K/UL
NRBC BLD-RTO: 0 /100 WBC
PLATELET # BLD AUTO: 155 K/UL (ref 164–446)
PMV BLD AUTO: 11.6 FL (ref 9–12.9)
RBC # BLD AUTO: 4.23 M/UL (ref 4.2–5.4)
TIBC SERPL-MCNC: 328 UG/DL (ref 250–450)
TSH SERPL DL<=0.005 MIU/L-ACNC: 2.97 UIU/ML (ref 0.38–5.33)
UIBC SERPL-MCNC: 261 UG/DL (ref 110–370)
VIT B12 SERPL-MCNC: 1193 PG/ML (ref 211–911)
WBC # BLD AUTO: 5.4 K/UL (ref 4.8–10.8)

## 2020-09-09 PROCEDURE — 84443 ASSAY THYROID STIM HORMONE: CPT

## 2020-09-09 PROCEDURE — 85025 COMPLETE CBC W/AUTO DIFF WBC: CPT

## 2020-09-09 PROCEDURE — 82607 VITAMIN B-12: CPT

## 2020-09-09 PROCEDURE — 83540 ASSAY OF IRON: CPT

## 2020-09-09 PROCEDURE — 36415 COLL VENOUS BLD VENIPUNCTURE: CPT

## 2020-09-09 PROCEDURE — 83550 IRON BINDING TEST: CPT

## 2020-09-09 PROCEDURE — 82728 ASSAY OF FERRITIN: CPT

## 2020-09-09 PROCEDURE — 82274 ASSAY TEST FOR BLOOD FECAL: CPT

## 2020-09-10 DIAGNOSIS — Z12.11 SCREEN FOR COLON CANCER: ICD-10-CM

## 2020-09-12 LAB — HEMOCCULT STL QL IA: NEGATIVE

## 2020-11-14 DIAGNOSIS — I10 ESSENTIAL HYPERTENSION: ICD-10-CM

## 2020-11-15 DIAGNOSIS — K21.9 GASTROESOPHAGEAL REFLUX DISEASE WITHOUT ESOPHAGITIS: ICD-10-CM

## 2020-11-16 RX ORDER — LISINOPRIL 2.5 MG/1
TABLET ORAL
Qty: 200 TAB | Refills: 2 | Status: SHIPPED | OUTPATIENT
Start: 2020-11-16 | End: 2021-10-14

## 2020-11-16 NOTE — TELEPHONE ENCOUNTER
Requested Prescriptions     Pending Prescriptions Disp Refills   • lisinopril (PRINIVIL) 2.5 MG Tab [Pharmacy Med Name: LISINOPRIL 2.5 MG TABLET] 200 Tab 2     Sig: TAKE ONE TABLET BY MOUTH TWICE A DAY   Joselin Kwon M.D.

## 2020-11-16 NOTE — TELEPHONE ENCOUNTER
Requested Prescriptions     Pending Prescriptions Disp Refills   • esomeprazole (NEXIUM) 20 MG capsule [Pharmacy Med Name: ESOMEPRAZOLE MAG DR 20 MG CAP] 90 Cap 1     Sig: TAKE ONE CAPSULE BY MOUTH EVERY MORNING BEFORE BREAKFAST   Joselin Kwon M.D.

## 2020-12-02 ENCOUNTER — TELEPHONE (OUTPATIENT)
Dept: HEALTH INFORMATION MANAGEMENT | Facility: OTHER | Age: 80
End: 2020-12-02

## 2020-12-02 NOTE — TELEPHONE ENCOUNTER
Good morning Dr Kwon,    I received a message form your patient today and she is asking for a lab to get tested to see if she has the COVID antibody. She believes she may have had it earlier in the year. Please advise.    Thank you,    Christen QUIJANO

## 2020-12-02 NOTE — TELEPHONE ENCOUNTER
Hello,     Please call patient let her know that currently the antibody testing is not very accurate. If she would still like one, I can place the order.   We can also discuss this further at her upcoming appointment.    Thank You,  Dr Kwon

## 2020-12-07 ENCOUNTER — OFFICE VISIT (OUTPATIENT)
Dept: MEDICAL GROUP | Facility: PHYSICIAN GROUP | Age: 80
End: 2020-12-07
Payer: MEDICARE

## 2020-12-07 VITALS
BODY MASS INDEX: 29.92 KG/M2 | HEIGHT: 69 IN | OXYGEN SATURATION: 93 % | RESPIRATION RATE: 20 BRPM | DIASTOLIC BLOOD PRESSURE: 72 MMHG | TEMPERATURE: 97.2 F | HEART RATE: 68 BPM | WEIGHT: 202 LBS | SYSTOLIC BLOOD PRESSURE: 142 MMHG

## 2020-12-07 DIAGNOSIS — I10 ESSENTIAL HYPERTENSION: ICD-10-CM

## 2020-12-07 DIAGNOSIS — G62.9 NEUROPATHY: ICD-10-CM

## 2020-12-07 DIAGNOSIS — E03.9 ACQUIRED HYPOTHYROIDISM: ICD-10-CM

## 2020-12-07 DIAGNOSIS — R73.09 ELEVATED HEMOGLOBIN A1C: ICD-10-CM

## 2020-12-07 PROCEDURE — 99214 OFFICE O/P EST MOD 30 MIN: CPT | Performed by: FAMILY MEDICINE

## 2020-12-07 RX ORDER — CHLORAL HYDRATE 500 MG
1000 CAPSULE ORAL
COMMUNITY

## 2020-12-07 RX ORDER — CHOLECALCIFEROL (VITAMIN D3) 125 MCG
CAPSULE ORAL
COMMUNITY

## 2020-12-07 RX ORDER — CALCIUM CARBONATE/VITAMIN D3 500-10/5ML
LIQUID (ML) ORAL
COMMUNITY

## 2020-12-07 ASSESSMENT — FIBROSIS 4 INDEX: FIB4 SCORE: 3.52

## 2020-12-07 NOTE — PROGRESS NOTES
CC: nerve pain                                                                                                                             Reina presents today for nerve pain.H/o h pylori more than 10 years ago.  C/o feel nauseous, bloating.  Denies any abdominal pain.  h pylori was diagnosed with a blood test. Continues to take probiotics with some improvement.     neuropathy  Ongoing issue. Complaining of Neuropathy down both knees to feet.  C/o burning to feet. Has it more in colder temperatures.   Went to chiropractor and referred to acupuncture as has hx of back pain. Thought it could be related.  Has been with vascular, Dr Ardon, in 2018.  Was asked to start aspirin and compression stockings. States she had f/u with cards and wasTold not to start aspirin thru cards.  Will start compression stockings    Hypertension  Chronic medical condition. BP today is 142/72.  Hasn't taken lisinopril this morning, was waiting to take it after this appt. Currently taking lisinopril 2.5mg daily  Denies symptoms of chest pain, headaches, or shortness of breath.     Elevated hemoglobin A1c  Chronic medical diagnosis.  BMI 29.83.  Previous labs from June have improved A1c at 5.9%.    Hypothyroid  Chronic.  Currently taking levothyroxine 25 mcg daily.  Previous labs from September have improved TSH at 2.97.  Prior to that in June TSH was elevated at 6.15 with a normal free T4 at 1.11.    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     • Zinc 30 MG Cap Take  by mouth.     • Omega-3 Fatty Acids (FISH OIL) 1000 MG Cap capsule Take 1,000 mg by mouth 3 times a day, with meals.     • lisinopril (PRINIVIL) 2.5 MG Tab TAKE ONE TABLET BY MOUTH TWICE A  Tab 2   • esomeprazole (NEXIUM) 20 MG capsule TAKE ONE CAPSULE BY MOUTH EVERY MORNING BEFORE BREAKFAST 90 Cap 1   • levothyroxine (SYNTHROID) 25 MCG Tab TAKE ONE TABLET BY MOUTH EVERY MORNING ON AN EMPTY STOMACH 90 Tab 3   • simvastatin (ZOCOR) 10 MG Tab Take 1 Tab by mouth every evening. 90 Tab 3   • Multiple Vitamins-Minerals (MULTIVITAMIN ADULT PO) Take  by mouth.       No current facility-administered medications for this visit.          Allergies as of 12/07/2020 - Reviewed 12/07/2020   Allergen Reaction Noted   • Doxycycline Nausea and Swelling 06/13/2018   • Penicillins Rash 01/11/2016   • Sulfa drugs Rash 01/11/2016          /72 (BP Location: Right arm, Patient Position: Sitting, BP Cuff Size: Adult)   Pulse 68   Temp 36.2 °C (97.2 °F) (Temporal)   Resp 20   Ht 1.753 m (5' 9\")   Wt 91.6 kg (202 lb)   SpO2 93%   BMI 29.83 kg/m²     Constitutional: Alert, no distress, well-groomed.  Skin: Warm, dry, good turgor, no rashes in visible areas.  Eye: conjunctiva clear, lids normal.  ENMT: Lips without lesions, good dentition, moist mucous membranes.  Neck: supple  Respiratory: Unlabored respiratory effort, no cough.  Cardio: No LE edema  Abdomen:  no gross masses.  MSK: Normal gait, moves all extremities.  Neuro: no tremors  Psych: Alert and oriented x3, normal affect and mood.      Assessment and Plan.   80 y.o. female with the following issues.    Reina was seen today for follow-up, nerve pain and gi problem.    Diagnoses and all orders for " this visit:    Acquired hypothyroidism  Chronic.  Previous labs have been improving.  For now, continue with current dose of levothyroxine at 25 mcg.  Follow-up in 3 months and labs to be drawn prior to appointment.  -     TSH WITH REFLEX TO FT4; Future    Elevated hemoglobin A1c  Chronic.  Improving.  Continue to monitor.  -     HEMOGLOBIN A1C; Future    Neuropathy  Ongoing issue.  Patient continues to follow-up with chiropractor and has an appointment with acupuncture scheduled.  Discussed with patient referring back to vascular doctor, JOSE J, and or starting medication such as gabapentin.  For now she would like to hold off and see if there is improvement with her acupuncture appointment.    Essential hypertension  Chronic.  States that home blood pressures are overall stable.  Did not take BP medications this morning.  We will continue to monitor.    Follow up: 3 months

## 2021-01-11 DIAGNOSIS — Z23 NEED FOR VACCINATION: ICD-10-CM

## 2021-02-08 ENCOUNTER — PATIENT OUTREACH (OUTPATIENT)
Dept: HEALTH INFORMATION MANAGEMENT | Facility: OTHER | Age: 81
End: 2021-02-08

## 2021-02-10 ENCOUNTER — SLEEP CENTER VISIT (OUTPATIENT)
Dept: SLEEP MEDICINE | Facility: MEDICAL CENTER | Age: 81
End: 2021-02-10
Payer: MEDICARE

## 2021-02-10 VITALS
WEIGHT: 203.5 LBS | BODY MASS INDEX: 30.14 KG/M2 | HEIGHT: 69 IN | RESPIRATION RATE: 16 BRPM | DIASTOLIC BLOOD PRESSURE: 98 MMHG | OXYGEN SATURATION: 94 % | HEART RATE: 83 BPM | SYSTOLIC BLOOD PRESSURE: 160 MMHG

## 2021-02-10 DIAGNOSIS — I10 ESSENTIAL HYPERTENSION: ICD-10-CM

## 2021-02-10 DIAGNOSIS — G47.33 OSA (OBSTRUCTIVE SLEEP APNEA): ICD-10-CM

## 2021-02-10 PROCEDURE — 99203 OFFICE O/P NEW LOW 30 MIN: CPT | Performed by: INTERNAL MEDICINE

## 2021-02-10 RX ORDER — NEOMYCIN SULFATE, POLYMYXIN B SULFATE, AND DEXAMETHASONE 3.5; 10000; 1 MG/G; [USP'U]/G; MG/G
OINTMENT OPHTHALMIC
COMMUNITY
Start: 2021-01-19 | End: 2021-05-28

## 2021-02-10 ASSESSMENT — FIBROSIS 4 INDEX: FIB4 SCORE: 3.52

## 2021-02-10 NOTE — PROGRESS NOTES
Chief Complaint   Patient presents with   • New Patient      Referred 9/2/20 by Joselin Kwon M.D. for G47.30 (ICD-10-CM) - Sleep apnea with use of continuous positive airway pressure (CPAP)       HPI: This patient is a 80 y.o. female who is referred for obstructive sleep apnea.  Last visit was May 2017.She was diagnosed with LUKAS in [place] approximately 6 years ago, and prescribed AutoPap therapy. Her sleep study results were unavailable. CPAP compliance card confirms 100% usage on AutoPap 10-20 cm of water with mean pressures of 12 cm of water. She uses CPAP over 8 hours nightly, with normal AHI of 2.1. She sleeps great with CPAP, awakens feeling rested. Her [place] sleepiness score is 1. She is using a nasal mask.   Requires a new CPAP machine/supply.    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   Tobacco Use   • Smoking status: Never Smoker   • Smokeless tobacco: Never Used   Substance and Sexual Activity   • Alcohol use: No     Alcohol/week: 0.0 oz   • Drug use: No   • Sexual activity: Yes     Partners: Male     Comment: , [employer], 3 kids   Other Topics Concern   • Not on file   Social History Narrative   • Not on file     Social Determinants of Health     Financial Resource Strain:    • Difficulty of Paying Living Expenses:    Food Insecurity:    • Worried About Running Out of Food in the Last Year:    • Ran Out of Food in the Last Year:    Transportation Needs:    • Lack  of Transportation (Medical):    • Lack of Transportation (Non-Medical):    Physical Activity:    • Days of Exercise per Week:    • Minutes of Exercise per Session:    Stress:    • Feeling of Stress :    Social Connections:    • Frequency of Communication with Friends and Family:    • Frequency of Social Gatherings with Friends and Family:    • Attends Sabianism Services:    • Active Member of Clubs or Organizations:    • Attends Club or Organization Meetings:    • Marital Status:    Intimate Partner Violence:    • Fear of Current or Ex-Partner:    • Emotionally Abused:    • Physically Abused:    • Sexually Abused:        Family History   Problem Relation Age of Onset   • Cancer Mother         multiple myeloma   • Heart Disease Mother    • Heart Attack Father    • Stroke Father    • Heart Disease Father    • Hypertension Father    • Heart Disease Brother    • Lung Disease Brother    • Stroke Sister    • Cancer Sister         breast   • Sleep Apnea Neg Hx    • Diabetes Neg Hx        Current Outpatient Medications on File Prior to Visit   Medication Sig Dispense Refill   • Cholecalciferol (VITAMIN D) 125 MCG (5000 UT) Cap Take  by mouth.     • Zinc 30 MG Cap Take  by mouth.     • Omega-3 Fatty Acids (FISH OIL) 1000 MG Cap capsule Take 1,000 mg by mouth 3 times a day, with meals.     • lisinopril (PRINIVIL) 2.5 MG Tab TAKE ONE TABLET BY MOUTH TWICE A  Tab 2   • esomeprazole (NEXIUM) 20 MG capsule TAKE ONE CAPSULE BY MOUTH EVERY MORNING BEFORE BREAKFAST 90 Cap 1   • levothyroxine (SYNTHROID) 25 MCG Tab TAKE ONE TABLET BY MOUTH EVERY MORNING ON AN EMPTY STOMACH 90 Tab 3   • simvastatin (ZOCOR) 10 MG Tab Take 1 Tab by mouth every evening. 90 Tab 3   • Multiple Vitamins-Minerals (MULTIVITAMIN ADULT PO) Take  by mouth.     • neomycin-polymixin-dexamethasone (MAXITROL) 3.5-39522-1.1 Ointment ophthalmic ointment        No current facility-administered medications on file prior to visit.       Allergies: Doxycycline,  "Penicillins, and Sulfa drugs    ROS:   Constitutional: Denies fevers, chills, night sweats, fatigue or weight loss  Eyes: Denies vision loss, pain, drainage, double vision  Ears, Nose, Throat: Denies earache, difficulty hearing, tinnitus, nasal congestion, hoarseness  Cardiovascular: Denies chest pain, tightness, palpitations, orthopnea or edema  Respiratory: Denies shortness of breath, cough, wheezing, hemoptysis  Sleep: Denies daytime sleepiness, snoring, apneas, insomnia, morning headaches  GI: Denies heartburn, dysphagia, nausea, abdominal pain, diarrhea or constipation  : Denies frequent urination, hematuria, discharge or painful urination  Musculoskeletal: Denies back pain, painful joints, sore muscles  Neurological: Denies weakness or headaches  Skin: No rashes    /98 (BP Location: Right arm, Patient Position: Sitting, BP Cuff Size: Large adult)   Pulse 83   Resp 16   Ht 1.753 m (5' 9\")   Wt 92.3 kg (203 lb 8 oz)   SpO2 94%     Physical Exam:  Appearance: Well-nourished, well-developed, in no acute distress  HEENT: Normocephalic, atraumatic, white sclera, PERRLA, Mallampati 3  Neck: No adenopathy or masses  Respiratory: no intercostal retractions or accessory muscle use  Lungs auscultation: Clear to auscultation bilaterally  Cardiovascular: Regular rate rhythm. No murmurs, rubs or gallops.  No LE edema  Abdomen: soft, nondistended  Gait: Normal  Digits: No clubbing, cyanosis  Motor: No focal deficits  Orientation: Oriented to time, person and place    Diagnosis:  1. LUKAS (obstructive sleep apnea)  Polysomnography, 4 or More   2. Essential hypertension         Plan:  The patient has history of obstructive sleep apnea diagnosed in [place] 6 years ago.  Sleep studies are unavailable.  She has successfully been using CPAP therapy since then with compliance download showing good response to AutoPap 10-20 cm of water.  She requires a new CPAP machine.  She requires an updated polysomnogram " for requalification of CPAP therapy.  Return for after sleep study.

## 2021-02-23 ENCOUNTER — TELEPHONE (OUTPATIENT)
Dept: MEDICAL GROUP | Facility: PHYSICIAN GROUP | Age: 81
End: 2021-02-23

## 2021-02-24 DIAGNOSIS — K21.9 GASTROESOPHAGEAL REFLUX DISEASE WITHOUT ESOPHAGITIS: ICD-10-CM

## 2021-02-24 NOTE — TELEPHONE ENCOUNTER
Requested Prescriptions     Pending Prescriptions Disp Refills   • esomeprazole (NEXIUM) 20 MG capsule 100 capsule 2     Sig: Take 1 capsule by mouth every morning before breakfast.   Joselin Kwon M.D.

## 2021-03-04 ENCOUNTER — HOSPITAL ENCOUNTER (OUTPATIENT)
Dept: LAB | Facility: MEDICAL CENTER | Age: 81
End: 2021-03-04
Attending: FAMILY MEDICINE
Payer: MEDICARE

## 2021-03-04 DIAGNOSIS — E03.9 ACQUIRED HYPOTHYROIDISM: ICD-10-CM

## 2021-03-04 DIAGNOSIS — R73.09 ELEVATED HEMOGLOBIN A1C: ICD-10-CM

## 2021-03-04 LAB
EST. AVERAGE GLUCOSE BLD GHB EST-MCNC: 123 MG/DL
HBA1C MFR BLD: 5.9 % (ref 4–5.6)
T4 FREE SERPL-MCNC: 1.1 NG/DL (ref 0.93–1.7)
TSH SERPL DL<=0.005 MIU/L-ACNC: 6.58 UIU/ML (ref 0.38–5.33)

## 2021-03-04 PROCEDURE — 84439 ASSAY OF FREE THYROXINE: CPT

## 2021-03-04 PROCEDURE — 84443 ASSAY THYROID STIM HORMONE: CPT

## 2021-03-04 PROCEDURE — 36415 COLL VENOUS BLD VENIPUNCTURE: CPT

## 2021-03-04 PROCEDURE — 83036 HEMOGLOBIN GLYCOSYLATED A1C: CPT

## 2021-03-08 ENCOUNTER — APPOINTMENT (OUTPATIENT)
Dept: MEDICAL GROUP | Facility: PHYSICIAN GROUP | Age: 81
End: 2021-03-08
Payer: MEDICARE

## 2021-03-10 ENCOUNTER — OFFICE VISIT (OUTPATIENT)
Dept: MEDICAL GROUP | Facility: PHYSICIAN GROUP | Age: 81
End: 2021-03-10
Payer: MEDICARE

## 2021-03-10 VITALS
DIASTOLIC BLOOD PRESSURE: 70 MMHG | WEIGHT: 205.3 LBS | HEIGHT: 69 IN | HEART RATE: 86 BPM | TEMPERATURE: 97.9 F | OXYGEN SATURATION: 97 % | SYSTOLIC BLOOD PRESSURE: 128 MMHG | BODY MASS INDEX: 30.41 KG/M2

## 2021-03-10 DIAGNOSIS — D11.0 PLEOMORPHIC ADENOMA OF PAROTID GLAND: ICD-10-CM

## 2021-03-10 DIAGNOSIS — E78.5 DYSLIPIDEMIA: ICD-10-CM

## 2021-03-10 DIAGNOSIS — R73.09 ELEVATED HEMOGLOBIN A1C: ICD-10-CM

## 2021-03-10 DIAGNOSIS — Z23 NEED FOR VACCINATION: ICD-10-CM

## 2021-03-10 DIAGNOSIS — E03.9 ACQUIRED HYPOTHYROIDISM: ICD-10-CM

## 2021-03-10 PROCEDURE — G0009 ADMIN PNEUMOCOCCAL VACCINE: HCPCS | Performed by: FAMILY MEDICINE

## 2021-03-10 PROCEDURE — 99214 OFFICE O/P EST MOD 30 MIN: CPT | Mod: 25 | Performed by: FAMILY MEDICINE

## 2021-03-10 PROCEDURE — 90732 PPSV23 VACC 2 YRS+ SUBQ/IM: CPT | Performed by: FAMILY MEDICINE

## 2021-03-10 RX ORDER — DIAZEPAM 5 MG/1
TABLET ORAL
Qty: 3 TABLET | Refills: 0 | Status: SHIPPED | OUTPATIENT
Start: 2021-03-10 | End: 2022-10-11 | Stop reason: SDUPTHER

## 2021-03-10 ASSESSMENT — FIBROSIS 4 INDEX: FIB4 SCORE: 3.52

## 2021-03-10 ASSESSMENT — PATIENT HEALTH QUESTIONNAIRE - PHQ9: CLINICAL INTERPRETATION OF PHQ2 SCORE: 0

## 2021-03-11 NOTE — PROGRESS NOTES
CC:   Chief Complaint   Patient presents with   • Follow-Up     3 months    • Immunizations     Requesting Shingles and Pneumonia    • Herpes Zoster     On left nipple and both thighs.    • Medication Refill     Valium only 2 for the sleep study and MRI          HPI:   Reina presents today med refills and MRI orders.      Pleomorphic adenoma of parotid gland  Needs MRI ordered.   Head/neck  Thru Dr Padgett at [facility]  [phone]  Has a Sleep study 5/1/21  Requesting refills on valium.  Would like to take these prior to her studies      Current Outpatient Medications Ordered in Epic   Medication Sig Dispense Refill   • diazePAM (VALIUM) 5 MG Tab Take 1 tab po once 60 minutes before MRI 3 tablet 0   • esomeprazole (NEXIUM) 20 MG capsule Take 1 capsule by mouth every morning before breakfast. 100 capsule 2   • neomycin-polymixin-dexamethasone (MAXITROL) 3.5-04580-6.1 Ointment ophthalmic ointment      • Cholecalciferol (VITAMIN D) 125 MCG (5000 UT) Cap Take  by mouth.     • Zinc 30 MG Cap Take  by mouth.     • Omega-3 Fatty Acids (FISH OIL) 1000 MG Cap capsule Take 1,000 mg by mouth 3 times a day, with meals.     • lisinopril (PRINIVIL) 2.5 MG Tab TAKE ONE TABLET BY MOUTH TWICE A  Tab 2   • levothyroxine (SYNTHROID) 25 MCG Tab TAKE ONE TABLET BY MOUTH EVERY MORNING ON AN EMPTY STOMACH 90 Tab 3   • simvastatin (ZOCOR) 10 MG Tab Take 1 Tab by mouth every evening. 90 Tab 3   • Multiple Vitamins-Minerals (MULTIVITAMIN ADULT PO) Take  by mouth.       No current Epic-ordered facility-administered medications on file.       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    • Dyslipidemia    • GERD (gastroesophageal reflux disease)    • Hypothyroidism     Resolved 2018   • Pleomorphic adenoma of parotid gland 1992, 2004    recurrent, yearly MRI   • Sleep apnea        Social History     Tobacco Use   • Smoking status: Never Smoker   • Smokeless tobacco: Never Used   Substance  "Use Topics   • Alcohol use: No     Alcohol/week: 0.0 oz   • Drug use: No       Allergies:  Doxycycline, Penicillins, and Sulfa drugs        Objective:       Exam:  /70 (BP Location: Right arm, Patient Position: Sitting, BP Cuff Size: Adult)   Pulse 86   Temp 36.6 °C (97.9 °F) (Temporal)   Ht 1.753 m (5' 9\")   Wt 93.1 kg (205 lb 4.8 oz)   SpO2 97%   BMI 30.32 kg/m²   Body mass index is 30.32 kg/m².  Wt Readings from Last 4 Encounters:   03/10/21 93.1 kg (205 lb 4.8 oz)   02/10/21 92.3 kg (203 lb 8 oz)   12/07/20 91.6 kg (202 lb)   09/02/20 91.6 kg (202 lb)       Gen: Alert and oriented, No apparent distress. Appropriately groomed.  Neck: Neck is supple without lymphadenopathy.No thyromegaly.   Lungs: Normal effort, CTA bilaterally, no wheezes, rhonchi, or rales  CV: Regular rate and rhythm. No Lower extremity edema  Skin: No rash noted.      Assessment & Plan:     80 y.o. female with the following -     1. Pleomorphic adenoma of parotid gland  Ongoing medical problem.  She follows up with [facility] annually with MRI.  States that she does need orders for these prior to her appointment with her doctor at [facility].  She does have a history of 2 parotid tumors which were nonmalignant in the past.  She is requesting Valium prior to these studies.- MR-SOFT TISSUE NECK-WITH & W/O; Future  - diazePAM (VALIUM) 5 MG Tab; Take 1 tab po once 60 minutes before MRI  Dispense: 3 tablet; Refill: 0    Obtained and reviewed patient utilization report from [facility] on 3/10/2021 5:30 PM prior to writing prescriptions for controlled substances.   II, III, IV per [place] Bill .  Based on assessment of the report, the prescription is medically necessary.    2. Need for vaccination  - Pneumococal Polysaccharide Vaccine 23-Valent =>3YO SQ/IM    3. Elevated hemoglobin A1c  Chronic medical diagnosis.  Recent labs have hemoglobin A1c stable at 5.9%.  BMI is 30.32.  She continues to work with physical " therapy and will start being more active once her left hip pain is improved.  - HEMOGLOBIN A1C; Future    4. Acquired hypothyroidism  Chronic medical diagnosis.  Currently taking levothyroxine 50 mcg daily.  Recent labs have TSH slightly increased at 6.58 and free T4 at 1.10.  She has started taking over-the-counter biotin and has noticed that her hair has improved.  We discussed repeating these labs in 3 months and also discussed holding off on the biotin for at least 3 days prior to lab services.  - TSH WITH REFLEX TO FT4; Future    5. Dyslipidemia  -Chronic medical diagnosis.  Improving.  Fernanda labs that have improvement in total cholesterol down to 183 and LDL at 97.  Continues to take Zocor 10 mg.  We will recheck these labs in 3 months prior to her next appointment with me.    CBC WITH DIFFERENTIAL; Future  - Comp Metabolic Panel; Future  - Lipid Profile; Future      Return in about 3 months (around 6/10/2021) for Preventative / Physical.    Please note that this dictation was created using voice recognition software. I have made every reasonable attempt to correct obvious errors, but I expect that there are errors of grammar and possibly content that I did not discover before finalizing the note.

## 2021-04-29 ENCOUNTER — HOSPITAL ENCOUNTER (OUTPATIENT)
Dept: RADIOLOGY | Facility: MEDICAL CENTER | Age: 81
End: 2021-04-29
Attending: FAMILY MEDICINE
Payer: MEDICARE

## 2021-04-29 DIAGNOSIS — D11.0 PLEOMORPHIC ADENOMA OF PAROTID GLAND: ICD-10-CM

## 2021-04-29 PROCEDURE — 700117 HCHG RX CONTRAST REV CODE 255: Performed by: FAMILY MEDICINE

## 2021-04-29 PROCEDURE — 70543 MRI ORBT/FAC/NCK W/O &W/DYE: CPT | Mod: ME

## 2021-04-29 PROCEDURE — A9576 INJ PROHANCE MULTIPACK: HCPCS | Performed by: FAMILY MEDICINE

## 2021-04-29 RX ADMIN — GADOTERIDOL 20 ML: 279.3 INJECTION, SOLUTION INTRAVENOUS at 13:53

## 2021-05-03 ENCOUNTER — SLEEP STUDY (OUTPATIENT)
Dept: SLEEP MEDICINE | Facility: MEDICAL CENTER | Age: 81
End: 2021-05-03
Attending: INTERNAL MEDICINE
Payer: MEDICARE

## 2021-05-03 DIAGNOSIS — G47.33 OSA (OBSTRUCTIVE SLEEP APNEA): ICD-10-CM

## 2021-05-05 NOTE — PROCEDURES
Comments:  The patient underwent a diagnostic polysomnogram using the standard montage for measurement of parameters of sleep, respiratory events, movement abnormalities, and heart rate and rhythm.   A microphone was used to monitor snoring.  Interpretation:  Study start time was 10:53:00 PM. Diagnostic recording time was 6h 30.5m with a total sleep time of 2h 31.5m resulting in a sleep efficiency of 38.80%%.   Sleep latency from the start of the study was 103 minutes and the latency from sleep to REM was 229 minutes.  In total,57 arousals were scored for an arousal index of 22.6.  Respiratory:  There were a total of 26 apneas consisting of 26 obstructive apneas, 0 mixed apneas, and 0 central apneas. A total of 36 hypopneas were scored.  The apnea index was 10.30 per hour and the hypopnea index was 14.26 per hour resulting in an overall AHI of 24.55.  AHI during rem was 72.0 and AHI while supine was 33.16.  Oximetry:  There was a mean oxygen saturation of 92.0% with a minimum oxygen saturation of 83.0%. Time spent with oxygen saturations below 89% was 9.5 minutes.  Cardiac:  The highest heart rate seen while awake was 87 BPM while the highest heart rate during sleep was 66 BPM with an average sleeping heart rate of 52 BPM.  Limb Movements:  There were a total of 0 PLMs during sleep, of which 0 were PLMS arousals. This resulted in a PLMS index of 0.0 and a PLMS arousal index of 0.0.    The sleep efficiency was 38%.  Sleep architecture showed reduced stage III and REM sleep.  Sleep latency was prolonged at 103 minutes.  No periodic limb movements noted.  No arrhythmias noted.    Once asleep snoring was heard associated with obstructive hypopneas and apneas.  Overall AHI was 24/h and associated with desaturations to a cyril of 83%.      Interpretation:  Moderately severe LUKAS, AHI: 24/h with desaturations to 83% SPO2.  Split-night criteria was not met.    Recommendations:  An in laboratory CPAP titration polysomnogram  is advised.  If clinically appropriate AutoPap therapy could also be considered.

## 2021-05-10 ENCOUNTER — TELEPHONE (OUTPATIENT)
Dept: HEALTH INFORMATION MANAGEMENT | Facility: OTHER | Age: 81
End: 2021-05-10

## 2021-05-10 ENCOUNTER — PATIENT MESSAGE (OUTPATIENT)
Dept: SLEEP MEDICINE | Facility: MEDICAL CENTER | Age: 81
End: 2021-05-10

## 2021-05-10 DIAGNOSIS — G47.33 OSA (OBSTRUCTIVE SLEEP APNEA): ICD-10-CM

## 2021-05-10 PROCEDURE — 95810 POLYSOM 6/> YRS 4/> PARAM: CPT | Performed by: INTERNAL MEDICINE

## 2021-05-10 NOTE — TELEPHONE ENCOUNTER
Good Morning,    Reina requesting her results from her completed Sleep Study please.    Thank you,  [facility]

## 2021-05-28 ENCOUNTER — OFFICE VISIT (OUTPATIENT)
Dept: MEDICAL GROUP | Facility: PHYSICIAN GROUP | Age: 81
End: 2021-05-28
Payer: MEDICARE

## 2021-05-28 VITALS
HEIGHT: 69 IN | TEMPERATURE: 98.7 F | SYSTOLIC BLOOD PRESSURE: 126 MMHG | RESPIRATION RATE: 20 BRPM | DIASTOLIC BLOOD PRESSURE: 76 MMHG | OXYGEN SATURATION: 96 % | HEART RATE: 69 BPM | BODY MASS INDEX: 31.07 KG/M2 | WEIGHT: 209.8 LBS

## 2021-05-28 DIAGNOSIS — J06.9 VIRAL UPPER RESPIRATORY TRACT INFECTION: ICD-10-CM

## 2021-05-28 DIAGNOSIS — Z20.818 STREP THROAT EXPOSURE: ICD-10-CM

## 2021-05-28 DIAGNOSIS — J30.2 SEASONAL ALLERGIES: ICD-10-CM

## 2021-05-28 LAB
FLUAV+FLUBV AG SPEC QL IA: NORMAL
INT CON NEG: NEGATIVE
INT CON NEG: NEGATIVE
INT CON POS: POSITIVE
INT CON POS: POSITIVE
S PYO AG THROAT QL: NORMAL

## 2021-05-28 PROCEDURE — 87804 INFLUENZA ASSAY W/OPTIC: CPT | Performed by: FAMILY MEDICINE

## 2021-05-28 PROCEDURE — 99213 OFFICE O/P EST LOW 20 MIN: CPT | Performed by: FAMILY MEDICINE

## 2021-05-28 PROCEDURE — 87880 STREP A ASSAY W/OPTIC: CPT | Performed by: FAMILY MEDICINE

## 2021-05-28 RX ORDER — AZITHROMYCIN 250 MG/1
TABLET, FILM COATED ORAL
COMMUNITY
Start: 2021-05-22 | End: 2021-05-28

## 2021-05-28 ASSESSMENT — ENCOUNTER SYMPTOMS
VOMITING: 0
SINUS PAIN: 1
NECK PAIN: 0
PHOTOPHOBIA: 0
DIARRHEA: 0
FEVER: 0
WHEEZING: 1
BLURRED VISION: 0
CHILLS: 0
HEADACHES: 1
PALPITATIONS: 0
DOUBLE VISION: 0
HEMOPTYSIS: 0
COUGH: 1
EYE PAIN: 0
SHORTNESS OF BREATH: 0
SPUTUM PRODUCTION: 1
NAUSEA: 0
SORE THROAT: 1
DIZZINESS: 0
MYALGIAS: 0

## 2021-05-28 ASSESSMENT — FIBROSIS 4 INDEX: FIB4 SCORE: 3.52

## 2021-05-28 NOTE — PROGRESS NOTES
Subjective:     CC:   Chief Complaint   Patient presents with   • Bronchitis     x1 week ago, went to doctor in [place] px abx, sta   • Cough     x 1 week, cough with yellow phlegm,    • Headache     All day,        HISTORY OF THE PRESENT ILLNESS: Patient is a 80 y.o. female. This pleasant patient is here today due to acute cough and congestion. She went to Urgent Care in [place] on 5/22/21 and was given a Z-pack, she stated this did not help her. She has had a cough for one week along with congestion and headaches. She thinks she may have had a fever the first day of her symptoms starting. She denies chest pain, body aches, and chills.     Seasonal allergies  Highly allergic ragweed and takes OTC antihistamines and flonase    Strep throat exposure  Patient was exposed to grandson who has strep throat and has congestion, fatigue, and sore throat at visit today    Viral upper respiratory tract infection  Patient presents to clinic with cough, fatigue, sore throat, sinus congestion, and rhinorrhea x 1 week. Patient was in [place] visiting family when she started experiencing symptoms and went to urgent care where she was prescribed antibiotics (zpack) x 5 days which did not help her symptoms.Patient stated that decongestants and sleep have helped her symptoms. Patient stated she was exposed to her grandson who had strep throat, will r/o strep today in clinic. Due to patients symptoms and recent travel we will do an influenza swab as well. Discussed if sx worsen and she feels her chest congestion is worse or has shortness of breath we can order a chest xray to rule out PNA.       Current Outpatient Medications Ordered in Epic   Medication Sig Dispense Refill   • esomeprazole (NEXIUM) 20 MG capsule Take 1 capsule by mouth every morning before breakfast. 100 capsule 2   • Cholecalciferol (VITAMIN D) 125 MCG (5000 UT) Cap Take  by mouth.     • Zinc 30 MG Cap Take  by mouth.     • Omega-3 Fatty Acids (FISH OIL) 1000 MG Cap  "capsule Take 1,000 mg by mouth 3 times a day, with meals.     • lisinopril (PRINIVIL) 2.5 MG Tab TAKE ONE TABLET BY MOUTH TWICE A  Tab 2   • levothyroxine (SYNTHROID) 25 MCG Tab TAKE ONE TABLET BY MOUTH EVERY MORNING ON AN EMPTY STOMACH 90 Tab 3   • simvastatin (ZOCOR) 10 MG Tab Take 1 Tab by mouth every evening. 90 Tab 3   • Multiple Vitamins-Minerals (MULTIVITAMIN ADULT PO) Take  by mouth.     • azithromycin (ZITHROMAX) 250 MG Tab  (Patient not taking: Reported on 5/28/2021)     • neomycin-polymixin-dexamethasone (MAXITROL) 3.5-20799-9.1 Ointment ophthalmic ointment  (Patient not taking: Reported on 5/28/2021)       No current [facility]-ordered facility-administered medications on file.         Review of Systems   Constitutional: Positive for malaise/fatigue. Negative for chills and fever.   HENT: Positive for congestion, sinus pain and sore throat. Negative for ear pain and tinnitus.    Eyes: Negative for blurred vision, double vision, photophobia and pain.   Respiratory: Positive for cough, sputum production and wheezing. Negative for hemoptysis and shortness of breath.    Cardiovascular: Negative for chest pain, palpitations and leg swelling.   Gastrointestinal: Negative for diarrhea, nausea and vomiting.   Musculoskeletal: Negative for myalgias and neck pain.   Skin: Negative for rash.   Neurological: Positive for headaches. Negative for dizziness.         Objective:     Exam: /76 (BP Location: Right arm, Patient Position: Sitting, BP Cuff Size: Adult)   Pulse 69   Temp 37.1 °C (98.7 °F) (Temporal)   Resp 20   Ht 1.753 m (5' 9\")   Wt 95.2 kg (209 lb 12.8 oz)   SpO2 96%  Body mass index is 30.98 kg/m².    Physical Exam  HENT:      Nose: Congestion and rhinorrhea present.      Comments: Small left effusion d/t allergies  Eyes:      Pupils: Pupils are equal, round, and reactive to light.   Cardiovascular:      Rate and Rhythm: Normal rate and regular rhythm.      Pulses: Normal pulses.      Heart " sounds: Normal heart sounds.   Pulmonary:      Effort: No respiratory distress.      Breath sounds: Wheezing present.      Comments: Slight expiratory wheezing left lower lobe cleared with coughing  Chest:      Chest wall: No tenderness.   Musculoskeletal:      Cervical back: No rigidity.   Skin:     General: Skin is warm and dry.   Neurological:      Mental Status: She is oriented to person, place, and time.   Psychiatric:         Mood and Affect: Mood normal.         Behavior: Behavior normal.          A chaperone was offered to the patient during today's exam. Patient declined chaperone.        Assessment & Plan:   80 y.o. female with the following -    1. Seasonal allergies  Chronic ongoing issue, patient uses OTC antihistamines (Allegra) to manage symptoms. Discussed using OTC Flonase 1-2 sprays in each nostril daily for allergies.     2. Strep throat exposure  - POCT Rapid Strep A    3. Viral upper respiratory tract infection  Influenza A/B negative. Patient likely has URI of viral etiology. Recent azithromycin did not relieve patients symptoms that she was given at an urgent care. Discussed drinking hot tea with honey, bone broth, humidification, and OTC Tylenol/Ibuprofen for symptom management, and avoiding OTC cough suppressants if possible due to increasing blood pressure.  Discussed if symptoms worsen or she has increased chest congestion, chest tightness, or wheezing we can order chest xray. Will follow up with patient next week to make sure symptoms are resolving.   Other orders  - azithromycin (ZITHROMAX) 250 MG Tab;  (Patient not taking: Reported on 5/28/2021)  - POCT Influenza A/B         Please note that this dictation was created using voice recognition software. I have made every reasonable attempt to correct obvious errors, but I expect that there are errors of grammar and possibly content that I did not discover before finalizing the note.

## 2021-05-28 NOTE — ASSESSMENT & PLAN NOTE
Patient presents to clinic with cough, fatigue, sore throat, sinus congestion, and rhinorrhea x 1 week. Patient was in [place] visiting family when she started experiencing symptoms and went to urgent care where she was prescribed antibiotics (zpack) x 5 days which did not help her symptoms.Patient stated that decongestants and sleep have helped her symptoms. Patient stated she was exposed to her grandson who had strep throat, will r/o strep today in clinic. Due to patients symptoms and recent travel we will do an influenza swab as well. Discussed if sx worsen and she feels her chest congestion is worse or has shortness of breath we can order a chest xray to rule out PNA.

## 2021-05-28 NOTE — ASSESSMENT & PLAN NOTE
Patient was exposed to grandson who has strep throat and has congestion, fatigue, and sore throat at visit today

## 2021-06-01 ENCOUNTER — NURSE TRIAGE (OUTPATIENT)
Dept: HEALTH INFORMATION MANAGEMENT | Facility: OTHER | Age: 81
End: 2021-06-01

## 2021-06-01 ENCOUNTER — TELEPHONE (OUTPATIENT)
Dept: MEDICAL GROUP | Facility: PHYSICIAN GROUP | Age: 81
End: 2021-06-01

## 2021-06-01 ENCOUNTER — OFFICE VISIT (OUTPATIENT)
Dept: MEDICAL GROUP | Facility: PHYSICIAN GROUP | Age: 81
End: 2021-06-01
Payer: MEDICARE

## 2021-06-01 VITALS
TEMPERATURE: 97.7 F | RESPIRATION RATE: 18 BRPM | BODY MASS INDEX: 30.23 KG/M2 | DIASTOLIC BLOOD PRESSURE: 78 MMHG | HEART RATE: 60 BPM | SYSTOLIC BLOOD PRESSURE: 132 MMHG | HEIGHT: 69 IN | WEIGHT: 204.1 LBS | OXYGEN SATURATION: 97 %

## 2021-06-01 DIAGNOSIS — R05.8 COUGH PRODUCTIVE OF CLEAR SPUTUM: ICD-10-CM

## 2021-06-01 DIAGNOSIS — J06.9 VIRAL UPPER RESPIRATORY TRACT INFECTION: ICD-10-CM

## 2021-06-01 PROCEDURE — 99212 OFFICE O/P EST SF 10 MIN: CPT | Performed by: FAMILY MEDICINE

## 2021-06-01 RX ORDER — FLUTICASONE PROPIONATE 50 MCG
2 SPRAY, SUSPENSION (ML) NASAL DAILY
COMMUNITY

## 2021-06-01 RX ORDER — IBUPROFEN 200 MG
200 TABLET ORAL EVERY 6 HOURS PRN
COMMUNITY

## 2021-06-01 ASSESSMENT — ENCOUNTER SYMPTOMS
DOUBLE VISION: 0
DIARRHEA: 0
PALPITATIONS: 0
NECK PAIN: 0
BLURRED VISION: 0
SPUTUM PRODUCTION: 1
SORE THROAT: 0
DIZZINESS: 0
COUGH: 1
PHOTOPHOBIA: 0
EYE PAIN: 0
CHILLS: 0
VOMITING: 0
ABDOMINAL PAIN: 0
NAUSEA: 0
MYALGIAS: 0
SHORTNESS OF BREATH: 0
HEADACHES: 1
WHEEZING: 0
FEVER: 0
EYE DISCHARGE: 0

## 2021-06-01 ASSESSMENT — FIBROSIS 4 INDEX: FIB4 SCORE: 3.52

## 2021-06-01 NOTE — TELEPHONE ENCOUNTER
Regarding: NEXT LEVEL CARE AND CLEARANCE IN OFFICE VISIT  ----- Message from Shannen Yu sent at 6/1/2021  9:42 AM PDT -----  COUGHING/CONGESTION

## 2021-06-01 NOTE — TELEPHONE ENCOUNTER
Saw April David on 5/28, if no better over weekend will do chest x-ray, no better, still coughing & has congestion.

## 2021-06-01 NOTE — ASSESSMENT & PLAN NOTE
6/1/21: Patient presents to clinic with cough, fatigue, sinus/chest congestion, and rhinorrhea x 10 days. Tried z-pack 7 days ago from  in [place] that did not help symptoms. Denies SOB or increased/colored sputum. Patient stated sputum is white and mostly occurs in the morning and she is able to cough it up. Patient does have coarse lung sounds R upper and lower lobes, expiratory wheezes heard at visit on 5/28/21 in [facility] are no longer present. Whispered pectoriloquy negative,bronchophony negative.

## 2021-06-01 NOTE — PROGRESS NOTES
"Subjective:     CC:   Chief Complaint   Patient presents with   • Cough     raspy voice, pt sttes wheezing and rattling in lungs, spitting up clear phlegm   • Sinus Problem     loss of taste, yellow mucus, ear \"still feels stuffed up   • Headache     behind eyes, eyes feel swollen   • Chest Pain     congested, tired, Sun. sharp px, occ. pt states has gone down       HPI:   Reina presents today with on going cough x 10 days, chest and head congestion, and fatigue. Patient went to  10 days ago and was given Z-pack, patients symptoms worsened, she then came to the clinic on 5/28/21 for her URI. Patient at that visit declined chest xray and stated she would contact me if symptoms worsened. Patient has tried Tylenol, Ibuprofen, and decongestants for her cough and congestion with some relief from this. Patient feels like her chest congestion is worse today on 6/1/21. Discussed trying medication such as Tessalon for some relief to which she does not want to take. She would like to continue to take OTC Tylenol and decongestants to see if this will continue to help. Patient stated she will get the chest xray done if she is still has chest congestion and cough by tomorrow morning, she is hesitant to take medicines that are not needed and to get chest xray if it isn't absolutely necessary due to wait and copay.     Viral upper respiratory tract infection  6/1/21: Patient presents to clinic with cough, fatigue, sinus/chest congestion, and rhinorrhea x 10 days. Tried z-pack 7 days ago from  in [place] that did not help symptoms. Denies SOB or increased/colored sputum. Patient stated sputum is white and mostly occurs in the morning and she is able to cough it up. Patient does have coarse lung sounds R upper and lower lobes, expiratory wheezes heard at visit on 5/28/21 in [facility] are no longer present. Whispered pectoriloquy negative,bronchophony negative.        Current Outpatient Medications Ordered in Epic   Medication Sig " "Dispense Refill   • fluticasone (FLONASE) 50 MCG/ACT nasal spray Administer 2 Sprays into affected nostril(S) every day.     • ibuprofen (MOTRIN) 200 MG Tab Take 200 mg by mouth every 6 hours as needed.     • esomeprazole (NEXIUM) 20 MG capsule Take 1 capsule by mouth every morning before breakfast. 100 capsule 2   • Cholecalciferol (VITAMIN D) 125 MCG (5000 UT) Cap Take  by mouth.     • Zinc 30 MG Cap Take  by mouth.     • Omega-3 Fatty Acids (FISH OIL) 1000 MG Cap capsule Take 1,000 mg by mouth 3 times a day, with meals.     • lisinopril (PRINIVIL) 2.5 MG Tab TAKE ONE TABLET BY MOUTH TWICE A  Tab 2   • levothyroxine (SYNTHROID) 25 MCG Tab TAKE ONE TABLET BY MOUTH EVERY MORNING ON AN EMPTY STOMACH 90 Tab 3   • simvastatin (ZOCOR) 10 MG Tab Take 1 Tab by mouth every evening. 90 Tab 3   • Multiple Vitamins-Minerals (MULTIVITAMIN ADULT PO) Take  by mouth.       No current Epic-ordered facility-administered medications on file.       Review of Systems   Constitutional: Positive for malaise/fatigue. Negative for chills and fever.   HENT: Positive for congestion. Negative for sore throat.    Eyes: Negative for blurred vision, double vision, photophobia, pain and discharge.   Respiratory: Positive for cough and sputum production. Negative for shortness of breath and wheezing.         White sputum   Cardiovascular: Negative for chest pain and palpitations.   Gastrointestinal: Negative for abdominal pain, diarrhea, nausea and vomiting.   Musculoskeletal: Negative for myalgias and neck pain.   Neurological: Positive for headaches. Negative for dizziness.         Objective:     Exam:  /78 (BP Location: Right arm, Patient Position: Sitting, BP Cuff Size: Adult)   Pulse 60   Temp 36.5 °C (97.7 °F) (Temporal)   Resp 18   Ht 1.753 m (5' 9\")   Wt 92.6 kg (204 lb 1.6 oz)   SpO2 97%   BMI 30.14 kg/m²  Body mass index is 30.14 kg/m².    Physical Exam  Constitutional:       Appearance: Normal appearance.   HENT:    "   Head: Normocephalic.      Right Ear: Tympanic membrane, ear canal and external ear normal. There is no impacted cerumen.      Left Ear: Tympanic membrane, ear canal and external ear normal. There is no impacted cerumen.      Nose: Congestion and rhinorrhea present.      Mouth/Throat:      Mouth: Mucous membranes are moist.      Pharynx: Oropharynx is clear. No oropharyngeal exudate or posterior oropharyngeal erythema.   Eyes:      Pupils: Pupils are equal, round, and reactive to light.   Cardiovascular:      Rate and Rhythm: Normal rate and regular rhythm.      Pulses: Normal pulses.      Heart sounds: Normal heart sounds.   Pulmonary:      Effort: No respiratory distress.      Breath sounds: No stridor. Rhonchi present. No wheezing or rales.      Comments: Whispered pectoriloquy negative   Bronchophony negative  Chest:      Chest wall: No tenderness.   Musculoskeletal:      Cervical back: Normal range of motion. No rigidity.   Lymphadenopathy:      Cervical: No cervical adenopathy.   Skin:     General: Skin is warm and dry.      Coloration: Skin is not jaundiced or pale.   Neurological:      Mental Status: She is alert and oriented to person, place, and time.   Psychiatric:         Mood and Affect: Mood normal.      Comments: fatigued          A chaperone was offered to the patient during today's exam. Patient declined chaperone.      Assessment & Plan:     80 y.o. female with the following -     1. Cough productive of clear sputum  Acute issue, ongoing x 10 days. Recent travel to [place] to see family, Z-pack given at  that did not help problem. Discussed if she does not feel better by tomorrow morning to go get chest xray that has been ordered.  - DX-CHEST-2 VIEWS; Future    2. Viral upper respiratory tract infection  Discussed with patient to continue drinking hot tea with honey, bone broth, humidification, and OTC Tylenol/Ibuprofen for symptom management, OTC decongestants such as Guaifenesin , but check BP  at home to make sure BP does not become high. Patient to follow up if symptoms worsen or as needed.       Other orders  - fluticasone (FLONASE) 50 MCG/ACT nasal spray; Administer 2 Sprays into affected nostril(S) every day.  - ibuprofen (MOTRIN) 200 MG Tab; Take 200 mg by mouth every 6 hours as needed.     Follow up if symptoms worsen or as needed.   AVW 6/9/21    Please note that this dictation was created using voice recognition software. I have made every reasonable attempt to correct obvious errors, but I expect that there are errors of grammar and possibly content that I did not discover before finalizing the note.

## 2021-06-02 ENCOUNTER — TELEPHONE (OUTPATIENT)
Dept: MEDICAL GROUP | Facility: PHYSICIAN GROUP | Age: 81
End: 2021-06-02

## 2021-06-02 ENCOUNTER — HOSPITAL ENCOUNTER (OUTPATIENT)
Dept: RADIOLOGY | Facility: MEDICAL CENTER | Age: 81
End: 2021-06-02
Attending: FAMILY MEDICINE
Payer: MEDICARE

## 2021-06-02 DIAGNOSIS — R05.8 COUGH PRODUCTIVE OF CLEAR SPUTUM: ICD-10-CM

## 2021-06-02 PROCEDURE — 71046 X-RAY EXAM CHEST 2 VIEWS: CPT

## 2021-06-02 NOTE — TELEPHONE ENCOUNTER
----- Message from MAMADOU Valencia sent at 6/2/2021  1:50 PM PDT -----  Your chest xray results are back and it does not show you have Pneumonia. It does show mild enlargement of the heart which can be due to many things. We can discuss this further at your visit next week and talk about follow up testing that might need to be done. Continue to rest, drink plenty of fluids/tea with honey, take the Tylenol and Ibuprofen as needed, humidification etc to help with your respiratory symptoms.  Your symptoms should start to get better, not worse. If you feel that your symptoms worsen, please let me know and we will try an antibiotic, with it lasting longer than 10 days and worsening it would lead me to believe it would be bacterial at that point. Thank you,  April MCCURDY

## 2021-06-02 NOTE — TELEPHONE ENCOUNTER
RN to call patient with results.   Your chest xray results are back and it does not show you have Pneumonia. It does show mild enlargement of the heart which can be due to many things. We can discuss this further at your visit next week and talk about follow up testing that might need to be done. Continue to rest, drink plenty of fluids/tea with honey, take the Tylenol and Ibuprofen as needed, humidification etc to help with your respiratory symptoms.  Your symptoms should start to get better, not worse. If you feel that your symptoms worsen, please let me know and we will try an antibiotic, with it lasting longer than 10 days and worsening it would lead me to believe it would be bacterial at that point. Thank you,  April MCCURDY

## 2021-06-02 NOTE — TELEPHONE ENCOUNTER
Called pt, she reports symptoms have not worsened, but haven't really improved either. Gave home remedy recommendations. Pt has increased fluids, doesn't have a humidifier- but makes sure she cleans her CPAP daily and that provides some humidity. Denies fever. Cough is not causing abdominal pain, unproductive cough. Denies SOB.   Pt agreeable to being called tomorrow to re-check symptoms.     Advised of enlarged heart and that Rachana David will discuss with her at next appt- pt accepting of explanation.

## 2021-06-03 ENCOUNTER — TELEPHONE (OUTPATIENT)
Dept: EMERGENCY MEDICINE | Facility: MEDICAL CENTER | Age: 81
End: 2021-06-03

## 2021-06-03 NOTE — PROGRESS NOTES
"Called pt to check up on her symptoms. Pt reports she \"feels about the same\". Reports she has been resting more, continues to push fluid intake, and told me   "

## 2021-06-05 ENCOUNTER — HOSPITAL ENCOUNTER (OUTPATIENT)
Facility: MEDICAL CENTER | Age: 81
End: 2021-06-05
Attending: PHYSICIAN ASSISTANT
Payer: MEDICARE

## 2021-06-05 ENCOUNTER — OFFICE VISIT (OUTPATIENT)
Dept: URGENT CARE | Facility: CLINIC | Age: 81
End: 2021-06-05
Payer: MEDICARE

## 2021-06-05 VITALS
HEIGHT: 69 IN | BODY MASS INDEX: 30.21 KG/M2 | WEIGHT: 204 LBS | RESPIRATION RATE: 16 BRPM | DIASTOLIC BLOOD PRESSURE: 94 MMHG | HEART RATE: 71 BPM | SYSTOLIC BLOOD PRESSURE: 160 MMHG | OXYGEN SATURATION: 97 % | TEMPERATURE: 98.8 F

## 2021-06-05 DIAGNOSIS — R05.9 COUGH: ICD-10-CM

## 2021-06-05 PROCEDURE — U0005 INFEC AGEN DETEC AMPLI PROBE: HCPCS

## 2021-06-05 PROCEDURE — U0003 INFECTIOUS AGENT DETECTION BY NUCLEIC ACID (DNA OR RNA); SEVERE ACUTE RESPIRATORY SYNDROME CORONAVIRUS 2 (SARS-COV-2) (CORONAVIRUS DISEASE [COVID-19]), AMPLIFIED PROBE TECHNIQUE, MAKING USE OF HIGH THROUGHPUT TECHNOLOGIES AS DESCRIBED BY CMS-2020-01-R: HCPCS

## 2021-06-05 PROCEDURE — 99213 OFFICE O/P EST LOW 20 MIN: CPT | Performed by: PHYSICIAN ASSISTANT

## 2021-06-05 ASSESSMENT — FIBROSIS 4 INDEX: FIB4 SCORE: 3.52

## 2021-06-06 LAB
COVID ORDER STATUS COVID19: NORMAL
SARS-COV-2 RNA RESP QL NAA+PROBE: NOTDETECTED
SPECIMEN SOURCE: NORMAL

## 2021-06-06 ASSESSMENT — ENCOUNTER SYMPTOMS
SPUTUM PRODUCTION: 0
WHEEZING: 0
HEMOPTYSIS: 0
CHILLS: 0
COUGH: 1
SHORTNESS OF BREATH: 0
FEVER: 0
PALPITATIONS: 0
SORE THROAT: 0
HEADACHES: 1

## 2021-06-06 NOTE — PROGRESS NOTES
Subjective:   Reina Munoz is a 80 y.o. female who presents for Cough (x2 wks ), Runny Nose, Rib Pain, and Headache      Cough  This is a new problem. Episode onset: 2 weeks ago. The problem has been unchanged. The cough is non-productive. Associated symptoms include headaches. Pertinent negatives include no chest pain, chills, ear pain, fever, hemoptysis, sore throat, shortness of breath or wheezing. Nothing aggravates the symptoms. She has tried OTC cough suppressant for the symptoms. The treatment provided mild relief.       Review of Systems   Constitutional: Positive for malaise/fatigue. Negative for chills and fever.   HENT: Positive for congestion. Negative for ear pain and sore throat.    Respiratory: Positive for cough. Negative for hemoptysis, sputum production, shortness of breath and wheezing.    Cardiovascular: Negative for chest pain and palpitations.   Neurological: Positive for headaches.   All other systems reviewed and are negative.      Medications:    • esomeprazole  • fish oil Caps  • fluticasone  • ibuprofen Tabs  • levothyroxine Tabs  • lisinopril Tabs  • MULTIVITAMIN ADULT PO  • simvastatin Tabs  • Vitamin D Caps  • Zinc Caps    Allergies: Doxycycline, Penicillins, and Sulfa drugs    Problem List: Reina Munoz does not have any pertinent problems on file.    Surgical History:  Past Surgical History:   Procedure Laterality Date   • CATARACT EXTRACTION WITH IOL     • LUMPECTOMY Left     non-cancerous   • PAROTIDECTOMY Left 1992, 2004    recurrent pleomorphic adenoma left parotid gland       Past Social Hx: Reina Munoz  reports that she has never smoked. She has never used smokeless tobacco. She reports that she does not drink alcohol and does not use drugs.     Past Family Hx:  Reina Munoz family history includes Cancer in her mother and sister; Heart Attack in her father; Heart Disease in her brother, father, and mother; Hypertension in her father; Lung Disease in her  "brother; Stroke in her father and sister.     Problem list, medications, and allergies reviewed by myself today in Epic.     Objective:     Blood Pressure 160/94   Pulse 71   Temperature 37.1 °C (98.8 °F) (Temporal)   Respiration 16   Height 1.753 m (5' 9\")   Weight 92.5 kg (204 lb)   Oxygen Saturation 97%   Body Mass Index 30.13 kg/m²     Physical Exam  Vitals reviewed.   Constitutional:       General: She is not in acute distress.     Appearance: She is well-developed. She is not ill-appearing or toxic-appearing.      Interventions: She is not intubated.  HENT:      Head: Normocephalic and atraumatic.      Right Ear: Hearing, tympanic membrane, ear canal and external ear normal.      Left Ear: Hearing, tympanic membrane, ear canal and external ear normal.      Nose: Nose normal.      Mouth/Throat:      Pharynx: Uvula midline.   Eyes:      General: Lids are normal.      Conjunctiva/sclera: Conjunctivae normal.   Cardiovascular:      Rate and Rhythm: Regular rhythm.      Heart sounds: Normal heart sounds, S1 normal and S2 normal. No murmur heard.   No friction rub. No gallop.    Pulmonary:      Effort: Pulmonary effort is normal. No tachypnea, bradypnea, accessory muscle usage or respiratory distress. She is not intubated.      Breath sounds: Normal breath sounds. No decreased breath sounds, wheezing, rhonchi or rales.   Chest:      Chest wall: No tenderness.   Musculoskeletal:         General: Normal range of motion.      Cervical back: Full passive range of motion without pain, normal range of motion and neck supple.   Skin:     General: Skin is warm and dry.   Neurological:      Mental Status: She is alert and oriented to person, place, and time.   Psychiatric:         Speech: Speech normal.         Behavior: Behavior normal.         Thought Content: Thought content normal.         Judgment: Judgment normal.         Assessment/Plan:     Medical Decision Making/Comments     Pt is a 80 yr old female who " presents for evaluation of cough.  Pt states she has had a cough for 2 weeks and has been seen twice for this issue.  Previously on azithromycin with no relief.  Rapid strep and flu were negative.  Chest x-ray done with no abnormalities.  She is not vaccinated against Covid.  Pt denies SOB or fever.  PMH: no history of tobacco exposure, CHF, COPD, asthma , history of thrombosis. Vital signs are normal.  Pt appears well and is in no acute distress.  Lung auscultation is clear with no signs of consolidation or wheezing.  No pitting edema of the lower extremities.  With no signs of tachycardia, tachypnea, fever, or rales likely does not have pneumonia. Pt is low risk of PE per Wells criteria.  We will swab for Covid and have her continue over-the-counter cough suppressants as needed.     Diagnosis and associated orders     1. Cough  SARS-CoV-2 PCR (24 hour In-House): Collect NP swab in V[facility]              Differential diagnosis, natural history, supportive care, and indications for immediate follow-up discussed.    Advised the patient to follow-up with the primary care physician for recheck, reevaluation, and consideration of further management.    Please note that this dictation was created using voice recognition software. I have made a reasonable attempt to correct obvious errors, but I expect that there are errors of grammar and possibly content that I did not discover before finalizing the note.

## 2021-06-07 ENCOUNTER — OFFICE VISIT (OUTPATIENT)
Dept: MEDICAL GROUP | Facility: PHYSICIAN GROUP | Age: 81
End: 2021-06-07
Payer: MEDICARE

## 2021-06-07 VITALS
TEMPERATURE: 98.4 F | HEART RATE: 60 BPM | HEIGHT: 68 IN | BODY MASS INDEX: 30.96 KG/M2 | OXYGEN SATURATION: 95 % | SYSTOLIC BLOOD PRESSURE: 130 MMHG | WEIGHT: 204.3 LBS | DIASTOLIC BLOOD PRESSURE: 76 MMHG

## 2021-06-07 DIAGNOSIS — J01.80 ACUTE NON-RECURRENT SINUSITIS OF OTHER SINUS: ICD-10-CM

## 2021-06-07 PROBLEM — Z20.818 STREP THROAT EXPOSURE: Status: RESOLVED | Noted: 2021-05-28 | Resolved: 2021-06-07

## 2021-06-07 PROBLEM — J06.9 VIRAL UPPER RESPIRATORY TRACT INFECTION: Status: RESOLVED | Noted: 2021-05-28 | Resolved: 2021-06-07

## 2021-06-07 PROCEDURE — 99214 OFFICE O/P EST MOD 30 MIN: CPT | Performed by: FAMILY MEDICINE

## 2021-06-07 ASSESSMENT — FIBROSIS 4 INDEX: FIB4 SCORE: 3.52

## 2021-06-07 NOTE — PROGRESS NOTES
CC:   Chief Complaint   Patient presents with   • Follow-Up     Coughing past 2 weeks still not resolved.        HPI:   Reina presents today for follow up for her cough and headaches.  She traveled to [place] approximately 3 weeks ago on May 17 and while there she developed headaches and sinus symptoms.  Had a cough.  She was seen in urgent care on May 22 in [place] and at that time she was given a course of Zithromax for sinusitis.  And asked to use over-the-counter Zyrtec and Flonase as well as Mucinex as needed.  She did not notice any improvement in her symptoms and then was seen by one of her providers on May 28 she was not having any improvement in her symptoms.  That time she was tested for strep and flu both of which were negative.  She then presented back 3 days later on June 1 not having any improvement.  At that time a chest x-ray was ordered with findings consistent mild cardiomegaly.  She was seen in urgent care 2 days ago on June 6 and at that time a Covid test was negative.  She is feeling better today.  States that her runny nose has improved.  Her cough is better today she has not taken any DayQuil since yesterday.  She denies any fevers but has been having some chills at home.  Denies any nausea vomiting or dysphagia.  She has noticed a decrease in her appetite.  Her last headache was approximately 2 to 3 days ago. Her right rib pain is improving. hasnt had it in several days.  She does feel like her left ear is clogged up.      Current Outpatient Medications Ordered in Epic   Medication Sig Dispense Refill   • fluticasone (FLONASE) 50 MCG/ACT nasal spray Administer 2 Sprays into affected nostril(S) every day.     • ibuprofen (MOTRIN) 200 MG Tab Take 200 mg by mouth every 6 hours as needed.     • esomeprazole (NEXIUM) 20 MG capsule Take 1 capsule by mouth every morning before breakfast. 100 capsule 2   • Cholecalciferol (VITAMIN D) 125 MCG (5000 UT) Cap Take  by mouth.     • Zinc 30 MG Cap Take  by  "mouth.     • Omega-3 Fatty Acids (FISH OIL) 1000 MG Cap capsule Take 1,000 mg by mouth 3 times a day, with meals.     • lisinopril (PRINIVIL) 2.5 MG Tab TAKE ONE TABLET BY MOUTH TWICE A  Tab 2   • levothyroxine (SYNTHROID) 25 MCG Tab TAKE ONE TABLET BY MOUTH EVERY MORNING ON AN EMPTY STOMACH 90 Tab 3   • simvastatin (ZOCOR) 10 MG Tab Take 1 Tab by mouth every evening. 90 Tab 3   • Multiple Vitamins-Minerals (MULTIVITAMIN ADULT PO) Take  by mouth.       No current Epic-ordered facility-administered medications on file.       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    • Dyslipidemia    • GERD (gastroesophageal reflux disease)    • Hypothyroidism     Resolved 2018   • Pleomorphic adenoma of parotid gland 1992, 2004    recurrent, yearly MRI   • Sleep apnea        Social History     Tobacco Use   • Smoking status: Never Smoker   • Smokeless tobacco: Never Used   Vaping Use   • Vaping Use: Never used   Substance Use Topics   • Alcohol use: No     Alcohol/week: 0.0 oz   • Drug use: No       Allergies:  Doxycycline, Penicillins, and Sulfa drugs      Objective:       Exam:  /76 (BP Location: Right arm, Patient Position: Sitting, BP Cuff Size: Adult)   Pulse 60   Temp 36.9 °C (98.4 °F) (Temporal)   Ht 1.727 m (5' 8\")   Wt 92.7 kg (204 lb 4.8 oz)   SpO2 95%   BMI 31.06 kg/m²   Body mass index is 31.06 kg/m².  Wt Readings from Last 4 Encounters:   06/07/21 92.7 kg (204 lb 4.8 oz)   06/05/21 92.5 kg (204 lb)   06/01/21 92.6 kg (204 lb 1.6 oz)   05/28/21 95.2 kg (209 lb 12.8 oz)       Gen: Alert and oriented, No apparent distress. Appropriately groomed.  Left ear effusion, nontender over frontal and maxillary sinuses  Neck: Neck is supple without lymphadenopathy.No thyromegaly.   Lungs: Normal effort, CTA bilaterally, no rhonchi, or rales, few expiratory wheezes  CV: Regular rate and rhythm. No Lower extremity edema  Skin: No rash noted.      Assessment & Plan: "     80 y.o. female with the following -     1. Acute non-recurrent sinusitis of other sinus  Ongoing issue.  Symptoms appear to be improving.  Covid, strep and flu tests were negative.  Chest xray with no acute findings.  Continue with flonase and dayquil as needed. ED/ UC  Precautions dw patient, I.e. SOB, chest pain, fevers chill, etc. For now, she'll continue to monitor symptoms and keep me updated via mychart and f/u in one week.  D/w patient if symptoms worsen, then will check labs, repeat cxr and possibly treat with new antibiotics.       I spent a total of 44 minutes with record review, exam, communication with the patient, communication with other providers, and documentation of this encounter.      Return in about 1 week (around 6/14/2021) for cough.    Please note that this dictation was created using voice recognition software. I have made every reasonable attempt to correct obvious errors, but I expect that there are errors of grammar and possibly content that I did not discover before finalizing the note.

## 2021-06-15 ENCOUNTER — APPOINTMENT (OUTPATIENT)
Dept: MEDICAL GROUP | Facility: PHYSICIAN GROUP | Age: 81
End: 2021-06-15
Payer: MEDICARE

## 2021-07-21 ENCOUNTER — OFFICE VISIT (OUTPATIENT)
Dept: MEDICAL GROUP | Facility: PHYSICIAN GROUP | Age: 81
End: 2021-07-21
Payer: MEDICARE

## 2021-07-21 VITALS
HEART RATE: 66 BPM | DIASTOLIC BLOOD PRESSURE: 78 MMHG | BODY MASS INDEX: 31.27 KG/M2 | RESPIRATION RATE: 16 BRPM | SYSTOLIC BLOOD PRESSURE: 122 MMHG | WEIGHT: 206.3 LBS | TEMPERATURE: 97.9 F | OXYGEN SATURATION: 95 % | HEIGHT: 68 IN

## 2021-07-21 DIAGNOSIS — J01.80 ACUTE NON-RECURRENT SINUSITIS OF OTHER SINUS: ICD-10-CM

## 2021-07-21 PROCEDURE — 99214 OFFICE O/P EST MOD 30 MIN: CPT | Performed by: FAMILY MEDICINE

## 2021-07-21 RX ORDER — AMOXICILLIN AND CLAVULANATE POTASSIUM 875; 125 MG/1; MG/1
1 TABLET, FILM COATED ORAL 2 TIMES DAILY
Qty: 14 TABLET | Refills: 0 | Status: SHIPPED | OUTPATIENT
Start: 2021-07-21 | End: 2021-07-28

## 2021-07-21 ASSESSMENT — ENCOUNTER SYMPTOMS
WHEEZING: 0
SHORTNESS OF BREATH: 0
PALPITATIONS: 0
HEADACHES: 1
FEVER: 0
MYALGIAS: 0
DOUBLE VISION: 0
BLURRED VISION: 0
CHILLS: 0
SORE THROAT: 1
COUGH: 0
SINUS PAIN: 1
SPUTUM PRODUCTION: 0
DIZZINESS: 0

## 2021-07-21 ASSESSMENT — FIBROSIS 4 INDEX: FIB4 SCORE: 3.52

## 2021-07-21 NOTE — PROGRESS NOTES
"Subjective:     CC:   Chief Complaint   Patient presents with   • Otalgia     Fluid, ache, left ear, radiates down eustachian tube, fullness, \"comes and goes\" has had ear surgury   • Pharyngitis     horse, hayley,        HPI:   Reina presents today with complaints of left ear pain that is intermittent x 14 days.    Other acute sinusitis  Patient has been having sinus congestion, headaches, and ear pain intermittently over the last few months. She has been taking Flonase and OTC antihistamine to help with her congestion which has helped but reports she cannot seem to get over the sinus headaches and congestion fully. Patient reports her symptoms have worsened over the last 2 weeks and now is having left ear pain. Denies fever, chills, or drainage from ears. Patient has hx of sinus infections in the past, in 2018 patient tolerated Augmentin for sinus infection. Will give 7 days of Augmentin for acute sinusitis. Discussed using humidifier, saline sprays, tylenol, or heating pad to help with congestion and pain.       Current Outpatient Medications Ordered in Epic   Medication Sig Dispense Refill   • amoxicillin-clavulanate (AUGMENTIN) 875-125 MG Tab Take 1 tablet by mouth 2 times a day for 7 days. 14 tablet 0   • fluticasone (FLONASE) 50 MCG/ACT nasal spray Administer 2 Sprays into affected nostril(S) every day.     • ibuprofen (MOTRIN) 200 MG Tab Take 200 mg by mouth every 6 hours as needed.     • esomeprazole (NEXIUM) 20 MG capsule Take 1 capsule by mouth every morning before breakfast. 100 capsule 2   • Cholecalciferol (VITAMIN D) 125 MCG (5000 UT) Cap Take  by mouth.     • Zinc 30 MG Cap Take  by mouth.     • Omega-3 Fatty Acids (FISH OIL) 1000 MG Cap capsule Take 1,000 mg by mouth 3 times a day, with meals.     • lisinopril (PRINIVIL) 2.5 MG Tab TAKE ONE TABLET BY MOUTH TWICE A  Tab 2   • levothyroxine (SYNTHROID) 25 MCG Tab TAKE ONE TABLET BY MOUTH EVERY MORNING ON AN EMPTY STOMACH 90 Tab 3   • " "simvastatin (ZOCOR) 10 MG Tab Take 1 Tab by mouth every evening. 90 Tab 3   • Multiple Vitamins-Minerals (MULTIVITAMIN ADULT PO) Take  by mouth.       No current Epic-ordered facility-administered medications on file.       Health Maintenance: declined order for mammogram today    Review of Systems   Constitutional: Positive for malaise/fatigue. Negative for chills and fever.   HENT: Positive for congestion, ear pain, nosebleeds, sinus pain, sore throat and tinnitus. Negative for ear discharge.    Eyes: Negative for blurred vision and double vision.   Respiratory: Negative for cough, sputum production, shortness of breath and wheezing.    Cardiovascular: Negative for chest pain and palpitations.   Musculoskeletal: Negative for myalgias.   Neurological: Positive for headaches. Negative for dizziness.         Objective:     Exam:  /78 (BP Location: Right arm, Patient Position: Sitting, BP Cuff Size: Adult)   Pulse 66   Temp 36.6 °C (97.9 °F) (Temporal)   Resp 16   Ht 1.727 m (5' 8\")   Wt 93.6 kg (206 lb 4.8 oz)   SpO2 95%   BMI 31.37 kg/m²  Body mass index is 31.37 kg/m².    Physical Exam  Vitals reviewed.   Constitutional:       Appearance: Normal appearance.   HENT:      Right Ear: Tympanic membrane, ear canal and external ear normal. There is no impacted cerumen. No mastoid tenderness. Tympanic membrane is not perforated, erythematous, retracted or bulging. Tympanic membrane has normal mobility.      Left Ear: External ear normal. There is no impacted cerumen. No mastoid tenderness. Tympanic membrane is not perforated, erythematous or retracted. Tympanic membrane has normal mobility.      Ears:      Comments: Left ear effusion     Nose: Congestion and rhinorrhea present. Rhinorrhea is bloody.      Right Turbinates: Not pale.      Left Turbinates: Not pale.      Right Sinus: Maxillary sinus tenderness and frontal sinus tenderness present.      Left Sinus: Frontal sinus tenderness present.      " Mouth/Throat:      Mouth: Mucous membranes are moist.      Pharynx: Oropharynx is clear.   Eyes:      Conjunctiva/sclera: Conjunctivae normal.      Pupils: Pupils are equal, round, and reactive to light.   Cardiovascular:      Rate and Rhythm: Normal rate and regular rhythm.      Pulses: Normal pulses.      Heart sounds: Normal heart sounds.   Pulmonary:      Effort: Pulmonary effort is normal.      Breath sounds: Normal breath sounds.   Abdominal:      General: Abdomen is flat. Bowel sounds are normal.   Musculoskeletal:         General: Normal range of motion.   Lymphadenopathy:      Head:      Right side of head: No submental, submandibular, tonsillar, preauricular, posterior auricular or occipital adenopathy.      Left side of head: No submental, submandibular, tonsillar, preauricular, posterior auricular or occipital adenopathy.      Cervical: No cervical adenopathy.      Right cervical: No superficial cervical adenopathy.     Left cervical: No superficial cervical adenopathy.   Skin:     General: Skin is warm and dry.      Coloration: Skin is not jaundiced or pale.   Neurological:      Mental Status: She is alert and oriented to person, place, and time.   Psychiatric:         Mood and Affect: Mood normal.         Behavior: Behavior normal.          A chaperone was offered to the patient during today's exam. Patient declined chaperone.        Assessment & Plan:     80 y.o. female with the following -     1. Acute non-recurrent sinusitis of other sinus  Chronic issue, patient has been having sinus congestion intermittently since June, worsened over the last 14 days. Cont flonase and otc antihistamine. Patient has tolerated Augmentin in 2018 for sinusitis, will start Augmentin BID x 7 days. Discussed if she has reaction to let me know and we can change to cephalosporin if needed. Pt has doxycycline and sulfa allergy listed. Discussed if symptoms do not improve or return to let us know or schedule f/u appointment.    - amoxicillin-clavulanate (AUGMENTIN) 875-125 MG Tab; Take 1 tablet by mouth 2 times a day for 7 days.  Dispense: 14 tablet; Refill: 0       I spent a total of 35 minutes with record review, exam, communication with the patient, communication with other providers, and documentation of this encounter.      Return for schedule after 9/3/21 for AWV.    Please note that this dictation was created using voice recognition software. I have made every reasonable attempt to correct obvious errors, but I expect that there are errors of grammar and possibly content that I did not discover before finalizing the note.

## 2021-07-21 NOTE — ASSESSMENT & PLAN NOTE
Patient has been having sinus congestion, headaches, and ear pain intermittently over the last few months. She has been taking Flonase and OTC antihistamine to help with her congestion which has helped but reports she cannot seem to get over the sinus headaches and congestion fully. Patient reports her symptoms have worsened over the last 2 weeks and now is having left ear pain. Denies fever, chills, or drainage from ears. Patient has hx of sinus infections in the past, in 2018 patient tolerated Augmentin for sinus infection. Will give 7 days of Augmentin for acute sinusitis. Discussed using humidifier, saline sprays, tylenol, or heating pad to help with congestion and pain.

## 2021-08-16 ENCOUNTER — PATIENT MESSAGE (OUTPATIENT)
Dept: HEALTH INFORMATION MANAGEMENT | Facility: OTHER | Age: 81
End: 2021-08-16

## 2021-08-16 DIAGNOSIS — E03.9 ACQUIRED HYPOTHYROIDISM: ICD-10-CM

## 2021-08-16 RX ORDER — LEVOTHYROXINE SODIUM 0.03 MG/1
TABLET ORAL
Qty: 100 TABLET | Refills: 3 | Status: SHIPPED | OUTPATIENT
Start: 2021-08-16 | End: 2021-09-08 | Stop reason: SDUPTHER

## 2021-08-16 NOTE — TELEPHONE ENCOUNTER
Requested Prescriptions     Pending Prescriptions Disp Refills   • levothyroxine (SYNTHROID) 25 MCG Tab [Pharmacy Med Name: LEVOTHYROXINE 25 MCG TABLET] 100 Tablet 3     Sig: TAKE 1 TABLET BY MOUTH EVERY MORNING ON AN EMPTY STOMACH   Joselin Kwon M.D.

## 2021-08-16 NOTE — TELEPHONE ENCOUNTER
Received request via: Pharmacy    Was the patient seen in the last year in this department? Yes  7/12/21    Does the patient have an active prescription (recently filled or refills available) for medication(s) requested? No

## 2021-09-03 ENCOUNTER — HOSPITAL ENCOUNTER (OUTPATIENT)
Dept: LAB | Facility: MEDICAL CENTER | Age: 81
End: 2021-09-03
Attending: FAMILY MEDICINE
Payer: MEDICARE

## 2021-09-03 DIAGNOSIS — E03.9 ACQUIRED HYPOTHYROIDISM: ICD-10-CM

## 2021-09-03 DIAGNOSIS — R73.09 ELEVATED HEMOGLOBIN A1C: ICD-10-CM

## 2021-09-03 DIAGNOSIS — E78.5 DYSLIPIDEMIA: ICD-10-CM

## 2021-09-03 LAB
ALBUMIN SERPL BCP-MCNC: 4.2 G/DL (ref 3.2–4.9)
ALBUMIN/GLOB SERPL: 1.9 G/DL
ALP SERPL-CCNC: 81 U/L (ref 30–99)
ALT SERPL-CCNC: 75 U/L (ref 2–50)
ANION GAP SERPL CALC-SCNC: 10 MMOL/L (ref 7–16)
AST SERPL-CCNC: 93 U/L (ref 12–45)
BASOPHILS # BLD AUTO: 0.7 % (ref 0–1.8)
BASOPHILS # BLD: 0.03 K/UL (ref 0–0.12)
BILIRUB SERPL-MCNC: 0.8 MG/DL (ref 0.1–1.5)
BUN SERPL-MCNC: 12 MG/DL (ref 8–22)
CALCIUM SERPL-MCNC: 9.5 MG/DL (ref 8.4–10.2)
CHLORIDE SERPL-SCNC: 107 MMOL/L (ref 96–112)
CHOLEST SERPL-MCNC: 179 MG/DL (ref 100–199)
CO2 SERPL-SCNC: 24 MMOL/L (ref 20–33)
CREAT SERPL-MCNC: 0.8 MG/DL (ref 0.5–1.4)
EOSINOPHIL # BLD AUTO: 0.12 K/UL (ref 0–0.51)
EOSINOPHIL NFR BLD: 2.7 % (ref 0–6.9)
ERYTHROCYTE [DISTWIDTH] IN BLOOD BY AUTOMATED COUNT: 46.4 FL (ref 35.9–50)
EST. AVERAGE GLUCOSE BLD GHB EST-MCNC: 126 MG/DL
FASTING STATUS PATIENT QL REPORTED: NORMAL
GLOBULIN SER CALC-MCNC: 2.2 G/DL (ref 1.9–3.5)
GLUCOSE SERPL-MCNC: 105 MG/DL (ref 65–99)
HBA1C MFR BLD: 6 % (ref 4–5.6)
HCT VFR BLD AUTO: 42.6 % (ref 37–47)
HDLC SERPL-MCNC: 55 MG/DL
HGB BLD-MCNC: 14.4 G/DL (ref 12–16)
IMM GRANULOCYTES # BLD AUTO: 0.01 K/UL (ref 0–0.11)
IMM GRANULOCYTES NFR BLD AUTO: 0.2 % (ref 0–0.9)
LDLC SERPL CALC-MCNC: 89 MG/DL
LYMPHOCYTES # BLD AUTO: 1.74 K/UL (ref 1–4.8)
LYMPHOCYTES NFR BLD: 39.5 % (ref 22–41)
MCH RBC QN AUTO: 31.5 PG (ref 27–33)
MCHC RBC AUTO-ENTMCNC: 33.8 G/DL (ref 33.6–35)
MCV RBC AUTO: 93.2 FL (ref 81.4–97.8)
MONOCYTES # BLD AUTO: 0.31 K/UL (ref 0–0.85)
MONOCYTES NFR BLD AUTO: 7 % (ref 0–13.4)
NEUTROPHILS # BLD AUTO: 2.2 K/UL (ref 2–7.15)
NEUTROPHILS NFR BLD: 49.9 % (ref 44–72)
NRBC # BLD AUTO: 0 K/UL
NRBC BLD-RTO: 0 /100 WBC
PLATELET # BLD AUTO: 156 K/UL (ref 164–446)
PMV BLD AUTO: 11 FL (ref 9–12.9)
POTASSIUM SERPL-SCNC: 4.1 MMOL/L (ref 3.6–5.5)
PROT SERPL-MCNC: 6.4 G/DL (ref 6–8.2)
RBC # BLD AUTO: 4.57 M/UL (ref 4.2–5.4)
SODIUM SERPL-SCNC: 141 MMOL/L (ref 135–145)
T4 FREE SERPL-MCNC: 1.12 NG/DL (ref 0.93–1.7)
TRIGL SERPL-MCNC: 174 MG/DL (ref 0–149)
TSH SERPL DL<=0.005 MIU/L-ACNC: 5.77 UIU/ML (ref 0.38–5.33)
WBC # BLD AUTO: 4.4 K/UL (ref 4.8–10.8)

## 2021-09-03 PROCEDURE — 84443 ASSAY THYROID STIM HORMONE: CPT

## 2021-09-03 PROCEDURE — 36415 COLL VENOUS BLD VENIPUNCTURE: CPT

## 2021-09-03 PROCEDURE — 80053 COMPREHEN METABOLIC PANEL: CPT

## 2021-09-03 PROCEDURE — 85025 COMPLETE CBC W/AUTO DIFF WBC: CPT

## 2021-09-03 PROCEDURE — 84439 ASSAY OF FREE THYROXINE: CPT

## 2021-09-03 PROCEDURE — 83036 HEMOGLOBIN GLYCOSYLATED A1C: CPT

## 2021-09-03 PROCEDURE — 80061 LIPID PANEL: CPT

## 2021-09-07 ENCOUNTER — TELEPHONE (OUTPATIENT)
Dept: MEDICAL GROUP | Facility: PHYSICIAN GROUP | Age: 81
End: 2021-09-07

## 2021-09-07 NOTE — TELEPHONE ENCOUNTER
ANNUAL WELLNESS VISIT PRE-VISIT PLANNING    1.  Reviewed notes from the last office visit: Yes    2.  If any orders were ordered or intended to be done prior to visit (i.e. 6 mos follow-up), do we have results/consult notes or has patient scheduled?        •  Labs - Labs were not ordered at last office visit.  Note: If patient appointment is for lab review and patient did not complete labs, check with provider if OK to reschedule patient until labs completed.       •  Imaging - Imaging was not ordered at last office visit.       •  Referrals - No referrals were ordered at last office visit.    3.  Immunizations were updated in Epic using Reconcile Outside Information activity? Yes       •  Is patient due for Tdap? NO       •  Is patient due for Shingrix? NO    4.  Patient is due for the following Health Maintenance Topics:   Health Maintenance Due   Topic Date Due   • COVID-19 Vaccine (1) Never done   • IMM DTaP/Tdap/Td Vaccine (1 - Tdap) Never done   • MAMMOGRAM  07/09/2021   • IMM INFLUENZA (1) 09/01/2021   • Annual Wellness Visit  09/03/2021       - Patient already has appointment scheduled for Annual Wellness Visit (AWV).    5.  Reviewed/Updated the following with patient:       •   Preferred Pharmacy? Yes       •   Preferred Lab? Yes       •   Preferred Communication? Yes       •   Allergies? Yes       •   Medications? YES. Was Abstract Encounter opened and chart updated? YES       •   Social History? No       •   Family History (document living status of immediate family members and if + hx of  cancer, diabetes, hypertension, hyperlipidemia, heart attack, stroke) No    6.  Care Team Updated:       •   DME Company (gait device, O2, CPAP, etc.): NO       •   Other Specialists (eye doctor, derm, GYN, cardiology, endo, etc): NO    7.  Patient was advised: “This is a free wellness visit. The provider will screen for medical conditions to help you stay healthy. If you have other concerns to address you may be asked  to discuss these at a separate visit or there may be an additional fee.”     8.  AHA (Puls[employer]) form printed for Provider? No, already completed

## 2021-09-08 ENCOUNTER — OFFICE VISIT (OUTPATIENT)
Dept: MEDICAL GROUP | Facility: PHYSICIAN GROUP | Age: 81
End: 2021-09-08
Payer: MEDICARE

## 2021-09-08 VITALS
WEIGHT: 208 LBS | TEMPERATURE: 97.3 F | HEIGHT: 68 IN | HEART RATE: 60 BPM | BODY MASS INDEX: 31.52 KG/M2 | SYSTOLIC BLOOD PRESSURE: 118 MMHG | DIASTOLIC BLOOD PRESSURE: 66 MMHG | RESPIRATION RATE: 14 BRPM | OXYGEN SATURATION: 97 %

## 2021-09-08 DIAGNOSIS — E03.9 ACQUIRED HYPOTHYROIDISM: ICD-10-CM

## 2021-09-08 DIAGNOSIS — R10.11 RUQ PAIN: ICD-10-CM

## 2021-09-08 DIAGNOSIS — I34.0 MITRAL VALVE INSUFFICIENCY, UNSPECIFIED ETIOLOGY: ICD-10-CM

## 2021-09-08 DIAGNOSIS — D69.6 THROMBOCYTOPENIA (HCC): ICD-10-CM

## 2021-09-08 DIAGNOSIS — G47.30 SLEEP APNEA WITH USE OF CONTINUOUS POSITIVE AIRWAY PRESSURE (CPAP): ICD-10-CM

## 2021-09-08 PROCEDURE — 99214 OFFICE O/P EST MOD 30 MIN: CPT | Performed by: FAMILY MEDICINE

## 2021-09-08 RX ORDER — LEVOTHYROXINE SODIUM 0.03 MG/1
25 TABLET ORAL EVERY MORNING
Qty: 100 TABLET | Refills: 3 | Status: SHIPPED | OUTPATIENT
Start: 2021-09-08 | End: 2022-10-27 | Stop reason: SDUPTHER

## 2021-09-08 ASSESSMENT — FIBROSIS 4 INDEX: FIB4 SCORE: 5.37

## 2021-09-08 NOTE — ASSESSMENT & PLAN NOTE
Chronic medical diagnosis.  Currently taking levothyroxine 25 mcg daily.  Recent labs with improvement in TSH levels.

## 2021-09-08 NOTE — PROGRESS NOTES
CC:   Chief Complaint   Patient presents with   • Dizziness     Felt like an episode on Saturday 9/4 at [employer]    • Leg Swelling     Both legs. Ongoing issue worse the past couple weeks.          HPI:   Reina presents today for an annual wellness visit.  However she had some acute concerns and we have switched her visit to discuss her dizziness and leg swelling.    States that she has a hx of Syncopal episode 3 years ago which required hospitalization.  Today she mentions that she Was at [employer] [facility] [employer] 4 days ago and felt lightheaded and faint. Had taken bp med that morning. [place] better after OJ given.  Was double masked at that time.  She's unsure what to make of this episode.  Was following up with cards in the past but not currently.     Sleep apnea with use of continuous positive airway pressure (CPAP)  Chronic.  Able to use cpap for 8 hours every night.     Mitral regurgitation  Chronic medical diagnosis.  Her last echocardiogram was in 2018.  She was last seen by cardiology in February 2020 with recommendations to follow-up in 1 year.    Continues to get SOB when she walks.     Thrombocytopenia (HCC)  Chronic issue. Denies any nosebleeds or easy bruising. Most recent labs with platelets in the 156k range.     Acquired hypothyroidism  Chronic medical diagnosis.  Currently taking levothyroxine 25 mcg daily.  Recent labs with improvement in TSH levels.    RUQ pain  New medical problem for the last month. Notices it after she eats.  This does not happen on a daily basis.  She denies any nausea vomiting.  Not need to take any medications for this.      Current Outpatient Medications Ordered in Epic   Medication Sig Dispense Refill   • levothyroxine (SYNTHROID) 25 MCG Tab Take 1 Tablet by mouth every morning. ON A EMPTY STOMACH 100 Tablet 3   • fluticasone (FLONASE) 50 MCG/ACT nasal spray Administer 2 Sprays into affected nostril(S) every day.     • ibuprofen (MOTRIN) 200 MG Tab Take 200 mg by mouth every 6 hours as  "needed.     • esomeprazole (NEXIUM) 20 MG capsule Take 1 capsule by mouth every morning before breakfast. 100 capsule 2   • Cholecalciferol (VITAMIN D) 125 MCG (5000 UT) Cap Take  by mouth.     • Zinc 30 MG Cap Take  by mouth.     • Omega-3 Fatty Acids (FISH OIL) 1000 MG Cap capsule Take 1,000 mg by mouth 3 times a day, with meals.     • lisinopril (PRINIVIL) 2.5 MG Tab TAKE ONE TABLET BY MOUTH TWICE A  Tab 2   • simvastatin (ZOCOR) 10 MG Tab Take 1 Tab by mouth every evening. 90 Tab 3   • Multiple Vitamins-Minerals (MULTIVITAMIN ADULT PO) Take  by mouth.       No current Epic-ordered facility-administered medications on file.       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    • Dyslipidemia    • GERD (gastroesophageal reflux disease)    • Hypothyroidism     Resolved 2018   • Pleomorphic adenoma of parotid gland 1992, 2004    recurrent, yearly MRI   • Sleep apnea        Social History     Tobacco Use   • Smoking status: Never Smoker   • Smokeless tobacco: Never Used   Vaping Use   • Vaping Use: Never used   Substance Use Topics   • Alcohol use: No     Alcohol/week: 0.0 oz   • Drug use: No       Allergies:  Doxycycline, Penicillins, and Sulfa drugs      Objective:       Exam:  /66 (BP Location: Right arm, Patient Position: Sitting, BP Cuff Size: Adult)   Pulse 60   Temp 36.3 °C (97.3 °F) (Temporal)   Resp 14   Ht 1.727 m (5' 8\")   Wt 94.3 kg (208 lb)   SpO2 97%   BMI 31.63 kg/m²   Body mass index is 31.63 kg/m².  Wt Readings from Last 4 Encounters:   09/08/21 94.3 kg (208 lb)   07/21/21 93.6 kg (206 lb 4.8 oz)   06/07/21 92.7 kg (204 lb 4.8 oz)   06/05/21 92.5 kg (204 lb)       Gen: Alert and oriented, No apparent distress. Appropriately groomed.  Neck: Neck is supple without lymphadenopathy.No thyromegaly.   Lungs: Normal effort, CTA bilaterally, no wheezes, rhonchi, or rales  CV: Regular rate and rhythm. Bilateral 1 +Lower extremity edema  ABD: " soft, normal bowel sounds, negative Pichardo's sign, nontender  Skin: No rash noted.      Assessment & Plan:     80 y.o. female with the following -     1. Sleep apnea with use of continuous positive airway pressure (CPAP)  Chronic. Stable.  Able to be compliant with CPAP.    2. Mitral valve insufficiency, unspecified etiology  Chronic issue.  Last echo was done 3 years ago.  has been almost 1.5 years since seen by cards.  Encouraged to f/u with cards. Will check carotid and echo.  - US-CAROTID DOPPLER BILAT; Future  - EC-ECHOCARDIOGRAM COMPLETE W/O CONT; Future    3. Thrombocytopenia (HCC)  Chronic issue. Aysmptomatic. Will recheck labs  - CBC WITH DIFFERENTIAL; Future  - Comp Metabolic Panel; Future    4. RUQ pain  New issue. Recent labs with increase in transaminases.  Will recheck labs and check RUQ sonogram.  Encouraged to avoid tylenol .    - US-RUQ; Future    5. Acquired hypothyroidism  -chronic issue. Improving.  C/w current dose of levothyoxine.   levothyroxine (SYNTHROID) 25 MCG Tab; Take 1 Tablet by mouth every morning. ON A EMPTY STOMACH  Dispense: 100 Tablet; Refill: 3      Return in about 5 weeks (around 10/13/2021).    Please note that this dictation was created using voice recognition software. I have made every reasonable attempt to correct obvious errors, but I expect that there are errors of grammar and possibly content that I did not discover before finalizing the note.

## 2021-09-08 NOTE — ASSESSMENT & PLAN NOTE
Chronic medical diagnosis.  Her last echocardiogram was in 2018.  She was last seen by cardiology in February 2020 with recommendations to follow-up in 1 year.    Continues to get SOB when she walks.

## 2021-09-16 ENCOUNTER — HOSPITAL ENCOUNTER (OUTPATIENT)
Dept: CARDIOLOGY | Facility: MEDICAL CENTER | Age: 81
End: 2021-09-16
Attending: FAMILY MEDICINE
Payer: MEDICARE

## 2021-09-16 DIAGNOSIS — I34.0 MITRAL VALVE INSUFFICIENCY, UNSPECIFIED ETIOLOGY: ICD-10-CM

## 2021-09-16 LAB
LV EJECT FRACT  99904: 75
LV EJECT FRACT MOD 2C 99903: 81.6
LV EJECT FRACT MOD 4C 99902: 72.38
LV EJECT FRACT MOD BP 99901: 77.36

## 2021-09-16 PROCEDURE — 93306 TTE W/DOPPLER COMPLETE: CPT

## 2021-09-16 PROCEDURE — 93306 TTE W/DOPPLER COMPLETE: CPT | Mod: 26 | Performed by: INTERNAL MEDICINE

## 2021-09-17 ENCOUNTER — TELEPHONE (OUTPATIENT)
Dept: HEALTH INFORMATION MANAGEMENT | Facility: OTHER | Age: 81
End: 2021-09-17

## 2021-09-17 NOTE — TELEPHONE ENCOUNTER
Member would like to schedule appointment with cardiologist sooner. Transferred member to Specialty scheduling with [facility]. Also assisted member with rescheduling PCP appointment. No further assistance needed at this time.

## 2021-10-01 ENCOUNTER — HOSPITAL ENCOUNTER (OUTPATIENT)
Dept: RADIOLOGY | Facility: MEDICAL CENTER | Age: 81
End: 2021-10-01
Attending: FAMILY MEDICINE
Payer: MEDICARE

## 2021-10-01 DIAGNOSIS — I34.0 MITRAL VALVE INSUFFICIENCY, UNSPECIFIED ETIOLOGY: ICD-10-CM

## 2021-10-01 DIAGNOSIS — R10.11 RUQ PAIN: ICD-10-CM

## 2021-10-01 PROCEDURE — 76705 ECHO EXAM OF ABDOMEN: CPT

## 2021-10-01 PROCEDURE — 93880 EXTRACRANIAL BILAT STUDY: CPT

## 2021-10-06 ENCOUNTER — TELEPHONE (OUTPATIENT)
Dept: MEDICAL GROUP | Facility: PHYSICIAN GROUP | Age: 81
End: 2021-10-06

## 2021-10-06 NOTE — TELEPHONE ENCOUNTER
1. Caller Name: Reina Munoz                        Call Back Number: [phone] (home)       How would the patient prefer to be contacted with a response: Phone call do NOT leave a detailed message    Patient called and LVM regarding her Labs. She will be getting some done tomorrow and would like to add a Covid Antibody Test to her Labs.Please Advise

## 2021-10-06 NOTE — TELEPHONE ENCOUNTER
Richard Elena- Does Dr. Kwon normally do this?     Quick review of the chart shows patient is unvaccinated, I do not see a positive swab for Covid. If she thinks she was infected in the past there may be antibodies present for 3-9 months, hard to say. It is not recommended to check without a clear reason and there are extensive questions that have to be answered for this to be ordered.    Let me know what you think. Thanks.

## 2021-10-07 ENCOUNTER — HOSPITAL ENCOUNTER (OUTPATIENT)
Dept: LAB | Facility: MEDICAL CENTER | Age: 81
End: 2021-10-07
Attending: FAMILY MEDICINE
Payer: MEDICARE

## 2021-10-07 DIAGNOSIS — D69.6 THROMBOCYTOPENIA (HCC): ICD-10-CM

## 2021-10-07 LAB
ALBUMIN SERPL BCP-MCNC: 4.3 G/DL (ref 3.2–4.9)
ALBUMIN/GLOB SERPL: 1.6 G/DL
ALP SERPL-CCNC: 84 U/L (ref 30–99)
ALT SERPL-CCNC: 65 U/L (ref 2–50)
ANION GAP SERPL CALC-SCNC: 11 MMOL/L (ref 7–16)
AST SERPL-CCNC: 66 U/L (ref 12–45)
BASOPHILS # BLD AUTO: 0.9 % (ref 0–1.8)
BASOPHILS # BLD: 0.04 K/UL (ref 0–0.12)
BILIRUB SERPL-MCNC: 0.7 MG/DL (ref 0.1–1.5)
BUN SERPL-MCNC: 13 MG/DL (ref 8–22)
CALCIUM SERPL-MCNC: 9.6 MG/DL (ref 8.4–10.2)
CHLORIDE SERPL-SCNC: 105 MMOL/L (ref 96–112)
CO2 SERPL-SCNC: 26 MMOL/L (ref 20–33)
CREAT SERPL-MCNC: 0.88 MG/DL (ref 0.5–1.4)
EOSINOPHIL # BLD AUTO: 0.1 K/UL (ref 0–0.51)
EOSINOPHIL NFR BLD: 2.2 % (ref 0–6.9)
ERYTHROCYTE [DISTWIDTH] IN BLOOD BY AUTOMATED COUNT: 46.4 FL (ref 35.9–50)
FASTING STATUS PATIENT QL REPORTED: NORMAL
GLOBULIN SER CALC-MCNC: 2.7 G/DL (ref 1.9–3.5)
GLUCOSE SERPL-MCNC: 109 MG/DL (ref 65–99)
HCT VFR BLD AUTO: 42.4 % (ref 37–47)
HGB BLD-MCNC: 14.2 G/DL (ref 12–16)
IMM GRANULOCYTES # BLD AUTO: 0.01 K/UL (ref 0–0.11)
IMM GRANULOCYTES NFR BLD AUTO: 0.2 % (ref 0–0.9)
LYMPHOCYTES # BLD AUTO: 1.91 K/UL (ref 1–4.8)
LYMPHOCYTES NFR BLD: 42.7 % (ref 22–41)
MCH RBC QN AUTO: 31.3 PG (ref 27–33)
MCHC RBC AUTO-ENTMCNC: 33.5 G/DL (ref 33.6–35)
MCV RBC AUTO: 93.4 FL (ref 81.4–97.8)
MONOCYTES # BLD AUTO: 0.38 K/UL (ref 0–0.85)
MONOCYTES NFR BLD AUTO: 8.5 % (ref 0–13.4)
NEUTROPHILS # BLD AUTO: 2.03 K/UL (ref 2–7.15)
NEUTROPHILS NFR BLD: 45.5 % (ref 44–72)
NRBC # BLD AUTO: 0 K/UL
NRBC BLD-RTO: 0 /100 WBC
PLATELET # BLD AUTO: 158 K/UL (ref 164–446)
PMV BLD AUTO: 11.6 FL (ref 9–12.9)
POTASSIUM SERPL-SCNC: 4.3 MMOL/L (ref 3.6–5.5)
PROT SERPL-MCNC: 7 G/DL (ref 6–8.2)
RBC # BLD AUTO: 4.54 M/UL (ref 4.2–5.4)
SODIUM SERPL-SCNC: 142 MMOL/L (ref 135–145)
WBC # BLD AUTO: 4.5 K/UL (ref 4.8–10.8)

## 2021-10-07 PROCEDURE — 85025 COMPLETE CBC W/AUTO DIFF WBC: CPT

## 2021-10-07 PROCEDURE — 36415 COLL VENOUS BLD VENIPUNCTURE: CPT

## 2021-10-07 PROCEDURE — 80053 COMPREHEN METABOLIC PANEL: CPT

## 2021-10-07 NOTE — TELEPHONE ENCOUNTER
Patient would like to discuss this with Dr. Kwon next week during her appointment since they have discussed her Covid experience in the past.

## 2021-10-11 ENCOUNTER — TELEPHONE (OUTPATIENT)
Dept: MEDICAL GROUP | Facility: PHYSICIAN GROUP | Age: 81
End: 2021-10-11

## 2021-10-11 NOTE — TELEPHONE ENCOUNTER
ESTABLISHED PATIENT PRE-VISIT PLANNING     Patient was contacted to complete PVP.     Note: Patient will not be contacted if there is no indication to call.     1.  Reviewed notes from the last few office visits within the medical group: Yes    2.  If any orders were placed at last visit or intended to be done for this visit (i.e. 6 mos follow-up), do we have Results/Consult Notes?         •  Labs - Labs ordered, completed on 10/7/2021 and results are in chart.  Note: If patient appointment is for lab review and patient did not complete labs, check with provider if OK to reschedule patient until labs completed.       •  Imaging - Imaging ordered, completed and results are in chart.       •  Referrals - No referrals were ordered at last office visit.    3. Is this appointment scheduled as a Hospital Follow-Up? No    4.  Immunizations were updated in Epic using Reconcile Outside Information activity? Yes    5.  Patient is due for the following Health Maintenance Topics:   Health Maintenance Due   Topic Date Due   • COVID-19 Vaccine (1) Never done   • IMM DTaP/Tdap/Td Vaccine (1 - Tdap) Never done   • MAMMOGRAM  07/09/2021   • IMM INFLUENZA (1) 09/01/2021   • Annual Wellness Visit  09/03/2021       AWV scheduled on 10/18/2021 at 1:20 pm    6.  AHA (Pulse8) form printed for Provider? No, already completed

## 2021-10-13 ENCOUNTER — OFFICE VISIT (OUTPATIENT)
Dept: MEDICAL GROUP | Facility: PHYSICIAN GROUP | Age: 81
End: 2021-10-13
Payer: MEDICARE

## 2021-10-13 VITALS
BODY MASS INDEX: 31.08 KG/M2 | DIASTOLIC BLOOD PRESSURE: 88 MMHG | OXYGEN SATURATION: 96 % | HEIGHT: 68 IN | RESPIRATION RATE: 12 BRPM | HEART RATE: 72 BPM | SYSTOLIC BLOOD PRESSURE: 130 MMHG | TEMPERATURE: 98.3 F | WEIGHT: 205.1 LBS

## 2021-10-13 DIAGNOSIS — R79.89 ELEVATED LIVER FUNCTION TESTS: ICD-10-CM

## 2021-10-13 DIAGNOSIS — E03.9 ACQUIRED HYPOTHYROIDISM: ICD-10-CM

## 2021-10-13 DIAGNOSIS — Z20.828 EXPOSURE TO SARS-ASSOCIATED CORONAVIRUS: ICD-10-CM

## 2021-10-13 DIAGNOSIS — Z12.31 ENCOUNTER FOR SCREENING MAMMOGRAM FOR BREAST CANCER: ICD-10-CM

## 2021-10-13 PROCEDURE — 99214 OFFICE O/P EST MOD 30 MIN: CPT | Mod: CS | Performed by: FAMILY MEDICINE

## 2021-10-13 ASSESSMENT — FIBROSIS 4 INDEX: FIB4 SCORE: 4.04

## 2021-10-13 NOTE — ASSESSMENT & PLAN NOTE
Chronic medical diagnosis.  She has been noted to have elevated transaminases probably the last 5 years.  Her previous right upper quadrant sonogram did have findings consistent with hepatic steatosis and or cirrhosis.  Over the last month she has noticed an improvement in her right upper quadrant pain.

## 2021-10-13 NOTE — ASSESSMENT & PLAN NOTE
Chronic medical diagnosis.  Currently taking levothyroxine 25 mcg daily.  Her last set of labs from September did have improvement in TSH with normal free T4.  States that she is feeling well

## 2021-10-13 NOTE — PROGRESS NOTES
CC:   Chief Complaint   Patient presents with   • Follow-Up     5 weeks.    • Lab Results         HPI:   Reina presents today for a 5-week follow-up visit.  She is feeling much better and here to discuss lab/imaging results.  States that she may be switching insurance plans next year and asking if I am on the [employer] plan. She also reminds me that she was very ill in June and is wondering is she had Covid at that time.  Hasn't received the vaccine. She also was able to review  Her echo results with Dr Millard .    Acquired hypothyroidism  Chronic medical diagnosis.  Currently taking levothyroxine 25 mcg daily.  Her last set of labs from September did have improvement in TSH with normal free T4.  States that she is feeling well    Elevated liver function tests  Chronic medical diagnosis.  She has been noted to have elevated transaminases probably the last 5 years.  Her previous right upper quadrant sonogram did have findings consistent with hepatic steatosis and or cirrhosis.  Over the last month she has noticed an improvement in her right upper quadrant pain.      Current Outpatient Medications Ordered in Epic   Medication Sig Dispense Refill   • levothyroxine (SYNTHROID) 25 MCG Tab Take 1 Tablet by mouth every morning. ON A EMPTY STOMACH 100 Tablet 3   • fluticasone (FLONASE) 50 MCG/ACT nasal spray Administer 2 Sprays into affected nostril(S) every day.     • ibuprofen (MOTRIN) 200 MG Tab Take 200 mg by mouth every 6 hours as needed.     • esomeprazole (NEXIUM) 20 MG capsule Take 1 capsule by mouth every morning before breakfast. 100 capsule 2   • Cholecalciferol (VITAMIN D) 125 MCG (5000 UT) Cap Take  by mouth.     • Zinc 30 MG Cap Take  by mouth.     • Omega-3 Fatty Acids (FISH OIL) 1000 MG Cap capsule Take 1,000 mg by mouth 3 times a day, with meals.     • simvastatin (ZOCOR) 10 MG Tab Take 1 Tab by mouth every evening. 90 Tab 3   • Multiple Vitamins-Minerals (MULTIVITAMIN ADULT PO) Take  by mouth.     •  "lisinopril (PRINIVIL) 2.5 MG Tab TAKE 1 TABLET BY MOUTH TWICE A  Tablet 2     No current Epic-ordered facility-administered medications on file.       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    • Dyslipidemia    • GERD (gastroesophageal reflux disease)    • Hypothyroidism     Resolved 2018   • Pleomorphic adenoma of parotid gland 1992, 2004    recurrent, yearly MRI   • Sleep apnea        Social History     Tobacco Use   • Smoking status: Never Smoker   • Smokeless tobacco: Never Used   Vaping Use   • Vaping Use: Never used   Substance Use Topics   • Alcohol use: No     Alcohol/week: 0.0 oz   • Drug use: No       Allergies:  Doxycycline, Penicillins, and Sulfa drugs    Health Maintenance: declining covid vaccine today. Would like antibodies checked first      Objective:       Exam:  /88 (BP Location: Right arm, Patient Position: Sitting, BP Cuff Size: Adult)   Pulse 72   Temp 36.8 °C (98.3 °F) (Temporal)   Resp 12   Ht 1.727 m (5' 8\")   Wt 93 kg (205 lb 1.6 oz)   SpO2 96%   BMI 31.19 kg/m²   Body mass index is 31.19 kg/m².  Wt Readings from Last 4 Encounters:   10/13/21 93 kg (205 lb 1.6 oz)   09/08/21 94.3 kg (208 lb)   07/21/21 93.6 kg (206 lb 4.8 oz)   06/07/21 92.7 kg (204 lb 4.8 oz)       Gen: Alert and oriented, No apparent distress. Appropriately groomed.  Neck: Neck is supple without lymphadenopathy.No thyromegaly.   Lungs: Normal effort, CTA bilaterally, no wheezes, rhonchi, or rales  CV: Regular rate and rhythm. No Lower extremity edema  ABD: soft, nontender, normal bowel sounds, no guarding or rigidity  Skin: No rash noted.      Assessment & Plan:     80 y.o. female with the following -     1. Acquired hypothyroidism  Chronic issue. Improving. C/w current dose of levoxyl  - TSH WITH REFLEX TO FT4; Future  - Comp Metabolic Panel; Future    2. Exposure to SARS-associated coronavirus  - SARS CoV-2 Ab, Total (non-vaccine); Future    3. " Encounter for screening mammogram for breast cancer  - MA-SCREENING MAMMO BILAT W/TOMOSYNTHESIS W/CAD; Future    4. Elevated liver function tests  Chronic issue. Improving.  RUQ sonogram results discussed in detail with her.  Will recheck prior to next appt as they are improving.       Return in about 2 months (around 12/13/2021) for Annual Wellness Visit.    Please note that this dictation was created using voice recognition software. I have made every reasonable attempt to correct obvious errors, but I expect that there are errors of grammar and possibly content that I did not discover before finalizing the note.

## 2021-10-14 DIAGNOSIS — I10 ESSENTIAL HYPERTENSION: ICD-10-CM

## 2021-10-14 RX ORDER — LISINOPRIL 2.5 MG/1
TABLET ORAL
Qty: 200 TABLET | Refills: 2 | Status: SHIPPED | OUTPATIENT
Start: 2021-10-14 | End: 2022-08-08

## 2021-10-14 NOTE — TELEPHONE ENCOUNTER
Received request via: Patient    Was the patient seen in the last year in this department? Yes    Does the patient have an active prescription (recently filled or refills available) for medication(s) requested? No   Last Filled 11/16/2020

## 2021-10-14 NOTE — TELEPHONE ENCOUNTER
Requested Prescriptions     Pending Prescriptions Disp Refills   • lisinopril (PRINIVIL) 2.5 MG Tab [Pharmacy Med Name: LISINOPRIL 2.5 MG TABLET] 200 Tablet 2     Sig: TAKE 1 TABLET BY MOUTH TWICE A DAY   Joselin Kwon M.D.

## 2021-10-18 ENCOUNTER — APPOINTMENT (OUTPATIENT)
Dept: MEDICAL GROUP | Facility: PHYSICIAN GROUP | Age: 81
End: 2021-10-18
Payer: MEDICARE

## 2021-10-22 ENCOUNTER — HOSPITAL ENCOUNTER (OUTPATIENT)
Dept: RADIOLOGY | Facility: MEDICAL CENTER | Age: 81
End: 2021-10-22
Attending: FAMILY MEDICINE
Payer: MEDICARE

## 2021-10-22 DIAGNOSIS — Z12.31 ENCOUNTER FOR SCREENING MAMMOGRAM FOR BREAST CANCER: ICD-10-CM

## 2021-10-22 PROCEDURE — 77063 BREAST TOMOSYNTHESIS BI: CPT

## 2021-10-28 ENCOUNTER — HOSPITAL ENCOUNTER (OUTPATIENT)
Dept: LAB | Facility: MEDICAL CENTER | Age: 81
End: 2021-10-28
Attending: FAMILY MEDICINE
Payer: MEDICARE

## 2021-10-28 DIAGNOSIS — Z20.828 EXPOSURE TO SARS-ASSOCIATED CORONAVIRUS: ICD-10-CM

## 2021-10-28 DIAGNOSIS — E03.9 ACQUIRED HYPOTHYROIDISM: ICD-10-CM

## 2021-10-28 LAB
ALBUMIN SERPL BCP-MCNC: 4.3 G/DL (ref 3.2–4.9)
ALBUMIN/GLOB SERPL: 1.8 G/DL
ALP SERPL-CCNC: 88 U/L (ref 30–99)
ALT SERPL-CCNC: 55 U/L (ref 2–50)
ANION GAP SERPL CALC-SCNC: 12 MMOL/L (ref 7–16)
AST SERPL-CCNC: 56 U/L (ref 12–45)
BILIRUB SERPL-MCNC: 0.6 MG/DL (ref 0.1–1.5)
BUN SERPL-MCNC: 14 MG/DL (ref 8–22)
CALCIUM SERPL-MCNC: 9.7 MG/DL (ref 8.4–10.2)
CHLORIDE SERPL-SCNC: 102 MMOL/L (ref 96–112)
CO2 SERPL-SCNC: 26 MMOL/L (ref 20–33)
CREAT SERPL-MCNC: 0.96 MG/DL (ref 0.5–1.4)
GLOBULIN SER CALC-MCNC: 2.4 G/DL (ref 1.9–3.5)
GLUCOSE SERPL-MCNC: 109 MG/DL (ref 65–99)
POTASSIUM SERPL-SCNC: 4.1 MMOL/L (ref 3.6–5.5)
PROT SERPL-MCNC: 6.7 G/DL (ref 6–8.2)
SARS-COV-2 AB SERPL QL IA: NORMAL
SODIUM SERPL-SCNC: 140 MMOL/L (ref 135–145)
T4 FREE SERPL-MCNC: 1.16 NG/DL (ref 0.93–1.7)
TSH SERPL DL<=0.005 MIU/L-ACNC: 5.6 UIU/ML (ref 0.38–5.33)

## 2021-10-28 PROCEDURE — 84443 ASSAY THYROID STIM HORMONE: CPT

## 2021-10-28 PROCEDURE — 84439 ASSAY OF FREE THYROXINE: CPT

## 2021-10-28 PROCEDURE — 86769 SARS-COV-2 COVID-19 ANTIBODY: CPT

## 2021-10-28 PROCEDURE — 80053 COMPREHEN METABOLIC PANEL: CPT

## 2021-10-28 PROCEDURE — 36415 COLL VENOUS BLD VENIPUNCTURE: CPT

## 2021-11-12 DIAGNOSIS — E03.9 ACQUIRED HYPOTHYROIDISM: ICD-10-CM

## 2021-11-16 DIAGNOSIS — E78.5 DYSLIPIDEMIA: ICD-10-CM

## 2021-11-16 RX ORDER — SIMVASTATIN 10 MG
10 TABLET ORAL EVERY EVENING
Qty: 100 TABLET | Refills: 2 | Status: SHIPPED | OUTPATIENT
Start: 2021-11-16 | End: 2022-09-07

## 2021-11-16 NOTE — TELEPHONE ENCOUNTER
Pt called stating that she got a bill from [employer], so she would like to make a payment, but first she would like to speak with a rep. / Pt was warmed transferred.

## 2021-11-17 NOTE — TELEPHONE ENCOUNTER
Requested Prescriptions     Pending Prescriptions Disp Refills   • simvastatin (ZOCOR) 10 MG Tab 100 Tablet 2     Sig: Take 1 Tablet by mouth every evening.   Joselin Kwon M.D.

## 2021-12-07 ENCOUNTER — TELEPHONE (OUTPATIENT)
Dept: MEDICAL GROUP | Facility: PHYSICIAN GROUP | Age: 81
End: 2021-12-07

## 2021-12-07 NOTE — TELEPHONE ENCOUNTER
ESTABLISHED PATIENT PRE-VISIT PLANNING     Patient was not contacted to complete PVP.     Note: Patient will not be contacted if there is no indication to call.     1.  Reviewed notes from the last few office visits within the medical group: Yes    2.  If any orders were placed at last visit or intended to be done for this visit (i.e. 6 mos follow-up), do we have Results/Consult Notes?         •  Labs - Labs ordered, completed on 10/28/2021 and results are in chart.  Note: If patient appointment is for lab review and patient did not complete labs, check with provider if OK to reschedule patient until labs completed.       •  Imaging - Imaging ordered, completed and results are in chart.       •  Referrals - No referrals were ordered at last office visit.    3. Is this appointment scheduled as a Hospital Follow-Up? No    4.  Immunizations were updated in Epic using Reconcile Outside Information activity? Yes    5.  Patient is due for the following Health Maintenance Topics:   Health Maintenance Due   Topic Date Due   • IMM DTaP/Tdap/Td Vaccine (1 - Tdap) Never done   • IMM INFLUENZA (1) 09/01/2021   • Annual Wellness Visit  09/03/2021   • COVID-19 Vaccine (2 - Pfizer 3-dose booster series) 12/09/2021       6.  AHA (Pulse8) form printed for Provider? No, already completed

## 2021-12-13 ENCOUNTER — OFFICE VISIT (OUTPATIENT)
Dept: MEDICAL GROUP | Facility: PHYSICIAN GROUP | Age: 81
End: 2021-12-13
Payer: MEDICARE

## 2021-12-13 VITALS
SYSTOLIC BLOOD PRESSURE: 128 MMHG | BODY MASS INDEX: 30.8 KG/M2 | HEART RATE: 66 BPM | HEIGHT: 68 IN | DIASTOLIC BLOOD PRESSURE: 76 MMHG | RESPIRATION RATE: 12 BRPM | TEMPERATURE: 97.3 F | WEIGHT: 203.2 LBS | OXYGEN SATURATION: 95 %

## 2021-12-13 DIAGNOSIS — R06.02 SHORTNESS OF BREATH: ICD-10-CM

## 2021-12-13 PROBLEM — J01.80 OTHER ACUTE SINUSITIS: Status: RESOLVED | Noted: 2021-06-07 | Resolved: 2021-12-13

## 2021-12-13 PROCEDURE — 99214 OFFICE O/P EST MOD 30 MIN: CPT | Performed by: FAMILY MEDICINE

## 2021-12-13 RX ORDER — ALBUTEROL SULFATE 90 UG/1
2 AEROSOL, METERED RESPIRATORY (INHALATION) EVERY 6 HOURS PRN
Qty: 1 EACH | Refills: 1 | Status: SHIPPED | OUTPATIENT
Start: 2021-12-13 | End: 2024-02-26 | Stop reason: SDUPTHER

## 2021-12-13 ASSESSMENT — ENCOUNTER SYMPTOMS: GENERAL WELL-BEING: EXCELLENT

## 2021-12-13 ASSESSMENT — FIBROSIS 4 INDEX: FIB4 SCORE: 3.78

## 2021-12-13 ASSESSMENT — PATIENT HEALTH QUESTIONNAIRE - PHQ9: CLINICAL INTERPRETATION OF PHQ2 SCORE: 0

## 2021-12-13 ASSESSMENT — ACTIVITIES OF DAILY LIVING (ADL): BATHING_REQUIRES_ASSISTANCE: 0

## 2021-12-13 NOTE — PROGRESS NOTES
Chief Complaint   Patient presents with   • Shortness of Breath     Noticed more the past couple months.    • Annual Wellness Visit     CC:   Chief Complaint   Patient presents with   • Shortness of Breath     Noticed more the past couple months.    • Annual Wellness Visit         HPI:   Reina presents today for AWV.  However she complains of increasing shortness of breath for at least the last 6 months.       Shortness of breath  New issue going on for at least the last 6 months.  She notices it mainly during the daytime.  Denies any lower extremity edema, chest pain, or coughing.  She has noted some wheezing.  Oxygen saturation is 95% on room air.  She denies needing any inhalers.  States that her allergies are well controlled and she has not had to use her Flonase.  She does have a history of sleep apnea which is well controlled with CPAP.  Her most recent chest x-ray from June was negative for any acute pulmonary issues.  She was noted to have mild cardiomegaly.  Echocardiogram completed in September had findings consistent with mild concentric LVH, normal diastolic function, calcification of the mitral valve, mild mitral and tricuspid regurgitation.  Estimated right ventricular systolic pressure at 30 to 35 mmHg.      Current Outpatient Medications Ordered in Epic   Medication Sig Dispense Refill   • albuterol 108 (90 Base) MCG/ACT Aero Soln inhalation aerosol Inhale 2 Puffs every 6 hours as needed for Shortness of Breath. 1 Each 1   • simvastatin (ZOCOR) 10 MG Tab Take 1 Tablet by mouth every evening. 100 Tablet 2   • lisinopril (PRINIVIL) 2.5 MG Tab TAKE 1 TABLET BY MOUTH TWICE A  Tablet 2   • levothyroxine (SYNTHROID) 25 MCG Tab Take 1 Tablet by mouth every morning. ON A EMPTY STOMACH 100 Tablet 3   • fluticasone (FLONASE) 50 MCG/ACT nasal spray Administer 2 Sprays into affected nostril(S) every day.     • ibuprofen (MOTRIN) 200 MG Tab Take 200 mg by mouth every 6 hours as needed.     • esomeprazole  "(NEXIUM) 20 MG capsule Take 1 capsule by mouth every morning before breakfast. 100 capsule 2   • Cholecalciferol (VITAMIN D) 125 MCG (5000 UT) Cap Take  by mouth.     • Zinc 30 MG Cap Take  by mouth.     • Omega-3 Fatty Acids (FISH OIL) 1000 MG Cap capsule Take 1,000 mg by mouth 3 times a day, with meals.     • Multiple Vitamins-Minerals (MULTIVITAMIN ADULT PO) Take  by mouth.       No current Epic-ordered facility-administered medications on file.       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    • Dyslipidemia    • GERD (gastroesophageal reflux disease)    • Hypothyroidism     Resolved 2018   • Pleomorphic adenoma of parotid gland 1992, 2004    recurrent, yearly MRI   • Sleep apnea        Social History     Tobacco Use   • Smoking status: Never Smoker   • Smokeless tobacco: Never Used   Vaping Use   • Vaping Use: Never used   Substance Use Topics   • Alcohol use: No     Alcohol/week: 0.0 oz   • Drug use: No       Allergies:  Doxycycline, Penicillins, and Sulfa drugs    Health Maintenance: encouraged to get her 2nd covid vaccine.       Objective:       Exam:  /76 (BP Location: Right arm, Patient Position: Sitting, BP Cuff Size: Adult)   Pulse 66   Temp 36.3 °C (97.3 °F) (Temporal)   Resp 12   Ht 1.727 m (5' 8\")   Wt 92.2 kg (203 lb 3.2 oz)   SpO2 95%   BMI 30.90 kg/m²   Body mass index is 30.9 kg/m².  Wt Readings from Last 4 Encounters:   12/13/21 92.2 kg (203 lb 3.2 oz)   10/13/21 93 kg (205 lb 1.6 oz)   09/08/21 94.3 kg (208 lb)   07/21/21 93.6 kg (206 lb 4.8 oz)       Gen: Alert and oriented, No apparent distress. Appropriately groomed.  Neck: Neck is supple without lymphadenopathy.No thyromegaly.   Lungs: Normal effort, CTA bilaterally, few expiratory wheezes  CV: Regular rate and rhythm. No Lower extremity edema  Skin: No rash noted.      Assessment & Plan:     81 y.o. female with the following -     1. Shortness of breath  New issue going on for at " least the last 6 months.  Continues to complain of wheezing.  Denies any chest pain, coughing, fevers or chills.  Does not think this is allergies.  We will go ahead and repeat her chest x-ray in do a trial of albuterol inhaler.  ED precautions discussed with patient.  We will have her follow-up in the next 3 to 4 weeks and then decide on further workup if necessary.  - albuterol 108 (90 Base) MCG/ACT Aero Soln inhalation aerosol; Inhale 2 Puffs every 6 hours as needed for Shortness of Breath.  Dispense: 1 Each; Refill: 1  - DX-CHEST-2 VIEWS; Future        I spent a total of 30 minutes with record review, exam, communication with the patient, communication with other providers, and documentation of this encounter.      Return in about 3 weeks (around 1/3/2022) for dyspnea, Annual Wellness Visit.    Please note that this dictation was created using voice recognition software. I have made every reasonable attempt to correct obvious errors, but I expect that there are errors of grammar and possibly content that I did not discover before finalizing the note.

## 2021-12-13 NOTE — ASSESSMENT & PLAN NOTE
New issue going on for at least the last 6 months.  She notices it mainly during the daytime.  Denies any lower extremity edema, chest pain, or coughing.  She has noted some wheezing.  Oxygen saturation is 95% on room air.  She denies needing any inhalers.  States that her allergies are well controlled and she has not had to use her Flonase.  She does have a history of sleep apnea which is well controlled with CPAP.  Her most recent chest x-ray from June was negative for any acute pulmonary issues.  She was noted to have mild cardiomegaly.

## 2022-01-04 ENCOUNTER — APPOINTMENT (OUTPATIENT)
Dept: RADIOLOGY | Facility: MEDICAL CENTER | Age: 82
End: 2022-01-04
Attending: FAMILY MEDICINE

## 2022-01-05 ENCOUNTER — HOSPITAL ENCOUNTER (OUTPATIENT)
Dept: RADIOLOGY | Facility: MEDICAL CENTER | Age: 82
End: 2022-01-05
Attending: FAMILY MEDICINE
Payer: MEDICARE

## 2022-01-05 DIAGNOSIS — R06.02 SHORTNESS OF BREATH: ICD-10-CM

## 2022-01-05 PROCEDURE — 71046 X-RAY EXAM CHEST 2 VIEWS: CPT

## 2022-01-06 ENCOUNTER — OFFICE VISIT (OUTPATIENT)
Dept: MEDICAL GROUP | Facility: PHYSICIAN GROUP | Age: 82
End: 2022-01-06
Payer: MEDICARE

## 2022-01-06 ENCOUNTER — TELEPHONE (OUTPATIENT)
Dept: MEDICAL GROUP | Facility: PHYSICIAN GROUP | Age: 82
End: 2022-01-06

## 2022-01-06 VITALS
HEIGHT: 68 IN | BODY MASS INDEX: 31.13 KG/M2 | TEMPERATURE: 98.6 F | DIASTOLIC BLOOD PRESSURE: 68 MMHG | SYSTOLIC BLOOD PRESSURE: 130 MMHG | HEART RATE: 63 BPM | OXYGEN SATURATION: 95 % | RESPIRATION RATE: 12 BRPM | WEIGHT: 205.4 LBS

## 2022-01-06 DIAGNOSIS — U07.1 LAB TEST POSITIVE FOR DETECTION OF COVID-19 VIRUS: ICD-10-CM

## 2022-01-06 DIAGNOSIS — D69.6 THROMBOCYTOPENIA (HCC): ICD-10-CM

## 2022-01-06 DIAGNOSIS — J02.9 SORE THROAT: ICD-10-CM

## 2022-01-06 DIAGNOSIS — J34.89 SINUS PAIN: ICD-10-CM

## 2022-01-06 DIAGNOSIS — R05.9 COUGH: ICD-10-CM

## 2022-01-06 DIAGNOSIS — H92.03 EAR PAIN, BILATERAL: ICD-10-CM

## 2022-01-06 LAB
EXTERNAL QUALITY CONTROL: NORMAL
SARS-COV+SARS-COV-2 AG RESP QL IA.RAPID: POSITIVE

## 2022-01-06 PROCEDURE — 87426 SARSCOV CORONAVIRUS AG IA: CPT | Performed by: FAMILY MEDICINE

## 2022-01-06 PROCEDURE — 99213 OFFICE O/P EST LOW 20 MIN: CPT | Mod: CS | Performed by: FAMILY MEDICINE

## 2022-01-06 ASSESSMENT — FIBROSIS 4 INDEX: FIB4 SCORE: 3.78

## 2022-01-06 ASSESSMENT — PATIENT HEALTH QUESTIONNAIRE - PHQ9: CLINICAL INTERPRETATION OF PHQ2 SCORE: 0

## 2022-01-06 NOTE — PROGRESS NOTES
Subjective:     CC:   Chief Complaint   Patient presents with   • Sore Throat     Started on monday. Throat is not as bad today.    • Cough     Lots of mucus build up.    • Earache         HPI:   Reina presents today with onset few d ago with ST, a little better today  Both ears hurt, sinus HA. Some drainage as well and cough w/ sputum  Mild cough / sob but already had a cxr 1/5/22 as ordered by CP when they discussed her persistent cough and shortness of breath a few weeks ago.  In fact she has a follow-up appointment scheduled next week regarding that with her PCP.  Nothing worrisome noted the chest x-ray.  Had a little wheezing yesterday but she has also had wheezing for some time as she described with her PCP.    Denies fevers and has a good appetite.  No rashes or myalgias or arthralgias.  No upset stomach or nausea vomiting or diarrhea or constipation.  She did have Covid in 2020 and this feels much more mild than prior.  She did finish her Covid vaccine with Pfizer on December 18.  She tried sinus tylenol which has been helping.  No known contacts w/ COVID,  had a cold recently.  Lives w/ her .     Problem   Thrombocytopenia (Hcc)    Stable, continue current plan of care with current medications.            Current Outpatient Medications Ordered in Epic   Medication Sig Dispense Refill   • albuterol 108 (90 Base) MCG/ACT Aero Soln inhalation aerosol Inhale 2 Puffs every 6 hours as needed for Shortness of Breath. 1 Each 1   • simvastatin (ZOCOR) 10 MG Tab Take 1 Tablet by mouth every evening. 100 Tablet 2   • lisinopril (PRINIVIL) 2.5 MG Tab TAKE 1 TABLET BY MOUTH TWICE A  Tablet 2   • levothyroxine (SYNTHROID) 25 MCG Tab Take 1 Tablet by mouth every morning. ON A EMPTY STOMACH 100 Tablet 3   • fluticasone (FLONASE) 50 MCG/ACT nasal spray Administer 2 Sprays into affected nostril(S) every day.     • ibuprofen (MOTRIN) 200 MG Tab Take 200 mg by mouth every 6 hours as needed.     •  "esomeprazole (NEXIUM) 20 MG capsule Take 1 capsule by mouth every morning before breakfast. 100 capsule 2   • Cholecalciferol (VITAMIN D) 125 MCG (5000 UT) Cap Take  by mouth.     • Zinc 30 MG Cap Take  by mouth.     • Omega-3 Fatty Acids (FISH OIL) 1000 MG Cap capsule Take 1,000 mg by mouth 3 times a day, with meals.     • Multiple Vitamins-Minerals (MULTIVITAMIN ADULT PO) Take  by mouth.       No current Epic-ordered facility-administered medications on file.       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    • Dyslipidemia    • GERD (gastroesophageal reflux disease)    • Hypothyroidism     Resolved 2018   • Pleomorphic adenoma of parotid gland 1992, 2004    recurrent, yearly MRI   • Sleep apnea        Social History     Tobacco Use   • Smoking status: Never Smoker   • Smokeless tobacco: Never Used   Vaping Use   • Vaping Use: Never used   Substance Use Topics   • Alcohol use: No     Alcohol/week: 0.0 oz   • Drug use: No       Allergies:  Doxycycline, Penicillins, and Sulfa drugs    Health Maintenance: Completed    ROS:  Per HPI      Objective:       Exam:  /68 (BP Location: Right arm, Patient Position: Sitting, BP Cuff Size: Adult)   Pulse 63   Temp 37 °C (98.6 °F) (Temporal)   Resp 12   Ht 1.727 m (5' 8\")   Wt 93.2 kg (205 lb 6.4 oz)   SpO2 95%   BMI 31.23 kg/m²   Body mass index is 31.23 kg/m².  Wt Readings from Last 4 Encounters:   01/06/22 93.2 kg (205 lb 6.4 oz)   12/13/21 92.2 kg (203 lb 3.2 oz)   10/13/21 93 kg (205 lb 1.6 oz)   09/08/21 94.3 kg (208 lb)       Gen: Alert and oriented, No apparent distress. Appropriately groomed.  Neck: Neck is supple without lymphadenopathy.No thyromegaly.   Tm's clear though small clear effusion on L. Nares clear bilat. OP clear mmm.   Lungs: Normal effort, CTA bilaterally, no wheezes, rhonchi, or rales.  CV: Regular rate and rhythm. No murmurs, rubs, or gallops.  ABD:  Soft, nontender, nondistended, NABSx4, no " HSM or RBT or guarding or masses.  Ext: No clubbing, cyanosis, edema.  Skin: No rash noted.      Assessment & Plan:     81 y.o. female with the following -   I explained that her sxs are c/w a viral infection and at this time her symptoms are very mild and seem to be improving.  She primarily made this appointment thinking she had a bronchitis and wanted antibiotics before her symptoms worsened.  I explained to her that no antibiotics are indicated at this time and we reviewed worrisome signs and symptoms to watch for.  Rapid COVID test was positive, She can continue to use her over-the-counter Tylenol sinus medication as needed.  She has follow-up with her PCP scheduled next week already.  Can reschedule that to the following 2 weeks and be seen sooner if her sxs aren't improving.   HH to do verbal referral from my MA to do a vitals check in a few days  1. Cough  - POCT SARS-COV Antigen JEANNETTE (Symptomatic Only)  - Referral to Home Health    2. Thrombocytopenia (HCC)  - Referral to Home Health    3. Sore throat  - Referral to Home Health    4. Ear pain, bilateral  - Referral to Home Health    5. Sinus pain  - Referral to Home Health    6. Lab test positive for detection of COVID-19 virus  - Referral to Home Health    I did wear full protective gear including an N95 and gown plus goggles during the time spent in the exam room with her.  She only had her mask off briefly to examine her oropharynx.  Please note that this dictation was created using voice recognition software. I have made every reasonable attempt to correct obvious errors, but I expect that there are errors of grammar and possibly content that I did not discover before finalizing the note.

## 2022-01-06 NOTE — TELEPHONE ENCOUNTER
ESTABLISHED PATIENT PRE-VISIT PLANNING     Patient was contacted to complete PVP.     Note: Patient will not be contacted if there is no indication to call.     1.  Reviewed notes from the last few office visits within the medical group: Yes    2.  If any orders were placed at last visit or intended to be done for this visit (i.e. 6 mos follow-up), do we have Results/Consult Notes?         •  Labs - Labs were not ordered at last office visit.  Note: If patient appointment is for lab review and patient did not complete labs, check with provider if OK to reschedule patient until labs completed.       •  Imaging - Imaging ordered, completed and results are in chart.       •  Referrals - No referrals were ordered at last office visit.    3. Is this appointment scheduled as a Hospital Follow-Up? No    4.  Immunizations were updated in Epic using Reconcile Outside Information activity? Yes    5.  Patient is due for the following Health Maintenance Topics:   Health Maintenance Due   Topic Date Due   • IMM DTaP/Tdap/Td Vaccine (1 - Tdap) Never done   • Annual Wellness Visit  09/03/2021         6.  AHA (Pulse8) form printed for Provider? Email sent to [facility] requesting form

## 2022-01-10 ENCOUNTER — APPOINTMENT (OUTPATIENT)
Dept: MEDICAL GROUP | Facility: PHYSICIAN GROUP | Age: 82
End: 2022-01-10
Payer: MEDICARE

## 2022-01-14 PROBLEM — U07.1 LAB TEST POSITIVE FOR DETECTION OF COVID-19 VIRUS: Status: ACTIVE | Noted: 2022-01-14

## 2022-01-17 ENCOUNTER — TELEPHONE (OUTPATIENT)
Dept: MEDICAL GROUP | Facility: PHYSICIAN GROUP | Age: 82
End: 2022-01-17

## 2022-01-18 ENCOUNTER — OFFICE VISIT (OUTPATIENT)
Dept: MEDICAL GROUP | Facility: PHYSICIAN GROUP | Age: 82
End: 2022-01-18
Payer: MEDICARE

## 2022-01-18 ENCOUNTER — HOSPITAL ENCOUNTER (OUTPATIENT)
Dept: RADIOLOGY | Facility: MEDICAL CENTER | Age: 82
End: 2022-01-18
Attending: FAMILY MEDICINE
Payer: MEDICARE

## 2022-01-18 VITALS
WEIGHT: 207.4 LBS | HEIGHT: 68 IN | HEART RATE: 62 BPM | BODY MASS INDEX: 31.43 KG/M2 | TEMPERATURE: 97.9 F | RESPIRATION RATE: 14 BRPM | DIASTOLIC BLOOD PRESSURE: 72 MMHG | SYSTOLIC BLOOD PRESSURE: 122 MMHG | OXYGEN SATURATION: 96 %

## 2022-01-18 DIAGNOSIS — D69.6 THROMBOCYTOPENIA (HCC): ICD-10-CM

## 2022-01-18 DIAGNOSIS — R05.9 COUGH IN ADULT: ICD-10-CM

## 2022-01-18 PROCEDURE — 71046 X-RAY EXAM CHEST 2 VIEWS: CPT

## 2022-01-18 PROCEDURE — 99213 OFFICE O/P EST LOW 20 MIN: CPT | Performed by: FAMILY MEDICINE

## 2022-01-18 ASSESSMENT — FIBROSIS 4 INDEX: FIB4 SCORE: 3.78

## 2022-01-18 NOTE — PROGRESS NOTES
Annual Health Assessment Questions:    1.  Are you currently engaging in any exercise or physical activity? No    2.  How would you describe your mood or emotional well-being today? good    3.  Have you had any falls in the last year? No    4.  Have you noticed any problems with your balance or had difficulty walking? No    5.  In the last six months have you experienced any leakage of urine? No    6. DPA/Advanced Directive: Patient has Advanced Directive on file.      CC:   Chief Complaint   Patient presents with   • Cough     Recovering from COVID on 01/06/2022   • Weakness       HPI:   Reina presents today for f/u of her cough after being diagnosed with covid on January 6, 2022.  At that time she was having headaches, sore throat and was dx'd with covid. Resolved after a few days.   Now c/o increasing cough since the weekend. Symptoms going on for the last 4 days. These have not improved or worsened. Complains of a productive cough. Denies any fevers or chills,  Headaches, sore throat. Feels weak and slight SOB. Was taking sinus tylenol but ran out.  at home feels well.  Oxygen saturation is 96% on room air.        Thrombocytopenia (HCC)  Chronic medical diagnosis. This is been present since at least 2018. Most recent labs from October have platelet count of 158,000.      Current Outpatient Medications Ordered in Epic   Medication Sig Dispense Refill   • albuterol 108 (90 Base) MCG/ACT Aero Soln inhalation aerosol Inhale 2 Puffs every 6 hours as needed for Shortness of Breath. 1 Each 1   • simvastatin (ZOCOR) 10 MG Tab Take 1 Tablet by mouth every evening. 100 Tablet 2   • lisinopril (PRINIVIL) 2.5 MG Tab TAKE 1 TABLET BY MOUTH TWICE A  Tablet 2   • levothyroxine (SYNTHROID) 25 MCG Tab Take 1 Tablet by mouth every morning. ON A EMPTY STOMACH 100 Tablet 3   • fluticasone (FLONASE) 50 MCG/ACT nasal spray Administer 2 Sprays into affected nostril(S) every day.     • ibuprofen (MOTRIN) 200 MG Tab Take  "200 mg by mouth every 6 hours as needed.     • esomeprazole (NEXIUM) 20 MG capsule Take 1 capsule by mouth every morning before breakfast. 100 capsule 2   • Cholecalciferol (VITAMIN D) 125 MCG (5000 UT) Cap Take  by mouth.     • Zinc 30 MG Cap Take  by mouth.     • Omega-3 Fatty Acids (FISH OIL) 1000 MG Cap capsule Take 1,000 mg by mouth 3 times a day, with meals.     • Multiple Vitamins-Minerals (MULTIVITAMIN ADULT PO) Take  by mouth.       No current Epic-ordered facility-administered medications on file.       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    • Dyslipidemia    • GERD (gastroesophageal reflux disease)    • Hypothyroidism     Resolved 2018   • Pleomorphic adenoma of parotid gland 1992, 2004    recurrent, yearly MRI   • Sleep apnea        Social History     Tobacco Use   • Smoking status: Never Smoker   • Smokeless tobacco: Never Used   Vaping Use   • Vaping Use: Never used   Substance Use Topics   • Alcohol use: No     Alcohol/week: 0.0 oz   • Drug use: No       Allergies:  Doxycycline, Penicillins, and Sulfa drugs      Objective:       Exam:  /72 (BP Location: Right arm, Patient Position: Sitting, BP Cuff Size: Adult)   Pulse 62   Temp 36.6 °C (97.9 °F) (Temporal)   Resp 14   Ht 1.727 m (5' 8\")   Wt 94.1 kg (207 lb 6.4 oz)   SpO2 96%   BMI 31.54 kg/m²   Body mass index is 31.54 kg/m².  Wt Readings from Last 4 Encounters:   01/18/22 94.1 kg (207 lb 6.4 oz)   01/06/22 93.2 kg (205 lb 6.4 oz)   12/13/21 92.2 kg (203 lb 3.2 oz)   10/13/21 93 kg (205 lb 1.6 oz)       Gen: Alert and oriented, No apparent distress. Appropriately groomed.  Neck: Neck is supple without lymphadenopathy.No thyromegaly.   Lungs: Normal effort, CTA bilaterally, no wheezes, rhonchi, or rales  CV: Regular rate and rhythm. No Lower extremity edema  Skin: No rash noted.      Assessment & Plan:     81 y.o. female with the following -     1. Thrombocytopenia (HCC)  Chronic " finding since 2018.. Stable.     2. Cough in adult  -new issue. Dx'd with covid January 6, 2022. Symptoms resolved and then started having a cough 4 days ago.  Concerned if this is bronchitis. cxr ordered.  Precautions d/w patient.    DX-CHEST-2 VIEWS; Future        No follow-ups on file.    Please note that this dictation was created using voice recognition software. I have made every reasonable attempt to correct obvious errors, but I expect that there are errors of grammar and possibly content that I did not discover before finalizing the note.

## 2022-01-18 NOTE — ASSESSMENT & PLAN NOTE
Chronic medical diagnosis. This is been present since at least 2018. Most recent labs from October have platelet count of 158,000.

## 2022-01-24 ENCOUNTER — OFFICE VISIT (OUTPATIENT)
Dept: MEDICAL GROUP | Facility: PHYSICIAN GROUP | Age: 82
End: 2022-01-24
Payer: MEDICARE

## 2022-01-24 VITALS
SYSTOLIC BLOOD PRESSURE: 132 MMHG | WEIGHT: 205.6 LBS | OXYGEN SATURATION: 96 % | DIASTOLIC BLOOD PRESSURE: 74 MMHG | BODY MASS INDEX: 31.16 KG/M2 | HEIGHT: 68 IN | TEMPERATURE: 97.1 F | HEART RATE: 72 BPM | RESPIRATION RATE: 12 BRPM

## 2022-01-24 DIAGNOSIS — R05.9 COUGH IN ADULT: ICD-10-CM

## 2022-01-24 DIAGNOSIS — E03.9 ACQUIRED HYPOTHYROIDISM: ICD-10-CM

## 2022-01-24 DIAGNOSIS — I70.0 ATHEROSCLEROSIS OF AORTA (HCC): ICD-10-CM

## 2022-01-24 DIAGNOSIS — E78.5 DYSLIPIDEMIA: ICD-10-CM

## 2022-01-24 PROCEDURE — 99214 OFFICE O/P EST MOD 30 MIN: CPT | Performed by: FAMILY MEDICINE

## 2022-01-24 ASSESSMENT — FIBROSIS 4 INDEX: FIB4 SCORE: 3.78

## 2022-01-24 NOTE — ASSESSMENT & PLAN NOTE
Chronic. Had been taking her nexium and thyroid meds together.  Will switch nexium to before lunch.  We will continue taking her levothyroxine 25 mcg daily, 7 days a week

## 2022-01-24 NOTE — PROGRESS NOTES
CC:    Chief Complaint   Patient presents with   • Follow-Up     2 weeks    • Pharyngitis     mainly at night.        HISTORY OF THE PRESENT ILLNESS: Patient is a 81 y.o. female. This pleasant patient is here today for a 1 week follow-up visit.  She was seen last week in our office on 1/18/22 for follow-up of her COVID-19 infection, dx'd on 1/6/22.  Chest x-ray was ordered with negative findings. Continues to complain of a sore throat. Has been taking Halls cough drops with improvement. oxygen sats 96% RA.   Continues to have a sore throat. Worse at nighttime.  No dysphagia. Symptoms are improving. Cough has been better over the last 2 days.     Acquired hypothyroidism  Chronic. Had been taking her nexium and thyroid meds together.  Will switch nexium to before lunch.  We will continue taking her levothyroxine 25 mcg daily, 7 days a week    Atherosclerosis of aorta (HCC)  dyslipidemia  New finding noted on cxr 1/18/22.  Continues to take Zocor 10 mg nightly.  Has been doing this for very long time.  Records reviewed and she has been doing it for at least 6 years, since 2016.      Allergies: Doxycycline, Penicillins, and Sulfa drugs    Current Outpatient Medications Ordered in Epic   Medication Sig Dispense Refill   • albuterol 108 (90 Base) MCG/ACT Aero Soln inhalation aerosol Inhale 2 Puffs every 6 hours as needed for Shortness of Breath. 1 Each 1   • simvastatin (ZOCOR) 10 MG Tab Take 1 Tablet by mouth every evening. 100 Tablet 2   • lisinopril (PRINIVIL) 2.5 MG Tab TAKE 1 TABLET BY MOUTH TWICE A  Tablet 2   • levothyroxine (SYNTHROID) 25 MCG Tab Take 1 Tablet by mouth every morning. ON A EMPTY STOMACH 100 Tablet 3   • fluticasone (FLONASE) 50 MCG/ACT nasal spray Administer 2 Sprays into affected nostril(S) every day.     • ibuprofen (MOTRIN) 200 MG Tab Take 200 mg by mouth every 6 hours as needed.     • esomeprazole (NEXIUM) 20 MG capsule Take 1 capsule by mouth every morning before breakfast. 100  "capsule 2   • Cholecalciferol (VITAMIN D) 125 MCG (5000 UT) Cap Take  by mouth.     • Zinc 30 MG Cap Take  by mouth.     • Omega-3 Fatty Acids (FISH OIL) 1000 MG Cap capsule Take 1,000 mg by mouth 3 times a day, with meals.     • Multiple Vitamins-Minerals (MULTIVITAMIN ADULT PO) Take  by mouth.       No current Epic-ordered facility-administered medications on file.       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    • Dyslipidemia    • GERD (gastroesophageal reflux disease)    • Hypothyroidism     Resolved 2018   • Pleomorphic adenoma of parotid gland 1992, 2004    recurrent, yearly MRI   • Sleep apnea        Past Surgical History:   Procedure Laterality Date   • CATARACT EXTRACTION WITH IOL     • LUMPECTOMY Left     non-cancerous   • PAROTIDECTOMY Left 1992, 2004    recurrent pleomorphic adenoma left parotid gland       Social History     Tobacco Use   • Smoking status: Never Smoker   • Smokeless tobacco: Never Used   Vaping Use   • Vaping Use: Never used   Substance Use Topics   • Alcohol use: No     Alcohol/week: 0.0 oz   • Drug use: No       Social History     Social History Narrative   • Not on file       Family History   Problem Relation Age of Onset   • Cancer Mother         multiple myeloma   • Heart Disease Mother    • Heart Attack Father    • Stroke Father    • Heart Disease Father    • Hypertension Father    • Heart Disease Brother    • Lung Disease Brother    • Stroke Sister    • Cancer Sister         breast   • Sleep Apnea Neg Hx    • Diabetes Neg Hx          Objective:     Exam:   /74 (BP Location: Right arm, Patient Position: Sitting, BP Cuff Size: Adult)   Pulse 72   Temp 36.2 °C (97.1 °F) (Temporal)   Resp 12   Ht 1.727 m (5' 8\")   Wt 93.3 kg (205 lb 9.6 oz)   SpO2 96%   BMI 31.26 kg/m²   Body mass index is 31.26 kg/m².  Wt Readings from Last 4 Encounters:   01/24/22 93.3 kg (205 lb 9.6 oz)   01/18/22 94.1 kg (207 lb 6.4 oz)   01/06/22 " 93.2 kg (205 lb 6.4 oz)   12/13/21 92.2 kg (203 lb 3.2 oz)     General: Normal appearing. No distress. Appropriately groomed.  HEENT: Normocephalic. Eyes conjunctiva clear lids without ptosis,   Neck: Supple, Thyroid is not enlarged.   Oropharynx : no exudates, postnasal drip.   Pulmonary: Clear to ausculation.  Normal effort. No rales, ronchi, or wheezing.  Cardiovascular: Regular rate and rhythm, no lower extremity edema  Skin: Warm and dry.  No obvious lesions.  Musculoskeletal: Normal gait. No extremity cyanosis, clubbing, or edema.  Psych: Normal mood and affect. Alert and oriented x3. Judgment and insight is normal.      Assessment & Plan:   81 y.o. female with the following -    1. Acquired hypothyroidism  Chronic issue.  Improving.  Previous labs from 3 months ago had TSH 5.6 and normal free T4 1.16.  She has been taking levothyroxine 25 mcg daily.  States that she had been taking her Nexium in the mornings as well.  She will continue with Levoxyl in the mornings and switch her Nexium to before lunchtime.  We will plan on rechecking labs in 2 to 3 months.    2. Dyslipidemia  Chronic medical diagnosis.  Improving.  She has been on simvastatin 10 mg for at least 6 years.  - Lipid Profile; Future  - URINALYSIS,CULTURE IF INDICATED; Future  - CBC WITH DIFFERENTIAL; Future    3. Atherosclerosis of aorta (HCC)  New medical diagnosis.  This was noted on recent chest x-ray.  We will continue with simvastatin 10 mg daily.    4. Cough in adult  Ongoing medical problem.  Improving.  We will continue to monitor.    Return in about 3 months (around 4/24/2022) for hypothyroid.    Please note that this dictation was created using voice recognition software. I have made every reasonable attempt to correct obvious errors, but I expect that there are errors of grammar and possibly content that I did not discover before finalizing the note.

## 2022-01-24 NOTE — ASSESSMENT & PLAN NOTE
New finding noted on cxr 1/18/22.  Continues to take Zocor 10 mg nightly.  Has been doing this for very long time.  Records reviewed and she has been doing it for at least 6 years, since 2016.

## 2022-02-03 PROBLEM — G63 POLYNEUROPATHY IN OTHER DISEASES CLASSIFIED ELSEWHERE (HCC): Status: ACTIVE | Noted: 2022-02-03

## 2022-02-03 PROBLEM — N18.30 CKD (CHRONIC KIDNEY DISEASE) STAGE 3, GFR 30-59 ML/MIN: Status: ACTIVE | Noted: 2022-02-03

## 2022-03-23 ENCOUNTER — TELEPHONE (OUTPATIENT)
Dept: HEALTH INFORMATION MANAGEMENT | Facility: OTHER | Age: 82
End: 2022-03-23
Payer: MEDICARE

## 2022-03-30 ENCOUNTER — HOSPITAL ENCOUNTER (EMERGENCY)
Facility: MEDICAL CENTER | Age: 82
End: 2022-03-30
Attending: EMERGENCY MEDICINE
Payer: MEDICARE

## 2022-03-30 ENCOUNTER — APPOINTMENT (OUTPATIENT)
Dept: RADIOLOGY | Facility: MEDICAL CENTER | Age: 82
End: 2022-03-30
Attending: EMERGENCY MEDICINE
Payer: MEDICARE

## 2022-03-30 VITALS
TEMPERATURE: 98.8 F | WEIGHT: 206.79 LBS | BODY MASS INDEX: 30.63 KG/M2 | HEART RATE: 60 BPM | HEIGHT: 69 IN | SYSTOLIC BLOOD PRESSURE: 165 MMHG | DIASTOLIC BLOOD PRESSURE: 77 MMHG | OXYGEN SATURATION: 95 % | RESPIRATION RATE: 18 BRPM

## 2022-03-30 DIAGNOSIS — J45.21 MILD INTERMITTENT REACTIVE AIRWAY DISEASE WITH ACUTE EXACERBATION: ICD-10-CM

## 2022-03-30 LAB
ALBUMIN SERPL BCP-MCNC: 4.6 G/DL (ref 3.2–4.9)
ALBUMIN/GLOB SERPL: 1.4 G/DL
ALP SERPL-CCNC: 99 U/L (ref 30–99)
ALT SERPL-CCNC: 93 U/L (ref 2–50)
ANION GAP SERPL CALC-SCNC: 14 MMOL/L (ref 7–16)
AST SERPL-CCNC: 103 U/L (ref 12–45)
BASOPHILS # BLD AUTO: 0.9 % (ref 0–1.8)
BASOPHILS # BLD: 0.05 K/UL (ref 0–0.12)
BILIRUB SERPL-MCNC: 0.6 MG/DL (ref 0.1–1.5)
BUN SERPL-MCNC: 12 MG/DL (ref 8–22)
CALCIUM SERPL-MCNC: 9.9 MG/DL (ref 8.4–10.2)
CHLORIDE SERPL-SCNC: 105 MMOL/L (ref 96–112)
CO2 SERPL-SCNC: 26 MMOL/L (ref 20–33)
CREAT SERPL-MCNC: 0.88 MG/DL (ref 0.5–1.4)
EOSINOPHIL # BLD AUTO: 0.38 K/UL (ref 0–0.51)
EOSINOPHIL NFR BLD: 6.8 % (ref 0–6.9)
ERYTHROCYTE [DISTWIDTH] IN BLOOD BY AUTOMATED COUNT: 46.4 FL (ref 35.9–50)
FLUAV RNA SPEC QL NAA+PROBE: NEGATIVE
FLUBV RNA SPEC QL NAA+PROBE: NEGATIVE
GFR SERPLBLD CREATININE-BSD FMLA CKD-EPI: 67 ML/MIN/1.73 M 2
GLOBULIN SER CALC-MCNC: 3.3 G/DL (ref 1.9–3.5)
GLUCOSE SERPL-MCNC: 107 MG/DL (ref 65–99)
HCT VFR BLD AUTO: 45.2 % (ref 37–47)
HGB BLD-MCNC: 15.6 G/DL (ref 12–16)
IMM GRANULOCYTES # BLD AUTO: 0.02 K/UL (ref 0–0.11)
IMM GRANULOCYTES NFR BLD AUTO: 0.4 % (ref 0–0.9)
LYMPHOCYTES # BLD AUTO: 1.62 K/UL (ref 1–4.8)
LYMPHOCYTES NFR BLD: 29.1 % (ref 22–41)
MCH RBC QN AUTO: 32.1 PG (ref 27–33)
MCHC RBC AUTO-ENTMCNC: 34.5 G/DL (ref 33.6–35)
MCV RBC AUTO: 93 FL (ref 81.4–97.8)
MONOCYTES # BLD AUTO: 0.38 K/UL (ref 0–0.85)
MONOCYTES NFR BLD AUTO: 6.8 % (ref 0–13.4)
NEUTROPHILS # BLD AUTO: 3.11 K/UL (ref 2–7.15)
NEUTROPHILS NFR BLD: 56 % (ref 44–72)
NRBC # BLD AUTO: 0 K/UL
NRBC BLD-RTO: 0 /100 WBC
PLATELET # BLD AUTO: 152 K/UL (ref 164–446)
PMV BLD AUTO: 10.9 FL (ref 9–12.9)
POTASSIUM SERPL-SCNC: 3.9 MMOL/L (ref 3.6–5.5)
PROT SERPL-MCNC: 7.9 G/DL (ref 6–8.2)
RBC # BLD AUTO: 4.86 M/UL (ref 4.2–5.4)
RSV RNA SPEC QL NAA+PROBE: NEGATIVE
SARS-COV-2 RNA RESP QL NAA+PROBE: NOTDETECTED
SODIUM SERPL-SCNC: 145 MMOL/L (ref 135–145)
SPECIMEN SOURCE: NORMAL
WBC # BLD AUTO: 5.6 K/UL (ref 4.8–10.8)

## 2022-03-30 PROCEDURE — 700102 HCHG RX REV CODE 250 W/ 637 OVERRIDE(OP): Performed by: EMERGENCY MEDICINE

## 2022-03-30 PROCEDURE — 80053 COMPREHEN METABOLIC PANEL: CPT

## 2022-03-30 PROCEDURE — 0241U HCHG SARS-COV-2 COVID-19 NFCT DS RESP RNA 4 TRGT MIC: CPT

## 2022-03-30 PROCEDURE — 96374 THER/PROPH/DIAG INJ IV PUSH: CPT

## 2022-03-30 PROCEDURE — 99285 EMERGENCY DEPT VISIT HI MDM: CPT

## 2022-03-30 PROCEDURE — A9270 NON-COVERED ITEM OR SERVICE: HCPCS | Performed by: EMERGENCY MEDICINE

## 2022-03-30 PROCEDURE — 36415 COLL VENOUS BLD VENIPUNCTURE: CPT

## 2022-03-30 PROCEDURE — C9803 HOPD COVID-19 SPEC COLLECT: HCPCS | Performed by: EMERGENCY MEDICINE

## 2022-03-30 PROCEDURE — 700111 HCHG RX REV CODE 636 W/ 250 OVERRIDE (IP): Performed by: EMERGENCY MEDICINE

## 2022-03-30 PROCEDURE — 85025 COMPLETE CBC W/AUTO DIFF WBC: CPT

## 2022-03-30 PROCEDURE — 71046 X-RAY EXAM CHEST 2 VIEWS: CPT

## 2022-03-30 PROCEDURE — 700105 HCHG RX REV CODE 258: Performed by: EMERGENCY MEDICINE

## 2022-03-30 PROCEDURE — 700101 HCHG RX REV CODE 250: Performed by: EMERGENCY MEDICINE

## 2022-03-30 PROCEDURE — 94640 AIRWAY INHALATION TREATMENT: CPT

## 2022-03-30 RX ORDER — ALBUTEROL SULFATE 90 UG/1
2 AEROSOL, METERED RESPIRATORY (INHALATION) EVERY 6 HOURS PRN
Qty: 8.5 G | Refills: 1 | Status: SHIPPED | OUTPATIENT
Start: 2022-03-30 | End: 2022-04-19

## 2022-03-30 RX ORDER — IPRATROPIUM BROMIDE AND ALBUTEROL SULFATE 2.5; .5 MG/3ML; MG/3ML
3 SOLUTION RESPIRATORY (INHALATION)
Status: DISCONTINUED | OUTPATIENT
Start: 2022-03-30 | End: 2022-03-30 | Stop reason: HOSPADM

## 2022-03-30 RX ORDER — SODIUM CHLORIDE 9 MG/ML
1000 INJECTION, SOLUTION INTRAVENOUS ONCE
Status: COMPLETED | OUTPATIENT
Start: 2022-03-30 | End: 2022-03-30

## 2022-03-30 RX ORDER — ACETAMINOPHEN 325 MG/1
650 TABLET ORAL ONCE
Status: COMPLETED | OUTPATIENT
Start: 2022-03-30 | End: 2022-03-30

## 2022-03-30 RX ORDER — METHYLPREDNISOLONE 4 MG/1
TABLET ORAL
Qty: 1 EACH | Refills: 0 | Status: SHIPPED | OUTPATIENT
Start: 2022-03-30 | End: 2022-04-15

## 2022-03-30 RX ORDER — METHYLPREDNISOLONE SODIUM SUCCINATE 40 MG/ML
40 INJECTION, POWDER, LYOPHILIZED, FOR SOLUTION INTRAMUSCULAR; INTRAVENOUS ONCE
Status: COMPLETED | OUTPATIENT
Start: 2022-03-30 | End: 2022-03-30

## 2022-03-30 RX ADMIN — ACETAMINOPHEN 650 MG: 325 TABLET, FILM COATED ORAL at 19:33

## 2022-03-30 RX ADMIN — IPRATROPIUM BROMIDE AND ALBUTEROL SULFATE 3 ML: 2.5; .5 SOLUTION RESPIRATORY (INHALATION) at 19:57

## 2022-03-30 RX ADMIN — SODIUM CHLORIDE 1000 ML: 9 INJECTION, SOLUTION INTRAVENOUS at 19:33

## 2022-03-30 RX ADMIN — METHYLPREDNISOLONE SODIUM SUCCINATE 40 MG: 40 INJECTION, POWDER, FOR SOLUTION INTRAMUSCULAR; INTRAVENOUS at 19:33

## 2022-03-30 ASSESSMENT — FIBROSIS 4 INDEX: FIB4 SCORE: 3.78

## 2022-03-31 NOTE — ED PROVIDER NOTES
ED Provider Note    CHIEF COMPLAINT  Chief Complaint   Patient presents with   • Cough   • Muscle Ache   • Diarrhea   • Weakness       HPI  Reina Munoz is a 81 y.o. female who presents for 3 to 4 days of runny nose cough myalgias body aches.  The patient apparently has had 2 distinct bouts of COVID-19.  She had 1 at its outset before vaccines were available and then 2 months ago with only omicron strain.  She reports no high fever but reports fatigue and myalgias runny nose and cough.  Symptoms have been present for 3 to 4 days.  She denies any dysuria or hematuria.  No abdominal pain.  She reports after Covid she has had issues with wheezing and chronic cough.  She is not on any home oxygen or use any nebulized breathing treatments.    REVIEW OF SYSTEMS  See HPI for further details.  No lethargy cyanosis apnea fever all other systems are negative.     PAST MEDICAL HISTORY  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    • Dyslipidemia    • GERD (gastroesophageal reflux disease)    • Hypothyroidism     Resolved 2018   • Pleomorphic adenoma of parotid gland 1992, 2004    recurrent, yearly MRI   • Sleep apnea        FAMILY HISTORY  Noncontributory    SOCIAL HISTORY  Social History     Socioeconomic History   • Marital status:    • Number of children: 3   Occupational History   • Occupation: retired -  of [employer]   Tobacco Use   • Smoking status: Never Smoker   • Smokeless tobacco: Never Used   Vaping Use   • Vaping Use: Never used   Substance and Sexual Activity   • Alcohol use: No     Alcohol/week: 0.0 oz   • Drug use: No   • Sexual activity: Yes     Partners: Male     Comment: , [employer], 3 kids     Non-smoker  SURGICAL HISTORY  Past Surgical History:   Procedure Laterality Date   • CATARACT EXTRACTION WITH IOL     • LUMPECTOMY Left     non-cancerous   • PAROTIDECTOMY Left 1992, 2004    recurrent pleomorphic adenoma left parotid gland        CURRENT MEDICATIONS    Current Facility-Administered Medications:   •  methylPREDNISolone (SOLU-MEDROL) 40 MG injection 40 mg, 40 mg, Intravenous, Once, Kalpesh Eli M.D.  •  ipratropium-albuterol (DUONEB) nebulizer solution, 3 mL, Nebulization, Q4H PRN (RT), Kalpesh Eli M.D.  •  NS infusion 1,000 mL, 1,000 mL, Intravenous, Once, Kalpesh Eli M.D.  •  acetaminophen (Tylenol) tablet 650 mg, 650 mg, Oral, Once, Kalpesh Eli M.D.    Current Outpatient Medications:   •  albuterol 108 (90 Base) MCG/ACT Aero Soln inhalation aerosol, Inhale 2 Puffs every 6 hours as needed for Shortness of Breath., Disp: 1 Each, Rfl: 1  •  simvastatin (ZOCOR) 10 MG Tab, Take 1 Tablet by mouth every evening., Disp: 100 Tablet, Rfl: 2  •  lisinopril (PRINIVIL) 2.5 MG Tab, TAKE 1 TABLET BY MOUTH TWICE A DAY, Disp: 200 Tablet, Rfl: 2  •  levothyroxine (SYNTHROID) 25 MCG Tab, Take 1 Tablet by mouth every morning. ON A EMPTY STOMACH, Disp: 100 Tablet, Rfl: 3  •  fluticasone (FLONASE) 50 MCG/ACT nasal spray, Administer 2 Sprays into affected nostril(S) every day., Disp: , Rfl:   •  ibuprofen (MOTRIN) 200 MG Tab, Take 200 mg by mouth every 6 hours as needed., Disp: , Rfl:   •  esomeprazole (NEXIUM) 20 MG capsule, Take 1 capsule by mouth every morning before breakfast., Disp: 100 capsule, Rfl: 2  •  Cholecalciferol (VITAMIN D) 125 MCG (5000 UT) Cap, Take  by mouth., Disp: , Rfl:   •  Zinc 30 MG Cap, Take  by mouth., Disp: , Rfl:   •  Omega-3 Fatty Acids (FISH OIL) 1000 MG Cap capsule, Take 1,000 mg by mouth 3 times a day, with meals., Disp: , Rfl:   •  Multiple Vitamins-Minerals (MULTIVITAMIN ADULT PO), Take  by mouth., Disp: , Rfl:       ALLERGIES  Allergies   Allergen Reactions   • Doxycycline Nausea and Swelling     Rxn - 2018  Tongue swelling   • Penicillins Rash     Rxn - years ago   Pt tolerated Augmentin 6/2018   • Sulfa Drugs Rash     Rxn - many years ago to an unknown med       PHYSICAL EXAM  VITAL SIGNS: BP (!)  "196/77   Pulse 82   Temp 37.1 °C (98.8 °F) (Temporal)   Resp 20   Ht 1.753 m (5' 9\")   Wt 93.8 kg (206 lb 12.7 oz)   SpO2 94%   BMI 30.54 kg/m²       Constitutional: Well developed, Well nourished, No acute distress, Non-toxic appearance.   HENT: Normocephalic, Atraumatic, Bilateral external ears normal, Oropharynx moist, No oral exudates, Nose normal.   Eyes: PERRLA, EOMI, Conjunctiva normal, No discharge.   Neck: Normal range of motion, No tenderness, Supple, No stridor.   Cardiovascular: Normal heart rate, Normal rhythm, No murmurs, No rubs, No gallops.   Thorax & Lungs: Bilateral expiratory wheezes with rhonchi   abdomen: Bowel sounds normal, Soft, No tenderness, No masses, No pulsatile masses.   Skin: Warm, Dry, No erythema, No rash.   Back: No tenderness, No CVA tenderness.   Extremities: Intact distal pulses, No edema, No tenderness, No cyanosis, No clubbing.   Neurologic: Alert & oriented x 3, Normal motor function, Normal sensory function, No focal deficits noted.   Psychiatric: Affect normal, Judgment normal, Mood normal.     DX-CHEST-2 VIEWS   Final Result      No acute cardiopulmonary abnormality identified.        Results for orders placed or performed during the hospital encounter of 03/30/22   CBC with Differential   Result Value Ref Range    WBC 5.6 4.8 - 10.8 K/uL    RBC 4.86 4.20 - 5.40 M/uL    Hemoglobin 15.6 12.0 - 16.0 g/dL    Hematocrit 45.2 37.0 - 47.0 %    MCV 93.0 81.4 - 97.8 fL    MCH 32.1 27.0 - 33.0 pg    MCHC 34.5 33.6 - 35.0 g/dL    RDW 46.4 35.9 - 50.0 fL    Platelet Count 152 (L) 164 - 446 K/uL    MPV 10.9 9.0 - 12.9 fL    Neutrophils-Polys 56.00 44.00 - 72.00 %    Lymphocytes 29.10 22.00 - 41.00 %    Monocytes 6.80 0.00 - 13.40 %    Eosinophils 6.80 0.00 - 6.90 %    Basophils 0.90 0.00 - 1.80 %    Immature Granulocytes 0.40 0.00 - 0.90 %    Nucleated RBC 0.00 /100 WBC    Neutrophils (Absolute) 3.11 2.00 - 7.15 K/uL    Lymphs (Absolute) 1.62 1.00 - 4.80 K/uL    Monos (Absolute) " 0.38 0.00 - 0.85 K/uL    Eos (Absolute) 0.38 0.00 - 0.51 K/uL    Baso (Absolute) 0.05 0.00 - 0.12 K/uL    Immature Granulocytes (abs) 0.02 0.00 - 0.11 K/uL    NRBC (Absolute) 0.00 K/uL   Complete Metabolic Panel (CMP)   Result Value Ref Range    Sodium 145 135 - 145 mmol/L    Potassium 3.9 3.6 - 5.5 mmol/L    Chloride 105 96 - 112 mmol/L    Co2 26 20 - 33 mmol/L    Anion Gap 14.0 7.0 - 16.0    Glucose 107 (H) 65 - 99 mg/dL    Bun 12 8 - 22 mg/dL    Creatinine 0.88 0.50 - 1.40 mg/dL    Calcium 9.9 8.4 - 10.2 mg/dL    AST(SGOT) 103 (H) 12 - 45 U/L    ALT(SGPT) 93 (H) 2 - 50 U/L    Alkaline Phosphatase 99 30 - 99 U/L    Total Bilirubin 0.6 0.1 - 1.5 mg/dL    Albumin 4.6 3.2 - 4.9 g/dL    Total Protein 7.9 6.0 - 8.2 g/dL    Globulin 3.3 1.9 - 3.5 g/dL    A-G Ratio 1.4 g/dL   COV-2, FLU A/B, AND RSV BY PCR (2-4 HOURS CEPHEID): Collect NP swab in VTM    Specimen: Nasopharyngeal; Respirate   Result Value Ref Range    Influenza virus A RNA Negative Negative    Influenza virus B, PCR Negative Negative    RSV, PCR Negative Negative    SARS-CoV-2 by PCR NotDetected     SARS-CoV-2 Source NP Swab    ESTIMATED GFR   Result Value Ref Range    GFR (CKD-EPI) 67 >60 mL/min/1.73 m 2       COURSE & MEDICAL DECISION MAKING  Pertinent Labs & Imaging studies reviewed. (See chart for details)  Presents here with some mild runny nose cough congestion and more than anything wheezing.  The patient reports that ever since she had COVID-19 she has developed secondary asthma/reactive airway disease.  She is never been a smoker.  Here she has reassuring laboratory studies no leukocytosis or bandemia.  Influenza a, B as well as Covid and RSV are all negative.  Chest x-ray did not demonstrate any focal infiltrate to suggest pneumonia and she had no hypoxia or high fever.  After treatment with a dose of IV steroids as well as a breathing treatment her work of breathing improved.  I counseled the patient that I did not feel that antibiotics are  clinically indicated as this is likely a nontypeable viral URI triggering some reactive airway disease.  I will discharge her home on a Medrol Dosepak and refill her albuterol inhaler.  Return precautions were reviewed    FINAL IMPRESSION  1.  Acute reactive airway disease    2.  Viral URI           Electronically signed by: Kalpesh Eli M.D., 3/30/2022 7:05 PM

## 2022-03-31 NOTE — ED TRIAGE NOTES
"PT amb to triage with   Chief Complaint   Patient presents with   • Cough   • Muscle Ache   • Diarrhea   • Weakness     PT reports above s/s X appox 5 days.     BP (!) 196/77   Pulse 82   Temp 37.1 °C (98.8 °F) (Temporal)   Resp 20   Ht 1.753 m (5' 9\")   Wt 93.8 kg (206 lb 12.7 oz)   SpO2 94%   BMI 30.54 kg/m²     "

## 2022-04-15 ENCOUNTER — OFFICE VISIT (OUTPATIENT)
Dept: MEDICAL GROUP | Facility: PHYSICIAN GROUP | Age: 82
End: 2022-04-15
Payer: MEDICARE

## 2022-04-15 VITALS
BODY MASS INDEX: 30.56 KG/M2 | TEMPERATURE: 98.2 F | HEIGHT: 69 IN | OXYGEN SATURATION: 95 % | WEIGHT: 206.3 LBS | SYSTOLIC BLOOD PRESSURE: 150 MMHG | HEART RATE: 66 BPM | DIASTOLIC BLOOD PRESSURE: 80 MMHG | RESPIRATION RATE: 15 BRPM

## 2022-04-15 DIAGNOSIS — K21.9 GASTROESOPHAGEAL REFLUX DISEASE WITHOUT ESOPHAGITIS: ICD-10-CM

## 2022-04-15 DIAGNOSIS — R05.9 COUGH IN ADULT: ICD-10-CM

## 2022-04-15 DIAGNOSIS — R06.2 EXPIRATORY WHEEZING: ICD-10-CM

## 2022-04-15 PROCEDURE — 99215 OFFICE O/P EST HI 40 MIN: CPT | Performed by: FAMILY MEDICINE

## 2022-04-15 RX ORDER — DEXAMETHASONE 6 MG/1
TABLET ORAL
Qty: 4 TABLET | Refills: 0 | Status: SHIPPED | OUTPATIENT
Start: 2022-04-15 | End: 2022-04-19

## 2022-04-15 ASSESSMENT — FIBROSIS 4 INDEX: FIB4 SCORE: 5.55

## 2022-04-15 NOTE — PROGRESS NOTES
Subjective:     CC:   Chief Complaint   Patient presents with   • Cough        HPI:   Reina presents today with ongoing cough. States that she has productive cough that is white, states she has SOB, fatigue, dizziness.  States she has bought 2 air purifiers for her house to see if this helps with her allergies..    States she takes coricidin to help with her congestion.  She continues with Flonase, Allegra daily for her allergies.  She recently took a Medrol Dosepak that she received from the ER which did not improve her cough.  She was told at that time she has an upper respiratory infection, chest x-ray was negative for pneumonia or hyperinflation of the lungs at that time.  Per ER diagnosis she had acute reactive airway disease.  Denies chest pain, thoracic back pain, orthopnea, lower leg edema.    No problem-specific Assessment & Plan notes found for this encounter.      Current Outpatient Medications Ordered in Epic   Medication Sig Dispense Refill   • esomeprazole (NEXIUM) 20 MG capsule TAKE 1 CAPSULE BY MOUTH EVERY DAY IN THE MORNING BEFORE BREAKFAST 100 Capsule 2   • Fexofenadine HCl (ALLEGRA ALLERGY PO) Take 1 Tablet by mouth every day.     • dexamethasone (DECADRON) 6 MG Tab Take 2 tablets daily x 2 days 4 Tablet 0   • albuterol 108 (90 Base) MCG/ACT Aero Soln inhalation aerosol Inhale 2 Puffs every 6 hours as needed for Shortness of Breath. 8.5 g 1   • albuterol 108 (90 Base) MCG/ACT Aero Soln inhalation aerosol Inhale 2 Puffs every 6 hours as needed for Shortness of Breath. 1 Each 1   • simvastatin (ZOCOR) 10 MG Tab Take 1 Tablet by mouth every evening. 100 Tablet 2   • lisinopril (PRINIVIL) 2.5 MG Tab TAKE 1 TABLET BY MOUTH TWICE A  Tablet 2   • levothyroxine (SYNTHROID) 25 MCG Tab Take 1 Tablet by mouth every morning. ON A EMPTY STOMACH 100 Tablet 3   • fluticasone (FLONASE) 50 MCG/ACT nasal spray Administer 2 Sprays into affected nostril(S) every day.     • ibuprofen (MOTRIN) 200 MG Tab Take  "200 mg by mouth every 6 hours as needed.     • Cholecalciferol (VITAMIN D) 125 MCG (5000 UT) Cap Take  by mouth.     • Zinc 30 MG Cap Take  by mouth.     • Omega-3 Fatty Acids (FISH OIL) 1000 MG Cap capsule Take 1,000 mg by mouth 3 times a day, with meals.     • Multiple Vitamins-Minerals (MULTIVITAMIN ADULT PO) Take  by mouth.       No current Epic-ordered facility-administered medications on file.       Health Maintenance: Completed    ROS:  Gen: no fevers/chills, no changes in weight  Eyes: no changes in vision  ENT: no sore throat, no hearing loss, no bloody nose  Pulm: no sob, +cough  CV: no chest pain, no palpitations  GI: no nausea/vomiting, no diarrhea  : no dysuria  MSk: no myalgias  Skin: no rash  Neuro: no headaches, no numbness/tingling  Heme/Lymph: no easy bruising      Objective:     Exam:  /80 (BP Location: Left arm, Patient Position: Sitting, BP Cuff Size: Adult)   Pulse 66   Temp 36.8 °C (98.2 °F) (Temporal)   Resp 15   Ht 1.753 m (5' 9\")   Wt 93.6 kg (206 lb 4.8 oz)   SpO2 95%   BMI 30.47 kg/m²  Body mass index is 30.47 kg/m².    Physical Exam  Vitals reviewed.   Constitutional:       General: She is not in acute distress.  HENT:      Mouth/Throat:      Mouth: Mucous membranes are moist.      Pharynx: Oropharynx is clear. No oropharyngeal exudate.   Eyes:      Conjunctiva/sclera: Conjunctivae normal.      Pupils: Pupils are equal, round, and reactive to light.   Cardiovascular:      Rate and Rhythm: Normal rate and regular rhythm.      Pulses: Normal pulses.      Heart sounds: No murmur heard.  Pulmonary:      Effort: Pulmonary effort is normal. No respiratory distress.      Breath sounds: Wheezing and rhonchi present. No rales.   Chest:      Chest wall: No tenderness.   Musculoskeletal:      Right lower leg: No edema.      Left lower leg: No edema.   Skin:     General: Skin is warm.   Neurological:      Mental Status: She is alert and oriented to person, place, and time. "   Psychiatric:         Mood and Affect: Mood normal.         Behavior: Behavior normal.           A chaperone was offered to the patient during today's exam. Patient declined chaperone.      Assessment & Plan:     81 y.o. female with the following -     1. Cough in adult  Chronic, intermittently worsened over the last few months.  She has had COVID twice in the last year so her chronic cough and congestion could be due to residual inflammation due to Covid.  Discussed that I would like to order a pulmonary function test to rule out asthma or restrictive airway disease.  On exam she does have expiratory wheezing and coarse lung sounds in anterior and posterior lung fields.  She states she does feel like she gets short of breath with activity however at night she feels okay because she does wear a CPAP.   discussed I will give her a short burst of dexamethasone which we typically use for asthma exacerbations, her symptoms present similar to an asthma exacerbation at this time.  She will come back to the clinic in 4 days so I can recheck her lung sounds.  If she cannot get in for her PFTs prior to that we will help schedule in clinic at that time.  ED precautions given.  - PULMONARY FUNCTION TESTS -Test requested: Complete Pulmonary Function Test; Future    2. Expiratory wheezing  - dexamethasone (DECADRON) 6 MG Tab; Take 2 tablets daily x 2 days  Dispense: 4 Tablet; Refill: 0    Other orders  - Fexofenadine HCl (ALLEGRA ALLERGY PO); Take 1 Tablet by mouth every day.       I spent a total of 41 minutes with record review, exam, communication with the patient, communication with other providers, and documentation of this encounter.      No follow-ups on file.    Please note that this dictation was created using voice recognition software. I have made every reasonable attempt to correct obvious errors, but I expect that there are errors of grammar and possibly content that I did not discover before finalizing the  note.

## 2022-04-15 NOTE — TELEPHONE ENCOUNTER
Requested Prescriptions     Pending Prescriptions Disp Refills   • esomeprazole (NEXIUM) 20 MG capsule [Pharmacy Med Name: ESOMEPRAZOLE MAG DR 20 MG CAP] 100 Capsule 2     Sig: TAKE 1 CAPSULE BY MOUTH EVERY DAY IN THE MORNING BEFORE BREAKFAST   Joselin Kwon M.D.

## 2022-04-19 ENCOUNTER — OFFICE VISIT (OUTPATIENT)
Dept: MEDICAL GROUP | Facility: PHYSICIAN GROUP | Age: 82
End: 2022-04-19
Payer: MEDICARE

## 2022-04-19 VITALS
OXYGEN SATURATION: 95 % | HEART RATE: 60 BPM | RESPIRATION RATE: 18 BRPM | TEMPERATURE: 97.3 F | WEIGHT: 206 LBS | SYSTOLIC BLOOD PRESSURE: 126 MMHG | BODY MASS INDEX: 30.51 KG/M2 | HEIGHT: 69 IN | DIASTOLIC BLOOD PRESSURE: 80 MMHG

## 2022-04-19 DIAGNOSIS — R05.9 COUGH IN ADULT: ICD-10-CM

## 2022-04-19 DIAGNOSIS — R06.02 SHORTNESS OF BREATH: ICD-10-CM

## 2022-04-19 PROCEDURE — 99213 OFFICE O/P EST LOW 20 MIN: CPT | Performed by: FAMILY MEDICINE

## 2022-04-19 RX ORDER — IPRATROPIUM BROMIDE AND ALBUTEROL SULFATE 2.5; .5 MG/3ML; MG/3ML
3 SOLUTION RESPIRATORY (INHALATION) 4 TIMES DAILY
Qty: 1 EACH | Refills: 1 | Status: SHIPPED | OUTPATIENT
Start: 2022-04-19 | End: 2022-06-29

## 2022-04-19 ASSESSMENT — FIBROSIS 4 INDEX: FIB4 SCORE: 5.55

## 2022-04-19 NOTE — PROGRESS NOTES
Subjective:     CC:   Chief Complaint   Patient presents with   • Cough     Still coughing, getting better        HPI:   Reina presents today with follow-up regarding her ongoing cough, shortness of breath, and fatigue.  On previous exam on 4/15/2022 she had extensive wheezing and rhonchi in bilateral posterior upper and lower lobes. She was seen in clinic on Friday due to this after recently being in the ER where she was given a Medrol Dosepak which did not improve her cough.  She states that while she was in the ER she was given a DuoNeb treatment which did improve her shortness of breath and cough at that time.  She would like a prescription for this sent to her pharmacy.  Discussed this may take a few days to get the nebulizer machine through her [employer] preferred.  She had a chest x-ray at that time that ruled out pneumonia or bronchitis.  She denies any dizziness, chest pain, orthopnea, or lower leg edema. No problem-specific Assessment & Plan notes found for this encounter.      Current Outpatient Medications Ordered in Epic   Medication Sig Dispense Refill   • ipratropium-albuterol (DUONEB) 0.5-2.5 (3) MG/3ML nebulizer solution Take 3 mL by nebulization 4 times a day. 1 Each 1   • esomeprazole (NEXIUM) 20 MG capsule TAKE 1 CAPSULE BY MOUTH EVERY DAY IN THE MORNING BEFORE BREAKFAST 100 Capsule 2   • Fexofenadine HCl (ALLEGRA ALLERGY PO) Take 1 Tablet by mouth every day.     • albuterol 108 (90 Base) MCG/ACT Aero Soln inhalation aerosol Inhale 2 Puffs every 6 hours as needed for Shortness of Breath. 1 Each 1   • simvastatin (ZOCOR) 10 MG Tab Take 1 Tablet by mouth every evening. 100 Tablet 2   • lisinopril (PRINIVIL) 2.5 MG Tab TAKE 1 TABLET BY MOUTH TWICE A  Tablet 2   • levothyroxine (SYNTHROID) 25 MCG Tab Take 1 Tablet by mouth every morning. ON A EMPTY STOMACH 100 Tablet 3   • fluticasone (FLONASE) 50 MCG/ACT nasal spray Administer 2 Sprays into affected nostril(S) every day.     • ibuprofen  "(MOTRIN) 200 MG Tab Take 200 mg by mouth every 6 hours as needed.     • Cholecalciferol (VITAMIN D) 125 MCG (5000 UT) Cap Take  by mouth.     • Zinc 30 MG Cap Take  by mouth.     • Omega-3 Fatty Acids (FISH OIL) 1000 MG Cap capsule Take 1,000 mg by mouth 3 times a day, with meals.     • Multiple Vitamins-Minerals (MULTIVITAMIN ADULT PO) Take  by mouth.       No current Epic-ordered facility-administered medications on file.       Health Maintenance: Completed    ROS:  Gen: no fevers/chills, no changes in weight  Eyes: no changes in vision  ENT: no sore throat, no hearing loss, no bloody nose  Pulm: no sob, + cough  CV: no chest pain, no palpitations  GI: no nausea/vomiting, no diarrhea  : no dysuria  MSk: no myalgias  Skin: no rash  Neuro: no headaches, no numbness/tingling  Heme/Lymph: no easy bruising      Objective:     Exam:  /80 (BP Location: Right arm, Patient Position: Sitting, BP Cuff Size: Adult)   Pulse 60   Temp 36.3 °C (97.3 °F) (Temporal)   Resp 18   Ht 1.753 m (5' 9\")   Wt 93.4 kg (206 lb)   SpO2 95%   BMI 30.42 kg/m²  Body mass index is 30.42 kg/m².  Physical Exam  Vitals reviewed.   Constitutional:       General: She is not in acute distress.     Appearance: Normal appearance. She is not ill-appearing.   HENT:      Nose: Nose normal.      Mouth/Throat:      Mouth: Mucous membranes are moist.      Pharynx: Oropharynx is clear.   Eyes:      Conjunctiva/sclera: Conjunctivae normal.      Pupils: Pupils are equal, round, and reactive to light.   Cardiovascular:      Rate and Rhythm: Normal rate and regular rhythm.      Pulses: Normal pulses.   Pulmonary:      Breath sounds: Rhonchi present.      Comments: Slight rhonchi in in left upper lobe  Musculoskeletal:      Right lower leg: No edema.      Left lower leg: No edema.   Skin:     General: Skin is warm.   Neurological:      Mental Status: She is alert and oriented to person, place, and time.   Psychiatric:         Mood and Affect: Mood " normal.         Behavior: Behavior normal.         A chaperone was offered to the patient during today's exam. Patient declined chaperone.      Assessment & Plan:     81 y.o. female with the following -     1. Shortness of breath  Improved, resolved since her short dose of steroids treatment that she took over the weekend.  ED precautions given.  2. Cough in adult  Chronic, improved with recent steroid burst. This does not appear to be a cardiac issue as her most recent echocardiogram was normal, her last chest x-ray was normal and did not show any hyperinflation of the lungs or enlargement of the heart.  She has not had a pulmonary function test, ordered at her last visit as urgent however she has still not received a call to schedule at this time , she was told they were waiting for insurance authorization.  If she does not hear back in 1 to 2 days she will let us know and we will follow-up on the order.  On exam her lung sounds are much improved from Friday, she has slight rhonchi in her left upper lobe however most of the rhonchi has now resolved and the wheezing is gone since the short burst steroid dose.  ED precautions given, discussed she can also call here to see if she can get in for an appointment if her symptoms worsen.  PCP appointment scheduled on 5/17/2022.  - ipratropium-albuterol (DUONEB) 0.5-2.5 (3) MG/3ML nebulizer solution; Take 3 mL by nebulization 4 times a day.  Dispense: 1 Each; Refill: 1       I spent a total of 21 minutes with record review, exam, communication with the patient, communication with other providers, and documentation of this encounter.      Return if symptoms worsen or fail to improve.    Please note that this dictation was created using voice recognition software. I have made every reasonable attempt to correct obvious errors, but I expect that there are errors of grammar and possibly content that I did not discover before finalizing the note.    Annual Health Assessment  Questions:    1.  Are you currently engaging in any exercise or physical activity? No    2.  How would you describe your mood or emotional well-being today? good    3.  Have you had any falls in the last year? No    4.  Have you noticed any problems with your balance or had difficulty walking? No    5.  In the last six months have you experienced any leakage of urine? Yes    6. DPA/Advanced Directive: Patient does not have an Advanced Directive.  A packet and workshop information was given on Advanced Directives.

## 2022-04-29 ENCOUNTER — SUPERVISING PHYSICIAN REVIEW (OUTPATIENT)
Dept: MEDICAL GROUP | Facility: PHYSICIAN GROUP | Age: 82
End: 2022-04-29
Payer: MEDICARE

## 2022-04-29 NOTE — PROGRESS NOTES
I have reviewed and agree with history, assessment and plan for office encounter on 4/19/22 with Advanced Practice Provider: bharat mejia  Face to face encounter/direct observation: no  Suggested changes to plan or follow-up: agree with assessment and plan. PFTs ordered. ED precautions d/w patient. F/u PCP 5/17.   Joselin Kwon M.D.

## 2022-05-13 ENCOUNTER — APPOINTMENT (OUTPATIENT)
Dept: PULMONOLOGY | Facility: MEDICAL CENTER | Age: 82
End: 2022-05-13
Attending: FAMILY MEDICINE
Payer: MEDICARE

## 2022-05-17 ENCOUNTER — OFFICE VISIT (OUTPATIENT)
Dept: MEDICAL GROUP | Facility: PHYSICIAN GROUP | Age: 82
End: 2022-05-17
Payer: MEDICARE

## 2022-05-17 VITALS
HEIGHT: 69 IN | RESPIRATION RATE: 14 BRPM | HEART RATE: 61 BPM | TEMPERATURE: 98 F | BODY MASS INDEX: 30.66 KG/M2 | DIASTOLIC BLOOD PRESSURE: 66 MMHG | OXYGEN SATURATION: 95 % | WEIGHT: 207 LBS | SYSTOLIC BLOOD PRESSURE: 128 MMHG

## 2022-05-17 DIAGNOSIS — R32 URINARY INCONTINENCE, UNSPECIFIED TYPE: ICD-10-CM

## 2022-05-17 DIAGNOSIS — D69.6 THROMBOCYTOPENIA (HCC): ICD-10-CM

## 2022-05-17 DIAGNOSIS — R06.02 SHORTNESS OF BREATH: ICD-10-CM

## 2022-05-17 DIAGNOSIS — E03.9 ACQUIRED HYPOTHYROIDISM: ICD-10-CM

## 2022-05-17 DIAGNOSIS — M79.89 LEFT LEG SWELLING: ICD-10-CM

## 2022-05-17 DIAGNOSIS — E78.5 DYSLIPIDEMIA: ICD-10-CM

## 2022-05-17 DIAGNOSIS — R73.09 ELEVATED HEMOGLOBIN A1C: ICD-10-CM

## 2022-05-17 PROCEDURE — 99214 OFFICE O/P EST MOD 30 MIN: CPT | Performed by: FAMILY MEDICINE

## 2022-05-17 ASSESSMENT — FIBROSIS 4 INDEX: FIB4 SCORE: 5.55

## 2022-05-17 NOTE — ASSESSMENT & PLAN NOTE
Chronic medical diagnosis.  Continues to take Zocor 10 mg daily.   Latest Reference Range & Units 09/03/21 07:23   Cholesterol,Tot 100 - 199 mg/dL 179   Triglycerides 0 - 149 mg/dL 174 (H)   HDL >=40 mg/dL 55   LDL <100 mg/dL 89   (H): Data is abnormally high

## 2022-05-17 NOTE — ASSESSMENT & PLAN NOTE
Chronic medical diagnosis.  This has been going on for some time.  She has had further evaluation of her swelling in the past.  Last sonogram was in 2018 with negative findings.

## 2022-05-17 NOTE — ASSESSMENT & PLAN NOTE
Chronic medical diagnosis.  She has had thrombocytopenia since at least 2018.  Most recent labs from March have platelet counts at 152,000. Denies any easy brusing. Has some nosebleeds due to sinus issues. Denies any hx hepatitis or etoh. Does have hx fatty liver.

## 2022-05-17 NOTE — ASSESSMENT & PLAN NOTE
Chronic medical diagnosis.  Continues to take Levoxyl 25 mcg daily.   Latest Reference Range & Units 10/28/21 07:44   TSH 0.380 - 5.330 uIU/mL 5.600 (H) [1]   Free T-4 0.93 - 1.70 ng/dL 1.16   (H): Data is abnormally high  [1] Please note new reference ranges effective 12/19/2017 12:30 PM    Pregnant Females, 1st Trimester  0.050-3.700  Pregnant Females, 2nd Trimester  0.310-4.350  Pregnant Females, 3rd Trimester  0.410-5.180

## 2022-05-17 NOTE — ASSESSMENT & PLAN NOTE
Ongoing medical problem.  Not improving.  She has noticed some urinary leakage.  Was following up with Dr. Esteban in the past.  Requesting a new referral.

## 2022-05-17 NOTE — PROGRESS NOTES
Annual Health Assessment Questions:    1.  Are you currently engaging in any exercise or physical activity? No    2.  How would you describe your mood or emotional well-being today? good    3.  Have you had any falls in the last year? No    4.  Have you noticed any problems with your balance or had difficulty walking? No    5.  In the last six months have you experienced any leakage of urine? Yes    6. DPA/Advanced Directive: Patient has Advanced Directive, but it is not on file. Instructed to bring in a copy to scan into their chart.     CC:   Chief Complaint   Patient presents with   • Follow-Up     4 months. Has been seeing April for a viral infection.    • Referral Needed     [facility]    • Shortness of Breath         HPI:   Reina presents today for a follow-up visit.  Last seen by me on January 24    Since our last appointment, has been seen by:  ED 3/30 for a viral upper respiratory syndrome.  Chest x-ray was completed which was negative.  In the emergency room she was treated with a dose of IV steroids as well as breathing treatment  Rachana David on April 15 .  At this time she was prescribed dexamethasone, Allegra, pulmonary function tests  Seen again by Rachana David on April 19 for follow-up of her shortness of breath and cough.  At this time she was given a prescription for nebulizer treatments  Continues to feel SOB but improving. Cough better.  Last used her nebs about a month ago. Fatigue has improved.  Currently not using any inhalers.hasn't had PFTs completed . Wanted to d/w me further to see if this was still recommended.     Upcoming appointments with:  pfts 5/25    Acquired hypothyroidism  Chronic medical diagnosis.  Continues to take Levoxyl 25 mcg daily.   Latest Reference Range & Units 10/28/21 07:44   TSH 0.380 - 5.330 uIU/mL 5.600 (H) [1]   Free T-4 0.93 - 1.70 ng/dL 1.16   (H): Data is abnormally high  [1] Please note new reference ranges effective 12/19/2017 12:30  PM    Pregnant Females, 1st Trimester  0.050-3.700  Pregnant Females, 2nd Trimester  0.310-4.350  Pregnant Females, 3rd Trimester  0.410-5.180    Dyslipidemia  Chronic medical diagnosis.  Continues to take Zocor 10 mg daily.   Latest Reference Range & Units 09/03/21 07:23   Cholesterol,Tot 100 - 199 mg/dL 179   Triglycerides 0 - 149 mg/dL 174 (H)   HDL >=40 mg/dL 55   LDL <100 mg/dL 89   (H): Data is abnormally high    Left leg swelling  Chronic medical diagnosis.  This has been going on for some time.  She has had further evaluation of her swelling in the past.  Last sonogram was in 2018 with negative findings.    Thrombocytopenia (HCC)  Chronic medical diagnosis.  She has had thrombocytopenia since at least 2018.  Most recent labs from March have platelet counts at 152,000. Denies any easy brusing. Has some nosebleeds due to sinus issues. Denies any hx hepatitis or etoh. Does have hx fatty liver.    Elevated hemoglobin A1c  Chronic medical diagnosis.  Her last hemoglobin A1c from last year was slightly elevated at 6%    Urinary incontinence  Ongoing medical problem.  Not improving.  She has noticed some urinary leakage.  Was following up with Dr. Esteban in the past.  Requesting a new referral.      Current Outpatient Medications Ordered in Epic   Medication Sig Dispense Refill   • ipratropium-albuterol (DUONEB) 0.5-2.5 (3) MG/3ML nebulizer solution Take 3 mL by nebulization 4 times a day. 1 Each 1   • esomeprazole (NEXIUM) 20 MG capsule TAKE 1 CAPSULE BY MOUTH EVERY DAY IN THE MORNING BEFORE BREAKFAST 100 Capsule 2   • Fexofenadine HCl (ALLEGRA ALLERGY PO) Take 1 Tablet by mouth every day.     • albuterol 108 (90 Base) MCG/ACT Aero Soln inhalation aerosol Inhale 2 Puffs every 6 hours as needed for Shortness of Breath. 1 Each 1   • simvastatin (ZOCOR) 10 MG Tab Take 1 Tablet by mouth every evening. 100 Tablet 2   • lisinopril (PRINIVIL) 2.5 MG Tab TAKE 1 TABLET BY MOUTH TWICE A  Tablet 2   • levothyroxine  "(SYNTHROID) 25 MCG Tab Take 1 Tablet by mouth every morning. ON A EMPTY STOMACH 100 Tablet 3   • fluticasone (FLONASE) 50 MCG/ACT nasal spray Administer 2 Sprays into affected nostril(S) every day.     • ibuprofen (MOTRIN) 200 MG Tab Take 200 mg by mouth every 6 hours as needed.     • Cholecalciferol (VITAMIN D) 125 MCG (5000 UT) Cap Take  by mouth.     • Zinc 30 MG Cap Take  by mouth.     • Omega-3 Fatty Acids (FISH OIL) 1000 MG Cap capsule Take 1,000 mg by mouth 3 times a day, with meals.     • Multiple Vitamins-Minerals (MULTIVITAMIN ADULT PO) Take  by mouth.       No current Epic-ordered facility-administered medications on file.       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    • Dyslipidemia    • GERD (gastroesophageal reflux disease)    • Hypothyroidism     Resolved 2018   • Pleomorphic adenoma of parotid gland 1992, 2004    recurrent, yearly MRI   • Sleep apnea        Social History     Tobacco Use   • Smoking status: Never Smoker   • Smokeless tobacco: Never Used   Vaping Use   • Vaping Use: Never used   Substance Use Topics   • Alcohol use: No     Alcohol/week: 0.0 oz   • Drug use: No       Allergies:  Doxycycline, Penicillins, and Sulfa drugs    Health Maintenance:  Declines covid booster      Objective:       Exam:  /66 (BP Location: Right arm, Patient Position: Sitting, BP Cuff Size: Adult)   Pulse 61   Temp 36.7 °C (98 °F) (Temporal)   Resp 14   Ht 1.753 m (5' 9\")   Wt 93.9 kg (207 lb)   SpO2 95%   BMI 30.57 kg/m²   Body mass index is 30.57 kg/m².  Wt Readings from Last 4 Encounters:   05/17/22 93.9 kg (207 lb)   04/19/22 93.4 kg (206 lb)   04/15/22 93.6 kg (206 lb 4.8 oz)   03/30/22 93.8 kg (206 lb 12.7 oz)       Gen: Alert and oriented, No apparent distress. Appropriately groomed.  Neck: Neck is supple without lymphadenopathy.No thyromegaly.   Lungs: Normal effort, CTA bilaterally, few expiratory wheezes, rhonchi, or rales  CV: Regular rate " and rhythm. No Lower extremity edema  Skin: No rash noted.      Assessment & Plan:     81 y.o. female with the following -     1. Thrombocytopenia (HCC)  Chronic medical diagnoses since 2018.  Overall stable.  She does have a history of hepatic steatosis per sonogram.  We will continue to monitor these labs.  - CBC WITH DIFFERENTIAL; Future  - Comp Metabolic Panel; Future    2. Shortness of breath  Chronic medical diagnosis.  Improving.  She is recovering from her viral syndrome.  States that she did have omicron in January 2022.  Has received the first 2 COVID vaccines.  Her chest x-ray from the emergency room did show an enlarged heart and mediastinum.  We will go ahead and repeat her chest x-ray.  PFTs scheduled for May 25.  - DX-CHEST-2 VIEWS; Future    3. Left leg swelling  Chronic medical diagnosis.  This has been going on for several years.  She is noticing swelling bilaterally, left greater than right.  Past sonograms have been negative for DVTs.  We will go and repeat.  - US-EXTREMITY VENOUS LOWER BILAT; Future    4. Dyslipidemia  -Chronic medical diagnosis.  Last set of labs had increase in triglycerides to 174.  She continues to take Zocor 10 mg daily.  We will go ahead and repeat the studies.    Lipid Profile; Future    5. Acquired hypothyroidism  Chronic medical diagnosis.  Improving.  Currently taking levothyroxine 25 mcg daily.  We will recheck labs prior to next appointment with me in 6 weeks.  - TSH WITH REFLEX TO FT4; Future    6. Urinary incontinence, unspecified type  -Chronic medical diagnosis.  Not improving.  New referral placed to urology for further evaluation.    Referral to Urology    7. Elevated hemoglobin A1c  Chronic medical diagnosis.  Hemoglobin A1c from last year was slightly elevated at 6%.  We will go ahead and repeat this.  - HEMOGLOBIN A1C; Future        I spent a total of 38 minutes with record review, exam, communication with the patient, communication with other providers, and  documentation of this encounter.      Return in about 6 weeks (around 6/28/2022).    Please note that this dictation was created using voice recognition software. I have made every reasonable attempt to correct obvious errors, but I expect that there are errors of grammar and possibly content that I did not discover before finalizing the note.

## 2022-05-20 ENCOUNTER — SUPERVISING PHYSICIAN REVIEW (OUTPATIENT)
Dept: MEDICAL GROUP | Facility: PHYSICIAN GROUP | Age: 82
End: 2022-05-20
Payer: MEDICARE

## 2022-05-20 NOTE — PROGRESS NOTES
I have reviewed and agree with history, assessment and plan for office encounter on 4/15/22 with Advanced Practice Provider: bharat mejia  Face to face encounter/direct observation: no  Suggested changes to plan or follow-up: agree with assessment and plan.  Joselin Kwon M.D.

## 2022-05-25 ENCOUNTER — HOSPITAL ENCOUNTER (OUTPATIENT)
Dept: PULMONOLOGY | Facility: MEDICAL CENTER | Age: 82
End: 2022-05-25
Attending: FAMILY MEDICINE
Payer: MEDICARE

## 2022-05-25 DIAGNOSIS — R05.9 COUGH IN ADULT: ICD-10-CM

## 2022-05-25 PROCEDURE — 94729 DIFFUSING CAPACITY: CPT

## 2022-05-25 PROCEDURE — 94729 DIFFUSING CAPACITY: CPT | Mod: 26 | Performed by: INTERNAL MEDICINE

## 2022-05-25 PROCEDURE — 94060 EVALUATION OF WHEEZING: CPT

## 2022-05-25 PROCEDURE — 94060 EVALUATION OF WHEEZING: CPT | Mod: 26 | Performed by: INTERNAL MEDICINE

## 2022-05-25 RX ADMIN — Medication 2.5 MG: at 12:01

## 2022-05-25 ASSESSMENT — PULMONARY FUNCTION TESTS
FEV1/FVC_PERCENT_LLN: 64
FEV1_PERCENT_PREDICTED: 77
FEV1/FVC: 75
FEV1_LLN: 1.92
FEV1: 1.77
FEV1/FVC_PERCENT_PREDICTED: 95
FVC_LLN: 2.55
FEV1_PERCENT_CHANGE: 1
FEV1_PREDICTED: 2.3
FEV1: 1.79
FEV1/FVC_PERCENT_CHANGE: -1
FEV1/FVC_PERCENT_CHANGE: 50
FVC: 2.44
FVC_PERCENT_PREDICTED: 77
FEV1/FVC_PERCENT_PREDICTED: 98
FEV1/FVC_PERCENT_PREDICTED: 100
FVC_PREDICTED: 3.05
FVC_PERCENT_PREDICTED: 80
FEV1/FVC: 75
FEV1_PERCENT_CHANGE: 2
FEV1/FVC_PERCENT_PREDICTED: 75
FEV1_LLN: 1.92
FEV1/FVC: 73.36
FEV1/FVC_PERCENT_PREDICTED: 97
FVC: 2.37
FEV1/FVC_PREDICTED: 76
FEV1_PERCENT_PREDICTED: 78
FEV1/FVC: 73
FEV1/FVC_PERCENT_LLN: 64
FVC_LLN: 2.55

## 2022-05-29 NOTE — PROCEDURES
DATE OF SERVICE:  05/25/2022     PULMONARY FUNCTION TEST INTERPRETATION     The results of this test meet the ATS standards for acceptability and   repeatability.     SPIROMETRY:  There is no evidence of obstruction manifested by an FEV1/FVC of 75%.  Noted   that the FEV1 was 78% in the post-bronchodilator arm, which correlates to 1.79   liters.     There is no response to bronchodilators in this test.     DIFFUSION CAPACITY:  There is no impairment in the gas transfer, manifested by a normal DLCO at   115%.     FLOW VOLUME CURVES:  The flow volume curves correlates with the information above.     CONCLUSION:  This is a normal spirometry without response to bronchodilators,   although this should not preclude the patient from receiving treatment with   them, the gas transfer is within normal limits.        ______________________________  MD TEJA Aguirre/KASIA/NIT    DD:  05/28/2022 18:47  DT:  05/28/2022 19:08    Job#:  [id number]

## 2022-05-31 ENCOUNTER — HOSPITAL ENCOUNTER (OUTPATIENT)
Dept: RADIOLOGY | Facility: MEDICAL CENTER | Age: 82
End: 2022-05-31
Attending: FAMILY MEDICINE
Payer: MEDICARE

## 2022-05-31 ENCOUNTER — OFFICE VISIT (OUTPATIENT)
Dept: MEDICAL GROUP | Facility: PHYSICIAN GROUP | Age: 82
End: 2022-05-31
Payer: MEDICARE

## 2022-05-31 VITALS
DIASTOLIC BLOOD PRESSURE: 76 MMHG | TEMPERATURE: 97.7 F | RESPIRATION RATE: 24 BRPM | SYSTOLIC BLOOD PRESSURE: 136 MMHG | HEIGHT: 69 IN | BODY MASS INDEX: 30.36 KG/M2 | HEART RATE: 72 BPM | WEIGHT: 205 LBS | OXYGEN SATURATION: 95 %

## 2022-05-31 DIAGNOSIS — J01.10 ACUTE NON-RECURRENT FRONTAL SINUSITIS: ICD-10-CM

## 2022-05-31 DIAGNOSIS — R09.89 ABNORMAL LUNG SOUNDS: ICD-10-CM

## 2022-05-31 DIAGNOSIS — R06.2 WHEEZE: ICD-10-CM

## 2022-05-31 DIAGNOSIS — R05.9 COUGH: ICD-10-CM

## 2022-05-31 PROCEDURE — 71046 X-RAY EXAM CHEST 2 VIEWS: CPT

## 2022-05-31 PROCEDURE — 99214 OFFICE O/P EST MOD 30 MIN: CPT | Performed by: FAMILY MEDICINE

## 2022-05-31 RX ORDER — METHYLPREDNISOLONE 4 MG/1
TABLET ORAL
Qty: 21 TABLET | Refills: 0 | Status: SHIPPED | OUTPATIENT
Start: 2022-05-31 | End: 2022-06-29

## 2022-05-31 RX ORDER — BENZONATATE 100 MG/1
100 CAPSULE ORAL 3 TIMES DAILY PRN
Qty: 60 CAPSULE | Refills: 0 | Status: SHIPPED | OUTPATIENT
Start: 2022-05-31 | End: 2022-06-29

## 2022-05-31 RX ORDER — AMOXICILLIN AND CLAVULANATE POTASSIUM 875; 125 MG/1; MG/1
1 TABLET, FILM COATED ORAL 2 TIMES DAILY
Qty: 20 TABLET | Refills: 0 | Status: SHIPPED | OUTPATIENT
Start: 2022-05-31 | End: 2022-06-10

## 2022-05-31 ASSESSMENT — FIBROSIS 4 INDEX: FIB4 SCORE: 5.55

## 2022-05-31 NOTE — PROGRESS NOTES
Subjective:     CC:   Chief Complaint   Patient presents with   • Sinus Problem     Allergies started two weeks ago and worsened. Yellow/ Green phlem with cough   • Fatigue         HPI:   Reina presents today with onset 2-3weeks ago with hayfever sxs of sneezing, clear coryza and feels the PND is causing a ST, her ears are itchy, full and sore. Is using tylenol and flonase and allegra and duoneb about 1x/d which helps w/ her asthma sxs (mild wheezing).  Was given abx in the past to help w/ her sinus drainage which has cleared all this up. Would like that again.  Despite all of her otc tx is noting 2wk of yellow - green rhinorrhea and has a bilat frontal sinus HA. Using vit c and echinachea to help. Did do allergy shots years ago that helped.   She states she has had COVID twice, most recently was in January.  She has struggled with some asthma issues like wheezing since COVID and has found the DuoNeb on occasion to be helpful.  She was never a smoker.  No problems updated.    Current Outpatient Medications Ordered in Epic   Medication Sig Dispense Refill   • amoxicillin-clavulanate (AUGMENTIN) 875-125 MG Tab Take 1 Tablet by mouth 2 times a day for 10 days. 20 Tablet 0   • methylPREDNISolone (MEDROL DOSEPAK) 4 MG Tablet Therapy Pack As directed on the packaging label. 21 Tablet 0   • benzonatate (TESSALON) 100 MG Cap Take 1 Capsule by mouth 3 times a day as needed for Cough. 60 Capsule 0   • ipratropium-albuterol (DUONEB) 0.5-2.5 (3) MG/3ML nebulizer solution Take 3 mL by nebulization 4 times a day. 1 Each 1   • esomeprazole (NEXIUM) 20 MG capsule TAKE 1 CAPSULE BY MOUTH EVERY DAY IN THE MORNING BEFORE BREAKFAST 100 Capsule 2   • Fexofenadine HCl (ALLEGRA ALLERGY PO) Take 1 Tablet by mouth every day.     • albuterol 108 (90 Base) MCG/ACT Aero Soln inhalation aerosol Inhale 2 Puffs every 6 hours as needed for Shortness of Breath. 1 Each 1   • simvastatin (ZOCOR) 10 MG Tab Take 1 Tablet by mouth every evening. 100  "Tablet 2   • lisinopril (PRINIVIL) 2.5 MG Tab TAKE 1 TABLET BY MOUTH TWICE A  Tablet 2   • levothyroxine (SYNTHROID) 25 MCG Tab Take 1 Tablet by mouth every morning. ON A EMPTY STOMACH 100 Tablet 3   • fluticasone (FLONASE) 50 MCG/ACT nasal spray Administer 2 Sprays into affected nostril(S) every day.     • ibuprofen (MOTRIN) 200 MG Tab Take 200 mg by mouth every 6 hours as needed.     • Cholecalciferol (VITAMIN D) 125 MCG (5000 UT) Cap Take  by mouth.     • Zinc 30 MG Cap Take  by mouth.     • Omega-3 Fatty Acids (FISH OIL) 1000 MG Cap capsule Take 1,000 mg by mouth 3 times a day, with meals.     • Multiple Vitamins-Minerals (MULTIVITAMIN ADULT PO) Take  by mouth.       No current Epic-ordered facility-administered medications on file.       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    • Dyslipidemia    • GERD (gastroesophageal reflux disease)    • Hypothyroidism     Resolved 2018   • Pleomorphic adenoma of parotid gland 1992, 2004    recurrent, yearly MRI   • Sleep apnea        Social History     Tobacco Use   • Smoking status: Never Smoker   • Smokeless tobacco: Never Used   Vaping Use   • Vaping Use: Never used   Substance Use Topics   • Alcohol use: No     Alcohol/week: 0.0 oz   • Drug use: No       Allergies:  Doxycycline, Penicillins, and Sulfa drugs    ROS:  Per HPI      Objective:       Exam:  /76 (BP Location: Right arm, Patient Position: Sitting, BP Cuff Size: Adult)   Pulse 72   Temp 36.5 °C (97.7 °F) (Temporal)   Resp (!) 24   Ht 1.753 m (5' 9\")   Wt 93 kg (205 lb)   SpO2 95%   BMI 30.27 kg/m²   Body mass index is 30.27 kg/m².  Wt Readings from Last 4 Encounters:   05/31/22 93 kg (205 lb)   05/17/22 93.9 kg (207 lb)   04/19/22 93.4 kg (206 lb)   04/15/22 93.6 kg (206 lb 4.8 oz)       Gen: Alert and oriented, No apparent distress. Appropriately groomed.  Tm's clear, nares w/ mucous, OP clear, +bilat frontal sinus ttp  Neck: Neck is supple " without lymphadenopathy.No thyromegaly.   Lungs: Normal effort, no rales.  He has very significant rhonchi on the left more than right lungs, moderately prolonged expiratory phase, bouts of coughing associated with wheezing.  CV: Regular rate and rhythm. No murmurs, rubs, or gallops.  ABD:  Soft, nontender, nondistended, NABSx4, no HSM or RBT or guarding or masses.  Ext: No clubbing, cyanosis, edema.  Skin: No rash noted.      Assessment & Plan:     81 y.o. female with the following -   I will give her Augmentin for her sinusitis and a Medrol Dosepak for her wheezing shortness of breath and coughing fits.  I will have her come back in the office in 2 days to have a repeat lung exam and she will check a chest x-ray today.  Use alb mdi q4h and otc to dry sinuses  To ER if breathing worsens  1. Cough  - DX-CHEST-2 VIEWS; Future    2. Acute non-recurrent frontal sinusitis  - DX-CHEST-2 VIEWS; Future    3. Wheeze  - DX-CHEST-2 VIEWS; Future    4. Abnormal lung sounds  - DX-CHEST-2 VIEWS; Future    Other orders  - amoxicillin-clavulanate (AUGMENTIN) 875-125 MG Tab; Take 1 Tablet by mouth 2 times a day for 10 days.  Dispense: 20 Tablet; Refill: 0  - methylPREDNISolone (MEDROL DOSEPAK) 4 MG Tablet Therapy Pack; As directed on the packaging label.  Dispense: 21 Tablet; Refill: 0  - benzonatate (TESSALON) 100 MG Cap; Take 1 Capsule by mouth 3 times a day as needed for Cough.  Dispense: 60 Capsule; Refill: 0      Return in about 6 days (around 6/6/2022) for with PCP Monday (she doesn't want the Thur appt options and clinc closed Fri).    Please note that this dictation was created using voice recognition software. I have made every reasonable attempt to correct obvious errors, but I expect that there are errors of grammar and possibly content that I did not discover before finalizing the note.

## 2022-06-01 ENCOUNTER — TELEPHONE (OUTPATIENT)
Dept: MEDICAL GROUP | Facility: PHYSICIAN GROUP | Age: 82
End: 2022-06-01
Payer: MEDICARE

## 2022-06-01 NOTE — TELEPHONE ENCOUNTER
Please call and let patient know that I hope she is feeling better . Please also let her know that the chest x-ray ordered yesterday did not show any acute infections.  She should still complete the course of medications prescribed by Dr Zamarripa yesterday.  Please let her know that the radiologist did see some plaque buildup in her blood vessels (atherosclerosis of aorta) . I would like her to continue with her simvastatin for this.     Thanks,  Joselin Kwon M.D.

## 2022-06-01 NOTE — TELEPHONE ENCOUNTER
Patient informed of providers result note.    Quality 154 Part A: Falls: Risk Assessment (Should Be Reported With Measure 155.): Falls risk assessment completed and documented in the past 12 months. Quality 154 Part B: Falls: Risk Screening (Should Be Reported With Measure 155.): Patient screened for future fall risk; documentation of no falls in the past year or only one fall without injury in the past year Quality 110: Preventive Care And Screening: Influenza Immunization: Influenza Immunization Administered during Influenza season Detail Level: Detailed Quality 111:Pneumonia Vaccination Status For Older Adults: Pneumococcal Vaccination Previously Received Quality 431: Preventive Care And Screening: Unhealthy Alcohol Use - Screening: Patient screened for unhealthy alcohol use using a single question and scores less than 2 times per year Quality 226: Preventive Care And Screening: Tobacco Use: Screening And Cessation Intervention: Patient screened for tobacco use and is an ex/non-smoker Name And Contact Information For Health Care Proxy: Kwesi Isaiah [phone] Quality 155 (Denominator): Falls Plan Of Care: Plan of Care not Documented, Reason not Otherwise Specified Quality 47: Advance Care Plan: Advance Care Planning discussed and documented; advance care plan or surrogate decision maker documented in the medical record.

## 2022-06-21 ENCOUNTER — HOSPITAL ENCOUNTER (OUTPATIENT)
Dept: LAB | Facility: MEDICAL CENTER | Age: 82
End: 2022-06-21
Attending: FAMILY MEDICINE
Payer: MEDICARE

## 2022-06-21 DIAGNOSIS — E03.9 ACQUIRED HYPOTHYROIDISM: ICD-10-CM

## 2022-06-21 DIAGNOSIS — D69.6 THROMBOCYTOPENIA (HCC): ICD-10-CM

## 2022-06-21 DIAGNOSIS — R73.09 ELEVATED HEMOGLOBIN A1C: ICD-10-CM

## 2022-06-21 DIAGNOSIS — E78.5 DYSLIPIDEMIA: ICD-10-CM

## 2022-06-21 LAB
ALBUMIN SERPL BCP-MCNC: 4 G/DL (ref 3.2–4.9)
ALBUMIN/GLOB SERPL: 1.6 G/DL
ALP SERPL-CCNC: 76 U/L (ref 30–99)
ALT SERPL-CCNC: 41 U/L (ref 2–50)
ANION GAP SERPL CALC-SCNC: 12 MMOL/L (ref 7–16)
APPEARANCE UR: CLEAR
AST SERPL-CCNC: 44 U/L (ref 12–45)
BASOPHILS # BLD AUTO: 0.7 % (ref 0–1.8)
BASOPHILS # BLD: 0.03 K/UL (ref 0–0.12)
BILIRUB SERPL-MCNC: 0.7 MG/DL (ref 0.1–1.5)
BILIRUB UR QL STRIP.AUTO: NEGATIVE
BUN SERPL-MCNC: 8 MG/DL (ref 8–22)
CALCIUM SERPL-MCNC: 9.4 MG/DL (ref 8.5–10.5)
CHLORIDE SERPL-SCNC: 105 MMOL/L (ref 96–112)
CHOLEST SERPL-MCNC: 178 MG/DL (ref 100–199)
CO2 SERPL-SCNC: 23 MMOL/L (ref 20–33)
COLOR UR: YELLOW
CREAT SERPL-MCNC: 0.72 MG/DL (ref 0.5–1.4)
EOSINOPHIL # BLD AUTO: 0.15 K/UL (ref 0–0.51)
EOSINOPHIL NFR BLD: 3.6 % (ref 0–6.9)
ERYTHROCYTE [DISTWIDTH] IN BLOOD BY AUTOMATED COUNT: 47.9 FL (ref 35.9–50)
EST. AVERAGE GLUCOSE BLD GHB EST-MCNC: 134 MG/DL
FASTING STATUS PATIENT QL REPORTED: NORMAL
GFR SERPLBLD CREATININE-BSD FMLA CKD-EPI: 85 ML/MIN/1.73 M 2
GLOBULIN SER CALC-MCNC: 2.5 G/DL (ref 1.9–3.5)
GLUCOSE SERPL-MCNC: 110 MG/DL (ref 65–99)
GLUCOSE UR STRIP.AUTO-MCNC: NEGATIVE MG/DL
HBA1C MFR BLD: 6.3 % (ref 4–5.6)
HCT VFR BLD AUTO: 43 % (ref 37–47)
HDLC SERPL-MCNC: 52 MG/DL
HGB BLD-MCNC: 14.5 G/DL (ref 12–16)
IMM GRANULOCYTES # BLD AUTO: 0.02 K/UL (ref 0–0.11)
IMM GRANULOCYTES NFR BLD AUTO: 0.5 % (ref 0–0.9)
KETONES UR STRIP.AUTO-MCNC: NEGATIVE MG/DL
LDLC SERPL CALC-MCNC: 87 MG/DL
LEUKOCYTE ESTERASE UR QL STRIP.AUTO: NEGATIVE
LYMPHOCYTES # BLD AUTO: 1.79 K/UL (ref 1–4.8)
LYMPHOCYTES NFR BLD: 42.4 % (ref 22–41)
MCH RBC QN AUTO: 31.9 PG (ref 27–33)
MCHC RBC AUTO-ENTMCNC: 33.7 G/DL (ref 33.6–35)
MCV RBC AUTO: 94.5 FL (ref 81.4–97.8)
MICRO URNS: NORMAL
MONOCYTES # BLD AUTO: 0.32 K/UL (ref 0–0.85)
MONOCYTES NFR BLD AUTO: 7.6 % (ref 0–13.4)
NEUTROPHILS # BLD AUTO: 1.91 K/UL (ref 2–7.15)
NEUTROPHILS NFR BLD: 45.2 % (ref 44–72)
NITRITE UR QL STRIP.AUTO: NEGATIVE
NRBC # BLD AUTO: 0 K/UL
NRBC BLD-RTO: 0 /100 WBC
PH UR STRIP.AUTO: 7 [PH] (ref 5–8)
PLATELET # BLD AUTO: 153 K/UL (ref 164–446)
PMV BLD AUTO: 12.4 FL (ref 9–12.9)
POTASSIUM SERPL-SCNC: 4.2 MMOL/L (ref 3.6–5.5)
PROT SERPL-MCNC: 6.5 G/DL (ref 6–8.2)
PROT UR QL STRIP: NEGATIVE MG/DL
RBC # BLD AUTO: 4.55 M/UL (ref 4.2–5.4)
RBC UR QL AUTO: NEGATIVE
SODIUM SERPL-SCNC: 140 MMOL/L (ref 135–145)
SP GR UR STRIP.AUTO: 1.01
TRIGL SERPL-MCNC: 195 MG/DL (ref 0–149)
TSH SERPL DL<=0.005 MIU/L-ACNC: 5.02 UIU/ML (ref 0.38–5.33)
UROBILINOGEN UR STRIP.AUTO-MCNC: 0.2 MG/DL
WBC # BLD AUTO: 4.2 K/UL (ref 4.8–10.8)

## 2022-06-21 PROCEDURE — 83036 HEMOGLOBIN GLYCOSYLATED A1C: CPT

## 2022-06-21 PROCEDURE — 84443 ASSAY THYROID STIM HORMONE: CPT

## 2022-06-21 PROCEDURE — 81003 URINALYSIS AUTO W/O SCOPE: CPT

## 2022-06-21 PROCEDURE — 85025 COMPLETE CBC W/AUTO DIFF WBC: CPT

## 2022-06-21 PROCEDURE — 36415 COLL VENOUS BLD VENIPUNCTURE: CPT

## 2022-06-21 PROCEDURE — 80053 COMPREHEN METABOLIC PANEL: CPT

## 2022-06-21 PROCEDURE — 80061 LIPID PANEL: CPT

## 2022-06-29 ENCOUNTER — OFFICE VISIT (OUTPATIENT)
Dept: MEDICAL GROUP | Facility: PHYSICIAN GROUP | Age: 82
End: 2022-06-29
Payer: MEDICARE

## 2022-06-29 VITALS
BODY MASS INDEX: 30.56 KG/M2 | DIASTOLIC BLOOD PRESSURE: 70 MMHG | OXYGEN SATURATION: 95 % | HEART RATE: 66 BPM | SYSTOLIC BLOOD PRESSURE: 110 MMHG | HEIGHT: 69 IN | WEIGHT: 206.3 LBS | TEMPERATURE: 97.7 F | RESPIRATION RATE: 12 BRPM

## 2022-06-29 DIAGNOSIS — E03.9 ACQUIRED HYPOTHYROIDISM: ICD-10-CM

## 2022-06-29 DIAGNOSIS — D69.6 THROMBOCYTOPENIA (HCC): ICD-10-CM

## 2022-06-29 DIAGNOSIS — D70.9 NEUTROPENIA, UNSPECIFIED TYPE (HCC): ICD-10-CM

## 2022-06-29 DIAGNOSIS — R73.09 ELEVATED HEMOGLOBIN A1C: ICD-10-CM

## 2022-06-29 DIAGNOSIS — E78.5 DYSLIPIDEMIA: ICD-10-CM

## 2022-06-29 PROCEDURE — 99214 OFFICE O/P EST MOD 30 MIN: CPT | Performed by: FAMILY MEDICINE

## 2022-06-29 ASSESSMENT — FIBROSIS 4 INDEX: FIB4 SCORE: 3.55

## 2022-06-29 NOTE — PROGRESS NOTES
CC:   Chief Complaint   Patient presents with   • Follow-Up     6 weeks.    • Lab Results         HPI:   Reina presents today for f/u visit. .  .    Since our last appointment, has been seen by:  Seen by Karthikeyan Slade on May 31 for a cough.  At that time she was given Augmentin and a Medrol Dosepak.  A chest x-ray was also ordered, with no acute issues. Seen by urology in June 20  States that her Cough is almost resolved.  Hasn't been using the inhalers or nebs.     Thrombocytopenia (HCC)  Chronic medical diagnosis.  She has been thrombocytopenic since 2018.  Recent labs with platelet count at 153,000.  Denies any nosebleeds or easy bruising.    Neutropenia (HCC)  Chronic medical diagnosis.  She has had low white blood cells dating back to 2016.  Recent labs with white blood cell counts decreased to 4.2 and elevated lymphocytes at 42.4 and neutropenia at 1.91.  She has had multiple respiratory infections the last 3 months.  Was treated on May 31 with steroids and antibiotics.  Was also treated in April.    Dyslipidemia  Chronic medical diagnoses.  Currently taking simvastatin 10 mg daily.  States that she does have some heartburn after she takes his medication.  We will have her start taking this every other day and see if symptoms improve.  States that she has been sensitive to previous statins in the past.  She will continue to work on diet and lifestyle changes.     Latest Reference Range & Units 06/21/22 09:09   Cholesterol,Tot 100 - 199 mg/dL 178   Triglycerides 0 - 149 mg/dL 195 (H)   HDL >=40 mg/dL 52   LDL <100 mg/dL 87   (H): Data is abnormally high    Acquired hypothyroidism  Chronic medical diagnosis.  Recent labs with normal TSH.  Continues to take levothyroxine 25 mcg daily.  States that she has now  her Nexium and levothyroxine.    Elevated hemoglobin A1c  Chronic medical diagnosis.  Recent labs with elevated A1c at 6.3%.  Over the last 3 months she has received steroids on 2 separate  occasions.      Current Outpatient Medications Ordered in Epic   Medication Sig Dispense Refill   • esomeprazole (NEXIUM) 20 MG capsule TAKE 1 CAPSULE BY MOUTH EVERY DAY IN THE MORNING BEFORE BREAKFAST 100 Capsule 2   • Fexofenadine HCl (ALLEGRA ALLERGY PO) Take 1 Tablet by mouth every day.     • albuterol 108 (90 Base) MCG/ACT Aero Soln inhalation aerosol Inhale 2 Puffs every 6 hours as needed for Shortness of Breath. 1 Each 1   • simvastatin (ZOCOR) 10 MG Tab Take 1 Tablet by mouth every evening. 100 Tablet 2   • lisinopril (PRINIVIL) 2.5 MG Tab TAKE 1 TABLET BY MOUTH TWICE A  Tablet 2   • levothyroxine (SYNTHROID) 25 MCG Tab Take 1 Tablet by mouth every morning. ON A EMPTY STOMACH 100 Tablet 3   • fluticasone (FLONASE) 50 MCG/ACT nasal spray Administer 2 Sprays into affected nostril(S) every day.     • ibuprofen (MOTRIN) 200 MG Tab Take 200 mg by mouth every 6 hours as needed.     • Cholecalciferol (VITAMIN D) 125 MCG (5000 UT) Cap Take  by mouth.     • Zinc 30 MG Cap Take  by mouth.     • Omega-3 Fatty Acids (FISH OIL) 1000 MG Cap capsule Take 1,000 mg by mouth 3 times a day, with meals.     • Multiple Vitamins-Minerals (MULTIVITAMIN ADULT PO) Take  by mouth.       No current Epic-ordered facility-administered medications on file.       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    • Dyslipidemia    • GERD (gastroesophageal reflux disease)    • Hypothyroidism     Resolved 2018   • Pleomorphic adenoma of parotid gland 1992, 2004    recurrent, yearly MRI   • Sleep apnea        Social History     Tobacco Use   • Smoking status: Never Smoker   • Smokeless tobacco: Never Used   Vaping Use   • Vaping Use: Never used   Substance Use Topics   • Alcohol use: No     Alcohol/week: 0.0 oz   • Drug use: No       Allergies:  Doxycycline, Penicillins, and Sulfa drugs    Health Maintenance: declining covid boosters      Objective:       Exam:  /70   Pulse 66   Temp  "36.5 °C (97.7 °F) (Temporal)   Resp 12   Ht 1.753 m (5' 9\")   Wt 93.6 kg (206 lb 4.8 oz)   SpO2 95%   BMI 30.47 kg/m²   Body mass index is 30.47 kg/m².  Wt Readings from Last 4 Encounters:   06/29/22 93.6 kg (206 lb 4.8 oz)   05/31/22 93 kg (205 lb)   05/17/22 93.9 kg (207 lb)   04/19/22 93.4 kg (206 lb)       Gen: Alert and oriented, No apparent distress. Appropriately groomed.  Neck: Neck is supple without lymphadenopathy.No thyromegaly.   Lungs: Normal effort, CTA bilaterally, no wheezes, rhonchi, or rales  CV: Regular rate and rhythm. No Lower extremity edema  Skin: No rash noted.      Assessment & Plan:     81 y.o. female with the following -     1. Neutropenia, unspecified type (HCC)  2. Thrombocytopenia (HCC)  Chronic medical diagnosis.  Stable.  Has had frequent upper respiratory infections over the last few months.  Has never been evaluated by hematology.  New referral placed today.  - Referral to Hematology Oncology  - CBC WITH DIFFERENTIAL; Future      3. Acquired hypothyroidism  -Chronic.  Stable.  Continue with Levoxyl 25 mcg daily   TSH WITH REFLEX TO FT4; Future    4. Dyslipidemia  -Chronic medical diagnosis.  Overall stable.  She will reduce her simvastatin to every other day and see if her GI symptoms improve.  We will plan on rechecking labs before next appointment with me in 3 months   lipid Profile; Future    5. Elevated hemoglobin A1c  -Chronic medical diagnosis.  Recent labs with increase in A1c to 6.3%.  Has received steroids twice in the last 3 months.  Dietary lifestyle modifications discussed and encouraged.  We will plan on rechecking this as well.    HEMOGLOBIN A1C; Future        I spent a total of 35 minutes with record review, exam, communication with the patient, communication with other providers, and documentation of this encounter.      Return in about 3 months (around 9/29/2022).    Please note that this dictation was created using voice recognition software. I have made every " reasonable attempt to correct obvious errors, but I expect that there are errors of grammar and possibly content that I did not discover before finalizing the note.

## 2022-06-29 NOTE — ASSESSMENT & PLAN NOTE
Chronic medical diagnosis.  She has had low white blood cells dating back to 2016.  Recent labs with white blood cell counts decreased to 4.2 and elevated lymphocytes at 42.4 and neutropenia at 1.91.  She has had multiple respiratory infections the last 3 months.  Was treated on May 31 with steroids and antibiotics.  Was also treated in April.

## 2022-06-29 NOTE — ASSESSMENT & PLAN NOTE
Chronic medical diagnosis.  Recent labs with elevated A1c at 6.3%.  Over the last 3 months she has received steroids on 2 separate occasions.

## 2022-06-29 NOTE — ASSESSMENT & PLAN NOTE
Chronic medical diagnosis.  Recent labs with normal TSH.  Continues to take levothyroxine 25 mcg daily.  States that she has now  her Nexium and levothyroxine.

## 2022-06-29 NOTE — ASSESSMENT & PLAN NOTE
Chronic medical diagnoses.  Currently taking simvastatin 10 mg daily.  States that she does have some heartburn after she takes his medication.  We will have her start taking this every other day and see if symptoms improve.  States that she has been sensitive to previous statins in the past.  She will continue to work on diet and lifestyle changes.     Latest Reference Range & Units 06/21/22 09:09   Cholesterol,Tot 100 - 199 mg/dL 178   Triglycerides 0 - 149 mg/dL 195 (H)   HDL >=40 mg/dL 52   LDL <100 mg/dL 87   (H): Data is abnormally high

## 2022-06-29 NOTE — ASSESSMENT & PLAN NOTE
Chronic medical diagnosis.  She has been thrombocytopenic since 2018.  Recent labs with platelet count at 153,000.  Denies any nosebleeds or easy bruising.

## 2022-08-07 DIAGNOSIS — I10 ESSENTIAL HYPERTENSION: ICD-10-CM

## 2022-08-08 RX ORDER — LISINOPRIL 2.5 MG/1
2.5 TABLET ORAL 2 TIMES DAILY
Qty: 200 TABLET | Refills: 3 | Status: SHIPPED | OUTPATIENT
Start: 2022-08-08 | End: 2023-10-18 | Stop reason: SDUPTHER

## 2022-08-08 NOTE — TELEPHONE ENCOUNTER
Requested Prescriptions     Pending Prescriptions Disp Refills   • lisinopril (PRINIVIL) 2.5 MG Tab [Pharmacy Med Name: LISINOPRIL 2.5 MG TABLET] 200 Tablet 3     Sig: TAKE 1 TABLET BY MOUTH TWICE A DAY   Joselin Kwon M.D.

## 2022-08-25 ENCOUNTER — OFFICE VISIT (OUTPATIENT)
Dept: DERMATOLOGY | Facility: IMAGING CENTER | Age: 82
End: 2022-08-25
Payer: MEDICARE

## 2022-08-25 DIAGNOSIS — L29.9 ITCHING: ICD-10-CM

## 2022-08-25 DIAGNOSIS — L82.1 SEBORRHEIC KERATOSIS: ICD-10-CM

## 2022-08-25 PROCEDURE — 99203 OFFICE O/P NEW LOW 30 MIN: CPT | Performed by: DERMATOLOGY

## 2022-08-25 NOTE — PROGRESS NOTES
"CC: Spots of concern on left ear    Subjective: New pt here to establish care with some spots of concern.    Itchy spots X 2 on left face.     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  Continues to grow.  Patient reports \"adenoma\"? With gland removal. Has had surgery at [facility] [place] - benign and monitor for possible re-surgery though risk of nerve damage. Has regular MRI to observe for growth      History of skin cancer: No  History of precancers/actinic keratoses: No  History of biopsies:No  History of blistering/severe sunburns:No  Family history of skin cancer:Yes, Details: Father, unknown  Family history of atypical moles:No    ROS: no fevers/chills. + itch.  No cough  Relevant PMH: hypothyroidism; pleomorphic adenoma of parotid gland  Social: NS; grew up in [place]    PE: Gen:WDWN female in NAD. Skin: focal exam: soft, subcut tumor (sub)adjacent to skin scar on left neck and periauricular. Waxy papules X 2 left face      A/P: SK, face:   -reviewed dx/tx  -would recommend trial topical steroid, OTC anti-itch cream BID PRN and moisturizer as first line trx for itch  -consider LN2 if no relief noted  -f/U PRN    Hx pleomorphic adenoma parotid, left:  -[facility] for [facility]; treat as indicated    I have reviewed medications relevant to my specialty.          "

## 2022-09-07 DIAGNOSIS — E78.5 DYSLIPIDEMIA: ICD-10-CM

## 2022-09-07 RX ORDER — SIMVASTATIN 10 MG
10 TABLET ORAL EVERY EVENING
Qty: 100 TABLET | Refills: 3 | Status: SHIPPED | OUTPATIENT
Start: 2022-09-07 | End: 2023-05-01

## 2022-09-07 NOTE — TELEPHONE ENCOUNTER
Requested Prescriptions     Pending Prescriptions Disp Refills   • simvastatin (ZOCOR) 10 MG Tab [Pharmacy Med Name: SIMVASTATIN 10 MG TABLET] 100 Tablet 3     Sig: TAKE 1 TABLET BY MOUTH EVERY DAY IN THE EVENING   Joselin Kwon M.D.

## 2022-10-04 ENCOUNTER — HOSPITAL ENCOUNTER (OUTPATIENT)
Dept: LAB | Facility: MEDICAL CENTER | Age: 82
End: 2022-10-04
Attending: FAMILY MEDICINE
Payer: MEDICARE

## 2022-10-04 DIAGNOSIS — E78.5 DYSLIPIDEMIA: ICD-10-CM

## 2022-10-04 DIAGNOSIS — D70.9 NEUTROPENIA, UNSPECIFIED TYPE (HCC): ICD-10-CM

## 2022-10-04 DIAGNOSIS — E03.9 ACQUIRED HYPOTHYROIDISM: ICD-10-CM

## 2022-10-04 DIAGNOSIS — R73.09 ELEVATED HEMOGLOBIN A1C: ICD-10-CM

## 2022-10-04 LAB
BASOPHILS # BLD AUTO: 1 % (ref 0–1.8)
BASOPHILS # BLD: 0.05 K/UL (ref 0–0.12)
CHOLEST SERPL-MCNC: 185 MG/DL (ref 100–199)
EOSINOPHIL # BLD AUTO: 0.18 K/UL (ref 0–0.51)
EOSINOPHIL NFR BLD: 3.6 % (ref 0–6.9)
ERYTHROCYTE [DISTWIDTH] IN BLOOD BY AUTOMATED COUNT: 45.7 FL (ref 35.9–50)
EST. AVERAGE GLUCOSE BLD GHB EST-MCNC: 123 MG/DL
FASTING STATUS PATIENT QL REPORTED: NORMAL
HBA1C MFR BLD: 5.9 % (ref 4–5.6)
HCT VFR BLD AUTO: 43.2 % (ref 37–47)
HDLC SERPL-MCNC: 49 MG/DL
HGB BLD-MCNC: 14.9 G/DL (ref 12–16)
IMM GRANULOCYTES # BLD AUTO: 0 K/UL (ref 0–0.11)
IMM GRANULOCYTES NFR BLD AUTO: 0 % (ref 0–0.9)
LDLC SERPL CALC-MCNC: 99 MG/DL
LYMPHOCYTES # BLD AUTO: 2.13 K/UL (ref 1–4.8)
LYMPHOCYTES NFR BLD: 42.7 % (ref 22–41)
MCH RBC QN AUTO: 31.6 PG (ref 27–33)
MCHC RBC AUTO-ENTMCNC: 34.5 G/DL (ref 33.6–35)
MCV RBC AUTO: 91.7 FL (ref 81.4–97.8)
MONOCYTES # BLD AUTO: 0.32 K/UL (ref 0–0.85)
MONOCYTES NFR BLD AUTO: 6.4 % (ref 0–13.4)
NEUTROPHILS # BLD AUTO: 2.31 K/UL (ref 2–7.15)
NEUTROPHILS NFR BLD: 46.3 % (ref 44–72)
NRBC # BLD AUTO: 0 K/UL
NRBC BLD-RTO: 0 /100 WBC
PLATELET # BLD AUTO: 161 K/UL (ref 164–446)
PMV BLD AUTO: 12.3 FL (ref 9–12.9)
RBC # BLD AUTO: 4.71 M/UL (ref 4.2–5.4)
T4 FREE SERPL-MCNC: 1.06 NG/DL (ref 0.93–1.7)
TRIGL SERPL-MCNC: 187 MG/DL (ref 0–149)
TSH SERPL DL<=0.005 MIU/L-ACNC: 5.84 UIU/ML (ref 0.38–5.33)
WBC # BLD AUTO: 5 K/UL (ref 4.8–10.8)

## 2022-10-04 PROCEDURE — 83036 HEMOGLOBIN GLYCOSYLATED A1C: CPT

## 2022-10-04 PROCEDURE — 85025 COMPLETE CBC W/AUTO DIFF WBC: CPT

## 2022-10-04 PROCEDURE — 36415 COLL VENOUS BLD VENIPUNCTURE: CPT

## 2022-10-04 PROCEDURE — 84443 ASSAY THYROID STIM HORMONE: CPT

## 2022-10-04 PROCEDURE — 80061 LIPID PANEL: CPT

## 2022-10-04 PROCEDURE — 84439 ASSAY OF FREE THYROXINE: CPT

## 2022-10-11 ENCOUNTER — OFFICE VISIT (OUTPATIENT)
Dept: MEDICAL GROUP | Facility: PHYSICIAN GROUP | Age: 82
End: 2022-10-11
Payer: MEDICARE

## 2022-10-11 VITALS
OXYGEN SATURATION: 96 % | WEIGHT: 208.1 LBS | SYSTOLIC BLOOD PRESSURE: 132 MMHG | RESPIRATION RATE: 12 BRPM | BODY MASS INDEX: 30.82 KG/M2 | HEIGHT: 69 IN | HEART RATE: 75 BPM | DIASTOLIC BLOOD PRESSURE: 74 MMHG | TEMPERATURE: 97.9 F

## 2022-10-11 DIAGNOSIS — G89.29 CHRONIC PAIN OF LEFT KNEE: ICD-10-CM

## 2022-10-11 DIAGNOSIS — R92.30 DENSE BREAST TISSUE ON MAMMOGRAM: ICD-10-CM

## 2022-10-11 DIAGNOSIS — E03.9 ACQUIRED HYPOTHYROIDISM: ICD-10-CM

## 2022-10-11 DIAGNOSIS — M25.562 CHRONIC PAIN OF LEFT KNEE: ICD-10-CM

## 2022-10-11 DIAGNOSIS — R73.09 ELEVATED HEMOGLOBIN A1C: ICD-10-CM

## 2022-10-11 DIAGNOSIS — D11.0 PLEOMORPHIC ADENOMA OF PAROTID GLAND: ICD-10-CM

## 2022-10-11 DIAGNOSIS — E78.5 DYSLIPIDEMIA: ICD-10-CM

## 2022-10-11 DIAGNOSIS — Z12.31 ENCOUNTER FOR SCREENING MAMMOGRAM FOR BREAST CANCER: ICD-10-CM

## 2022-10-11 DIAGNOSIS — D69.6 THROMBOCYTOPENIA (HCC): ICD-10-CM

## 2022-10-11 DIAGNOSIS — K21.9 GASTROESOPHAGEAL REFLUX DISEASE WITHOUT ESOPHAGITIS: ICD-10-CM

## 2022-10-11 PROBLEM — R05.9 COUGH IN ADULT: Status: RESOLVED | Noted: 2022-01-18 | Resolved: 2022-10-11

## 2022-10-11 PROBLEM — U07.1 LAB TEST POSITIVE FOR DETECTION OF COVID-19 VIRUS: Status: RESOLVED | Noted: 2022-01-14 | Resolved: 2022-10-11

## 2022-10-11 PROCEDURE — 99215 OFFICE O/P EST HI 40 MIN: CPT | Performed by: FAMILY MEDICINE

## 2022-10-11 RX ORDER — DIAZEPAM 5 MG/1
TABLET ORAL
Qty: 3 TABLET | Refills: 0 | Status: SHIPPED | OUTPATIENT
Start: 2022-10-11 | End: 2022-11-08

## 2022-10-11 RX ORDER — DIAZEPAM 5 MG/1
TABLET ORAL
Qty: 3 TABLET | Refills: 0 | Status: SHIPPED | OUTPATIENT
Start: 2022-10-11 | End: 2022-10-11 | Stop reason: SDUPTHER

## 2022-10-11 ASSESSMENT — FIBROSIS 4 INDEX: FIB4 SCORE: 3.37

## 2022-10-11 NOTE — PROGRESS NOTES
CC:   Chief Complaint   Patient presents with    Follow-Up     3 months     Lab Results         HPI:   Reina presents today for a f/u visit.  Last seen by me on June 29..    Since our last appointment, has been seen by:  Seen by dermatology on August 25-Dr. Eagle.     She was referred to [facility] hematology in June for further evaluation of her neutropenia and thrombocytopenia.  Their recommendations were that her platelet count was not low enough to warrant work-up at this time.       Elevated hemoglobin A1c  Chronic. Recent labs with improvement in A1c from 6.3 to 5.9%.  Has reduced bread intake.  Has been frustrated as she continues to gain weight.    Dyslipidemia    Chronic. Reduced her zocor to every other day in June as she thought it this may be contributing to her stomach issues.  However she has not noticed any improvement and  Will restart daily Zocor 10 mg.    Acquired hypothyroidism  Chronic medical diagnosis.  Recent labs had normal free T4 was slightly elevated TSH.  Currently taking levothyroxine 25 mcg daily.  Has reduced her carbohydrate intake and has been able to lower her A1c down to 5.9%.  However states that she has been having difficulty lose weight.    Gastroesophageal reflux disease without esophagitis  Chronic.continues to have epigastric pain after eating. Has a hard time remembering to take nexium  before meals.  Currently taking Nexium 10 mg.  Had an EGD in the past which was normal. Will try pepcid.    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  Her last MRI was completed in April 2021 and her subsequent follow-up appointment with [facility] was afterwards.  States that over the last 6 months she has noticed left preauricular and postauricular swelling.  Describes this as nontender and mobile.  Currently does not have any MRIs or follow-up appointment scheduled with [facility] for further evaluation.      Current Outpatient Medications Ordered in Epic   Medication Sig  Dispense Refill    diazePAM (VALIUM) 5 MG Tab Take 1 tab po once 60 minutes before MRI 3 Tablet 0    simvastatin (ZOCOR) 10 MG Tab TAKE 1 TABLET BY MOUTH EVERY DAY IN THE EVENING 100 Tablet 3    lisinopril (PRINIVIL) 2.5 MG Tab TAKE 1 TABLET BY MOUTH TWICE A  Tablet 3    esomeprazole (NEXIUM) 20 MG capsule TAKE 1 CAPSULE BY MOUTH EVERY DAY IN THE MORNING BEFORE BREAKFAST 100 Capsule 2    Fexofenadine HCl (ALLEGRA ALLERGY PO) Take 1 Tablet by mouth every day.      albuterol 108 (90 Base) MCG/ACT Aero Soln inhalation aerosol Inhale 2 Puffs every 6 hours as needed for Shortness of Breath. 1 Each 1    levothyroxine (SYNTHROID) 25 MCG Tab Take 1 Tablet by mouth every morning. ON A EMPTY STOMACH 100 Tablet 3    fluticasone (FLONASE) 50 MCG/ACT nasal spray Administer 2 Sprays into affected nostril(S) every day.      ibuprofen (MOTRIN) 200 MG Tab Take 200 mg by mouth every 6 hours as needed.      Cholecalciferol (VITAMIN D) 125 MCG (5000 UT) Cap Take  by mouth.      Zinc 30 MG Cap Take  by mouth.      Omega-3 Fatty Acids (FISH OIL) 1000 MG Cap capsule Take 1,000 mg by mouth 3 times a day, with meals.      Multiple Vitamins-Minerals (MULTIVITAMIN ADULT PO) Take  by mouth.       No current Epic-ordered facility-administered medications on file.       Past Medical History:   Diagnosis Date    Chronic meniscal tear of knee     left medial     Cough in adult 1/18/2022    Dyslipidemia     GERD (gastroesophageal reflux disease)     Hypothyroidism     Resolved 2018    Lab test positive for detection of COVID-19 virus 1/14/2022    Pleomorphic adenoma of parotid gland 1992, 2004    recurrent, yearly MRI    Sleep apnea        Social History     Tobacco Use    Smoking status: Never    Smokeless tobacco: Never   Vaping Use    Vaping Use: Never used   Substance Use Topics    Alcohol use: No     Alcohol/week: 0.0 oz    Drug use: No       Allergies:  Doxycycline, Penicillins, and Sulfa drugs    Health Maintenance: Mammogram and  "Nic sign ordered today.  Declining flu vaccine.  Encouraged patient to complete her pneumonia vaccine      Objective:       Exam:  /74   Pulse 75   Temp 36.6 °C (97.9 °F) (Temporal)   Resp 12   Ht 1.753 m (5' 9\")   Wt 94.4 kg (208 lb 1.6 oz)   SpO2 96%   BMI 30.73 kg/m²   Body mass index is 30.73 kg/m².  Wt Readings from Last 4 Encounters:   10/11/22 94.4 kg (208 lb 1.6 oz)   06/29/22 93.6 kg (206 lb 4.8 oz)   05/31/22 93 kg (205 lb)   05/17/22 93.9 kg (207 lb)       Gen: Alert and oriented, No apparent distress. Appropriately groomed.  Neck: Neck is supple without lymphadenopathy.No thyromegaly.   Lungs: Normal effort, CTA bilaterally, no wheezes, rhonchi, or rales  CV: Regular rate and rhythm. No Lower extremity edema  Skin: No rash noted.  Left preauricular: 1 cm x 1 cm mobile nontender mass  Left postauricular : 2 x 3 cm mobile nontender mass      Assessment & Plan:     81 y.o. female with the following -     1. Pleomorphic adenoma of parotid gland  -Chronic.  He has noticed increased in size over the last 6 months.  Encouraged to schedule follow-up with  [facility].  In the meantime, orders for MRI ordered with request to compare to study completed April 2021.    MR-SOFT TISSUE NECK-WITH & W/O; Future  - diazePAM (VALIUM) 5 MG Tab; Take 1 tab po once 60 minutes before MRI  Dispense: 3 Tablet; Refill: 0    2. Chronic pain of left knee  -Chronic.  Stable.  Continues to follow-up with [facility] orthopedics, Dr. Pelletier.  Requesting a new referral.    Referral to Orthopedics    3. Encounter for screening mammogram for breast cancer  - MA-SCREENING MAMMO BILAT W/TOMOSYNTHESIS W/CAD; Future    4. Dense breast tissue on mammogram  - US-SCREENING WHOLE BREAST BILATERAL (3D SCREENING); Future    5. Dyslipidemia  -Chronic medical diagnosis.  Recent labs with slight elevation.  Had been taking Zocor every other day.  She will start taking this medication on a daily basis.  We will repeat labs.    Lipid " Profile; Future    6. Acquired hypothyroidism  -Chronic.  Not well controlled he has recent TSH slightly elevated.  She will continue with levothyroxine 25 mcg daily.  She will also add an extra 25 mcg once a week.    TSH WITH REFLEX TO FT4; Future    7. Elevated hemoglobin A1c  Chronic.  Improving.  Recent A1c improved to 5.9%.    8. Gastroesophageal reflux disease without esophagitis  Chronic.  Well-controlled.  She has difficulty remembering to take her Nexium 30 minutes before meals and spacing it out with her other medications.  Discussed with her that she can try over-the-counter Pepcid as needed.    9. Thrombocytopenia (HCC)  Chronic medical diagnosis.  Stable..  We will continue to monitor.    I spent a total of 51 minutes with record review, exam, communication with the patient, communication with other providers, and documentation of this encounter.      No follow-ups on file.    Please note that this dictation was created using voice recognition software. I have made every reasonable attempt to correct obvious errors, but I expect that there are errors of grammar and possibly content that I did not discover before finalizing the note.

## 2022-10-11 NOTE — ASSESSMENT & PLAN NOTE
Chronic. Reduced her zocor to every other day in June as she thought it this may be contributing to her stomach issues.  However she has not noticed any improvement and  Will restart daily Zocor 10 mg.

## 2022-10-11 NOTE — ASSESSMENT & PLAN NOTE
Chronic medical diagnosis.  Her last MRI was completed in April 2021 and her subsequent follow-up appointment with [facility] was afterwards.  States that over the last 6 months she has noticed left preauricular and postauricular swelling.  Describes this as nontender and mobile.  Currently does not have any MRIs or follow-up appointment scheduled with [facility] for further evaluation.

## 2022-10-11 NOTE — ASSESSMENT & PLAN NOTE
Chronic medical diagnosis.  Recent labs had normal free T4 was slightly elevated TSH.  Currently taking levothyroxine 25 mcg daily.  Has reduced her carbohydrate intake and has been able to lower her A1c down to 5.9%.  However states that she has been having difficulty lose weight.

## 2022-10-11 NOTE — ASSESSMENT & PLAN NOTE
Chronic. Recent labs with improvement in A1c from 6.3 to 5.9%.  Has reduced bread intake.  Has been frustrated as she continues to gain weight.

## 2022-10-11 NOTE — ASSESSMENT & PLAN NOTE
Chronic.continues to have epigastric pain after eating. Has a hard time remembering to take nexium  before meals.  Currently taking Nexium 10 mg.  Had an EGD in the past which was normal. Will try pepcid.   The patient is a 54y Male complaining of hallucinations.

## 2022-10-24 ENCOUNTER — PATIENT MESSAGE (OUTPATIENT)
Dept: MEDICAL GROUP | Facility: PHYSICIAN GROUP | Age: 82
End: 2022-10-24
Payer: MEDICARE

## 2022-10-24 DIAGNOSIS — D11.0 PLEOMORPHIC ADENOMA OF PAROTID GLAND: ICD-10-CM

## 2022-10-27 ENCOUNTER — TELEPHONE (OUTPATIENT)
Dept: MEDICAL GROUP | Facility: PHYSICIAN GROUP | Age: 82
End: 2022-10-27
Payer: MEDICARE

## 2022-10-27 DIAGNOSIS — E03.9 ACQUIRED HYPOTHYROIDISM: ICD-10-CM

## 2022-10-27 RX ORDER — LEVOTHYROXINE SODIUM 0.03 MG/1
25 TABLET ORAL
Qty: 100 TABLET | Refills: 3 | Status: CANCELLED | OUTPATIENT
Start: 2022-10-27

## 2022-10-27 RX ORDER — LEVOTHYROXINE SODIUM 0.03 MG/1
25 TABLET ORAL EVERY MORNING
Qty: 100 TABLET | Refills: 3 | Status: SHIPPED | OUTPATIENT
Start: 2022-10-27 | End: 2022-12-13

## 2022-10-27 NOTE — TELEPHONE ENCOUNTER
Requested Prescriptions     Pending Prescriptions Disp Refills   • levothyroxine (SYNTHROID) 25 MCG Tab 100 Tablet 3     Sig: Take 1 Tablet by mouth every morning. ON A EMPTY STOMACH   Joselin Kwon M.D.

## 2022-10-27 NOTE — TELEPHONE ENCOUNTER
Patient came in today and asked if she could take an additional half a pill instead of a whole pill during the week. Taking an extra pill caused her to feel too energized and awake.

## 2022-10-27 NOTE — TELEPHONE ENCOUNTER
Patient informed and she will start taking an extra 1/2 a tablet weekly instead of a full tablet.

## 2022-11-02 DIAGNOSIS — K21.9 GASTROESOPHAGEAL REFLUX DISEASE WITHOUT ESOPHAGITIS: ICD-10-CM

## 2022-11-02 NOTE — TELEPHONE ENCOUNTER
Requested Prescriptions     Pending Prescriptions Disp Refills   • esomeprazole (NEXIUM) 20 MG capsule 100 Capsule 0     Sig: TAKE 1 CAPSULE BY MOUTH EVERY DAY IN THE MORNING BEFORE BREAKFAST   Joselin Kwon M.D.

## 2022-11-07 ENCOUNTER — DOCUMENTATION (OUTPATIENT)
Dept: HEALTH INFORMATION MANAGEMENT | Facility: OTHER | Age: 82
End: 2022-11-07
Payer: MEDICARE

## 2022-11-09 ENCOUNTER — PATIENT MESSAGE (OUTPATIENT)
Dept: HEALTH INFORMATION MANAGEMENT | Facility: OTHER | Age: 82
End: 2022-11-09

## 2022-11-22 PROCEDURE — RXMED WILLOW AMBULATORY MEDICATION CHARGE: Performed by: FAMILY MEDICINE

## 2022-11-27 ENCOUNTER — PHARMACY VISIT (OUTPATIENT)
Dept: PHARMACY | Facility: MEDICAL CENTER | Age: 82
End: 2022-11-27
Payer: COMMERCIAL

## 2022-12-07 ENCOUNTER — HOSPITAL ENCOUNTER (OUTPATIENT)
Dept: RADIOLOGY | Facility: MEDICAL CENTER | Age: 82
End: 2022-12-07
Attending: FAMILY MEDICINE
Payer: MEDICARE

## 2022-12-07 DIAGNOSIS — D11.0 PLEOMORPHIC ADENOMA OF PAROTID GLAND: ICD-10-CM

## 2022-12-07 PROCEDURE — 70543 MRI ORBT/FAC/NCK W/O &W/DYE: CPT

## 2022-12-07 PROCEDURE — 700117 HCHG RX CONTRAST REV CODE 255: Performed by: FAMILY MEDICINE

## 2022-12-07 PROCEDURE — A9576 INJ PROHANCE MULTIPACK: HCPCS | Performed by: FAMILY MEDICINE

## 2022-12-07 RX ADMIN — GADOTERIDOL 20 ML: 279.3 INJECTION, SOLUTION INTRAVENOUS at 10:06

## 2022-12-08 NOTE — RESULT ENCOUNTER NOTE
Please call patient and let her know that recent MRI shows that her left parotid lesion is stable in size.    Thanks  Joselin Kwon M.D.

## 2022-12-13 DIAGNOSIS — E03.9 ACQUIRED HYPOTHYROIDISM: ICD-10-CM

## 2022-12-13 RX ORDER — LEVOTHYROXINE SODIUM 0.03 MG/1
TABLET ORAL
Qty: 100 TABLET | Refills: 3 | Status: SHIPPED | OUTPATIENT
Start: 2022-12-13 | End: 2023-01-30

## 2022-12-13 NOTE — TELEPHONE ENCOUNTER
Received request via: Patient    Was the patient seen in the last year in this department? Yes    Does the patient have an active prescription (recently filled or refills available) for medication(s) requested? No    Does the patient have [facility] and need 100 day supply (blood pressure, diabetes and cholesterol meds only)? Yes, quantity updated to 100 days

## 2022-12-13 NOTE — TELEPHONE ENCOUNTER
Requested Prescriptions     Pending Prescriptions Disp Refills   • levothyroxine (SYNTHROID) 25 MCG Tab [Pharmacy Med Name: LEVOTHYROXINE 25 MCG TABLET] 100 Tablet 3     Sig: TAKE 1 TABLET BY MOUTH EVERY MORNING. ON A EMPTY STOMACH   Joselin Kwon M.D.

## 2022-12-14 DIAGNOSIS — E03.9 ACQUIRED HYPOTHYROIDISM: ICD-10-CM

## 2022-12-14 RX ORDER — LEVOTHYROXINE SODIUM 0.03 MG/1
25 TABLET ORAL EVERY MORNING
Qty: 100 TABLET | Refills: 3 | Status: SHIPPED | OUTPATIENT
Start: 2022-12-14 | End: 2023-01-30

## 2022-12-14 NOTE — TELEPHONE ENCOUNTER
Received request via: Patient    Was the patient seen in the last year in this department? Yes    Does the patient have an active prescription (recently filled or refills available) for medication(s) requested? No    Does the patient have [facility] and need 100 day supply (blood pressure, diabetes and cholesterol meds only)? Yes, quantity updated to 100 days

## 2022-12-14 NOTE — TELEPHONE ENCOUNTER
Requested Prescriptions     Pending Prescriptions Disp Refills   • levothyroxine (SYNTHROID) 25 MCG Tab 100 Tablet 3     Sig: Take 1 Tablet by mouth every morning on an empty stomach     Joselin Kwon M.D.

## 2023-01-25 ENCOUNTER — HOSPITAL ENCOUNTER (OUTPATIENT)
Dept: LAB | Facility: MEDICAL CENTER | Age: 83
End: 2023-01-25
Attending: FAMILY MEDICINE
Payer: MEDICARE

## 2023-01-25 DIAGNOSIS — E78.5 DYSLIPIDEMIA: ICD-10-CM

## 2023-01-25 DIAGNOSIS — E03.9 ACQUIRED HYPOTHYROIDISM: ICD-10-CM

## 2023-01-25 LAB
CHOLEST SERPL-MCNC: 186 MG/DL (ref 100–199)
HDLC SERPL-MCNC: 54 MG/DL
LDLC SERPL CALC-MCNC: 101 MG/DL
T4 FREE SERPL-MCNC: 0.97 NG/DL (ref 0.93–1.7)
TRIGL SERPL-MCNC: 156 MG/DL (ref 0–149)
TSH SERPL DL<=0.005 MIU/L-ACNC: 6.5 UIU/ML (ref 0.38–5.33)

## 2023-01-25 PROCEDURE — 84443 ASSAY THYROID STIM HORMONE: CPT

## 2023-01-25 PROCEDURE — 36415 COLL VENOUS BLD VENIPUNCTURE: CPT

## 2023-01-25 PROCEDURE — 84439 ASSAY OF FREE THYROXINE: CPT

## 2023-01-25 PROCEDURE — 80061 LIPID PANEL: CPT

## 2023-01-30 ENCOUNTER — OFFICE VISIT (OUTPATIENT)
Dept: MEDICAL GROUP | Facility: PHYSICIAN GROUP | Age: 83
End: 2023-01-30
Payer: MEDICARE

## 2023-01-30 VITALS
OXYGEN SATURATION: 94 % | HEART RATE: 70 BPM | HEIGHT: 69 IN | RESPIRATION RATE: 18 BRPM | BODY MASS INDEX: 30.81 KG/M2 | DIASTOLIC BLOOD PRESSURE: 78 MMHG | TEMPERATURE: 97.5 F | WEIGHT: 208 LBS | SYSTOLIC BLOOD PRESSURE: 132 MMHG

## 2023-01-30 DIAGNOSIS — Z12.11 SCREEN FOR COLON CANCER: ICD-10-CM

## 2023-01-30 DIAGNOSIS — I10 ESSENTIAL HYPERTENSION: ICD-10-CM

## 2023-01-30 DIAGNOSIS — D11.0 PLEOMORPHIC ADENOMA OF PAROTID GLAND: ICD-10-CM

## 2023-01-30 DIAGNOSIS — Z23 NEED FOR VACCINATION: ICD-10-CM

## 2023-01-30 DIAGNOSIS — E03.9 ACQUIRED HYPOTHYROIDISM: ICD-10-CM

## 2023-01-30 DIAGNOSIS — D69.6 THROMBOCYTOPENIA (HCC): ICD-10-CM

## 2023-01-30 DIAGNOSIS — I70.0 ATHEROSCLEROSIS OF AORTA (HCC): ICD-10-CM

## 2023-01-30 DIAGNOSIS — E78.5 DYSLIPIDEMIA: ICD-10-CM

## 2023-01-30 DIAGNOSIS — R73.09 ELEVATED HEMOGLOBIN A1C: ICD-10-CM

## 2023-01-30 PROBLEM — N18.30 CKD (CHRONIC KIDNEY DISEASE) STAGE 3, GFR 30-59 ML/MIN: Status: RESOLVED | Noted: 2022-02-03 | Resolved: 2023-01-30

## 2023-01-30 PROCEDURE — G0009 ADMIN PNEUMOCOCCAL VACCINE: HCPCS | Performed by: FAMILY MEDICINE

## 2023-01-30 PROCEDURE — 99214 OFFICE O/P EST MOD 30 MIN: CPT | Mod: 25 | Performed by: FAMILY MEDICINE

## 2023-01-30 PROCEDURE — 90677 PCV20 VACCINE IM: CPT | Performed by: FAMILY MEDICINE

## 2023-01-30 RX ORDER — ZOSTER VACCINE RECOMBINANT, ADJUVANTED 50 MCG/0.5
0.5 KIT INTRAMUSCULAR ONCE
Qty: 0.5 ML | Refills: 0 | Status: SHIPPED | OUTPATIENT
Start: 2023-01-30 | End: 2023-01-30

## 2023-01-30 RX ORDER — LEVOTHYROXINE SODIUM 0.03 MG/1
25 TABLET ORAL
Qty: 100 TABLET | Refills: 2 | Status: SHIPPED | OUTPATIENT
Start: 2023-01-30 | End: 2023-03-21

## 2023-01-30 ASSESSMENT — FIBROSIS 4 INDEX: FIB4 SCORE: 3.41

## 2023-01-30 ASSESSMENT — PATIENT HEALTH QUESTIONNAIRE - PHQ9: CLINICAL INTERPRETATION OF PHQ2 SCORE: 0

## 2023-01-30 NOTE — PROGRESS NOTES
CC:   Chief Complaint   Patient presents with    Lab Results         HPI:   Reina presents today for a follow-up visit.  Last seen by me on October 11.    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  Last MRI from December had a stable left parotid lesion. Hasn't been able to go to [facility] due to snow. Will reschedule  appt for end of Feb.    Acquired hypothyroidism  Chronic medical diagnosis.  Recent labs have normal free T4 but slightly elevated TSH at 6.5.   Latest Reference Range & Units 01/25/23 07:45   TSH 0.380 - 5.330 uIU/mL 6.500 (H)   Free T-4 0.93 - 1.70 ng/dL 0.97   (H): Data is abnormally high    Dyslipidemia  Chronic medical diagnosis.  Recent labs do show an improvement in triglyceride levels. Occasionally will skip zocor 10mg daily.    Latest Reference Range & Units 01/25/23 07:45   Cholesterol,Tot 100 - 199 mg/dL 186   Triglycerides 0 - 149 mg/dL 156 (H)   HDL >=40 mg/dL 54   LDL <100 mg/dL 101 (H)   (H): Data is abnormally high    Essential hypertension  Chronic. Home bps are in the lower and normal range.  Didn't take her bp meds this am. Takes lisinopril 2.5mg bid.   bp today is 140/82.     Elevated hemoglobin A1c  Chronic medical diagnosis.  Last set of labs from October had A1c improving to 5.9%    Thrombocytopenia (HCC)  Chronic medical diagnosis.  This is been evident in epic since August 2018.  Last set of labs from October had platelet counts at 161,000.      Current Outpatient Medications Ordered in Epic   Medication Sig Dispense Refill    levothyroxine (SYNTHROID) 25 MCG Tab Take 1 Tablet by mouth every morning on an empty stomach. 100 Tablet 2    Zoster Vac Recomb Adjuvanted (SHINGRIX) 50 MCG/0.5ML Recon Susp Inject 0.5 mL into the shoulder, thigh, or buttocks one time for 1 dose. 0.5 mL 0    esomeprazole (NEXIUM) 20 MG capsule TAKE 1 CAPSULE BY MOUTH EVERY DAY IN THE MORNING BEFORE BREAKFAST 100 Capsule 0    simvastatin (ZOCOR) 10 MG Tab Take 1 Tablet by mouth every  "evening. 100 Tablet 3    lisinopril (PRINIVIL) 2.5 MG Tab Take 1 Tablet by mouth 2 times a day. 200 Tablet 3    Fexofenadine HCl (ALLEGRA ALLERGY PO) Take 1 Tablet by mouth every day.      albuterol 108 (90 Base) MCG/ACT Aero Soln inhalation aerosol Inhale 2 Puffs every 6 hours as needed for Shortness of Breath. 1 Each 1    fluticasone (FLONASE) 50 MCG/ACT nasal spray Administer 2 Sprays into affected nostril(S) every day.      ibuprofen (MOTRIN) 200 MG Tab Take 200 mg by mouth every 6 hours as needed.      Cholecalciferol (VITAMIN D) 125 MCG (5000 UT) Cap Take  by mouth.      Zinc 30 MG Cap Take  by mouth.      Omega-3 Fatty Acids (FISH OIL) 1000 MG Cap capsule Take 1,000 mg by mouth 3 times a day, with meals.      Multiple Vitamins-Minerals (MULTIVITAMIN ADULT PO) Take  by mouth.       No current Epic-ordered facility-administered medications on file.       Past Medical History:   Diagnosis Date    Chronic meniscal tear of knee     left medial     CKD (chronic kidney disease) stage 3, GFR 30-59 ml/min ([facility]) 2/3/2022    Cough in adult 1/18/2022    Dyslipidemia     GERD (gastroesophageal reflux disease)     Hypothyroidism     Resolved 2018    Lab test positive for detection of COVID-19 virus 1/14/2022    Pleomorphic adenoma of parotid gland 1992, 2004    recurrent, yearly MRI    Sleep apnea        Social History     Tobacco Use    Smoking status: Never    Smokeless tobacco: Never   Vaping Use    Vaping Use: Never used   Substance Use Topics    Alcohol use: No     Alcohol/week: 0.0 oz    Drug use: No       Allergies:  Doxycycline, Penicillins, and Sulfa drugs    Health Maintenance: Prevnar 20 administered today.  Encouraged to get shingrix -handwritten prescription given to patient today    Objective:       Exam:  /78   Pulse 70   Temp 36.4 °C (97.5 °F) (Temporal)   Resp 18   Ht 1.753 m (5' 9\")   Wt 94.3 kg (208 lb)   SpO2 94%   BMI 30.72 kg/m²   Body mass index is 30.72 kg/m².  Wt Readings from Last 4 " Encounters:   01/30/23 94.3 kg (208 lb)   10/11/22 94.4 kg (208 lb 1.6 oz)   06/29/22 93.6 kg (206 lb 4.8 oz)   05/31/22 93 kg (205 lb)       Gen: Alert and oriented, No apparent distress. Appropriately groomed.  Neck: Neck is supple without lymphadenopathy.No thyromegaly.   Lungs: Normal effort, CTA bilaterally, no wheezes, rhonchi, or rales  CV: Regular rate and rhythm. No Lower extremity edema  Skin: No rash noted.      Assessment & Plan:     82 y.o. female with the following -     1. Pleomorphic adenoma of parotid gland  Chronic.  Stable.  Recently underwent MRI.  In the process of scheduling follow-up with specialist at [facility].    2. Acquired hypothyroidism  Chronic.  Recent labs with elevated TSH and borderline free T4.  Has been talking to her chiropractor who recommended switching over to Synthroid.  She would like to do that.  Plan on rechecking labs in 2 months.  - levothyroxine (SYNTHROID) 25 MCG Tab; Take 1 Tablet by mouth every morning on an empty stomach.  Dispense: 100 Tablet; Refill: 2  - TSH WITH REFLEX TO FT4; Future    3. Dyslipidemia  Chronic medical diagnosis.  Overall stable.  Continue with Zocor 10 mg nightly.- CBC WITH DIFFERENTIAL; Future  - Comp Metabolic Panel; Future  - Lipid Profile; Future  - VITAMIN D,25 HYDROXY (DEFICIENCY); Future  - VITAMIN B12; Future    4. Essential hypertension  Chronic medical diagnosis.  Stable with lisinopril 2.5 mg twice a day    5. Need for vaccination  - Pneumococcal Conjugate Vaccine 20-Valent (19 yrs+)  - Zoster Vac Recomb Adjuvanted (SHINGRIX) 50 MCG/0.5ML Recon Susp; Inject 0.5 mL into the shoulder, thigh, or buttocks one time for 1 dose.  Dispense: 0.5 mL; Refill: 0    6. Elevated hemoglobin A1c  Chronic.  Improving.  - HEMOGLOBIN A1C; Future    7. Screen for colon cancer  - COLOGUARD (FIT DNA)    8. Atherosclerosis of aorta (HCC)  9. Thrombocytopenia (HCC)  HCC Gap Form    Diagnosis to address: I70.0 - Atherosclerosis of aorta (HCC)  Assessment and  plan: Chronic, stable.  Noted on chest x-ray completed January 2022.  Continue with simvastatin 10 mg daily  Diagnosis: D69.6 - Thrombocytopenia (HCC)  Assessment and plan: Chronic, stable.  We will recheck labs  Last edited 01/30/23 13:10 PST by Joselin Kwon M.D.              Return in about 3 months (around 4/30/2023).    Please note that this dictation was created using voice recognition software. I have made every reasonable attempt to correct obvious errors, but I expect that there are errors of grammar and possibly content that I did not discover before finalizing the note.

## 2023-01-30 NOTE — ASSESSMENT & PLAN NOTE
Chronic medical diagnosis.  Recent labs do show an improvement in triglyceride levels. Occasionally will skip zocor 10mg daily.    Latest Reference Range & Units 01/25/23 07:45   Cholesterol,Tot 100 - 199 mg/dL 186   Triglycerides 0 - 149 mg/dL 156 (H)   HDL >=40 mg/dL 54   LDL <100 mg/dL 101 (H)   (H): Data is abnormally high

## 2023-01-30 NOTE — ASSESSMENT & PLAN NOTE
Chronic medical diagnosis.  This is been evident in epic since August 2018.  Last set of labs from October had platelet counts at 161,000.

## 2023-01-30 NOTE — ASSESSMENT & PLAN NOTE
Chronic medical diagnosis.  Recent labs have normal free T4 but slightly elevated TSH at 6.5.   Latest Reference Range & Units 01/25/23 07:45   TSH 0.380 - 5.330 uIU/mL 6.500 (H)   Free T-4 0.93 - 1.70 ng/dL 0.97   (H): Data is abnormally high

## 2023-01-30 NOTE — ASSESSMENT & PLAN NOTE
Chronic. Home bps are in the lower and normal range.  Didn't take her bp meds this am. Takes lisinopril 2.5mg bid.   bp today is 140/82.

## 2023-01-30 NOTE — ASSESSMENT & PLAN NOTE
Problem: Fatigue  Goal: Improved Activity Tolerance  Intervention: Promote Energy Conservation  Flowsheets (Taken 5/6/2020 1321)  Fatigue Management: fatigue-related activity identified; frequent rest breaks encouraged      Chronic medical diagnosis.  Last MRI from December had a stable left parotid lesion. Hasn't been able to go to [facility] due to snow. Will reschedule  appt for end of Feb.

## 2023-02-23 ENCOUNTER — OFFICE VISIT (OUTPATIENT)
Dept: SLEEP MEDICINE | Facility: MEDICAL CENTER | Age: 83
End: 2023-02-23
Attending: NURSE PRACTITIONER
Payer: MEDICARE

## 2023-02-23 VITALS
HEART RATE: 71 BPM | WEIGHT: 202 LBS | HEIGHT: 69 IN | SYSTOLIC BLOOD PRESSURE: 136 MMHG | DIASTOLIC BLOOD PRESSURE: 90 MMHG | OXYGEN SATURATION: 93 % | BODY MASS INDEX: 29.92 KG/M2

## 2023-02-23 DIAGNOSIS — I10 ESSENTIAL HYPERTENSION: ICD-10-CM

## 2023-02-23 DIAGNOSIS — Z78.9 NONSMOKER: ICD-10-CM

## 2023-02-23 DIAGNOSIS — G47.30 SLEEP APNEA WITH USE OF CONTINUOUS POSITIVE AIRWAY PRESSURE (CPAP): ICD-10-CM

## 2023-02-23 PROCEDURE — 99213 OFFICE O/P EST LOW 20 MIN: CPT | Performed by: NURSE PRACTITIONER

## 2023-02-23 PROCEDURE — 99212 OFFICE O/P EST SF 10 MIN: CPT | Performed by: NURSE PRACTITIONER

## 2023-02-23 ASSESSMENT — FIBROSIS 4 INDEX: FIB4 SCORE: 3.41

## 2023-02-23 NOTE — PATIENT INSTRUCTIONS
For mouth dryness; try paper tape you can get at pharmacy or chin strap/cpap mouth tape on [email or web address]

## 2023-02-23 NOTE — PROGRESS NOTES
Chief Complaint   Patient presents with    Apnea     Last seen 02/10/21       HPI:  Reina Munoz is a 82 y.o. year old female here today for follow-up on LUKAS.  Last OV 2/10/21     Currently using APAP @ 10-20cm H20 nightly; RESMED; device obtained 2021.  Compliance report 1/23/2023 through 2/21/2023 indicates 100 and compliance, average nightly use 8 hours 35 minutes, mean pressure 15.2 cm, moderate to significant mask leak with a reduced AHI of 0.7/h.  Patient notes her supplies to be quite outdated but continues to clean them regularly.  [place] [facility]  Currently using a nasal mask and notes dry mouth.  Her humidifier chamber is unable to be filled all the way and appears to be leaking.  She denies morning headaches.  She notes energy level is generally consistent through the day but will nap 3-4 times per week up to an hour in her recliner.  We reviewed sleep hygiene.  She notes if she is overly active or does not sleep well the night before she may need to nap.  We discussed that if it is necessary to lay down with her device and set an alarm for 30 minutes which does not interrupt her sleep at night.  Ultimately I would like her to avoid naps if possible.  She denies cardiac respiratory symptoms today.  She did have COVID in the past and was placed on Ne as needed.  She rarely uses it.    Sleep hx:  Previously diagnosed in [place] several years ago.  Sleep study results are unavailable.  She was placed on auto CPAP 10 to 20 cm nightly.  PSG 5/3/2021 noted severe active sleep apnea with an overall AHI of 24/h and O2 cyril of 83%.  Split-night criteria was not met.  Orders were placed for new auto CPAP 10 to 20 cm 5/13/2021.      ROS: As per HPI and otherwise negative if not stated.    Past Medical History:   Diagnosis Date    Chronic meniscal tear of knee     left medial     CKD (chronic kidney disease) stage 3, GFR 30-59 ml/min ([facility]) 2/3/2022    Cough in  adult 1/18/2022    Dyslipidemia     GERD (gastroesophageal reflux disease)     Hypothyroidism     Resolved 2018    Lab test positive for detection of COVID-19 virus 1/14/2022    Pleomorphic adenoma of parotid gland 1992, 2004    recurrent, yearly MRI    Sleep apnea        Past Surgical History:   Procedure Laterality Date    CATARACT EXTRACTION WITH IOL      LUMPECTOMY Left     non-cancerous    PAROTIDECTOMY Left 1992, 2004    recurrent pleomorphic adenoma left parotid gland       Family History   Problem Relation Age of Onset    Cancer Mother         multiple myeloma    Heart Disease Mother     Heart Attack Father     Stroke Father     Heart Disease Father     Hypertension Father     Heart Disease Brother     Lung Disease Brother     Stroke Sister     Cancer Sister         breast    Sleep Apnea Neg Hx     Diabetes Neg Hx        Social History     Socioeconomic History    Marital status:      Spouse name: Not on file    Number of children: 3    Years of education: Not on file    Highest education level: Not on file   Occupational History    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   Tobacco Use    Smoking status: Never    Smokeless tobacco: Never   Vaping Use    Vaping Use: Never used   Substance and Sexual Activity    Alcohol use: No     Alcohol/week: 0.0 oz    Drug use: No    Sexual activity: Yes     Partners: Male     Comment: , [employer], 3 kids   Other Topics Concern    Not on file   Social History Narrative    Not on file     Social Determinants of Health     Financial Resource Strain: Not on file   Food Insecurity: Not on file   Transportation Needs: Not on file   Physical Activity: Not on file   Stress: Not on file   Social Connections: Not on file   Intimate Partner Violence: Not on file   Housing Stability: Not on file       Allergies as of 02/23/2023 - Reviewed 02/23/2023   Allergen Reaction Noted    Doxycycline Nausea and Swelling 06/13/2018    Penicillins Rash 01/11/2016    Sulfa  "drugs Rash 01/11/2016        Vitals:  BP (!) 136/90 (BP Location: Left arm, Patient Position: Sitting, BP Cuff Size: Adult)   Pulse 71   Ht 1.753 m (5' 9\")   Wt 91.6 kg (202 lb)   SpO2 93%     Current medications as of today   Current Outpatient Medications   Medication Sig Dispense Refill    SYNTHROID 25 MCG Tab Take 1 Tablet by mouth every morning on an empty stomach. 100 Tablet 2    esomeprazole (NEXIUM) 20 MG capsule TAKE 1 CAPSULE BY MOUTH EVERY DAY IN THE MORNING BEFORE BREAKFAST 100 Capsule 0    simvastatin (ZOCOR) 10 MG Tab Take 1 Tablet by mouth every evening. 100 Tablet 3    lisinopril (PRINIVIL) 2.5 MG Tab Take 1 Tablet by mouth 2 times a day. 200 Tablet 3    Fexofenadine HCl (ALLEGRA ALLERGY PO) Take 1 Tablet by mouth every day.      albuterol 108 (90 Base) MCG/ACT Aero Soln inhalation aerosol Inhale 2 Puffs every 6 hours as needed for Shortness of Breath. 1 Each 1    fluticasone (FLONASE) 50 MCG/ACT nasal spray Administer 2 Sprays into affected nostril(S) every day.      ibuprofen (MOTRIN) 200 MG Tab Take 200 mg by mouth every 6 hours as needed.      Cholecalciferol (VITAMIN D) 125 MCG (5000 UT) Cap Take  by mouth.      Zinc 30 MG Cap Take  by mouth.      Omega-3 Fatty Acids (FISH OIL) 1000 MG Cap capsule Take 1,000 mg by mouth 3 times a day, with meals.      Multiple Vitamins-Minerals (MULTIVITAMIN ADULT PO) Take  by mouth.       No current facility-administered medications for this visit.         Physical Exam:   Gen:           Alert and oriented, No apparent distress. Mood and affect appropriate, normal interaction with examiner.  Eyes:          PERRL, EOM intact, sclere white, conjunctive moist.  Ears:          Not examined.   Hearing:     Grossly intact.  Nose:          Normal, no lesions or deformities.  Dentition:    mask  Oropharynx:   mask  Mallampati Classification: mask  Neck:        Supple, trachea midline, no masses.  Respiratory Effort: No intercostal retractions or use of accessory " muscles.   Lung Auscultation:      Clear to auscultation bilaterally; no rales, rhonchi or wheezing.  CV:            Regular rate and rhythm. No murmurs, rubs or gallops.  Abd:           Not examined.   Lymphadenopathy: Not examined.  Gait and Station: Normal.  Digits and Nails: No clubbing, cyanosis, petechiae, or nodes.   Cranial Nerves: II-XII grossly intact.  Skin:        No rashes, lesions or ulcers noted.               Ext:           No cyanosis or edema.      Assessment:  1. Sleep apnea with use of continuous positive airway pressure (CPAP)        2. BMI 29.0-29.9,adult        3. Essential hypertension        4. Nonsmoker            Immunizations:    Flu:recommend  Pneumovax 23:2021  Prevnar 13:2021  PCV 20: 2023  COVID-19: 12/18/21    Plan:  LUKAS is well controlled but patient does need supplies.  She will continue auto CPAP 10 to 20 cm nightly.  She will update her supplies for improved mask fit.  Avoidance of naps encouraged otherwise setting alarm for 30 minutes and to use her device if a nap is necessary.  Follow-up with primary care further health concerns clean management hypertension  Follow-up in 1 year with compliance report, sooner if needed.  Advised patient to contact me sooner if she has any changes in her sleep.    Please note that this dictation was created using voice recognition software. I have made every reasonable attempt to correct obvious errors, but it is possible there are errors of grammar and possibly content that I did not discover before finalizing the note.

## 2023-03-16 ENCOUNTER — TELEPHONE (OUTPATIENT)
Dept: HEALTH INFORMATION MANAGEMENT | Facility: OTHER | Age: 83
End: 2023-03-16
Payer: MEDICARE

## 2023-03-16 DIAGNOSIS — K21.9 GASTROESOPHAGEAL REFLUX DISEASE WITHOUT ESOPHAGITIS: ICD-10-CM

## 2023-03-16 DIAGNOSIS — E03.9 ACQUIRED HYPOTHYROIDISM: ICD-10-CM

## 2023-03-16 PROCEDURE — RXMED WILLOW AMBULATORY MEDICATION CHARGE: Performed by: FAMILY MEDICINE

## 2023-03-16 NOTE — TELEPHONE ENCOUNTER
Requested Prescriptions     Pending Prescriptions Disp Refills   • esomeprazole (NEXIUM) 20 MG capsule 100 Capsule 3     Sig: TAKE 1 CAPSULE BY MOUTH EVERY DAY IN THE MORNING BEFORE BREAKFAST   Joslein Kwon M.D.

## 2023-03-16 NOTE — TELEPHONE ENCOUNTER
1. Caller Name: Reina Munoz                        Call Back Number: [phone]      How would the patient prefer to be contacted with a response: Phone call OK to leave a detailed message        Patient called to request  this medication substitution:  levothyroxine oral tablet 25 mcg  This is a Tier 1 copay    Patient does not want the  branded Synthroid 25 mcg, as it is NF and she would need to pay the cash prices, which she stated is very high    Member has about 2 weeks left for her current supply, sending telephone encounter with a lower priority

## 2023-03-21 ENCOUNTER — PHARMACY VISIT (OUTPATIENT)
Dept: PHARMACY | Facility: MEDICAL CENTER | Age: 83
End: 2023-03-21
Payer: COMMERCIAL

## 2023-03-21 PROCEDURE — RXMED WILLOW AMBULATORY MEDICATION CHARGE: Performed by: INTERNAL MEDICINE

## 2023-03-21 RX ORDER — LEVOTHYROXINE SODIUM 0.03 MG/1
25 TABLET ORAL
Qty: 100 TABLET | Refills: 3 | Status: SHIPPED | OUTPATIENT
Start: 2023-03-21

## 2023-03-21 RX ORDER — LEVOTHYROXINE SODIUM 0.03 MG/1
25 TABLET ORAL
COMMUNITY
End: 2023-03-21 | Stop reason: SDUPTHER

## 2023-04-04 PROBLEM — E66.9 OBESITY (BMI 30.0-34.9): Status: ACTIVE | Noted: 2023-04-04

## 2023-04-04 PROBLEM — E66.811 OBESITY (BMI 30.0-34.9): Status: ACTIVE | Noted: 2023-04-04

## 2023-04-04 PROBLEM — I27.20 PULMONARY HYPERTENSION (HCC): Status: ACTIVE | Noted: 2023-04-04

## 2023-04-04 PROBLEM — R73.03 PREDIABETES: Status: ACTIVE | Noted: 2023-04-04

## 2023-04-04 NOTE — RESULT ENCOUNTER NOTE
Hello,    Your Cologuard test was negative which indicates a more than 99% chance you do not have colon cancer.  We should repeat this test in 3 years.    Joselin Kwon M.D.

## 2023-04-12 ENCOUNTER — TELEPHONE (OUTPATIENT)
Dept: MEDICAL GROUP | Facility: PHYSICIAN GROUP | Age: 83
End: 2023-04-12
Payer: MEDICARE

## 2023-04-12 DIAGNOSIS — D11.0 PLEOMORPHIC ADENOMA OF PAROTID GLAND: ICD-10-CM

## 2023-04-12 NOTE — TELEPHONE ENCOUNTER
Caller Name: benson zurita   Call Back Number: [phone]    How would the patient prefer to be contacted with a response: Phone call OK to leave a detailed message    2. SPECIFIC Action To Be Taken: Referral pending, please sign.    3. Diagnosis/Clinical Reason for Request: D11.0 (ICD-10-CM) - Pleomorphic adenoma of parotid gland    4. Specialty & Provider Name/Lab/Imaging Location: [facility] DEPT.    5. Is appointment scheduled for requested order/referral: no    Patient was informed they will receive a return phone call from the office ONLY if there are any questions before processing their request. Advised to call back if they haven't received a call from the referral department in 5 days.

## 2023-04-27 ENCOUNTER — HOSPITAL ENCOUNTER (OUTPATIENT)
Dept: LAB | Facility: MEDICAL CENTER | Age: 83
End: 2023-04-27
Attending: FAMILY MEDICINE
Payer: MEDICARE

## 2023-04-27 DIAGNOSIS — E03.9 ACQUIRED HYPOTHYROIDISM: ICD-10-CM

## 2023-04-27 DIAGNOSIS — R73.09 ELEVATED HEMOGLOBIN A1C: ICD-10-CM

## 2023-04-27 DIAGNOSIS — E78.5 DYSLIPIDEMIA: ICD-10-CM

## 2023-04-27 LAB
25(OH)D3 SERPL-MCNC: 33 NG/ML (ref 30–100)
ALBUMIN SERPL BCP-MCNC: 4.5 G/DL (ref 3.2–4.9)
ALBUMIN/GLOB SERPL: 1.7 G/DL
ALP SERPL-CCNC: 90 U/L (ref 30–99)
ALT SERPL-CCNC: 52 U/L (ref 2–50)
ANION GAP SERPL CALC-SCNC: 12 MMOL/L (ref 7–16)
AST SERPL-CCNC: 56 U/L (ref 12–45)
BASOPHILS # BLD AUTO: 0.9 % (ref 0–1.8)
BASOPHILS # BLD: 0.04 K/UL (ref 0–0.12)
BILIRUB SERPL-MCNC: 0.9 MG/DL (ref 0.1–1.5)
BUN SERPL-MCNC: 13 MG/DL (ref 8–22)
CALCIUM ALBUM COR SERPL-MCNC: 9.6 MG/DL (ref 8.5–10.5)
CALCIUM SERPL-MCNC: 10 MG/DL (ref 8.5–10.5)
CHLORIDE SERPL-SCNC: 105 MMOL/L (ref 96–112)
CHOLEST SERPL-MCNC: 240 MG/DL (ref 100–199)
CO2 SERPL-SCNC: 25 MMOL/L (ref 20–33)
CREAT SERPL-MCNC: 0.78 MG/DL (ref 0.5–1.4)
EOSINOPHIL # BLD AUTO: 0.25 K/UL (ref 0–0.51)
EOSINOPHIL NFR BLD: 5.5 % (ref 0–6.9)
ERYTHROCYTE [DISTWIDTH] IN BLOOD BY AUTOMATED COUNT: 47.5 FL (ref 35.9–50)
EST. AVERAGE GLUCOSE BLD GHB EST-MCNC: 137 MG/DL
FASTING STATUS PATIENT QL REPORTED: NORMAL
GFR SERPLBLD CREATININE-BSD FMLA CKD-EPI: 77 ML/MIN/1.73 M 2
GLOBULIN SER CALC-MCNC: 2.6 G/DL (ref 1.9–3.5)
GLUCOSE SERPL-MCNC: 108 MG/DL (ref 65–99)
HBA1C MFR BLD: 6.4 % (ref 4–5.6)
HCT VFR BLD AUTO: 45.2 % (ref 37–47)
HDLC SERPL-MCNC: 59 MG/DL
HGB BLD-MCNC: 15.4 G/DL (ref 12–16)
IMM GRANULOCYTES # BLD AUTO: 0.01 K/UL (ref 0–0.11)
IMM GRANULOCYTES NFR BLD AUTO: 0.2 % (ref 0–0.9)
LDLC SERPL CALC-MCNC: 143 MG/DL
LYMPHOCYTES # BLD AUTO: 1.94 K/UL (ref 1–4.8)
LYMPHOCYTES NFR BLD: 43 % (ref 22–41)
MCH RBC QN AUTO: 31.5 PG (ref 27–33)
MCHC RBC AUTO-ENTMCNC: 34.1 G/DL (ref 33.6–35)
MCV RBC AUTO: 92.4 FL (ref 81.4–97.8)
MONOCYTES # BLD AUTO: 0.26 K/UL (ref 0–0.85)
MONOCYTES NFR BLD AUTO: 5.8 % (ref 0–13.4)
NEUTROPHILS # BLD AUTO: 2.01 K/UL (ref 2–7.15)
NEUTROPHILS NFR BLD: 44.6 % (ref 44–72)
NRBC # BLD AUTO: 0 K/UL
NRBC BLD-RTO: 0 /100 WBC
PLATELET # BLD AUTO: 137 K/UL (ref 164–446)
PMV BLD AUTO: 12.8 FL (ref 9–12.9)
POTASSIUM SERPL-SCNC: 4.5 MMOL/L (ref 3.6–5.5)
PROT SERPL-MCNC: 7.1 G/DL (ref 6–8.2)
RBC # BLD AUTO: 4.89 M/UL (ref 4.2–5.4)
SODIUM SERPL-SCNC: 142 MMOL/L (ref 135–145)
TRIGL SERPL-MCNC: 191 MG/DL (ref 0–149)
TSH SERPL DL<=0.005 MIU/L-ACNC: 5.13 UIU/ML (ref 0.38–5.33)
VIT B12 SERPL-MCNC: 941 PG/ML (ref 211–911)
WBC # BLD AUTO: 4.5 K/UL (ref 4.8–10.8)

## 2023-04-27 PROCEDURE — 84443 ASSAY THYROID STIM HORMONE: CPT

## 2023-04-27 PROCEDURE — 36415 COLL VENOUS BLD VENIPUNCTURE: CPT

## 2023-04-27 PROCEDURE — 80053 COMPREHEN METABOLIC PANEL: CPT

## 2023-04-27 PROCEDURE — 83036 HEMOGLOBIN GLYCOSYLATED A1C: CPT

## 2023-04-27 PROCEDURE — 82306 VITAMIN D 25 HYDROXY: CPT

## 2023-04-27 PROCEDURE — 80061 LIPID PANEL: CPT

## 2023-04-27 PROCEDURE — 82607 VITAMIN B-12: CPT

## 2023-04-27 PROCEDURE — 85025 COMPLETE CBC W/AUTO DIFF WBC: CPT

## 2023-05-01 ENCOUNTER — OFFICE VISIT (OUTPATIENT)
Dept: MEDICAL GROUP | Facility: PHYSICIAN GROUP | Age: 83
End: 2023-05-01
Payer: MEDICARE

## 2023-05-01 VITALS
HEIGHT: 67 IN | BODY MASS INDEX: 31.86 KG/M2 | DIASTOLIC BLOOD PRESSURE: 70 MMHG | TEMPERATURE: 97.7 F | HEART RATE: 66 BPM | WEIGHT: 203 LBS | RESPIRATION RATE: 12 BRPM | OXYGEN SATURATION: 95 % | SYSTOLIC BLOOD PRESSURE: 122 MMHG

## 2023-05-01 DIAGNOSIS — Z78.0 POSTMENOPAUSAL: ICD-10-CM

## 2023-05-01 DIAGNOSIS — E78.5 DYSLIPIDEMIA: ICD-10-CM

## 2023-05-01 DIAGNOSIS — R73.09 ELEVATED HEMOGLOBIN A1C: ICD-10-CM

## 2023-05-01 DIAGNOSIS — D11.0 PLEOMORPHIC ADENOMA OF PAROTID GLAND: ICD-10-CM

## 2023-05-01 DIAGNOSIS — D69.6 THROMBOCYTOPENIA (HCC): ICD-10-CM

## 2023-05-01 DIAGNOSIS — R79.89 ELEVATED LIVER FUNCTION TESTS: ICD-10-CM

## 2023-05-01 DIAGNOSIS — Z12.31 ENCOUNTER FOR SCREENING MAMMOGRAM FOR BREAST CANCER: ICD-10-CM

## 2023-05-01 PROCEDURE — 99214 OFFICE O/P EST MOD 30 MIN: CPT | Performed by: FAMILY MEDICINE

## 2023-05-01 RX ORDER — EZETIMIBE 10 MG/1
10 TABLET ORAL DAILY
Qty: 100 TABLET | Refills: 3 | Status: SHIPPED | OUTPATIENT
Start: 2023-05-01 | End: 2023-08-23

## 2023-05-01 ASSESSMENT — FIBROSIS 4 INDEX: FIB4 SCORE: 4.53

## 2023-05-01 NOTE — PROGRESS NOTES
CC:   Chief Complaint   Patient presents with    Lab Results    Letter for School/Work     For [facility] and [employer]          HPI:   Reina presents today for a follow-up visit and to discuss recent lab results..  Last seen by me on January 30.  Hasn't been seen [facility] yet.    Since our last appointment, has been seen by:  Sleep medicine in February    Thrombocytopenia (HCC)  Chronic.  Noted since 2018.  ruq sono 2021 with fatty liver,  Recent labs with platelet count 137,000.  Denies any easy bruising.    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  He has been following up with [facility] for at least the last 5 years.  She typically follows up over there on an annual basis.  States that she had to reschedule this year's appointment due to snow .states that she has been having some insurance issues and would like me to write a letter.    Elevated hemoglobin A1c  Chronic medical diagnosis.  Recent labs with increase in A1c from 5.9 to 6.4%.      Dyslipidemia  Chronic. Zocor 10mg. Has been forgetting to take it at times.    Latest Reference Range & Units 04/27/23 09:01   Cholesterol,Tot 100 - 199 mg/dL 240 (H)   Triglycerides 0 - 149 mg/dL 191 (H)   HDL >=40 mg/dL 59   LDL <100 mg/dL 143 (H)   (H): Data is abnormally high    Elevated liver function tests  Chronic medical diagnosis.  Has been noted to have this on and off for the last 5 years.  Right upper quadrant sonogram completed in October 2021 had findings consistent with hepatic steatosis and/or cirrhosis.  At times she has noticed right upper quadrant pain.  Nothing recently.  Her recent labs from last week had AST elevated at 56 and ALT elevated at 52.      Hospital Outpatient Visit on 04/27/2023   Component Date Value Ref Range Status    Vitamin B12 -True Cobalamin 04/27/2023 941 (H)  211 - 911 pg/mL Final    TSH 04/27/2023 5.130  0.380 - 5.330 uIU/mL Final    Comment: The 2011 American Thyroid Association (MACIE)  guidelines  recommended that the interpretation of thyroid function in  pregnancy be based on trimester specific reference ranges.    1st Trimester  0.100-2.500 mIU/L  2nd Trimester  0.200-3.000 mIU/L  3rd Trimester  0.300-3.500 mIU/L    These established reference ranges have not been validated  at [facility].      Glycohemoglobin 04/27/2023 6.4 (H)  4.0 - 5.6 % Final    Comment: Increased risk for diabetes:  5.7 -6.4%  Diabetes:  >6.4%  Glycemic control for adults with diabetes:  <7.0%    The above interpretations are per ADA guidelines.  Diagnosis  of diabetes mellitus on the basis of elevated Hemoglobin A1c  should be confirmed by repeating the Hb A1c test.      Est Avg Glucose 04/27/2023 137  mg/dL Final    Comment: The eAG calculation is based on the A1c-Derived Daily Glucose  (ADAG) study.  See the ADA's website for additional information.      WBC 04/27/2023 4.5 (L)  4.8 - 10.8 K/uL Final    RBC 04/27/2023 4.89  4.20 - 5.40 M/uL Final    Hemoglobin 04/27/2023 15.4  12.0 - 16.0 g/dL Final    Hematocrit 04/27/2023 45.2  37.0 - 47.0 % Final    MCV 04/27/2023 92.4  81.4 - 97.8 fL Final    MCH 04/27/2023 31.5  27.0 - 33.0 pg Final    MCHC 04/27/2023 34.1  33.6 - 35.0 g/dL Final    RDW 04/27/2023 47.5  35.9 - 50.0 fL Final    Platelet Count 04/27/2023 137 (L)  164 - 446 K/uL Final    MPV 04/27/2023 12.8  9.0 - 12.9 fL Final    Neutrophils-Polys 04/27/2023 44.60  44.00 - 72.00 % Final    Lymphocytes 04/27/2023 43.00 (H)  22.00 - 41.00 % Final    Monocytes 04/27/2023 5.80  0.00 - 13.40 % Final    Eosinophils 04/27/2023 5.50  0.00 - 6.90 % Final    Basophils 04/27/2023 0.90  0.00 - 1.80 % Final    Immature Granulocytes 04/27/2023 0.20  0.00 - 0.90 % Final    Nucleated RBC 04/27/2023 0.00  /100 WBC Final    Neutrophils (Absolute) 04/27/2023 2.01  2.00 - 7.15 K/uL Final    Includes immature neutrophils, if present.    Lymphs (Absolute) 04/27/2023 1.94  1.00 - 4.80 K/uL Final    Monos (Absolute)  04/27/2023 0.26  0.00 - 0.85 K/uL Final    Eos (Absolute) 04/27/2023 0.25  0.00 - 0.51 K/uL Final    Baso (Absolute) 04/27/2023 0.04  0.00 - 0.12 K/uL Final    Immature Granulocytes (abs) 04/27/2023 0.01  0.00 - 0.11 K/uL Final    NRBC (Absolute) 04/27/2023 0.00  K/uL Final    Sodium 04/27/2023 142  135 - 145 mmol/L Final    Potassium 04/27/2023 4.5  3.6 - 5.5 mmol/L Final    Chloride 04/27/2023 105  96 - 112 mmol/L Final    Co2 04/27/2023 25  20 - 33 mmol/L Final    Anion Gap 04/27/2023 12.0  7.0 - 16.0 Final    Glucose 04/27/2023 108 (H)  65 - 99 mg/dL Final    Bun 04/27/2023 13  8 - 22 mg/dL Final    Creatinine 04/27/2023 0.78  0.50 - 1.40 mg/dL Final    Calcium 04/27/2023 10.0  8.5 - 10.5 mg/dL Final    AST(SGOT) 04/27/2023 56 (H)  12 - 45 U/L Final    ALT(SGPT) 04/27/2023 52 (H)  2 - 50 U/L Final    Alkaline Phosphatase 04/27/2023 90  30 - 99 U/L Final    Total Bilirubin 04/27/2023 0.9  0.1 - 1.5 mg/dL Final    Albumin 04/27/2023 4.5  3.2 - 4.9 g/dL Final    Total Protein 04/27/2023 7.1  6.0 - 8.2 g/dL Final    Globulin 04/27/2023 2.6  1.9 - 3.5 g/dL Final    A-G Ratio 04/27/2023 1.7  g/dL Final    Cholesterol,Tot 04/27/2023 240 (H)  100 - 199 mg/dL Final    Triglycerides 04/27/2023 191 (H)  0 - 149 mg/dL Final    HDL 04/27/2023 59  >=40 mg/dL Final    LDL 04/27/2023 143 (H)  <100 mg/dL Final    25-Hydroxy   Vitamin D 25 04/27/2023 33  30 - 100 ng/mL Final    Comment: Adult Ranges:   <20 ng/mL - Deficiency  20-29 ng/mL - Insufficiency   ng/mL - Sufficiency  Electrochemiluminescence binding assay performed using Roche victorina e  immunoassay analyzer.  The Elecsys Vitamin D total II assay is intended for  the quantitative determination of total 25 hydroxyvitamin D in human serum  and plasma. This assay is to be used as an aid in the assessment of vitamin  D sufficiency in adults.      Fasting Status 04/27/2023 Fasting   Final    Correct Calcium 04/27/2023 9.6  8.5 - 10.5 mg/dL Final    GFR (CKD-EPI)  04/27/2023 77  >60 mL/min/1.73 m 2 Final    Comment: Estimated Glomerular Filtration Rate is calculated using  race neutral CKD-EPI 2021 equation per NKF-ASN recommendations.         Current Outpatient Medications Ordered in Epic   Medication Sig Dispense Refill    ezetimibe (ZETIA) 10 MG Tab Take 1 Tablet by mouth every day. 100 Tablet 3    levothyroxine (SYNTHROID) 25 MCG Tab Take 1 Tablet by mouth every morning on an empty stomach. 100 Tablet 3    esomeprazole (NEXIUM) 20 MG capsule TAKE 1 CAPSULE BY MOUTH EVERY DAY IN THE MORNING BEFORE BREAKFAST 84 Capsule 3    lisinopril (PRINIVIL) 2.5 MG Tab Take 1 Tablet by mouth 2 times a day. 200 Tablet 3    Fexofenadine HCl (ALLEGRA ALLERGY PO) Take 1 Tablet by mouth every day.      albuterol 108 (90 Base) MCG/ACT Aero Soln inhalation aerosol Inhale 2 Puffs every 6 hours as needed for Shortness of Breath. 1 Each 1    fluticasone (FLONASE) 50 MCG/ACT nasal spray Administer 2 Sprays into affected nostril(S) every day.      ibuprofen (MOTRIN) 200 MG Tab Take 200 mg by mouth every 6 hours as needed.      Cholecalciferol (VITAMIN D) 125 MCG (5000 UT) Cap Take  by mouth.      Zinc 30 MG Cap Take  by mouth.      Omega-3 Fatty Acids (FISH OIL) 1000 MG Cap capsule Take 1,000 mg by mouth 3 times a day, with meals.      Multiple Vitamins-Minerals (MULTIVITAMIN ADULT PO) Take  by mouth.       No current Epic-ordered facility-administered medications on file.       Past Medical History:   Diagnosis Date    Chronic meniscal tear of knee     left medial     CKD (chronic kidney disease) stage 3, GFR 30-59 ml/min ([facility]) 2/3/2022    Cough in adult 1/18/2022    Dyslipidemia     GERD (gastroesophageal reflux disease)     Hypothyroidism     Resolved 2018    Lab test positive for detection of COVID-19 virus 1/14/2022    Pleomorphic adenoma of parotid gland 1992, 2004    recurrent, yearly MRI    Sleep apnea        Social History     Tobacco Use    Smoking status: Never    Smokeless tobacco:  "Never   Vaping Use    Vaping Use: Never used   Substance Use Topics    Alcohol use: No     Alcohol/week: 0.0 oz    Drug use: No       Allergies:  Doxycycline, Penicillins, and Sulfa drugs    Health Maintenance: Bone density and mammogram ordered.      Objective:       Exam:  /70   Pulse 66   Temp 36.5 °C (97.7 °F) (Temporal)   Resp 12   Ht 1.702 m (5' 7\")   Wt 92.1 kg (203 lb)   SpO2 95%   BMI 31.79 kg/m²   Body mass index is 31.79 kg/m².  Wt Readings from Last 4 Encounters:   05/01/23 92.1 kg (203 lb)   04/04/23 91.6 kg (202 lb)   02/23/23 91.6 kg (202 lb)   01/30/23 94.3 kg (208 lb)       Gen: Alert and oriented, No apparent distress. Appropriately groomed.  Neck: Neck is supple without lymphadenopathy.No thyromegaly.   Lungs: Normal effort, CTA bilaterally, no wheezes, rhonchi, or rales  CV: Regular rate and rhythm. No Lower extremity edema  Skin: No rash noted.      Assessment & Plan:     82 y.o. female with the following -     1. Pleomorphic adenoma of parotid gland  -Chronic medical diagnosis.  Stable.  Continues to follow-up with ENT annually.  Her last MRI was completed in December 2022 with findings of stable lesion to the left parotid bed.    Referral to ENT    2. Thrombocytopenia (HCC)  Chronic medical diagnosis.  Stable noted since 2018.  Denies any easy bruising.  We will continue to monitor.    3. Dyslipidemia  -Chronic medical diagnosis.  Not well controlled.  She states that she has had myalgias with statins.  We will discontinue simvastatin.  Start her on Zetia.  We also discussed referral to lipid clinic for PCSK9 inhibitors.  For now, she would like to do a trial of Zetia and lifestyle changes.  Ezetimibe (ZETIA) 10 MG Tab; Take 1 Tablet by mouth every day.  Dispense: 100 Tablet; Refill: 3  - CBC WITH DIFFERENTIAL; Future  - Comp Metabolic Panel; Future  - Lipid Profile; Future    4. Elevated hemoglobin A1c  Chronic medical diagnosis.  Not well controlled.  Recent labs with increase " in A1c to 6.4%.  Dietary and lifestyle modifications discussed and encouraged.  - HEMOGLOBIN A1C; Future    5. Encounter for screening mammogram for breast cancer  - MA-SCREENING MAMMO BILAT W/TOMOSYNTHESIS W/CAD; Future    6. Postmenopausal  - DS-BONE DENSITY STUDY (DEXA); Future    7. Elevated liver function tests  Chronic medical diagnosis.  We will discontinue her simvastatin and recheck labs in 3 months                Return in about 3 months (around 8/1/2023).    Please note that this dictation was created using voice recognition software. I have made every reasonable attempt to correct obvious errors, but I expect that there are errors of grammar and possibly content that I did not discover before finalizing the note.

## 2023-05-01 NOTE — ASSESSMENT & PLAN NOTE
Chronic. Zocor 10mg. Has been forgetting to take it at times.    Latest Reference Range & Units 04/27/23 09:01   Cholesterol,Tot 100 - 199 mg/dL 240 (H)   Triglycerides 0 - 149 mg/dL 191 (H)   HDL >=40 mg/dL 59   LDL <100 mg/dL 143 (H)   (H): Data is abnormally high

## 2023-05-01 NOTE — ASSESSMENT & PLAN NOTE
Chronic.  Noted since 2018.  ruq sono 2021 with fatty liver,  Recent labs with platelet count 137,000.  Denies any easy bruising.

## 2023-05-01 NOTE — ASSESSMENT & PLAN NOTE
Chronic medical diagnosis.  He has been following up with [facility] for at least the last 5 years.  She typically follows up over there on an annual basis.  States that she had to reschedule this year's appointment due to snow .states that she has been having some insurance issues and would like me to write a letter.

## 2023-05-01 NOTE — ASSESSMENT & PLAN NOTE
Chronic medical diagnosis.  Has been noted to have this on and off for the last 5 years.  Right upper quadrant sonogram completed in October 2021 had findings consistent with hepatic steatosis and/or cirrhosis.  At times she has noticed right upper quadrant pain.  Nothing recently.  Her recent labs from last week had AST elevated at 56 and ALT elevated at 52.

## 2023-05-04 ENCOUNTER — PHARMACY VISIT (OUTPATIENT)
Dept: PHARMACY | Facility: MEDICAL CENTER | Age: 83
End: 2023-05-04
Payer: COMMERCIAL

## 2023-05-04 PROCEDURE — RXMED WILLOW AMBULATORY MEDICATION CHARGE: Performed by: FAMILY MEDICINE

## 2023-06-12 ENCOUNTER — HOSPITAL ENCOUNTER (OUTPATIENT)
Dept: RADIOLOGY | Facility: MEDICAL CENTER | Age: 83
End: 2023-06-12
Attending: FAMILY MEDICINE
Payer: MEDICARE

## 2023-06-12 DIAGNOSIS — Z78.0 POSTMENOPAUSAL: ICD-10-CM

## 2023-06-12 DIAGNOSIS — Z12.31 ENCOUNTER FOR SCREENING MAMMOGRAM FOR BREAST CANCER: ICD-10-CM

## 2023-06-12 PROCEDURE — 77063 BREAST TOMOSYNTHESIS BI: CPT

## 2023-06-12 PROCEDURE — 77080 DXA BONE DENSITY AXIAL: CPT

## 2023-06-27 PROCEDURE — RXMED WILLOW AMBULATORY MEDICATION CHARGE: Performed by: FAMILY MEDICINE

## 2023-06-27 PROCEDURE — RXMED WILLOW AMBULATORY MEDICATION CHARGE: Performed by: INTERNAL MEDICINE

## 2023-06-28 ENCOUNTER — PHARMACY VISIT (OUTPATIENT)
Dept: PHARMACY | Facility: MEDICAL CENTER | Age: 83
End: 2023-06-28
Payer: COMMERCIAL

## 2023-08-15 ENCOUNTER — APPOINTMENT (OUTPATIENT)
Dept: MEDICAL GROUP | Facility: PHYSICIAN GROUP | Age: 83
End: 2023-08-15
Payer: MEDICARE

## 2023-08-15 ENCOUNTER — HOSPITAL ENCOUNTER (OUTPATIENT)
Dept: LAB | Facility: MEDICAL CENTER | Age: 83
End: 2023-08-15
Attending: FAMILY MEDICINE
Payer: MEDICARE

## 2023-08-15 DIAGNOSIS — E78.5 DYSLIPIDEMIA: ICD-10-CM

## 2023-08-15 DIAGNOSIS — R73.09 ELEVATED HEMOGLOBIN A1C: ICD-10-CM

## 2023-08-15 LAB
ALBUMIN SERPL BCP-MCNC: 4.4 G/DL (ref 3.2–4.9)
ALBUMIN/GLOB SERPL: 1.8 G/DL
ALP SERPL-CCNC: 81 U/L (ref 30–99)
ALT SERPL-CCNC: 55 U/L (ref 2–50)
ANION GAP SERPL CALC-SCNC: 10 MMOL/L (ref 7–16)
AST SERPL-CCNC: 57 U/L (ref 12–45)
BASOPHILS # BLD AUTO: 0.9 % (ref 0–1.8)
BASOPHILS # BLD: 0.04 K/UL (ref 0–0.12)
BILIRUB SERPL-MCNC: 0.7 MG/DL (ref 0.1–1.5)
BUN SERPL-MCNC: 14 MG/DL (ref 8–22)
CALCIUM ALBUM COR SERPL-MCNC: 9.6 MG/DL (ref 8.5–10.5)
CALCIUM SERPL-MCNC: 9.9 MG/DL (ref 8.5–10.5)
CHLORIDE SERPL-SCNC: 106 MMOL/L (ref 96–112)
CHOLEST SERPL-MCNC: 230 MG/DL (ref 100–199)
CO2 SERPL-SCNC: 27 MMOL/L (ref 20–33)
CREAT SERPL-MCNC: 0.81 MG/DL (ref 0.5–1.4)
EOSINOPHIL # BLD AUTO: 0.2 K/UL (ref 0–0.51)
EOSINOPHIL NFR BLD: 4.4 % (ref 0–6.9)
ERYTHROCYTE [DISTWIDTH] IN BLOOD BY AUTOMATED COUNT: 47.8 FL (ref 35.9–50)
EST. AVERAGE GLUCOSE BLD GHB EST-MCNC: 128 MG/DL
FASTING STATUS PATIENT QL REPORTED: NORMAL
GFR SERPLBLD CREATININE-BSD FMLA CKD-EPI: 73 ML/MIN/1.73 M 2
GLOBULIN SER CALC-MCNC: 2.5 G/DL (ref 1.9–3.5)
GLUCOSE SERPL-MCNC: 88 MG/DL (ref 65–99)
HBA1C MFR BLD: 6.1 % (ref 4–5.6)
HCT VFR BLD AUTO: 43.1 % (ref 37–47)
HDLC SERPL-MCNC: 48 MG/DL
HGB BLD-MCNC: 14.3 G/DL (ref 12–16)
IMM GRANULOCYTES # BLD AUTO: 0.01 K/UL (ref 0–0.11)
IMM GRANULOCYTES NFR BLD AUTO: 0.2 % (ref 0–0.9)
LDLC SERPL CALC-MCNC: 147 MG/DL
LYMPHOCYTES # BLD AUTO: 2.17 K/UL (ref 1–4.8)
LYMPHOCYTES NFR BLD: 47.3 % (ref 22–41)
MCH RBC QN AUTO: 31.4 PG (ref 27–33)
MCHC RBC AUTO-ENTMCNC: 33.2 G/DL (ref 32.2–35.5)
MCV RBC AUTO: 94.7 FL (ref 81.4–97.8)
MONOCYTES # BLD AUTO: 0.27 K/UL (ref 0–0.85)
MONOCYTES NFR BLD AUTO: 5.9 % (ref 0–13.4)
NEUTROPHILS # BLD AUTO: 1.9 K/UL (ref 1.82–7.42)
NEUTROPHILS NFR BLD: 41.3 % (ref 44–72)
NRBC # BLD AUTO: 0 K/UL
NRBC BLD-RTO: 0 /100 WBC (ref 0–0.2)
PLATELET # BLD AUTO: 137 K/UL (ref 164–446)
PMV BLD AUTO: 12.4 FL (ref 9–12.9)
POTASSIUM SERPL-SCNC: 4.1 MMOL/L (ref 3.6–5.5)
PROT SERPL-MCNC: 6.9 G/DL (ref 6–8.2)
RBC # BLD AUTO: 4.55 M/UL (ref 4.2–5.4)
SODIUM SERPL-SCNC: 143 MMOL/L (ref 135–145)
TRIGL SERPL-MCNC: 177 MG/DL (ref 0–149)
WBC # BLD AUTO: 4.6 K/UL (ref 4.8–10.8)

## 2023-08-15 PROCEDURE — 36415 COLL VENOUS BLD VENIPUNCTURE: CPT

## 2023-08-15 PROCEDURE — 80053 COMPREHEN METABOLIC PANEL: CPT

## 2023-08-15 PROCEDURE — 80061 LIPID PANEL: CPT

## 2023-08-15 PROCEDURE — 83036 HEMOGLOBIN GLYCOSYLATED A1C: CPT

## 2023-08-15 PROCEDURE — 85025 COMPLETE CBC W/AUTO DIFF WBC: CPT

## 2023-08-16 ENCOUNTER — APPOINTMENT (OUTPATIENT)
Dept: MEDICAL GROUP | Facility: PHYSICIAN GROUP | Age: 83
End: 2023-08-16
Payer: MEDICARE

## 2023-08-23 ENCOUNTER — OFFICE VISIT (OUTPATIENT)
Dept: MEDICAL GROUP | Facility: PHYSICIAN GROUP | Age: 83
End: 2023-08-23
Payer: MEDICARE

## 2023-08-23 VITALS
BODY MASS INDEX: 31.71 KG/M2 | TEMPERATURE: 97.3 F | DIASTOLIC BLOOD PRESSURE: 72 MMHG | OXYGEN SATURATION: 95 % | SYSTOLIC BLOOD PRESSURE: 128 MMHG | HEART RATE: 64 BPM | RESPIRATION RATE: 14 BRPM | WEIGHT: 202 LBS | HEIGHT: 67 IN

## 2023-08-23 DIAGNOSIS — I10 ESSENTIAL HYPERTENSION: ICD-10-CM

## 2023-08-23 DIAGNOSIS — K21.9 GASTROESOPHAGEAL REFLUX DISEASE WITHOUT ESOPHAGITIS: ICD-10-CM

## 2023-08-23 DIAGNOSIS — R74.01 TRANSAMINITIS: ICD-10-CM

## 2023-08-23 DIAGNOSIS — D72.820 LYMPHOCYTOSIS: ICD-10-CM

## 2023-08-23 DIAGNOSIS — I70.0 ATHEROSCLEROSIS OF AORTA (HCC): ICD-10-CM

## 2023-08-23 DIAGNOSIS — K76.0 FATTY LIVER DISEASE, NONALCOHOLIC: ICD-10-CM

## 2023-08-23 DIAGNOSIS — E03.9 ACQUIRED HYPOTHYROIDISM: ICD-10-CM

## 2023-08-23 DIAGNOSIS — R73.03 PREDIABETES: ICD-10-CM

## 2023-08-23 DIAGNOSIS — G47.30 SLEEP APNEA WITH USE OF CONTINUOUS POSITIVE AIRWAY PRESSURE (CPAP): Chronic | ICD-10-CM

## 2023-08-23 DIAGNOSIS — I34.0 NONRHEUMATIC MITRAL VALVE REGURGITATION: ICD-10-CM

## 2023-08-23 DIAGNOSIS — D11.0 PLEOMORPHIC ADENOMA OF PAROTID GLAND: ICD-10-CM

## 2023-08-23 DIAGNOSIS — D69.6 THROMBOCYTOPENIA (HCC): ICD-10-CM

## 2023-08-23 DIAGNOSIS — I27.20 PULMONARY HYPERTENSION (HCC): ICD-10-CM

## 2023-08-23 DIAGNOSIS — E78.5 DYSLIPIDEMIA: ICD-10-CM

## 2023-08-23 PROBLEM — R10.11 RUQ PAIN: Status: RESOLVED | Noted: 2021-09-08 | Resolved: 2023-08-23

## 2023-08-23 PROBLEM — E04.1 LEFT THYROID NODULE: Status: RESOLVED | Noted: 2019-04-11 | Resolved: 2023-08-23

## 2023-08-23 PROBLEM — E66.811 OBESITY (BMI 30.0-34.9): Status: RESOLVED | Noted: 2023-04-04 | Resolved: 2023-08-23

## 2023-08-23 PROBLEM — R73.09 ELEVATED HEMOGLOBIN A1C: Status: RESOLVED | Noted: 2020-03-10 | Resolved: 2023-08-23

## 2023-08-23 PROBLEM — R32 URINARY INCONTINENCE: Status: RESOLVED | Noted: 2022-05-17 | Resolved: 2023-08-23

## 2023-08-23 PROBLEM — M79.89 LEFT LEG SWELLING: Status: RESOLVED | Noted: 2018-03-14 | Resolved: 2023-08-23

## 2023-08-23 PROBLEM — E66.9 OBESITY (BMI 30.0-34.9): Status: RESOLVED | Noted: 2023-04-04 | Resolved: 2023-08-23

## 2023-08-23 PROBLEM — R06.02 SHORTNESS OF BREATH: Status: RESOLVED | Noted: 2021-12-13 | Resolved: 2023-08-23

## 2023-08-23 PROCEDURE — 99215 OFFICE O/P EST HI 40 MIN: CPT | Performed by: INTERNAL MEDICINE

## 2023-08-23 PROCEDURE — 3074F SYST BP LT 130 MM HG: CPT | Performed by: INTERNAL MEDICINE

## 2023-08-23 PROCEDURE — 3078F DIAST BP <80 MM HG: CPT | Performed by: INTERNAL MEDICINE

## 2023-08-23 RX ORDER — ROSUVASTATIN CALCIUM 5 MG/1
5 TABLET, COATED ORAL EVERY EVENING
Qty: 100 TABLET | Refills: 3 | Status: SHIPPED | OUTPATIENT
Start: 2023-08-23

## 2023-08-23 RX ORDER — ASPIRIN 81 MG/1
81 TABLET ORAL DAILY
COMMUNITY
End: 2023-08-28

## 2023-08-23 ASSESSMENT — FIBROSIS 4 INDEX: FIB4 SCORE: 4.49

## 2023-08-23 NOTE — PROGRESS NOTES
Subjective:   Chief Complaint/History of Present Illness:  Reina Munoz is a 82 y.o. female new patient to me who presents today to discuss medical problems as listed below and establish care. Reina is unaccompanied for today's visit.    Problem   Transaminitis     Latest Reference Range & Units 03/30/22 18:03 06/21/22 09:09 04/27/23 09:01 08/15/23 07:01   AST(SGOT) 12 - 45 U/L 103 (H) 44 56 (H) 57 (H)   ALT(SGPT) 2 - 50 U/L 93 (H) 41 52 (H) 55 (H)     She has history of elevated liver enzymes dating back years.  She had prior ultrasound in 2021 which showed steatohepatitis versus cirrhosis.  She is not a follow-up since that time.  She has had adjustment of her cholesterol medicine but otherwise no hepatotoxic medicines.     Fatty Liver Disease, Nonalcoholic   Lymphocytosis     Latest Reference Range & Units 08/15/23 07:01   WBC 4.8 - 10.8 K/uL 4.6 (L)   Neutrophils-Polys 44.00 - 72.00 % 41.30 (L)   Neutrophils (Absolute) 1.82 - 7.42 K/uL 1.90   Lymphocytes 22.00 - 41.00 % 47.30 (H)   Lymphs (Absolute) 1.00 - 4.80 K/uL 2.17     She has leukopenia with relative lymphocytosis and thrombocytopenia.  No personal history of leukemias or lymphomas.  No recent viral infection to explain this.     Prediabetes     Latest Reference Range & Units 08/15/23 07:01   Glycohemoglobin 4.0 - 5.6 % 6.1 (H)     She has prediabetes for the past several years, no known history of type 2 diabetes.  This is treated with diet, no  Indication for pharmacotherapy at this time.     Pulmonary Hypertension (Hcc)    She has mild history of pulmonary hypertension evidenced by RVSP of 30 to 35 mmHg on echocardiogram in 2021, this is an improvement from previous echocardiogram demonstrating pressures of 45 mmHg in 2018.     Atherosclerosis of Aorta (Hcc)    She has history of atherosclerosis in the aorta seen on previous imaging incidentally.  She is on statin therapy and aspirin.     Thrombocytopenia (Hcc)     Latest Reference Range & Units  08/15/23 07:01   Platelet Count 164 - 446 K/uL 137 (L)     She has mild thrombocytopenia for many years, she is on low-dose aspirin.  Right upper quadrant ultrasound years ago demonstrated WALLIS versus cirrhosis.  No bruising or bleeding complications.       Mitral Regurgitation    Echocardiogram (9/2021): Calcification of mitral valve leaflets.  Mild mitral regurgitation.    She has history of mild to moderate mitral regurgitation.  Previously seen Dr. Chung and Dr. Martinez at [facility], no in recent time.  Mild signs of fluid overload with lower extremity edema.     Pleomorphic Adenoma of Parotid Gland    She has longstanding history of parotid gland adenoma.  She follows regularly with ENT at [facility] with MR imaging every other year.     Sleep Apnea With Use of Continuous Positive Airway Pressure (Cpap)    She has history of obstructive sleep apnea treated with CPAP therapy and follows with sleep medicine.     Essential Hypertension    She has mild hypertension which is well controlled on low-dose medical therapy.  No signs or symptoms orthostasis.  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  Saw Dr. Chung in the past at [facility].    Blood pressure in clinic 122-128/70-72    Current regimen: Lisinopril 2.5 mg twice daily     Dyslipidemia     Latest Reference Range & Units 08/15/23 07:01   Cholesterol,Tot 100 - 199 mg/dL 230 (H)   Triglycerides 0 - 149 mg/dL 177 (H)   HDL >=40 mg/dL 48   LDL <100 mg/dL 147 (H)     She reports longstanding history of elevated cholesterol previously on simvastatin 10 mg daily.  No issues with the simvastatin however did not realize this was fat-soluble and wonders how well it was absorbing for her with the timing that she was taking it.  Also has history of elevated liver enzymes with ultrasound imaging in the past showing fatty liver disease versus cirrhosis.  She has aortic atherosclerosis incidentally on prior imaging but no history of blockages personally  she was aware of.  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  Previously saw Dr. Chung, not currently with a cardiologist.     Gastroesophageal Reflux Disease Without Esophagitis    She has history of heartburn with no Gongora's esophagus on prior EGD.  Symptoms well controlled on current medical therapy.  She takes baby aspirin and as needed NSAIDs.    Current regimen: Nexium 20 mg daily     Acquired Hypothyroidism     Latest Reference Range & Units 04/27/23 09:01   TSH 0.380 - 5.330 uIU/mL 5.130     She reports multi decade history of low functioning thyroid on low-dose levothyroxine.  Tried a slightly increased dose in the past but it led to anxiousness.  Currently no signs or symptoms of overtreatment under treatment.    Current regimen: Levothyroxine 25 mcg daily     Obesity (Bmi 30.0-34.9) (Resolved)   Urinary Incontinence (Resolved)   Bmi 31.0-31.9,Adult (Resolved)   Shortness of Breath (Resolved)   Ruq Pain (Resolved)   Elevated Hemoglobin A1c (Resolved)   Left Thyroid Nodule (Resolved)   Left Leg Swelling (Resolved)        Current Medications:  Current Outpatient Medications Ordered in Epic   Medication Sig Dispense Refill    rosuvastatin (CRESTOR) 5 MG Tab Take 1 Tablet by mouth every evening. 100 Tablet 3    aspirin 81 MG EC tablet Take 81 mg by mouth every day.      levothyroxine (SYNTHROID) 25 MCG Tab Take 1 Tablet by mouth every morning on an empty stomach. 100 Tablet 3    esomeprazole (NEXIUM) 20 MG capsule TAKE 1 CAPSULE BY MOUTH EVERY DAY IN THE MORNING BEFORE BREAKFAST 84 Capsule 3    lisinopril (PRINIVIL) 2.5 MG Tab Take 1 Tablet by mouth 2 times a day. 200 Tablet 3    Fexofenadine HCl (ALLEGRA ALLERGY PO) Take 1 Tablet by mouth every day.      albuterol 108 (90 Base) MCG/ACT Aero Soln inhalation aerosol Inhale 2 Puffs every 6 hours as needed for Shortness of Breath. 1 Each 1    fluticasone (FLONASE)  "50 MCG/ACT nasal spray Administer 2 Sprays into affected nostril(S) every day.      ibuprofen (MOTRIN) 200 MG Tab Take 200 mg by mouth every 6 hours as needed.      Cholecalciferol (VITAMIN D) 125 MCG (5000 UT) Cap Take  by mouth.      Zinc 30 MG Cap Take  by mouth.      Omega-3 Fatty Acids (FISH OIL) 1000 MG Cap capsule Take 1,000 mg by mouth 3 times a day, with meals.      Multiple Vitamins-Minerals (MULTIVITAMIN ADULT PO) Take  by mouth.       No current Epic-ordered facility-administered medications on file.          Objective:   Physical Exam:    Vitals: /72   Pulse 64   Temp 36.3 °C (97.3 °F) (Temporal)   Resp 14   Ht 1.702 m (5' 7\")   Wt 91.6 kg (202 lb)   SpO2 95%    BMI: Body mass index is 31.64 kg/m².  Physical Exam  Constitutional:       General: She is not in acute distress.     Appearance: Normal appearance. She is not ill-appearing.   HENT:      Right Ear: External ear normal.      Left Ear: External ear normal.      Nose: No rhinorrhea.      Mouth/Throat:      Mouth: Mucous membranes are moist.   Eyes:      General: No scleral icterus.     Conjunctiva/sclera: Conjunctivae normal.   Cardiovascular:      Rate and Rhythm: Normal rate and regular rhythm.      Pulses: Normal pulses.   Pulmonary:      Effort: Pulmonary effort is normal. No respiratory distress.      Breath sounds: No wheezing, rhonchi or rales.   Abdominal:      General: Bowel sounds are normal.      Palpations: Abdomen is soft.   Musculoskeletal:      Right lower leg: No edema.      Left lower leg: Edema present.      Comments: Left foot swelling   Lymphadenopathy:      Cervical: No cervical adenopathy.   Skin:     General: Skin is warm and dry.      Findings: Bruising present. No rash.      Comments: First great toe   Neurological:      Comments: Ambulates without a gait aid   Psychiatric:         Mood and Affect: Mood normal.         Behavior: Behavior normal.         Thought Content: Thought content normal.         Judgment: " Judgment normal.               Assessment and Plan:   Reina is a 82 y.o. female with the following:  Problem List Items Addressed This Visit       Sleep apnea with use of continuous positive airway pressure (CPAP) (Chronic)     Chronic stable problem, continue compliance with CPAP therapy, this likely has improved her pulmonary artery pressures.         Acquired hypothyroidism     Chronic ongoing problem.  Discussed that TSH goal increases with normal aging and if she is otherwise asymptomatic then TSH up to 7-10 is acceptable.  She recalls in the past trying to increase her dose and it led to tremulousness and anxiety.  Update thyroid labs before next follow-up in 3 months and continue levothyroxine 25 mcg daily.         Relevant Orders    TSH    FREE THYROXINE    Atherosclerosis of aorta (HCC)     Neck stable problem, continue rosuvastatin 5 mg daily and aspirin 81 mg daily.         Relevant Medications    rosuvastatin (CRESTOR) 5 MG Tab    Dyslipidemia     Chronic decompensated problem.  She reports the Zetia caused profound fatigue and stopped it.  She would be open to trial of low-dose water-soluble statin To help improve cholesterol with her history of aortic atherosclerosis as well as questionable completed heart attack on prior stress testing.  Ultimately she would be well served by getting back on the cardiologist in the future but we will start first by try to get her cholesterol to better control while not causing further irritation to the liver.  We will [place] back in 3 months recheck the lab work and at that time could get her back in with cardiology, she like Dr. Siemens in the past who now is at [facility].         Relevant Medications    rosuvastatin (CRESTOR) 5 MG Tab    Other Relevant Orders    CBC WITH DIFFERENTIAL    Comp Metabolic Panel    Lipid Profile    Essential hypertension     Chronic stable problem, blood pressure well controlled on low-dose therapy, continue lisinopril 2.5 mg  twice daily, could consider streamlining this to once daily dosing of 5 mg in the future if she would be interested.         Relevant Medications    rosuvastatin (CRESTOR) 5 MG Tab    Fatty liver disease, nonalcoholic    Relevant Orders    Referral to Gastroenterology    Gastroesophageal reflux disease without esophagitis     Chronic stable problem, symptoms well controlled on current medical therapy, continue Nexium 20 mg daily.         Lymphocytosis     New problem, slowly and steadily increasing over the past several years.  Recommend a flow cytometry to check for CLL/SLL as well as for evaluation due to prior imaging suggesting WALLIS versus cirrhosis.         Relevant Orders    LEUK/LYMPH PHENOTYPING, FLOW CYTOMETRY    Mitral regurgitation     Chronic stable problem.  Overdue to follow-up with cardiology, will start by further evaluating the liver and then can [place] back and update echocardiogram and get her in with Dr. Martinez at [facility].         Relevant Medications    rosuvastatin (CRESTOR) 5 MG Tab    Pleomorphic adenoma of parotid gland     Chronic ongoing problem, continue with follow-up MR imaging per ENT at [facility] as recommended.         Prediabetes     Chronic ongoing problem, improved from last check, she continues to be very mindful of her diet and stays active.  No indication to start medicine at this time.  Continue with periodic monitoring.         Relevant Orders    HEMOGLOBIN A1C    Pulmonary hypertension (HCC)     Chronic ongoing problem, improved from previous, can plan on updating echocardiogram with orders at her next visit or get her back in with cardiology at that time as last imaging was completed about 2 years ago.         Relevant Medications    rosuvastatin (CRESTOR) 5 MG Tab    Thrombocytopenia (HCC)     Chronic ongoing problem, with leukopenia, lymphocytosis, and thrombocytopenia would worry about underlying hepatic etiology, will start with referral to digestive  health for FibroScan to evaluate for fibrosis versus cirrhosis.         Transaminitis     Chronic ongoing issue, liver enzymes have stabilized compared to March 2022 however due to prior findings on imaging suggesting cirrhosis would recommend she meet with GI to complete a FibroScan and determine level of fibrosis versus cirrhosis.         Relevant Orders    Referral to Gastroenterology          RTC: Return in about 3 months (around 11/23/2023).    I spent a total of 42 minutes with record review, exam, communication with the patient, communication with other providers, and documentation of this encounter.    PLEASE NOTE: This dictation was created using voice recognition software. I have made every reasonable attempt to correct obvious errors, but I expect that there are errors of grammar and possibly content that I did not discover before finalizing the note.      Nayeli Finley, DO  Geriatric and Internal Medicine  [facility]

## 2023-08-23 NOTE — ASSESSMENT & PLAN NOTE
Chronic decompensated problem.  She reports the Zetia caused profound fatigue and stopped it.  She would be open to trial of low-dose water-soluble statin To help improve cholesterol with her history of aortic atherosclerosis as well as questionable completed heart attack on prior stress testing.  Ultimately she would be well served by getting back on the cardiologist in the future but we will start first by try to get her cholesterol to better control while not causing further irritation to the liver.  We will [place] back in 3 months recheck the lab work and at that time could get her back in with cardiology, she like Dr. Siemens in the past who now is at [facility].

## 2023-08-23 NOTE — ASSESSMENT & PLAN NOTE
Chronic ongoing problem, improved from previous, can plan on updating echocardiogram with orders at her next visit or get her back in with cardiology at that time as last imaging was completed about 2 years ago.

## 2023-08-23 NOTE — ASSESSMENT & PLAN NOTE
Chronic stable problem.  Overdue to follow-up with cardiology, will start by further evaluating the liver and then can [place] back and update echocardiogram and get her in with Dr. Martinez at [facility].

## 2023-08-23 NOTE — ASSESSMENT & PLAN NOTE
Chronic ongoing problem, improved from last check, she continues to be very mindful of her diet and stays active.  No indication to start medicine at this time.  Continue with periodic monitoring.

## 2023-08-23 NOTE — ASSESSMENT & PLAN NOTE
Chronic stable problem, continue compliance with CPAP therapy, this likely has improved her pulmonary artery pressures.

## 2023-08-23 NOTE — ASSESSMENT & PLAN NOTE
Chronic ongoing problem.  Discussed that TSH goal increases with normal aging and if she is otherwise asymptomatic then TSH up to 7-10 is acceptable.  She recalls in the past trying to increase her dose and it led to tremulousness and anxiety.  Update thyroid labs before next follow-up in 3 months and continue levothyroxine 25 mcg daily.

## 2023-08-23 NOTE — ASSESSMENT & PLAN NOTE
Chronic ongoing issue, liver enzymes have stabilized compared to March 2022 however due to prior findings on imaging suggesting cirrhosis would recommend she meet with GI to complete a FibroScan and determine level of fibrosis versus cirrhosis.

## 2023-08-23 NOTE — ASSESSMENT & PLAN NOTE
New problem, slowly and steadily increasing over the past several years.  Recommend a flow cytometry to check for CLL/SLL as well as for evaluation due to prior imaging suggesting WALLIS versus cirrhosis.

## 2023-08-23 NOTE — ASSESSMENT & PLAN NOTE
Chronic ongoing problem, with leukopenia, lymphocytosis, and thrombocytopenia would worry about underlying hepatic etiology, will start with referral to digestive health for FibroScan to evaluate for fibrosis versus cirrhosis.

## 2023-08-23 NOTE — ASSESSMENT & PLAN NOTE
Chronic ongoing problem, continue with follow-up MR imaging per ENT at [facility] as recommended.

## 2023-08-23 NOTE — ASSESSMENT & PLAN NOTE
Chronic stable problem, symptoms well controlled on current medical therapy, continue Nexium 20 mg daily.

## 2023-08-23 NOTE — ASSESSMENT & PLAN NOTE
Chronic stable problem, blood pressure well controlled on low-dose therapy, continue lisinopril 2.5 mg twice daily, could consider streamlining this to once daily dosing of 5 mg in the future if she would be interested.

## 2023-08-28 ENCOUNTER — OFFICE VISIT (OUTPATIENT)
Dept: MEDICAL GROUP | Facility: PHYSICIAN GROUP | Age: 83
End: 2023-08-28
Payer: MEDICARE

## 2023-08-28 ENCOUNTER — HOSPITAL ENCOUNTER (OUTPATIENT)
Dept: RADIOLOGY | Facility: MEDICAL CENTER | Age: 83
End: 2023-08-28
Attending: INTERNAL MEDICINE
Payer: MEDICARE

## 2023-08-28 VITALS
TEMPERATURE: 97.8 F | OXYGEN SATURATION: 94 % | HEART RATE: 77 BPM | BODY MASS INDEX: 31.88 KG/M2 | DIASTOLIC BLOOD PRESSURE: 82 MMHG | RESPIRATION RATE: 16 BRPM | SYSTOLIC BLOOD PRESSURE: 132 MMHG | HEIGHT: 67 IN | WEIGHT: 203.1 LBS

## 2023-08-28 DIAGNOSIS — R60.0 EDEMA OF LEFT FOOT: ICD-10-CM

## 2023-08-28 DIAGNOSIS — L03.032 CELLULITIS OF TOE OF LEFT FOOT: ICD-10-CM

## 2023-08-28 PROCEDURE — RXMED WILLOW AMBULATORY MEDICATION CHARGE: Performed by: INTERNAL MEDICINE

## 2023-08-28 PROCEDURE — 3075F SYST BP GE 130 - 139MM HG: CPT | Performed by: INTERNAL MEDICINE

## 2023-08-28 PROCEDURE — 99214 OFFICE O/P EST MOD 30 MIN: CPT | Performed by: INTERNAL MEDICINE

## 2023-08-28 PROCEDURE — 1125F AMNT PAIN NOTED PAIN PRSNT: CPT | Performed by: INTERNAL MEDICINE

## 2023-08-28 PROCEDURE — 73620 X-RAY EXAM OF FOOT: CPT | Mod: LT

## 2023-08-28 PROCEDURE — 3079F DIAST BP 80-89 MM HG: CPT | Performed by: INTERNAL MEDICINE

## 2023-08-28 RX ORDER — PREDNISONE 20 MG/1
40 TABLET ORAL DAILY
Qty: 10 TABLET | Refills: 0 | Status: SHIPPED | OUTPATIENT
Start: 2023-08-28 | End: 2023-09-02

## 2023-08-28 RX ORDER — AMOXICILLIN AND CLAVULANATE POTASSIUM 875; 125 MG/1; MG/1
1 TABLET, FILM COATED ORAL 2 TIMES DAILY
Qty: 14 TABLET | Refills: 0 | Status: SHIPPED | OUTPATIENT
Start: 2023-08-28 | End: 2023-11-27

## 2023-08-28 RX ORDER — FUROSEMIDE 40 MG/1
40 TABLET ORAL DAILY
Qty: 30 TABLET | Refills: 3 | Status: SHIPPED | OUTPATIENT
Start: 2023-08-28 | End: 2023-12-26

## 2023-08-28 ASSESSMENT — FIBROSIS 4 INDEX: FIB4 SCORE: 4.49

## 2023-08-28 ASSESSMENT — PAIN SCALES - GENERAL: PAINLEVEL: 8=MODERATE-SEVERE PAIN

## 2023-08-28 NOTE — ASSESSMENT & PLAN NOTE
New decompensated problem.  Unclear if this is gout versus cellulitis from stagnant blood from the recent injury where she dropped a bowl on the toe.  It looks much worse today than 5 days ago when I saw her in clinic where there was minimal bruising and swelling.  Swelling is [facility] up her leg and the redness is mainly concentrated around the first MTP.  We will start her on Augmentin 875-125 mg twice daily for 7 days, prednisone 40 mg daily for 5 days, and Lasix 40 mg daily to help with the fluid retention.  She will come back to clinic in 3 days so I can recheck and we can determine next steps.  She will get x-ray of the foot/toe at this time while she is waiting for prescriptions to be filled.  ER precautions provided and she voiced understanding.

## 2023-08-28 NOTE — ASSESSMENT & PLAN NOTE
New decompensated problem, likely related to inflammatory cells vs infection vs gout, will treat as noted above and recheck in 3 days, will also complete foot xray to check for fracture and look for evidence of gout crystals.

## 2023-08-28 NOTE — PROGRESS NOTES
Subjective:   Chief Complaint/History of Present Illness:  Reina Munoz is a 82 y.o. female established patient who presents today to discuss medical problems as listed below. Reina is unaccompanied for today's visit.    Problem   Edema of Left Foot    He has edema from the left foot up to the mid calf.  Likely secondary to infection versus inflammation from recent injury versus infection.  Treating broadly due to abruptness and rapid worsening of symptoms.     Cellulitis of Toe of Left Foot    She has swelling, redness, pain, and erythema of the left foot first MTP. She has dropped a bowl on the toe and there was bruising but her ROM was intact so fracture seemed less likely. Over the weekend the symptoms rapidly progressed and she made an appointment for evaluation. Denies history of gout. There was slight sloughing of skin just lateral to the area of swelling. No clear entry point for infection. Is taking tylenol and ibuprofen for pain.          Current Medications:  Current Outpatient Medications Ordered in Epic   Medication Sig Dispense Refill    furosemide (LASIX) 40 MG Tab Take 1 Tablet by mouth every day. 30 Tablet 3    amoxicillin-clavulanate (AUGMENTIN) 875-125 MG Tab Take 1 Tablet by mouth 2 times a day. 14 Tablet 0    predniSONE (DELTASONE) 20 MG Tab Take 2 Tablets by mouth every day for 5 days. Take with food 10 Tablet 0    rosuvastatin (CRESTOR) 5 MG Tab Take 1 Tablet by mouth every evening. 100 Tablet 3    levothyroxine (SYNTHROID) 25 MCG Tab Take 1 Tablet by mouth every morning on an empty stomach. 100 Tablet 3    esomeprazole (NEXIUM) 20 MG capsule TAKE 1 CAPSULE BY MOUTH EVERY DAY IN THE MORNING BEFORE BREAKFAST 84 Capsule 3    lisinopril (PRINIVIL) 2.5 MG Tab Take 1 Tablet by mouth 2 times a day. 200 Tablet 3    Fexofenadine HCl (ALLEGRA ALLERGY PO) Take 1 Tablet by mouth every day.      albuterol 108 (90 Base) MCG/ACT Aero Soln inhalation aerosol Inhale 2 Puffs every 6 hours as needed for  "Shortness of Breath. 1 Each 1    fluticasone (FLONASE) 50 MCG/ACT nasal spray Administer 2 Sprays into affected nostril(S) every day.      ibuprofen (MOTRIN) 200 MG Tab Take 200 mg by mouth every 6 hours as needed.      Cholecalciferol (VITAMIN D) 125 MCG (5000 UT) Cap Take  by mouth.      Zinc 30 MG Cap Take  by mouth.      Omega-3 Fatty Acids (FISH OIL) 1000 MG Cap capsule Take 1,000 mg by mouth 3 times a day, with meals.      Multiple Vitamins-Minerals (MULTIVITAMIN ADULT PO) Take  by mouth.       No current Epic-ordered facility-administered medications on file.          Objective:   Physical Exam:    Vitals: /82 (BP Location: Right arm, Patient Position: Sitting, BP Cuff Size: Adult)   Pulse 77   Temp 36.6 °C (97.8 °F) (Temporal)   Resp 16   Ht 1.702 m (5' 7\")   Wt 92.1 kg (203 lb 1.6 oz)   SpO2 94%    BMI: Body mass index is 31.81 kg/m².  Physical Exam  Constitutional:       General: She is not in acute distress.     Appearance: Normal appearance. She is not ill-appearing.   Eyes:      General: No scleral icterus.     Conjunctiva/sclera: Conjunctivae normal.   Cardiovascular:      Rate and Rhythm: Normal rate.   Pulmonary:      Effort: Pulmonary effort is normal. No respiratory distress.   Musculoskeletal:        Feet:    Skin:     General: Skin is warm.      Findings: Bruising, erythema and rash present.   Neurological:      Gait: Gait abnormal.   Psychiatric:         Mood and Affect: Mood normal.         Behavior: Behavior normal.         Thought Content: Thought content normal.         Judgment: Judgment normal.               Assessment and Plan:   Reina is a 82 y.o. female with the following:  Problem List Items Addressed This Visit       Cellulitis of toe of left foot     New decompensated problem.  Unclear if this is gout versus cellulitis from stagnant blood from the recent injury where she dropped a bowl on the toe.  It looks much worse today than 5 days ago when I saw her in clinic where " there was minimal bruising and swelling.  Swelling is intermediate up her leg and the redness is mainly concentrated around the first MTP.  We will start her on Augmentin 875-125 mg twice daily for 7 days, prednisone 40 mg daily for 5 days, and Lasix 40 mg daily to help with the fluid retention.  She will come back to clinic in 3 days so I can recheck and we can determine next steps.  She will get x-ray of the foot/toe at this time while she is waiting for prescriptions to be filled.  ER precautions provided and she voiced understanding.         Relevant Medications    amoxicillin-clavulanate (AUGMENTIN) 875-125 MG Tab    predniSONE (DELTASONE) 20 MG Tab    Other Relevant Orders    DX-FOOT-2- LEFT    Edema of left foot     New decompensated problem, likely related to inflammatory cells vs infection vs gout, will treat as noted above and recheck in 3 days, will also complete foot xray to check for fracture and look for evidence of gout crystals.         Relevant Medications    furosemide (LASIX) 40 MG Tab    Other Relevant Orders    DX-FOOT-2- LEFT          RTC: Return in about 3 days (around 8/31/2023).    I spent a total of 30 minutes with record review, exam, communication with the patient, communication with other providers, and documentation of this encounter.    PLEASE NOTE: This dictation was created using voice recognition software. I have made every reasonable attempt to correct obvious errors, but I expect that there are errors of grammar and possibly content that I did not discover before finalizing the note.      Nayeli Finley, DO  Geriatric and Internal Medicine  [facility]

## 2023-08-30 NOTE — RESULT ENCOUNTER NOTE
Called and spoke with patient   Taking antiobiotics redness going away and swelling going away and can walk better.

## 2023-08-31 ENCOUNTER — OFFICE VISIT (OUTPATIENT)
Dept: MEDICAL GROUP | Facility: PHYSICIAN GROUP | Age: 83
End: 2023-08-31
Payer: MEDICARE

## 2023-08-31 ENCOUNTER — PHARMACY VISIT (OUTPATIENT)
Dept: PHARMACY | Facility: MEDICAL CENTER | Age: 83
End: 2023-08-31
Payer: COMMERCIAL

## 2023-08-31 VITALS
BODY MASS INDEX: 31.66 KG/M2 | TEMPERATURE: 97.5 F | HEIGHT: 67 IN | RESPIRATION RATE: 16 BRPM | WEIGHT: 201.7 LBS | HEART RATE: 68 BPM | OXYGEN SATURATION: 92 % | DIASTOLIC BLOOD PRESSURE: 66 MMHG | SYSTOLIC BLOOD PRESSURE: 132 MMHG

## 2023-08-31 DIAGNOSIS — L03.032 CELLULITIS OF TOE OF LEFT FOOT: ICD-10-CM

## 2023-08-31 PROCEDURE — 3075F SYST BP GE 130 - 139MM HG: CPT | Performed by: INTERNAL MEDICINE

## 2023-08-31 PROCEDURE — 3078F DIAST BP <80 MM HG: CPT | Performed by: INTERNAL MEDICINE

## 2023-08-31 PROCEDURE — 99213 OFFICE O/P EST LOW 20 MIN: CPT | Performed by: INTERNAL MEDICINE

## 2023-08-31 ASSESSMENT — FIBROSIS 4 INDEX: FIB4 SCORE: 4.49

## 2023-08-31 NOTE — PROGRESS NOTES
Subjective:   Chief Complaint/History of Present Illness:  Reina Munoz is a 82 y.o. female established patient who presents today to discuss medical problems as listed below. Reina is unaccompanied for today's visit.    Problem   Cellulitis of Toe of Left Foot    She has swelling, redness, pain, and erythema of the left foot first MTP. She has dropped a bowl on the toe and there was bruising but her ROM was intact so fracture seemed less likely. Over the weekend the symptoms rapidly progressed and she made an appointment for evaluation. Denies history of gout. There was slight sloughing of skin just lateral to the area of swelling. No clear entry point for infection. Is taking tylenol and ibuprofen for pain.    She followed up in clinic 3 days later.  X-ray completed day of her last visit showed soft tissue swelling but no evidence of fracture or crystallization in the MTP.  She has had significant improvement since starting on antibiotics and steroids, there is still some swelling and redness but she is on the right track.  Water pill is made her pee more and seems to be helping mobilize the fluid but she is still edematous.          Current Medications:  Current Outpatient Medications Ordered in Epic   Medication Sig Dispense Refill    furosemide (LASIX) 40 MG Tab Take 1 Tablet by mouth every day. 30 Tablet 3    amoxicillin-clavulanate (AUGMENTIN) 875-125 MG Tab Take 1 Tablet by mouth 2 times a day. 14 Tablet 0    predniSONE (DELTASONE) 20 MG Tab Take 2 Tablets by mouth every day for 5 days. Take with food 10 Tablet 0    rosuvastatin (CRESTOR) 5 MG Tab Take 1 Tablet by mouth every evening. 100 Tablet 3    levothyroxine (SYNTHROID) 25 MCG Tab Take 1 Tablet by mouth every morning on an empty stomach. 100 Tablet 3    esomeprazole (NEXIUM) 20 MG capsule TAKE 1 CAPSULE BY MOUTH EVERY DAY IN THE MORNING BEFORE BREAKFAST 84 Capsule 3    lisinopril (PRINIVIL) 2.5 MG Tab Take 1 Tablet by mouth 2 times a day. 200  "Tablet 3    Fexofenadine HCl (ALLEGRA ALLERGY PO) Take 1 Tablet by mouth every day.      albuterol 108 (90 Base) MCG/ACT Aero Soln inhalation aerosol Inhale 2 Puffs every 6 hours as needed for Shortness of Breath. 1 Each 1    fluticasone (FLONASE) 50 MCG/ACT nasal spray Administer 2 Sprays into affected nostril(S) every day.      ibuprofen (MOTRIN) 200 MG Tab Take 200 mg by mouth every 6 hours as needed.      Cholecalciferol (VITAMIN D) 125 MCG (5000 UT) Cap Take  by mouth.      Zinc 30 MG Cap Take  by mouth.      Omega-3 Fatty Acids (FISH OIL) 1000 MG Cap capsule Take 1,000 mg by mouth 3 times a day, with meals.      Multiple Vitamins-Minerals (MULTIVITAMIN ADULT PO) Take  by mouth.       No current Epic-ordered facility-administered medications on file.          Objective:   Physical Exam:    Vitals: /66 (BP Location: Right arm, Patient Position: Sitting, BP Cuff Size: Adult)   Pulse 68   Temp 36.4 °C (97.5 °F) (Temporal)   Resp 16   Ht 1.702 m (5' 7\")   Wt 91.5 kg (201 lb 11.2 oz)   SpO2 92%    BMI: Body mass index is 31.59 kg/m².  Physical Exam  Constitutional:       General: She is not in acute distress.     Appearance: Normal appearance. She is not ill-appearing.   Eyes:      General: No scleral icterus.     Conjunctiva/sclera: Conjunctivae normal.   Cardiovascular:      Rate and Rhythm: Normal rate.   Pulmonary:      Effort: Pulmonary effort is normal. No respiratory distress.   Musculoskeletal:      Right lower leg: No edema.      Left lower leg: Edema present.      Comments: 1+ pretibial    Lymphadenopathy:      Cervical: No cervical adenopathy.   Skin:     General: Skin is warm and dry.      Findings: Erythema present.      Comments: Improved erythema, warmth, and edema of right 1st MTP   Neurological:      Comments: Cautious gait with foot swelling and pain   Psychiatric:         Mood and Affect: Mood normal.         Behavior: Behavior normal.         Thought Content: Thought content normal.   "       Judgment: Judgment normal.               Assessment and Plan:   Reina is a 82 y.o. female with the following:  Problem List Items Addressed This Visit       Cellulitis of toe of left foot     Recent problem, improving.  We arrange for recheck 3 days later in clinic, redness, swelling, and warmth are all improved but not resolved.  She is on day 4 of antibiotics.  She will continue steroids due to concern of contributing gout.  She will continue Lasix until week after she completes the other therapies to see if we can mobilize the fluid a bit better.  We will keep me updated in the interim with her progress.  She says it is much easier to get on her sandals and ambulate although she is still being cautious.  She is glad to know there is no fracture.  Urgent care precautions given over the long holiday weekend, she voiced understanding.               RTC: Return in about 3 months (around 11/30/2023).    I spent a total of 20 minutes with record review, exam, communication with the patient, communication with other providers, and documentation of this encounter.    PLEASE NOTE: This dictation was created using voice recognition software. I have made every reasonable attempt to correct obvious errors, but I expect that there are errors of grammar and possibly content that I did not discover before finalizing the note.      Nayeli Finley, DO  Geriatric and Internal Medicine  [facility]

## 2023-08-31 NOTE — ASSESSMENT & PLAN NOTE
Recent problem, improving.  We arrange for recheck 3 days later in clinic, redness, swelling, and warmth are all improved but not resolved.  She is on day 4 of antibiotics.  She will continue steroids due to concern of contributing gout.  She will continue Lasix until week after she completes the other therapies to see if we can mobilize the fluid a bit better.  We will keep me updated in the interim with her progress.  She says it is much easier to get on her sandals and ambulate although she is still being cautious.  She is glad to know there is no fracture.  Urgent care precautions given over the long holiday weekend, she voiced understanding.

## 2023-10-13 ENCOUNTER — HOSPITAL ENCOUNTER (OUTPATIENT)
Dept: LAB | Facility: MEDICAL CENTER | Age: 83
End: 2023-10-13
Payer: MEDICARE

## 2023-10-13 LAB
ALBUMIN SERPL BCP-MCNC: 4.3 G/DL (ref 3.2–4.9)
ALBUMIN/GLOB SERPL: 1.8 G/DL
ALP SERPL-CCNC: 71 U/L (ref 30–99)
ALT SERPL-CCNC: 39 U/L (ref 2–50)
ANION GAP SERPL CALC-SCNC: 9 MMOL/L (ref 7–16)
AST SERPL-CCNC: 52 U/L (ref 12–45)
BASOPHILS # BLD AUTO: 0.6 % (ref 0–1.8)
BASOPHILS # BLD: 0.03 K/UL (ref 0–0.12)
BILIRUB SERPL-MCNC: 0.9 MG/DL (ref 0.1–1.5)
BUN SERPL-MCNC: 10 MG/DL (ref 8–22)
CALCIUM ALBUM COR SERPL-MCNC: 9.3 MG/DL (ref 8.5–10.5)
CALCIUM SERPL-MCNC: 9.5 MG/DL (ref 8.5–10.5)
CHLORIDE SERPL-SCNC: 106 MMOL/L (ref 96–112)
CO2 SERPL-SCNC: 26 MMOL/L (ref 20–33)
CREAT SERPL-MCNC: 0.76 MG/DL (ref 0.5–1.4)
EOSINOPHIL # BLD AUTO: 0.16 K/UL (ref 0–0.51)
EOSINOPHIL NFR BLD: 3.5 % (ref 0–6.9)
ERYTHROCYTE [DISTWIDTH] IN BLOOD BY AUTOMATED COUNT: 46 FL (ref 35.9–50)
FERRITIN SERPL-MCNC: 206 NG/ML (ref 10–291)
GFR SERPLBLD CREATININE-BSD FMLA CKD-EPI: 79 ML/MIN/1.73 M 2
GGT SERPL-CCNC: 64 U/L (ref 7–34)
GLOBULIN SER CALC-MCNC: 2.4 G/DL (ref 1.9–3.5)
GLUCOSE SERPL-MCNC: 103 MG/DL (ref 65–99)
HBV CORE AB SERPL QL IA: NONREACTIVE
HBV SURFACE AB SERPL IA-ACNC: <3.5 MIU/ML (ref 0–10)
HBV SURFACE AG SER QL: NORMAL
HCT VFR BLD AUTO: 43.4 % (ref 37–47)
HCV AB SER QL: NORMAL
HGB BLD-MCNC: 14.7 G/DL (ref 12–16)
IMM GRANULOCYTES # BLD AUTO: 0.01 K/UL (ref 0–0.11)
IMM GRANULOCYTES NFR BLD AUTO: 0.2 % (ref 0–0.9)
INR PPP: 1.17 (ref 0.87–1.13)
IRON SATN MFR SERPL: 42 % (ref 15–55)
IRON SERPL-MCNC: 139 UG/DL (ref 40–170)
LYMPHOCYTES # BLD AUTO: 1.88 K/UL (ref 1–4.8)
LYMPHOCYTES NFR BLD: 40.6 % (ref 22–41)
MCH RBC QN AUTO: 31.5 PG (ref 27–33)
MCHC RBC AUTO-ENTMCNC: 33.9 G/DL (ref 32.2–35.5)
MCV RBC AUTO: 93.1 FL (ref 81.4–97.8)
MONOCYTES # BLD AUTO: 0.27 K/UL (ref 0–0.85)
MONOCYTES NFR BLD AUTO: 5.8 % (ref 0–13.4)
NEUTROPHILS # BLD AUTO: 2.28 K/UL (ref 1.82–7.42)
NEUTROPHILS NFR BLD: 49.3 % (ref 44–72)
NRBC # BLD AUTO: 0 K/UL
NRBC BLD-RTO: 0 /100 WBC (ref 0–0.2)
PLATELET # BLD AUTO: 133 K/UL (ref 164–446)
PMV BLD AUTO: 11.4 FL (ref 9–12.9)
POTASSIUM SERPL-SCNC: 4.1 MMOL/L (ref 3.6–5.5)
PROT SERPL-MCNC: 6.7 G/DL (ref 6–8.2)
PROTHROMBIN TIME: 15.1 SEC (ref 12–14.6)
RBC # BLD AUTO: 4.66 M/UL (ref 4.2–5.4)
SODIUM SERPL-SCNC: 141 MMOL/L (ref 135–145)
T4 FREE SERPL-MCNC: 0.98 NG/DL (ref 0.93–1.7)
TIBC SERPL-MCNC: 331 UG/DL (ref 250–450)
TSH SERPL DL<=0.005 MIU/L-ACNC: 4.67 UIU/ML (ref 0.38–5.33)
UIBC SERPL-MCNC: 192 UG/DL (ref 110–370)
WBC # BLD AUTO: 4.6 K/UL (ref 4.8–10.8)

## 2023-10-13 PROCEDURE — 82784 ASSAY IGA/IGD/IGG/IGM EACH: CPT

## 2023-10-13 PROCEDURE — 84439 ASSAY OF FREE THYROXINE: CPT

## 2023-10-13 PROCEDURE — 84460 ALANINE AMINO (ALT) (SGPT): CPT | Mod: XU

## 2023-10-13 PROCEDURE — 86704 HEP B CORE ANTIBODY TOTAL: CPT

## 2023-10-13 PROCEDURE — 82947 ASSAY GLUCOSE BLOOD QUANT: CPT | Mod: XU

## 2023-10-13 PROCEDURE — 86015 ACTIN ANTIBODY EACH: CPT

## 2023-10-13 PROCEDURE — 85025 COMPLETE CBC W/AUTO DIFF WBC: CPT

## 2023-10-13 PROCEDURE — 36415 COLL VENOUS BLD VENIPUNCTURE: CPT

## 2023-10-13 PROCEDURE — 82977 ASSAY OF GGT: CPT

## 2023-10-13 PROCEDURE — 84443 ASSAY THYROID STIM HORMONE: CPT

## 2023-10-13 PROCEDURE — 82104 ALPHA-1-ANTITRYPSIN PHENO: CPT

## 2023-10-13 PROCEDURE — 82390 ASSAY OF CERULOPLASMIN: CPT

## 2023-10-13 PROCEDURE — 83540 ASSAY OF IRON: CPT

## 2023-10-13 PROCEDURE — 84478 ASSAY OF TRIGLYCERIDES: CPT

## 2023-10-13 PROCEDURE — 86381 MITOCHONDRIAL ANTIBODY EACH: CPT

## 2023-10-13 PROCEDURE — 82172 ASSAY OF APOLIPOPROTEIN: CPT

## 2023-10-13 PROCEDURE — 82247 BILIRUBIN TOTAL: CPT | Mod: XU

## 2023-10-13 PROCEDURE — 80053 COMPREHEN METABOLIC PANEL: CPT

## 2023-10-13 PROCEDURE — 86803 HEPATITIS C AB TEST: CPT

## 2023-10-13 PROCEDURE — 86038 ANTINUCLEAR ANTIBODIES: CPT

## 2023-10-13 PROCEDURE — 85610 PROTHROMBIN TIME: CPT

## 2023-10-13 PROCEDURE — 86706 HEP B SURFACE ANTIBODY: CPT

## 2023-10-13 PROCEDURE — 84450 TRANSFERASE (AST) (SGOT): CPT | Mod: XU

## 2023-10-13 PROCEDURE — 86708 HEPATITIS A ANTIBODY: CPT

## 2023-10-13 PROCEDURE — 82728 ASSAY OF FERRITIN: CPT

## 2023-10-13 PROCEDURE — 83010 ASSAY OF HAPTOGLOBIN QUANT: CPT

## 2023-10-13 PROCEDURE — 82103 ALPHA-1-ANTITRYPSIN TOTAL: CPT

## 2023-10-13 PROCEDURE — 83883 ASSAY NEPHELOMETRY NOT SPEC: CPT

## 2023-10-13 PROCEDURE — 83550 IRON BINDING TEST: CPT

## 2023-10-13 PROCEDURE — 87340 HEPATITIS B SURFACE AG IA: CPT

## 2023-10-13 PROCEDURE — 82465 ASSAY BLD/SERUM CHOLESTEROL: CPT

## 2023-10-14 LAB
HAV AB SER QL IA: POSITIVE
IGG SERPL-MCNC: 660 MG/DL (ref 768–1632)
MITOCHONDRIA M2 IGG SER-ACNC: 5.2 UNITS (ref 0–24.9)
NUCLEAR IGG SER QL IA: NORMAL
SMA IGG SER-ACNC: 2 UNITS (ref 0–19)

## 2023-10-15 LAB — CERULOPLASMIN SERPL-MCNC: 23 MG/DL (ref 16–45)

## 2023-10-16 LAB
A1AT PHENOTYP SERPL-IMP: NORMAL
A1AT SERPL-MCNC: 144 MG/DL (ref 90–200)
A2 MACROGLOB SERPL-MCNC: 316 MG/DL (ref 110–276)
ALT SERPL W P-5'-P-CCNC: 48 IU/L (ref 0–40)
APO A-I SERPL-MCNC: 159 MG/DL (ref 116–209)
AST SERPL W P-5'-P-CCNC: 53 IU/L (ref 0–40)
BILIRUB SERPL-MCNC: 0.7 MG/DL (ref 0–1.2)
CHOLEST SERPL-MCNC: 165 MG/DL (ref 100–199)
FIBROSIS SCORING 550107: ABNORMAL
FIBROSIS STAGE SERPL QL: ABNORMAL
GGT SERPL-CCNC: 62 IU/L (ref 0–60)
GLUCOSE SERPL-MCNC: 101 MG/DL (ref 70–99)
HAPTOGLOB SERPL-MCNC: 99 MG/DL (ref 42–346)
LABORATORY COMMENT REPORT: ABNORMAL
LIVER FIBR SCORE SERPL CALC.FIBROSURE: 0.69 (ref 0–0.21)
LIVER STEATOSIS GRADE SERPL QL: ABNORMAL
LIVER STEATOSIS SCORE SERPL: 0.68 (ref 0–0.4)
NASH GRADE SERPL QL: ABNORMAL
NASH INTERPRETATION SERPL-IMP: ABNORMAL
NASH SCORE SERPL: 0.91 (ref 0–0.25)
NASH SCORING Z4789: ABNORMAL
STEATOSIS SCORING NL11718: ABNORMAL
TEST PERFORMANCE INFO SPEC: ABNORMAL
TEST PERFORMANCE INFO SPEC: ABNORMAL
TRIGL SERPL-MCNC: 194 MG/DL (ref 0–149)

## 2023-10-18 DIAGNOSIS — I10 ESSENTIAL HYPERTENSION: ICD-10-CM

## 2023-10-18 PROCEDURE — RXMED WILLOW AMBULATORY MEDICATION CHARGE: Performed by: INTERNAL MEDICINE

## 2023-10-18 RX ORDER — LISINOPRIL 2.5 MG/1
2.5 TABLET ORAL 2 TIMES DAILY
Qty: 200 TABLET | Refills: 3 | Status: SHIPPED | OUTPATIENT
Start: 2023-10-18 | End: 2024-02-21

## 2023-10-19 ENCOUNTER — HOSPITAL ENCOUNTER (OUTPATIENT)
Dept: RADIOLOGY | Facility: MEDICAL CENTER | Age: 83
End: 2023-10-19
Payer: MEDICARE

## 2023-10-19 ENCOUNTER — PHARMACY VISIT (OUTPATIENT)
Dept: PHARMACY | Facility: MEDICAL CENTER | Age: 83
End: 2023-10-19
Payer: COMMERCIAL

## 2023-10-19 DIAGNOSIS — R79.89 ELEVATED LFTS: ICD-10-CM

## 2023-10-19 PROCEDURE — 76705 ECHO EXAM OF ABDOMEN: CPT

## 2023-11-09 ENCOUNTER — PATIENT OUTREACH (OUTPATIENT)
Dept: HEALTH INFORMATION MANAGEMENT | Facility: OTHER | Age: 83
End: 2023-11-09
Payer: MEDICARE

## 2023-11-17 ENCOUNTER — PATIENT OUTREACH (OUTPATIENT)
Dept: HEALTH INFORMATION MANAGEMENT | Facility: OTHER | Age: 83
End: 2023-11-17
Payer: MEDICARE

## 2023-11-17 NOTE — PROGRESS NOTES
ALVINA Kern contacted pt to discuss resources that have currently been sent over and to set home visit to show pt how to utilize online resources. Pt did not answer, ALVINA left VM with contact information.

## 2023-11-22 ENCOUNTER — PHARMACY VISIT (OUTPATIENT)
Dept: PHARMACY | Facility: MEDICAL CENTER | Age: 83
End: 2023-11-22
Payer: COMMERCIAL

## 2023-11-22 PROCEDURE — RXMED WILLOW AMBULATORY MEDICATION CHARGE: Performed by: INTERNAL MEDICINE

## 2023-11-27 ENCOUNTER — OFFICE VISIT (OUTPATIENT)
Dept: MEDICAL GROUP | Facility: PHYSICIAN GROUP | Age: 83
End: 2023-11-27
Payer: MEDICARE

## 2023-11-27 VITALS
HEART RATE: 60 BPM | RESPIRATION RATE: 12 BRPM | TEMPERATURE: 96.5 F | DIASTOLIC BLOOD PRESSURE: 70 MMHG | SYSTOLIC BLOOD PRESSURE: 124 MMHG | BODY MASS INDEX: 32.02 KG/M2 | OXYGEN SATURATION: 94 % | HEIGHT: 67 IN | WEIGHT: 204 LBS

## 2023-11-27 DIAGNOSIS — R73.03 PREDIABETES: ICD-10-CM

## 2023-11-27 DIAGNOSIS — R60.0 EDEMA OF LEFT FOOT: ICD-10-CM

## 2023-11-27 DIAGNOSIS — R74.01 TRANSAMINITIS: ICD-10-CM

## 2023-11-27 DIAGNOSIS — K21.9 GASTROESOPHAGEAL REFLUX DISEASE WITHOUT ESOPHAGITIS: ICD-10-CM

## 2023-11-27 DIAGNOSIS — N28.89 RENAL MASS, RIGHT: ICD-10-CM

## 2023-11-27 DIAGNOSIS — E78.5 DYSLIPIDEMIA: ICD-10-CM

## 2023-11-27 PROCEDURE — 3078F DIAST BP <80 MM HG: CPT | Performed by: INTERNAL MEDICINE

## 2023-11-27 PROCEDURE — 3074F SYST BP LT 130 MM HG: CPT | Performed by: INTERNAL MEDICINE

## 2023-11-27 PROCEDURE — 99214 OFFICE O/P EST MOD 30 MIN: CPT | Performed by: INTERNAL MEDICINE

## 2023-11-27 RX ORDER — ESOMEPRAZOLE MAGNESIUM 40 MG/1
40 CAPSULE, DELAYED RELEASE ORAL
Qty: 100 CAPSULE | Refills: 3 | Status: SHIPPED | OUTPATIENT
Start: 2023-11-27

## 2023-11-27 RX ORDER — NEOMYCIN SULFATE, POLYMYXIN B SULFATE, AND DEXAMETHASONE 3.5; 10000; 1 MG/G; [USP'U]/G; MG/G
OINTMENT OPHTHALMIC
COMMUNITY
Start: 2023-09-30 | End: 2024-03-14

## 2023-11-27 ASSESSMENT — FIBROSIS 4 INDEX: FIB4 SCORE: 4.6

## 2023-11-27 NOTE — ASSESSMENT & PLAN NOTE
Chronic ongoing issue, likely related to WALLIS, established with [facility] and completed lab work a few months ago in addition imaging but is not yet followed up, she will contact their office to arrange for follow-up.

## 2023-11-27 NOTE — ASSESSMENT & PLAN NOTE
New problem, incidental finding on imaging ordered by NP at GI, has not followed up on results yet, discussed she will likely need a follow-up CT scan to ensure the complex cyst is not concerning for RCC. She declines imaging at this time but would be agreeable next year.

## 2023-11-27 NOTE — ASSESSMENT & PLAN NOTE
Previous problem, due for follow-up, reports she has been taking the Crestor faithfully, will recheck levels and adjust with goal to improve LDL below 100 and ensure no worsening of liver enzymes.

## 2023-11-27 NOTE — ASSESSMENT & PLAN NOTE
Chronic improved problem, A1c better with dietary adjustments, continue periodic evaluation to ensure stability.

## 2023-11-27 NOTE — ASSESSMENT & PLAN NOTE
Previous problem, improving, still with slight edema likely related to diet with recent Thanksgiving as well as air travel, she will continue the Lasix 40 mg daily as needed depending on swelling.

## 2023-11-27 NOTE — PROGRESS NOTES
Subjective:   Chief Complaint/History of Present Illness:  Reina Munoz is a 82 y.o. female established patient who presents today to discuss medical problems as listed below. Reina is unaccompanied for today's visit.    Problem   Renal mass, right- unspecified, incidental finding    RUQ US (10/2023):  Small complex cyst at the upper pole RIGHT kidney measuring 9 mm.  Mass is not excluded.      Edema of Left Foot    He has edema from the left foot up to the mid calf.  Likely secondary to infection versus inflammation from recent injury versus infection.  Treating broadly due to abruptness and rapid worsening of symptoms.     Transaminitis     Latest Reference Range & Units 03/30/22 18:03 06/21/22 09:09 04/27/23 09:01 08/15/23 07:01   AST(SGOT) 12 - 45 U/L 103 (H) 44 56 (H) 57 (H)   ALT(SGPT) 2 - 50 U/L 93 (H) 41 52 (H) 55 (H)     She has history of elevated liver enzymes dating back years.  She had prior ultrasound in 2021 which showed steatohepatitis versus cirrhosis.  She is not a follow-up since that time.  She has had adjustment of her cholesterol medicine but otherwise no hepatotoxic medicines.     Prediabetes     Latest Reference Range & Units 08/15/23 07:01   Glycohemoglobin 4.0 - 5.6 % 6.1 (H)     She has prediabetes for the past several years, no known history of type 2 diabetes.  This is treated with diet, no  Indication for pharmacotherapy at this time.     Dyslipidemia     Latest Reference Range & Units 08/15/23 07:01   Cholesterol,Tot 100 - 199 mg/dL 230 (H)   Triglycerides 0 - 149 mg/dL 177 (H)   HDL >=40 mg/dL 48   LDL <100 mg/dL 147 (H)     She reports longstanding history of elevated cholesterol previously on simvastatin 10 mg daily.  No issues with the simvastatin however did not realize this was fat-soluble and wonders how well it was absorbing for her with the timing that she was taking it.  Also has history of elevated liver enzymes with ultrasound imaging in the past showing fatty liver  disease versus cirrhosis.  She has aortic atherosclerosis incidentally on prior imaging but no history of blockages personally she was aware of.  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  Previously saw Dr. Chung, not currently with a cardiologist.     Gastroesophageal Reflux Disease Without Esophagitis    She has history of heartburn with no Gongora's esophagus on prior EGD.  Symptoms well controlled on current medical therapy.  She takes baby aspirin and as needed NSAIDs.    Current regimen: Nexium 20-40 mg daily          Current Medications:  Current Outpatient Medications Ordered in Epic   Medication Sig Dispense Refill    neomycin-polymixin-dexamethasone (MAXITROL) 3.5-53623-0.1 Ointment ophthalmic ointment       esomeprazole (NEXIUM) 20 MG capsule Take 2 Capsules by mouth every morning before breakfast. 200 Capsule 3    lisinopril (PRINIVIL) 2.5 MG Tab Take 1 Tablet by mouth 2 times a day. 200 Tablet 3    furosemide (LASIX) 40 MG Tab Take 1 Tablet by mouth every day. 30 Tablet 3    rosuvastatin (CRESTOR) 5 MG Tab Take 1 Tablet by mouth every evening. 100 Tablet 3    levothyroxine (SYNTHROID) 25 MCG Tab Take 1 Tablet by mouth every morning on an empty stomach. 100 Tablet 3    Fexofenadine HCl (ALLEGRA ALLERGY PO) Take 1 Tablet by mouth every day.      albuterol 108 (90 Base) MCG/ACT Aero Soln inhalation aerosol Inhale 2 Puffs every 6 hours as needed for Shortness of Breath. 1 Each 1    fluticasone (FLONASE) 50 MCG/ACT nasal spray Administer 2 Sprays into affected nostril(S) every day.      ibuprofen (MOTRIN) 200 MG Tab Take 200 mg by mouth every 6 hours as needed.      Cholecalciferol (VITAMIN D) 125 MCG (5000 UT) Cap Take  by mouth.      Zinc 30 MG Cap Take  by mouth.      Omega-3 Fatty Acids (FISH OIL) 1000 MG Cap capsule Take 1,000 mg by mouth 3 times a day, with meals.      Multiple Vitamins-Minerals  "(MULTIVITAMIN ADULT PO) Take  by mouth.       No current Epic-ordered facility-administered medications on file.          Objective:   Physical Exam:    Vitals: /70 (BP Location: Right arm, Patient Position: Sitting, BP Cuff Size: Adult)   Pulse 60   Temp 35.8 °C (96.5 °F) (Temporal)   Resp 12   Ht 1.702 m (5' 7\")   Wt 92.5 kg (204 lb)   SpO2 94%    BMI: Body mass index is 31.95 kg/m².  Physical Exam  Constitutional:       General: She is not in acute distress.     Appearance: Normal appearance. She is not ill-appearing.   HENT:      Right Ear: Ear canal and external ear normal. There is no impacted cerumen.      Left Ear: Ear canal and external ear normal. There is no impacted cerumen.   Eyes:      General: No scleral icterus.     Conjunctiva/sclera: Conjunctivae normal.   Cardiovascular:      Rate and Rhythm: Normal rate and regular rhythm.      Pulses: Normal pulses.   Pulmonary:      Effort: Pulmonary effort is normal. No respiratory distress.      Breath sounds: No wheezing, rhonchi or rales.   Abdominal:      General: Bowel sounds are normal. There is no distension.      Palpations: Abdomen is soft.      Tenderness: There is no abdominal tenderness.   Musculoskeletal:      Right lower leg: Edema present.      Left lower leg: Edema present.      Comments: Left > right 1+ pitting edema   Lymphadenopathy:      Cervical: No cervical adenopathy.   Skin:     General: Skin is warm and dry.      Findings: No rash.   Neurological:      Gait: Gait normal.   Psychiatric:         Mood and Affect: Mood normal.         Behavior: Behavior normal.         Thought Content: Thought content normal.         Judgment: Judgment normal.          Assessment and Plan:   Reina is a 82 y.o. female with the following:  Problem List Items Addressed This Visit       Dyslipidemia     Previous problem, due for follow-up, reports she has been taking the Crestor faithfully, will recheck levels and adjust with goal to improve LDL " below 100 and ensure no worsening of liver enzymes.         Edema of left foot     Previous problem, improving, still with slight edema likely related to diet with recent Thanksgiving as well as air travel, she will continue the Lasix 40 mg daily as needed depending on swelling.         Gastroesophageal reflux disease without esophagitis     Chronic ongoing problem, sometimes finds that she needs to use higher dose, continue Nexium 20 to 40 mg daily as needed, she is on baby aspirin and uses anti-inflammatories periodically so this assist with controlling GERD and GI prophylaxis.         Relevant Medications    esomeprazole (NEXIUM) 20 MG capsule    Prediabetes     Chronic improved problem, A1c better with dietary adjustments, continue periodic evaluation to ensure stability.         Renal mass, right- unspecified, incidental finding     New problem, incidental finding on imaging ordered by NP at GI, has not followed up on results yet, discussed she will likely need a follow-up CT scan to ensure the complex cyst is not concerning for RCC. She declines imaging at this time but would be agreeable next year.         Transaminitis     Chronic ongoing issue, likely related to WALLIS, established with [facility] and completed lab work a few months ago in addition imaging but is not yet followed up, she will contact their office to arrange for follow-up.               RTC: Return in about 3 months (around 2/27/2024).    I spent a total of 32 minutes with record review, exam, communication with the patient, communication with other providers, and documentation of this encounter.    PLEASE NOTE: This dictation was created using voice recognition software. I have made every reasonable attempt to correct obvious errors, but I expect that there are errors of grammar and possibly content that I did not discover before finalizing the note.      Nayeli Finley, DO  Geriatric and Internal Medicine  [facility]

## 2023-11-27 NOTE — ASSESSMENT & PLAN NOTE
Chronic ongoing problem, sometimes finds that she needs to use higher dose, continue Nexium 20 to 40 mg daily as needed, she is on baby aspirin and uses anti-inflammatories periodically so this assist with controlling GERD and GI prophylaxis.

## 2023-11-29 ENCOUNTER — TELEPHONE (OUTPATIENT)
Dept: MEDICAL GROUP | Facility: PHYSICIAN GROUP | Age: 83
End: 2023-11-29
Payer: MEDICARE

## 2023-11-29 NOTE — TELEPHONE ENCOUNTER
1. Caller Name: Reina Munoz                          Call Back Number: [phone] (home)         How would the patient prefer to be contacted with a response: Phone call OK to leave a detailed message    Patient called to say she had the shingles shot at [facility] and has been having a horrible headache and body aches all over ever since.  She took 1000mg of Tylenol but headache and aches not gone.  Can she take advil dual action too?

## 2023-11-29 NOTE — TELEPHONE ENCOUNTER
That's fine to take advil, really common to have side effects with that vaccine and hopefully dissipates within 1-2 days.

## 2023-12-11 ENCOUNTER — PATIENT OUTREACH (OUTPATIENT)
Dept: HEALTH INFORMATION MANAGEMENT | Facility: OTHER | Age: 83
End: 2023-12-11
Payer: MEDICARE

## 2023-12-11 NOTE — PROGRESS NOTES
..Community Health Worker Note    CHW Concha made home visit with pt to show her how to navigate rental search websites.     Discussed: CHW and pt discussed each search engine and different features in each ([employer], [employer] and [employer]). CHW showed pt how to personalize search to fit her preferences.     Follow-Up Needs: CHW stated she would create login on each website on pt's behalf. CHW is unable to do so on personal computers, CHW will have to make follow-up home visit to create login on pt's computer if pt is willing.     Plan: CHW will contact pt in one week to see if she is still in need/ able to navigate websites comfortably.

## 2023-12-15 ENCOUNTER — OFFICE VISIT (OUTPATIENT)
Dept: MEDICAL GROUP | Facility: PHYSICIAN GROUP | Age: 83
End: 2023-12-15
Payer: MEDICARE

## 2023-12-15 VITALS
SYSTOLIC BLOOD PRESSURE: 128 MMHG | OXYGEN SATURATION: 94 % | BODY MASS INDEX: 32.18 KG/M2 | WEIGHT: 205 LBS | HEIGHT: 67 IN | HEART RATE: 72 BPM | TEMPERATURE: 97.4 F | DIASTOLIC BLOOD PRESSURE: 68 MMHG | RESPIRATION RATE: 18 BRPM

## 2023-12-15 DIAGNOSIS — H92.02 LEFT EAR PAIN: ICD-10-CM

## 2023-12-15 DIAGNOSIS — J30.2 SEASONAL ALLERGIES: ICD-10-CM

## 2023-12-15 DIAGNOSIS — I10 PRIMARY HYPERTENSION: ICD-10-CM

## 2023-12-15 PROCEDURE — 3074F SYST BP LT 130 MM HG: CPT | Performed by: STUDENT IN AN ORGANIZED HEALTH CARE EDUCATION/TRAINING PROGRAM

## 2023-12-15 PROCEDURE — 99214 OFFICE O/P EST MOD 30 MIN: CPT | Performed by: STUDENT IN AN ORGANIZED HEALTH CARE EDUCATION/TRAINING PROGRAM

## 2023-12-15 PROCEDURE — 3078F DIAST BP <80 MM HG: CPT | Performed by: STUDENT IN AN ORGANIZED HEALTH CARE EDUCATION/TRAINING PROGRAM

## 2023-12-15 ASSESSMENT — FIBROSIS 4 INDEX: FIB4 SCORE: 4.66

## 2023-12-15 NOTE — PROGRESS NOTES
"CC:  Diagnoses of Seasonal allergies and Primary hypertension were pertinent to this visit.    HISTORY OF THE PRESENT ILLNESS: Patient is a 83 y.o. female. This pleasant patient is here today for follow up visit.    Problem   Primary Hypertension       Left ear pain began 2-3 days ago. Has decreased hearing. No drainage from the ear. No tinnitus. No headache. Has runny nose and congestion. Takes allergra and helps but did not take recently. Has flonase at home.    No fever, chills. No cough.      ROS:   ROS  See above    /68 (BP Location: Left arm, Patient Position: Sitting, BP Cuff Size: Adult)   Pulse 72   Temp 36.3 °C (97.4 °F) (Temporal)   Resp 18   Ht 1.702 m (5' 7\")   Wt 93 kg (205 lb)   SpO2 94%   BMI 32.11 kg/m² Body mass index is 32.11 kg/m².    Physical Exam  Vitals reviewed.   Constitutional:       General: She is not in acute distress.     Appearance: She is not ill-appearing or diaphoretic.   HENT:      Head: Normocephalic and atraumatic.      Right Ear: Tympanic membrane, ear canal and external ear normal. There is no impacted cerumen.      Left Ear: Tympanic membrane, ear canal and external ear normal. There is no impacted cerumen.      Nose: Congestion and rhinorrhea present.      Mouth/Throat:      Pharynx: No oropharyngeal exudate.   Eyes:      General: No scleral icterus.        Right eye: No discharge.         Left eye: No discharge.      Extraocular Movements: Extraocular movements intact.   Cardiovascular:      Rate and Rhythm: Normal rate and regular rhythm.   Pulmonary:      Effort: Pulmonary effort is normal. No respiratory distress.      Breath sounds: No stridor.   Musculoskeletal:      Cervical back: No rigidity or tenderness.      Right lower leg: No edema.      Left lower leg: No edema.   Lymphadenopathy:      Cervical: No cervical adenopathy.   Skin:     Capillary Refill: Capillary refill takes less than 2 seconds.   Neurological:      General: No focal deficit present.    "   Mental Status: She is alert and oriented to person, place, and time. Mental status is at baseline.   Psychiatric:         Mood and Affect: Mood normal.         Behavior: Behavior normal.         Thought Content: Thought content normal.         Judgment: Judgment normal.             Note: I have reviewed all pertinent labs and diagnostic tests associated with this visit with specific comments listed under the assessment and plan below    Assessment and Plan  83 y.o. female with the following -  1. Left ear pain  Acute, no sign of infection on exam, appears to be from sinus congestion  -Will treat sinus congestion as mentioned seasonal allergies    2. Seasonal allergies  Chronic, decompensated, likely the cause of left ear pain.  Has congestion and rhinorrhea on exam  -Continue fexofenadine 180 mg and Flonase 1 puff daily as needed for congestion and runny nose    3. Primary hypertension  Chronic, stable, blood pressure in clinic 128/68  Continue lisinopril 2.5 mg BID     I spent a total of 11 minutes with record review, exam, communication with the patient, communication with other providers, and documentation of this encounter.    Return if symptoms worsen or fail to improve.  Follow up in February 2024 as scheduled    Please note that this dictation was created using voice recognition software. I have made every reasonable attempt to correct obvious errors, but I expect that there are errors of grammar and possibly content that I did not discover before finalizing the note.

## 2023-12-23 DIAGNOSIS — R60.0 EDEMA OF LEFT FOOT: ICD-10-CM

## 2023-12-26 RX ORDER — FUROSEMIDE 40 MG/1
40 TABLET ORAL DAILY
Qty: 100 TABLET | Refills: 3 | Status: SHIPPED | OUTPATIENT
Start: 2023-12-26

## 2023-12-26 NOTE — TELEPHONE ENCOUNTER
Received request via: Patient    Was the patient seen in the last year in this department? Yes    Does the patient have an active prescription (recently filled or refills available) for medication(s) requested?     Does the patient have [facility] and need 100 day supply (blood pressure, diabetes and cholesterol meds only)? Yes, quantity updated to 100 days

## 2024-01-02 ENCOUNTER — OFFICE VISIT (OUTPATIENT)
Dept: MEDICAL GROUP | Facility: IMAGING CENTER | Age: 84
End: 2024-01-02
Payer: MEDICARE

## 2024-01-02 VITALS
HEART RATE: 55 BPM | SYSTOLIC BLOOD PRESSURE: 122 MMHG | BODY MASS INDEX: 29.47 KG/M2 | RESPIRATION RATE: 14 BRPM | TEMPERATURE: 97.5 F | OXYGEN SATURATION: 95 % | HEIGHT: 69 IN | DIASTOLIC BLOOD PRESSURE: 86 MMHG | WEIGHT: 199 LBS

## 2024-01-02 DIAGNOSIS — H92.02 OTALGIA OF LEFT EAR: ICD-10-CM

## 2024-01-02 DIAGNOSIS — H10.12 ACUTE ALLERGIC CONJUNCTIVITIS OF LEFT EYE: ICD-10-CM

## 2024-01-02 PROCEDURE — 3074F SYST BP LT 130 MM HG: CPT

## 2024-01-02 PROCEDURE — 3079F DIAST BP 80-89 MM HG: CPT

## 2024-01-02 PROCEDURE — 99213 OFFICE O/P EST LOW 20 MIN: CPT

## 2024-01-02 ASSESSMENT — FIBROSIS 4 INDEX: FIB4 SCORE: 4.66

## 2024-01-02 ASSESSMENT — PATIENT HEALTH QUESTIONNAIRE - PHQ9: CLINICAL INTERPRETATION OF PHQ2 SCORE: 0

## 2024-01-02 NOTE — PROGRESS NOTES
Subjective:     CC:   Chief Complaint   Patient presents with    Otalgia     Visit to her dr. 12/15     Eye Problem     Pt report discharge and swollen  and itchy x 3 days       HPI:   Reina presents today to discuss:    Left ear otalgia  Ongoing condition since 12/12. She saw her PCP on 12/15. At the time, patient did not show s/s of infection. Overall, her symptoms are improving   Denies fevers, hearing loss, tinnitus, or vertigo.     Left eye problem  Patient reports developing left eye redness, watery, itchy, irritated, scratchy eyes on 12/29. Her symptoms worsened and woke up with her eye closed shut from purulent drainage. She was prescribed erythromycin ophthalmic ointment and have been using clear eyes allergy drops. Her symptoms are improving but not fully resolved.   She denies fevers, vision loss, diplopia.      Past Medical History:   Diagnosis Date    Chronic meniscal tear of knee     left medial     CKD (chronic kidney disease) stage 3, GFR 30-59 ml/min (Former[facility]) 2/3/2022    Cough in adult 1/18/2022    Dyslipidemia     GERD (gastroesophageal reflux disease)     Hypothyroidism     Resolved 2018    Lab test positive for detection of COVID-19 virus 1/14/2022    Left leg swelling 3/14/2018    Left thyroid nodule 4/11/2019    Obesity (BMI 30.0-34.9) 4/4/2023    Pleomorphic adenoma of parotid gland 1992, 2004    recurrent, yearly MRI    RUQ pain 9/8/2021    Shortness of breath 12/13/2021    Sleep apnea     Urinary incontinence 5/17/2022     Family History   Problem Relation Age of Onset    Cancer Mother         multiple myeloma    Heart Disease Mother     Heart Attack Father     Stroke Father     Heart Disease Father     Hypertension Father     Heart Disease Brother     Lung Disease Brother     Stroke Sister     Cancer Sister         breast    Sleep Apnea Neg Hx     Diabetes Neg Hx      Past Surgical History:   Procedure Laterality Date    CATARACT EXTRACTION WITH IOL      LUMPECTOMY Left     non-cancerous     PAROTIDECTOMY Left 1992, 2004    recurrent pleomorphic adenoma left parotid gland     Social History     Tobacco Use    Smoking status: Never    Smokeless tobacco: Never   Vaping Use    Vaping Use: Never used   Substance Use Topics    Alcohol use: No     Alcohol/week: 0.0 oz    Drug use: No     Social History     Social History Narrative    Not on file     Current Outpatient Medications Ordered in Epic   Medication Sig Dispense Refill    furosemide (LASIX) 40 MG Tab TAKE 1 TABLET BY MOUTH DAILY 100 Tablet 3    neomycin-polymixin-dexamethasone (MAXITROL) 3.5-81084-2.1 Ointment ophthalmic ointment       esomeprazole (NEXIUM) 40 MG delayed-release capsule Take 1 Capsule by mouth every morning before breakfast. 100 Capsule 3    lisinopril (PRINIVIL) 2.5 MG Tab Take 1 Tablet by mouth 2 times a day. 200 Tablet 3    rosuvastatin (CRESTOR) 5 MG Tab Take 1 Tablet by mouth every evening. 100 Tablet 3    levothyroxine (SYNTHROID) 25 MCG Tab Take 1 Tablet by mouth every morning on an empty stomach. 100 Tablet 3    Fexofenadine HCl (ALLEGRA ALLERGY PO) Take 1 Tablet by mouth every day.      albuterol 108 (90 Base) MCG/ACT Aero Soln inhalation aerosol Inhale 2 Puffs every 6 hours as needed for Shortness of Breath. 1 Each 1    fluticasone (FLONASE) 50 MCG/ACT nasal spray Administer 2 Sprays into affected nostril(S) every day.      ibuprofen (MOTRIN) 200 MG Tab Take 200 mg by mouth every 6 hours as needed.      Cholecalciferol (VITAMIN D) 125 MCG (5000 UT) Cap Take  by mouth.      Zinc 30 MG Cap Take  by mouth.      Omega-3 Fatty Acids (FISH OIL) 1000 MG Cap capsule Take 1,000 mg by mouth 3 times a day, with meals.      Multiple Vitamins-Minerals (MULTIVITAMIN ADULT PO) Take  by mouth.       No current Epic-ordered facility-administered medications on file.     Doxycycline, Penicillins, and Sulfa drugs    ROS: see hpi  Gen: no fevers/chills  Pulm: no sob, no cough  CV: no chest pain, no palpitations, no edema  GI: no  "nausea/vomiting, no diarrhea  Skin: no rash    Objective:   Exam:  /86 (BP Location: Left arm, Patient Position: Sitting, BP Cuff Size: Adult)   Pulse (!) 55   Temp 36.4 °C (97.5 °F) (Temporal)   Resp 14   Ht 1.753 m (5' 9\")   Wt 90.3 kg (199 lb)   SpO2 95%   BMI 29.39 kg/m²    Body mass index is 29.39 kg/m².    Gen: Alert and oriented, No apparent distress.  HEENT: Head atraumatic, normocephalic. Pupils equal and round. Left TM with mild effusion, no erythema or edema. Left eye conjunctiva with mild erythema, no edema or drainage noted. PERRLA  Neck: Neck is supple without lymphadenopathy.   Lungs: Normal effort, CTA bilaterally, no wheezes, rhonchi, or rales  CV: Regular rate and rhythm. No murmurs, rubs, or gallops.  ABD: +BS. Non-tender, non-distended. No rebound, rigidity, or guarding.  Ext: No clubbing, cyanosis, edema.    Assessment & Plan:     83 y.o. female with the following -     1. Otalgia of left ear  Established, stable condition.  No s/s of infectious etiology.  Likely allergies.  Recommend OTC antihistamine.  If symptoms do not improve or worsen, recommend to follow-up in office.    2. Acute allergic conjunctivitis of left eye  New condition, stable. VSS. No s/s of concerning or emergent finding.  Pt presentation consistent with allergic conjunctivitis.   May continue with erythromycin drops as prescribed.  May start OTC lastaraft eye drops for allergies. Continue with oral antihistamine. May use saline eyewash and artificial tears PRN.   If symptoms do not improve or worsen, recommend to follow-up in office.    Medical Decision Making/Course:  In the course of preparing for this visit with review of the pertinent past medical history, recent and past clinic visits, current medications, and performing chart, immunization, medical history and medication reconciliation, and in the further course of obtaining the current history pertinent to the clinic visit today, performing an exam and " evaluation, ordering and independently evaluating labs, tests, and/or procedures, prescribing any recommended new medications as noted above, providing any pertinent counseling and education and recommending further coordination of care. This was discussed with patient in a shared-decision making conversation, and they understand and agreed with plan of care.     Return if symptoms worsen or fail to improve.    MAMADOU Sutton   [facility]    Please note that this dictation was created using voice recognition software. I have made every reasonable attempt to correct obvious errors, but I expect that there are errors of grammar and possibly content that I did not discover before finalizing the note.

## 2024-02-05 ENCOUNTER — PATIENT OUTREACH (OUTPATIENT)
Dept: HEALTH INFORMATION MANAGEMENT | Facility: OTHER | Age: 84
End: 2024-02-05
Payer: MEDICARE

## 2024-02-19 ENCOUNTER — HOSPITAL ENCOUNTER (OUTPATIENT)
Dept: LAB | Facility: MEDICAL CENTER | Age: 84
End: 2024-02-19
Attending: INTERNAL MEDICINE
Payer: MEDICARE

## 2024-02-19 DIAGNOSIS — E03.9 ACQUIRED HYPOTHYROIDISM: ICD-10-CM

## 2024-02-19 DIAGNOSIS — E78.5 DYSLIPIDEMIA: ICD-10-CM

## 2024-02-19 DIAGNOSIS — R73.03 PREDIABETES: ICD-10-CM

## 2024-02-19 DIAGNOSIS — D72.820 LYMPHOCYTOSIS: ICD-10-CM

## 2024-02-19 LAB
ALBUMIN SERPL BCP-MCNC: 4.3 G/DL (ref 3.2–4.9)
ALBUMIN/GLOB SERPL: 1.5 G/DL
ALP SERPL-CCNC: 87 U/L (ref 30–99)
ALT SERPL-CCNC: 41 U/L (ref 2–50)
ANION GAP SERPL CALC-SCNC: 11 MMOL/L (ref 7–16)
AST SERPL-CCNC: 50 U/L (ref 12–45)
BASOPHILS # BLD AUTO: 0.9 % (ref 0–1.8)
BASOPHILS # BLD: 0.04 K/UL (ref 0–0.12)
BILIRUB SERPL-MCNC: 0.7 MG/DL (ref 0.1–1.5)
BUN SERPL-MCNC: 11 MG/DL (ref 8–22)
CALCIUM ALBUM COR SERPL-MCNC: 9.2 MG/DL (ref 8.5–10.5)
CALCIUM SERPL-MCNC: 9.4 MG/DL (ref 8.4–10.2)
CHLORIDE SERPL-SCNC: 107 MMOL/L (ref 96–112)
CHOLEST SERPL-MCNC: 159 MG/DL (ref 100–199)
CO2 SERPL-SCNC: 25 MMOL/L (ref 20–33)
CREAT SERPL-MCNC: 0.82 MG/DL (ref 0.5–1.4)
EOSINOPHIL # BLD AUTO: 0.26 K/UL (ref 0–0.51)
EOSINOPHIL NFR BLD: 5.7 % (ref 0–6.9)
ERYTHROCYTE [DISTWIDTH] IN BLOOD BY AUTOMATED COUNT: 45.3 FL (ref 35.9–50)
EST. AVERAGE GLUCOSE BLD GHB EST-MCNC: 120 MG/DL
FASTING STATUS PATIENT QL REPORTED: NORMAL
GFR SERPLBLD CREATININE-BSD FMLA CKD-EPI: 72 ML/MIN/1.73 M 2
GLOBULIN SER CALC-MCNC: 2.8 G/DL (ref 1.9–3.5)
GLUCOSE SERPL-MCNC: 114 MG/DL (ref 65–99)
HBA1C MFR BLD: 5.8 % (ref 4–5.6)
HCT VFR BLD AUTO: 41.8 % (ref 37–47)
HDLC SERPL-MCNC: 57 MG/DL
HGB BLD-MCNC: 14.5 G/DL (ref 12–16)
IMM GRANULOCYTES # BLD AUTO: 0 K/UL (ref 0–0.11)
IMM GRANULOCYTES NFR BLD AUTO: 0 % (ref 0–0.9)
LDLC SERPL CALC-MCNC: 76 MG/DL
LYMPHOCYTES # BLD AUTO: 1.71 K/UL (ref 1–4.8)
LYMPHOCYTES NFR BLD: 37.3 % (ref 22–41)
MCH RBC QN AUTO: 31.6 PG (ref 27–33)
MCHC RBC AUTO-ENTMCNC: 34.7 G/DL (ref 32.2–35.5)
MCV RBC AUTO: 91.1 FL (ref 81.4–97.8)
MONOCYTES # BLD AUTO: 0.3 K/UL (ref 0–0.85)
MONOCYTES NFR BLD AUTO: 6.6 % (ref 0–13.4)
NEUTROPHILS # BLD AUTO: 2.27 K/UL (ref 1.82–7.42)
NEUTROPHILS NFR BLD: 49.5 % (ref 44–72)
NRBC # BLD AUTO: 0 K/UL
NRBC BLD-RTO: 0 /100 WBC (ref 0–0.2)
PLATELET # BLD AUTO: 142 K/UL (ref 164–446)
PMV BLD AUTO: 11.2 FL (ref 9–12.9)
POTASSIUM SERPL-SCNC: 4.2 MMOL/L (ref 3.6–5.5)
PROT SERPL-MCNC: 7.1 G/DL (ref 6–8.2)
RBC # BLD AUTO: 4.59 M/UL (ref 4.2–5.4)
SODIUM SERPL-SCNC: 143 MMOL/L (ref 135–145)
T4 FREE SERPL-MCNC: 1.1 NG/DL (ref 0.93–1.7)
TRIGL SERPL-MCNC: 130 MG/DL (ref 0–149)
TSH SERPL DL<=0.005 MIU/L-ACNC: 5 UIU/ML (ref 0.38–5.33)
WBC # BLD AUTO: 4.6 K/UL (ref 4.8–10.8)

## 2024-02-19 PROCEDURE — 88184 FLOWCYTOMETRY/ TC 1 MARKER: CPT

## 2024-02-19 PROCEDURE — 85025 COMPLETE CBC W/AUTO DIFF WBC: CPT

## 2024-02-19 PROCEDURE — 88185 FLOWCYTOMETRY/TC ADD-ON: CPT | Mod: 91

## 2024-02-19 PROCEDURE — 84439 ASSAY OF FREE THYROXINE: CPT

## 2024-02-19 PROCEDURE — 80053 COMPREHEN METABOLIC PANEL: CPT

## 2024-02-19 PROCEDURE — 80061 LIPID PANEL: CPT

## 2024-02-19 PROCEDURE — 36415 COLL VENOUS BLD VENIPUNCTURE: CPT

## 2024-02-19 PROCEDURE — 84443 ASSAY THYROID STIM HORMONE: CPT

## 2024-02-19 PROCEDURE — RXMED WILLOW AMBULATORY MEDICATION CHARGE: Performed by: INTERNAL MEDICINE

## 2024-02-19 PROCEDURE — 83036 HEMOGLOBIN GLYCOSYLATED A1C: CPT

## 2024-02-20 LAB
ACUTE LEUKEMIA MARKERS SPEC-IMP: NORMAL
EVENTS COUNTED SPEC: 32 MARKERS
SOURCE 9121: NORMAL

## 2024-02-21 ENCOUNTER — OFFICE VISIT (OUTPATIENT)
Dept: MEDICAL GROUP | Facility: PHYSICIAN GROUP | Age: 84
End: 2024-02-21
Payer: MEDICARE

## 2024-02-21 VITALS
SYSTOLIC BLOOD PRESSURE: 132 MMHG | TEMPERATURE: 97.7 F | OXYGEN SATURATION: 94 % | RESPIRATION RATE: 16 BRPM | DIASTOLIC BLOOD PRESSURE: 88 MMHG | WEIGHT: 200.6 LBS | HEIGHT: 67 IN | HEART RATE: 63 BPM | BODY MASS INDEX: 31.48 KG/M2

## 2024-02-21 DIAGNOSIS — I27.20 PULMONARY HYPERTENSION (HCC): ICD-10-CM

## 2024-02-21 DIAGNOSIS — E03.9 ACQUIRED HYPOTHYROIDISM: ICD-10-CM

## 2024-02-21 DIAGNOSIS — I10 ESSENTIAL HYPERTENSION: ICD-10-CM

## 2024-02-21 DIAGNOSIS — I70.0 ATHEROSCLEROSIS OF AORTA (HCC): ICD-10-CM

## 2024-02-21 DIAGNOSIS — G63 POLYNEUROPATHY IN OTHER DISEASES CLASSIFIED ELSEWHERE (HCC): ICD-10-CM

## 2024-02-21 DIAGNOSIS — E78.5 DYSLIPIDEMIA: ICD-10-CM

## 2024-02-21 DIAGNOSIS — D69.6 THROMBOCYTOPENIA (HCC): ICD-10-CM

## 2024-02-21 DIAGNOSIS — R73.03 PREDIABETES: ICD-10-CM

## 2024-02-21 DIAGNOSIS — N28.89 RIGHT RENAL MASS: ICD-10-CM

## 2024-02-21 PROCEDURE — 3079F DIAST BP 80-89 MM HG: CPT | Performed by: INTERNAL MEDICINE

## 2024-02-21 PROCEDURE — 3075F SYST BP GE 130 - 139MM HG: CPT | Performed by: INTERNAL MEDICINE

## 2024-02-21 PROCEDURE — 99214 OFFICE O/P EST MOD 30 MIN: CPT | Performed by: INTERNAL MEDICINE

## 2024-02-21 PROCEDURE — RXMED WILLOW AMBULATORY MEDICATION CHARGE: Performed by: INTERNAL MEDICINE

## 2024-02-21 RX ORDER — ZOSTER VACCINE RECOMBINANT, ADJUVANTED 50 MCG/0.5
KIT INTRAMUSCULAR
COMMUNITY
Start: 2023-11-28 | End: 2024-02-21

## 2024-02-21 RX ORDER — MV-MN/FOLATE 11/M.THISTLE/HERB 72.3MCGDFE
CAPSULE ORAL
COMMUNITY

## 2024-02-21 RX ORDER — ERYTHROMYCIN 5 MG/G
1 OINTMENT OPHTHALMIC 4 TIMES DAILY
COMMUNITY
Start: 2023-12-30 | End: 2024-03-14

## 2024-02-21 RX ORDER — LOSARTAN POTASSIUM 25 MG/1
12.5 TABLET ORAL DAILY
Qty: 50 TABLET | Refills: 3 | Status: SHIPPED | OUTPATIENT
Start: 2024-02-21

## 2024-02-21 ASSESSMENT — FIBROSIS 4 INDEX: FIB4 SCORE: 4.45

## 2024-02-21 NOTE — ASSESSMENT & PLAN NOTE
Chronic stable problem, cholesterol doing much better on rosuvastatin 5 mg daily, continue at this time.

## 2024-02-21 NOTE — ASSESSMENT & PLAN NOTE
Chronic improved problem, A1c trending down, commended her on this improvement, she is trying to eat more healthy.

## 2024-02-21 NOTE — ASSESSMENT & PLAN NOTE
Chronic ongoing problem, blood pressure stable, more symptomatic with throat clearing and dry cough and wonders if it could be the lisinopril, will do a trial of stopping lisinopril and starting losartan 12.5 mg daily.

## 2024-02-21 NOTE — PROGRESS NOTES
Subjective:   Chief Complaint/History of Present Illness:  Reina Munoz is a 83 y.o. female established patient who presents today to discuss medical problems as listed below. Reina is unaccompanied for today's visit.    Problem   Right Renal Mass    RUQ US (10/2023):  Small complex cyst at the upper pole RIGHT kidney measuring 9 mm.  Mass is not excluded.      Prediabetes     Latest Reference Range & Units 02/19/24 09:00   Glycohemoglobin 4.0 - 5.6 % 5.8 (H)   Estim. Avg Glu mg/dL 120     She has prediabetes for the past several years, no known history of type 2 diabetes.  This is treated with diet, no  Indication for pharmacotherapy at this time.     Pulmonary Hypertension (Hcc)    She has mild history of pulmonary hypertension evidenced by RVSP of 30 to 35 mmHg on echocardiogram in 2021, this is an improvement from previous echocardiogram demonstrating pressures of 45 mmHg in 2018.     Polyneuropathy in Other Diseases Classified Elsewhere (Hcc)    She has longstanding history of neuropathic pain in the feet, no clear etiology noted in her history but has lower extremity edema as well as thyroid disease and liver disease which could all be contributing.  Not currently taking any nerve pain medicine but does use ibuprofen as needed.     Atherosclerosis of Aorta (Hcc)    She has history of atherosclerosis in the aorta seen on previous imaging incidentally.  She is on statin therapy and aspirin.     Thrombocytopenia (Hcc)     Latest Reference Range & Units 02/19/24 09:00   Platelet Count 164 - 446 K/uL 142 (L)     She has mild thrombocytopenia for many years, she is on low-dose aspirin.  Right upper quadrant ultrasound years ago demonstrated WALLIS versus cirrhosis.  No bruising or bleeding complications.       Essential Hypertension    She has mild hypertension which is well controlled on low-dose medical therapy.  No signs or symptoms orthostasis.  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   Saw Dr. Chung in the past at [facility].  She notes cough/throat clearing and would like to try switching from ACEI to ARB.    Blood pressure in clinic 122-128/70-72    Current regimen: Losartan 12.5 mg daily  Previous regimen: Lisinopril 2.5 mg twice daily     Dyslipidemia     Latest Reference Range & Units 02/19/24 09:00   Cholesterol,Tot 100 - 199 mg/dL 159   Triglycerides 0 - 149 mg/dL 130   HDL >=40 mg/dL 57   LDL <100 mg/dL 76     She reports longstanding history of elevated cholesterol previously on simvastatin on rosuvastatin. History of elevated liver enzymes with ultrasound imaging in the past showing fatty liver disease versus cirrhosis.  She has aortic atherosclerosis incidentally on prior imaging but no history of blockages personally she was aware of.  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  Previously saw Dr. Chung, not currently with a cardiologist.     Acquired Hypothyroidism     Latest Reference Range & Units 02/19/24 09:00   TSH 0.380 - 5.330 uIU/mL 5.000   Free T-4 0.93 - 1.70 ng/dL 1.10     She reports multi decade history of low functioning thyroid on low-dose levothyroxine.  Tried a slightly increased dose in the past but it led to anxiousness.  Currently no signs or symptoms of overtreatment under treatment.    Current regimen: Levothyroxine 25 mcg daily          Current Medications:  Current Outpatient Medications Ordered in Epic   Medication Sig Dispense Refill    Multiple Vitamins-Minerals (IMMUNE ESSENTIALS DAILY) Cap Take  by mouth.      erythromycin 5 MG/GM Ointment Apply 1 Application to both eyes 4 times a day.      losartan (COZAAR) 25 MG Tab Take 0.5 Tablets by mouth every day. 50 Tablet 3    furosemide (LASIX) 40 MG Tab TAKE 1 TABLET BY MOUTH DAILY 100 Tablet 3    neomycin-polymixin-dexamethasone (MAXITROL) 3.5-01490-3.1 Ointment ophthalmic ointment       esomeprazole (NEXIUM) 40 MG  "delayed-release capsule Take 1 Capsule by mouth every morning before breakfast. 100 Capsule 3    rosuvastatin (CRESTOR) 5 MG Tab Take 1 Tablet by mouth every evening. 100 Tablet 3    levothyroxine (SYNTHROID) 25 MCG Tab Take 1 Tablet by mouth every morning on an empty stomach. 100 Tablet 3    Fexofenadine HCl (ALLEGRA ALLERGY PO) Take 1 Tablet by mouth every day.      albuterol 108 (90 Base) MCG/ACT Aero Soln inhalation aerosol Inhale 2 Puffs every 6 hours as needed for Shortness of Breath. 1 Each 1    fluticasone (FLONASE) 50 MCG/ACT nasal spray Administer 2 Sprays into affected nostril(S) every day.      ibuprofen (MOTRIN) 200 MG Tab Take 200 mg by mouth every 6 hours as needed.      Cholecalciferol (VITAMIN D) 125 MCG (5000 UT) Cap Take  by mouth.      Zinc 30 MG Cap Take  by mouth.      Omega-3 Fatty Acids (FISH OIL) 1000 MG Cap capsule Take 1,000 mg by mouth 3 times a day, with meals.       No current Epic-ordered facility-administered medications on file.          Objective:   Physical Exam:    Vitals: /88 (BP Location: Right arm, Patient Position: Sitting, BP Cuff Size: Adult)   Pulse 63   Temp 36.5 °C (97.7 °F) (Temporal)   Resp 16   Ht 1.702 m (5' 7\")   Wt 91 kg (200 lb 9.6 oz)   SpO2 94%    BMI: Body mass index is 31.42 kg/m².  Physical Exam  Constitutional:       General: She is not in acute distress.     Appearance: Normal appearance. She is not ill-appearing.   HENT:      Right Ear: External ear normal.      Left Ear: External ear normal.   Eyes:      General: No scleral icterus.     Conjunctiva/sclera: Conjunctivae normal.   Cardiovascular:      Rate and Rhythm: Normal rate and regular rhythm.      Pulses: Normal pulses.   Pulmonary:      Effort: Pulmonary effort is normal. No respiratory distress.      Breath sounds: No wheezing or rhonchi.   Musculoskeletal:      Comments: Stable mild edema bilaterally pretibial   Skin:     General: Skin is warm and dry.      Findings: No rash. "   Psychiatric:         Mood and Affect: Mood normal.         Behavior: Behavior normal.         Thought Content: Thought content normal.         Judgment: Judgment normal.      Comments: Stressed with 's illness at this time               Assessment and Plan:   Reina is a 83 y.o. female with the following:  Problem List Items Addressed This Visit       Acquired hypothyroidism     Chronic ongoing problem, continue medical therapy with levothyroxine 25 mcg daily.         Atherosclerosis of aorta (HCC)     Chronic stable problem, cholesterol doing much better on rosuvastatin 5 mg daily, continue at this time.         Relevant Medications    losartan (COZAAR) 25 MG Tab    Dyslipidemia     Chronic ongoing problem, lipids doing significantly better on rosuvastatin 5 mg daily, continue at this time.         Essential hypertension     Chronic ongoing problem, blood pressure stable, more symptomatic with throat clearing and dry cough and wonders if it could be the lisinopril, will do a trial of stopping lisinopril and starting losartan 12.5 mg daily.         Relevant Medications    losartan (COZAAR) 25 MG Tab    Polyneuropathy in other diseases classified elsewhere (HCC)     Chronic ongoing problem, could consider addition of nerve pain medicine should her foot pain worsen moving forward.         Prediabetes     Chronic improved problem, A1c trending down, commended her on this improvement, she is trying to eat more healthy.         Pulmonary hypertension (HCC)     Chronic ongoing problem, continue with attempts to maintain euvolemia and remain normotensive, continue current medication regiment.         Relevant Medications    losartan (COZAAR) 25 MG Tab    Right renal mass     Incidental finding per GI noted in October 2023, she really does not want to proceed with any imaging that would require contrast at this time, agreeable to repeat ultrasound dedicated to the right kidney to see if there has been interval  growth 6 months, she is agreeable.         Relevant Orders    US-RENAL    Thrombocytopenia (HCC)     Chronic ongoing problem, still with stable thrombocytopenia, discussed a coexisting leukopenia and fatty liver disease it could be all liver related, continue with regular blood work to ensure ongoing stability.  No significant bruising or bleeding issues at this time.               RTC: Return in about 3 months (around 5/21/2024).    I spent a total of 34 minutes with record review, exam, communication with the patient, communication with other providers, and documentation of this encounter.    PLEASE NOTE: This dictation was created using voice recognition software. I have made every reasonable attempt to correct obvious errors, but I expect that there are errors of grammar and possibly content that I did not discover before finalizing the note.      Nayeli Finley, DO  Geriatric and Internal Medicine  [facility]

## 2024-02-21 NOTE — ASSESSMENT & PLAN NOTE
Chronic ongoing problem, continue with attempts to maintain euvolemia and remain normotensive, continue current medication regiment.

## 2024-02-21 NOTE — ASSESSMENT & PLAN NOTE
Chronic ongoing problem, lipids doing significantly better on rosuvastatin 5 mg daily, continue at this time.

## 2024-02-21 NOTE — ASSESSMENT & PLAN NOTE
Incidental finding per GI noted in October 2023, she really does not want to proceed with any imaging that would require contrast at this time, agreeable to repeat ultrasound dedicated to the right kidney to see if there has been interval growth 6 months, she is agreeable.

## 2024-02-21 NOTE — ASSESSMENT & PLAN NOTE
Chronic ongoing problem, could consider addition of nerve pain medicine should her foot pain worsen moving forward.

## 2024-02-21 NOTE — ASSESSMENT & PLAN NOTE
Chronic ongoing problem, still with stable thrombocytopenia, discussed a coexisting leukopenia and fatty liver disease it could be all liver related, continue with regular blood work to ensure ongoing stability.  No significant bruising or bleeding issues at this time.

## 2024-02-22 ENCOUNTER — PHARMACY VISIT (OUTPATIENT)
Dept: PHARMACY | Facility: MEDICAL CENTER | Age: 84
End: 2024-02-22
Payer: COMMERCIAL

## 2024-02-26 DIAGNOSIS — R05.9 COUGH IN ADULT: ICD-10-CM

## 2024-02-26 DIAGNOSIS — R06.02 SHORTNESS OF BREATH: ICD-10-CM

## 2024-02-26 RX ORDER — IPRATROPIUM BROMIDE AND ALBUTEROL SULFATE 2.5; .5 MG/3ML; MG/3ML
3 SOLUTION RESPIRATORY (INHALATION) 4 TIMES DAILY
COMMUNITY
End: 2024-02-26 | Stop reason: SDUPTHER

## 2024-02-26 RX ORDER — ALBUTEROL SULFATE 90 UG/1
2 AEROSOL, METERED RESPIRATORY (INHALATION) EVERY 6 HOURS PRN
Qty: 54 G | Refills: 3 | Status: SHIPPED | OUTPATIENT
Start: 2024-02-26

## 2024-02-26 RX ORDER — IPRATROPIUM BROMIDE AND ALBUTEROL SULFATE 2.5; .5 MG/3ML; MG/3ML
3 SOLUTION RESPIRATORY (INHALATION) 4 TIMES DAILY
Qty: 1 EACH | Refills: 1 | Status: CANCELLED | OUTPATIENT
Start: 2024-02-26

## 2024-02-26 RX ORDER — IPRATROPIUM BROMIDE AND ALBUTEROL SULFATE 2.5; .5 MG/3ML; MG/3ML
3 SOLUTION RESPIRATORY (INHALATION) 4 TIMES DAILY
Qty: 90 ML | Refills: 11 | Status: SHIPPED | OUTPATIENT
Start: 2024-02-26 | End: 2024-03-14 | Stop reason: SDUPTHER

## 2024-02-26 NOTE — TELEPHONE ENCOUNTER
Received request via: Pharmacy    Was the patient seen in the last year in this department? Yes    Does the patient have an active prescription (recently filled or refills available) for medication(s) requested? No    Pharmacy Name: [facility]    Does the patient have FDC Plus and need 100 day supply (blood pressure, diabetes and cholesterol meds only)? Medication is not for cholesterol, blood pressure or diabetes

## 2024-02-27 ENCOUNTER — PHARMACY VISIT (OUTPATIENT)
Dept: PHARMACY | Facility: MEDICAL CENTER | Age: 84
End: 2024-02-27
Payer: COMMERCIAL

## 2024-02-27 PROCEDURE — RXMED WILLOW AMBULATORY MEDICATION CHARGE: Performed by: INTERNAL MEDICINE

## 2024-03-13 NOTE — ASSESSMENT & PLAN NOTE
Stable. Currently taking levothyroxine 25mcg daily as prescribed. Denies palpitations, skin changes, temperature intolerance, or changes in bowel habits. Follow up with PCP at least annually for continued monitoring and management.

## 2024-03-13 NOTE — ASSESSMENT & PLAN NOTE
Chronic, stable. Currently takes Nexium 40 mg daily. States this has well controlled her reflux. Follow up with PCP for continued monitoring.

## 2024-03-13 NOTE — ASSESSMENT & PLAN NOTE
Chronic, stable. Found on prior imaging. Patient currently takes rosuvastatin 5 mg daily. Follow up with PCP for continued monitoring.

## 2024-03-14 ENCOUNTER — OFFICE VISIT (OUTPATIENT)
Dept: FAMILY PLANNING/WOMEN'S HEALTH CLINIC | Facility: PHYSICIAN GROUP | Age: 84
End: 2024-03-14
Attending: FAMILY MEDICINE
Payer: MEDICARE

## 2024-03-14 VITALS
HEIGHT: 69 IN | BODY MASS INDEX: 29.77 KG/M2 | SYSTOLIC BLOOD PRESSURE: 126 MMHG | WEIGHT: 201 LBS | DIASTOLIC BLOOD PRESSURE: 80 MMHG

## 2024-03-14 DIAGNOSIS — K21.9 GASTROESOPHAGEAL REFLUX DISEASE WITHOUT ESOPHAGITIS: ICD-10-CM

## 2024-03-14 DIAGNOSIS — E03.9 ACQUIRED HYPOTHYROIDISM: ICD-10-CM

## 2024-03-14 DIAGNOSIS — I70.0 ATHEROSCLEROSIS OF AORTA (HCC): ICD-10-CM

## 2024-03-14 DIAGNOSIS — G47.30 SLEEP APNEA WITH USE OF CONTINUOUS POSITIVE AIRWAY PRESSURE (CPAP): Chronic | ICD-10-CM

## 2024-03-14 PROCEDURE — 3079F DIAST BP 80-89 MM HG: CPT

## 2024-03-14 PROCEDURE — 1126F AMNT PAIN NOTED NONE PRSNT: CPT

## 2024-03-14 PROCEDURE — 3074F SYST BP LT 130 MM HG: CPT

## 2024-03-14 PROCEDURE — G0439 PPPS, SUBSEQ VISIT: HCPCS

## 2024-03-14 RX ORDER — IPRATROPIUM BROMIDE AND ALBUTEROL SULFATE 2.5; .5 MG/3ML; MG/3ML
3 SOLUTION RESPIRATORY (INHALATION) 4 TIMES DAILY
Qty: 90 ML | Refills: 11 | Status: SHIPPED
Start: 2024-03-14 | End: 2024-03-18 | Stop reason: SDUPTHER

## 2024-03-14 SDOH — ECONOMIC STABILITY: FOOD INSECURITY: WITHIN THE PAST 12 MONTHS, THE FOOD YOU BOUGHT JUST DIDN'T LAST AND YOU DIDN'T HAVE MONEY TO GET MORE.: NEVER TRUE

## 2024-03-14 SDOH — ECONOMIC STABILITY: INCOME INSECURITY: IN THE LAST 12 MONTHS, WAS THERE A TIME WHEN YOU WERE NOT ABLE TO PAY THE MORTGAGE OR RENT ON TIME?: NO

## 2024-03-14 SDOH — ECONOMIC STABILITY: HOUSING INSECURITY
IN THE LAST 12 MONTHS, WAS THERE A TIME WHEN YOU DID NOT HAVE A STEADY PLACE TO SLEEP OR SLEPT IN A SHELTER (INCLUDING NOW)?: NO

## 2024-03-14 SDOH — ECONOMIC STABILITY: FOOD INSECURITY: WITHIN THE PAST 12 MONTHS, YOU WORRIED THAT YOUR FOOD WOULD RUN OUT BEFORE YOU GOT MONEY TO BUY MORE.: NEVER TRUE

## 2024-03-14 SDOH — ECONOMIC STABILITY: TRANSPORTATION INSECURITY
IN THE PAST 12 MONTHS, HAS LACK OF TRANSPORTATION KEPT YOU FROM MEETINGS, WORK, OR FROM GETTING THINGS NEEDED FOR DAILY LIVING?: NO

## 2024-03-14 SDOH — ECONOMIC STABILITY: INCOME INSECURITY: HOW HARD IS IT FOR YOU TO PAY FOR THE VERY BASICS LIKE FOOD, HOUSING, MEDICAL CARE, AND HEATING?: NOT HARD AT ALL

## 2024-03-14 SDOH — ECONOMIC STABILITY: HOUSING INSECURITY: IN THE LAST 12 MONTHS, HOW MANY PLACES HAVE YOU LIVED?: 1

## 2024-03-14 SDOH — ECONOMIC STABILITY: TRANSPORTATION INSECURITY
IN THE PAST 12 MONTHS, HAS THE LACK OF TRANSPORTATION KEPT YOU FROM MEDICAL APPOINTMENTS OR FROM GETTING MEDICATIONS?: NO

## 2024-03-14 ASSESSMENT — ACTIVITIES OF DAILY LIVING (ADL): BATHING_REQUIRES_ASSISTANCE: 0

## 2024-03-14 ASSESSMENT — PAIN SCALES - GENERAL: PAINLEVEL: NO PAIN

## 2024-03-14 ASSESSMENT — ENCOUNTER SYMPTOMS: GENERAL WELL-BEING: GOOD

## 2024-03-14 ASSESSMENT — FIBROSIS 4 INDEX: FIB4 SCORE: 4.45

## 2024-03-14 ASSESSMENT — PATIENT HEALTH QUESTIONNAIRE - PHQ9: CLINICAL INTERPRETATION OF PHQ2 SCORE: 0

## 2024-03-14 NOTE — PROGRESS NOTES
Comprehensive Health Assessment Program     Reina Munoz is a 83 y.o. here for her comprehensive health assessment.    Patient Active Problem List    Diagnosis Date Noted    Primary hypertension 12/15/2023    Right renal mass 11/27/2023    Edema of left foot 08/28/2023    Cellulitis of toe of left foot 08/28/2023    Transaminitis 08/23/2023    Fatty liver disease, nonalcoholic 08/23/2023    Lymphocytosis 08/23/2023    Prediabetes 04/04/2023    Pulmonary hypertension (HCC) 04/04/2023    Polyneuropathy in other diseases classified elsewhere (HCC) 02/03/2022    Atherosclerosis of aorta (HCC) 01/24/2022    Thrombocytopenia (HCC) 09/08/2021    Seasonal allergies 05/28/2021    Neuropathy 12/07/2020    Mitral regurgitation 02/06/2020    Pleomorphic adenoma of parotid gland 07/19/2019    Sleep apnea with use of continuous positive airway pressure (CPAP) 10/11/2018    Essential hypertension 02/03/2017    Dyslipidemia 12/12/2016    Gastroesophageal reflux disease without esophagitis 12/12/2016    Acquired hypothyroidism        Current Outpatient Medications   Medication Sig Dispense Refill    ipratropium-albuterol (DUONEB) 0.5-2.5 (3) MG/3ML nebulizer solution Take 3 mL by nebulization 4 times a day. 90 mL 11    albuterol 108 (90 Base) MCG/ACT Aero Soln inhalation aerosol Inhale 2 Puffs every 6 hours as needed for Shortness of Breath. 54 g 3    Multiple Vitamins-Minerals (IMMUNE ESSENTIALS DAILY) Cap Take  by mouth.      losartan (COZAAR) 25 MG Tab Take 0.5 Tablets by mouth every day. 50 Tablet 3    furosemide (LASIX) 40 MG Tab TAKE 1 TABLET BY MOUTH DAILY 100 Tablet 3    esomeprazole (NEXIUM) 40 MG delayed-release capsule Take 1 Capsule by mouth every morning before breakfast. 100 Capsule 3    rosuvastatin (CRESTOR) 5 MG Tab Take 1 Tablet by mouth every evening. 100 Tablet 3    levothyroxine (SYNTHROID) 25 MCG Tab Take 1 Tablet by mouth every morning on an empty stomach. 100 Tablet 3    Fexofenadine HCl (ALLEGRA  ALLERGY PO) Take 1 Tablet by mouth every day.      fluticasone (FLONASE) 50 MCG/ACT nasal spray Administer 2 Sprays into affected nostril(S) every day.      ibuprofen (MOTRIN) 200 MG Tab Take 200 mg by mouth every 6 hours as needed.      Cholecalciferol (VITAMIN D) 125 MCG (5000 UT) Cap Take  by mouth.      Zinc 30 MG Cap Take  by mouth.      Omega-3 Fatty Acids (FISH OIL) 1000 MG Cap capsule Take 1,000 mg by mouth 3 times a day, with meals.       No current facility-administered medications for this visit.          Current supplements as per medication list.     Allergies:   Doxycycline, Penicillins, and Sulfa drugs  Social History     Tobacco Use    Smoking status: Never    Smokeless tobacco: Never   Vaping Use    Vaping Use: Never used   Substance Use Topics    Alcohol use: No     Alcohol/week: 0.0 oz    Drug use: No     Family History   Problem Relation Age of Onset    Cancer Mother         multiple myeloma    Heart Disease Mother     Heart Attack Father     Stroke Father     Heart Disease Father     Hypertension Father     Heart Disease Brother     Lung Disease Brother     Stroke Sister     Cancer Sister         breast    Sleep Apnea Neg Hx     Diabetes Neg Hx      Reina  has a past medical history of Chronic meniscal tear of knee, CKD (chronic kidney disease) stage 3, GFR 30-59 ml/min ([facility]) (2/3/2022), Cough in adult (1/18/2022), Dyslipidemia, GERD (gastroesophageal reflux disease), Hypothyroidism, Lab test positive for detection of COVID-19 virus (1/14/2022), Left leg swelling (3/14/2018), Left thyroid nodule (4/11/2019), Obesity (BMI 30.0-34.9) (4/4/2023), Pleomorphic adenoma of parotid gland (1992, 2004), RUQ pain (9/8/2021), Shortness of breath (12/13/2021), Sleep apnea, and Urinary incontinence (5/17/2022).   Past Surgical History:   Procedure Laterality Date    CATARACT EXTRACTION WITH IOL      LUMPECTOMY Left     non-cancerous    PAROTIDECTOMY Left 1992, 2004    recurrent pleomorphic adenoma left  parotid gland       Screening:  In the last six months have you experienced any leakage of urine? Yes, does not happen often. Occurs when she coughs.     Depression Screening  Little interest or pleasure in doing things?  0 - not at all  Feeling down, depressed , or hopeless? 0 - not at all  Patient Health Questionnaire Score: 0     If depressive symptoms identified deferred to follow up visit unless specifically addressed in assessment and plan.    Interpretation of PHQ-9 Total Score   Score Severity   1-4 No Depression   5-9 Mild Depression   10-14 Moderate Depression   15-19 Moderately Severe Depression   20-27 Severe Depression    Screening for Cognitive Impairment  Do you or any of your friends or family members have any concern about your memory? No  Three Minute Recall (Leader, Season, Table) 3/3    Avtar clock face with all 12 numbers and set the hands to show 10 minutes after 11.  Yes    Cognitive concerns identified deferred for follow up unless specifically addressed in assessment and plan.    Fall Risk Assessment  Has the patient had two or more falls in the last year or any fall with injury in the last year?  No    Safety Assessment  Do you always wear your seatbelt?  Yes  Any changes to home needed to function safely? No  Difficulty hearing.  No  Patient counseled about all safety risks that were identified.    Functional Assessment ADLs  Are there any barriers preventing you from cooking for yourself or meeting nutritional needs?  No.    Are there any barriers preventing you from driving safely or obtaining transportation?  No.    Are there any barriers preventing you from using a telephone or calling for help?  No    Are there any barriers preventing you from shopping?  No.    Are there any barriers preventing you from taking care of your own finances?  No    Are there any barriers preventing you from managing your medications?  No    Are there any barriers preventing you from showering, bathing or  dressing yourself? No    Are there any barriers preventing you from doing housework or laundry? No  Are there any barriers preventing you from using the toilet?No  Are you currently engaging in any exercise or physical activity?  Yes. House work and daily walking     Self-Assessment of Health  What is your perception of your health? Good  Do you sleep more than six hours a night? Yes  In the past 7 days, how much did pain keep you from doing your normal work? None  Do you spend quality time with family or friends (virtually or in person)? Yes  Do you usually eat a heart healthy diet that constists of a variety of fruits, vegetables, whole grains and fiber? Yes  Do you eat foods high in fat and/or Fast Food more than three times per week? No    Advance Care Planning  Do you have an Advance Directive, Living Will, Durable Power of , or POLST? No            Provided patient with educational brochure regarding Advance Care Planning.       Health Maintenance Summary            Postponed - IMM DTaP/Tdap/Td Vaccine (1 - Tdap) Postponed until 8/23/2024      No completion history exists for this topic.              Mammogram (Yearly) Next due on 6/12/2024 06/12/2023  MA-SCREENING MAMMO BILAT W/TOMOSYNTHESIS W/CAD    10/22/2021  MA-SCREENING MAMMO BILAT W/TOMOSYNTHESIS W/CAD    07/09/2020  MA-SCREENING MAMMO BILAT W/TOMOSYNTHESIS W/CAD    05/23/2018  MA-MAMMO SCREENING BILAT W/THERESA W/CAD    07/15/2013  MA-SCREENING MAMMO BILAT W/TOMOSYNTHESIS W/CAD    Only the first 5 history entries have been loaded, but more history exists.              Annual Wellness Visit (Yearly) Next due on 3/14/2025      03/14/2024  Level of Service: ANNUAL WELLNESS VISIT-INCLUDES PPPS SUBSEQUE*    04/04/2023  Level of Service: CO ANNUAL WELLNESS VISIT-INCLUDES PPPS SUBSEQUE*    02/03/2022  Level of Service: CO ANNUAL WELLNESS VISIT-INCLUDES PPPS SUBSEQUE*    09/02/2020  Subsequent Annual Wellness Visit - Includes PPPS ()     09/02/2020  Visit Dx: Medicare annual wellness visit, subsequent    Only the first 5 history entries have been loaded, but more history exists.              Bone Density Scan (Every 5 Years) Next due on 6/12/2028 06/12/2023  DS-BONE DENSITY STUDY (DEXA)    05/23/2018  DS-BONE DENSITY STUDY (DEXA)              Pneumococcal Vaccine: 65+ Years (Series Information) Completed      01/30/2023  Imm Admin: Pneumococcal Conjugate Vaccine (PCV20)    03/10/2021  Imm Admin: Pneumococcal polysaccharide vaccine (PPSV-23)    01/20/2021  Imm Admin: Pneumococcal Vaccine (UF) - HISTORICAL DATA              Hepatitis A Vaccine (Hep A) (Series Information) Aged Out      No completion history exists for this topic.              Hepatitis B Vaccine (Hep B) (Series Information) Aged Out      No completion history exists for this topic.              HPV Vaccines (Series Information) Aged Out      No completion history exists for this topic.              Polio Vaccine (Inactivated Polio) (Series Information) Aged Out      No completion history exists for this topic.              Meningococcal Immunization (Series Information) Aged Out      No completion history exists for this topic.              Discontinued - Colorectal Cancer Screening  Discontinued        Frequency changed to Never automatically (Topic No Longer Applies)    03/23/2023  COLOGUARD COLON CANCER SCREENING    09/09/2020  OCCULT BLOOD FECES IMMUNOASSAY    05/22/2019  OCCULT BLOOD FECES IMMUNOASSAY    03/19/2018  OCCULT BLOOD FECES IMMUNOASSAY    Only the first 5 history entries have been loaded, but more history exists.              Discontinued - Influenza Vaccine  Discontinued      01/11/2016  Patient Declined              Discontinued - Zoster (Shingles) Vaccines  Discontinued      11/28/2023  Imm Admin: Zoster Vaccine Recombinant (RZV) (SHINGRIX)              Discontinued - COVID-19 Vaccine  Discontinued      12/18/2021  Imm Admin: PFIZER PURPLE CAP SARS-COV-2  "VACCINATION (12+)    11/18/2021  Imm Admin: PFIZER PURPLE CAP SARS-COV-2 VACCINATION (12+)              Discontinued - Hepatitis C Screening  Discontinued        Frequency changed to Never automatically (Topic No Longer Applies)    10/13/2023  Hepatitis C Antibody component of HEP C VIRUS ANTIBODY    06/07/2016  Hepatitis C Antibody component of HEP C VIRUS ANTIBODY                    Patient Care Team:  Nayeli Finley D.O. as PCP - General (Internal Medicine)  Rafy Herndon M.D. as Consulting Physician (Ophthalmology)  [facility] as Home Health Provider (DME Supplier)  Roxanne Carmona M.D. (Pulmonary Medicine)  Lizet Kern as Community Health Worker      Financial Resource Strain: Low Risk  (3/14/2024)    Overall Financial Resource Strain (CARDIA)     Difficulty of Paying Living Expenses: Not hard at all      Transportation Needs: No Transportation Needs (3/14/2024)    PRAPARE - Transportation     Lack of Transportation (Medical): No     Lack of Transportation (Non-Medical): No      Food Insecurity: No Food Insecurity (3/14/2024)    Hunger Vital Sign     Worried About Running Out of Food in the Last Year: Never true     Ran Out of Food in the Last Year: Never true        Encounter Vitals  Blood Pressure : 126/80  Weight: 91.2 kg (201 lb)  Height: 175.3 cm (5' 9\")  BMI (Calculated): 29.68  Pain Score: No pain     Alert, oriented in no acute distress.  Eye contact is good, speech goal directed, affect calm.    Assessment and Plan. The following treatment and monitoring plan is recommended:  Atherosclerosis of aorta (HCC)  Chronic, stable. Found on prior imaging. Patient currently takes rosuvastatin 5 mg daily. Follow up with PCP for continued monitoring.      Gastroesophageal reflux disease without esophagitis  Chronic, stable. Currently takes Nexium 40 mg daily. States this has well controlled her reflux. Follow up with PCP for continued monitoring.      Sleep apnea with use of continuous " positive airway pressure (CPAP)  Chronic, stable. Compliant with CPAP at night. Does not require supplemental O2. Follows with pulmonology.       Acquired hypothyroidism  Stable. Currently taking levothyroxine 25mcg daily as prescribed. Denies palpitations, skin changes, temperature intolerance, or changes in bowel habits. Follow up with PCP at least annually for continued monitoring and management.        Services suggested: No services needed at this time  Health Care Screening: Age-appropriate preventive services recommended by USPTF and ACIP covered by Medicare were discussed today. Services ordered if indicated and agreed upon by the patient.  Referrals offered: Community-based lifestyle interventions to reduce health risks and promote self-management and wellness, fall prevention, nutrition, physical activity, tobacco-use cessation, weight loss, and mental health services as per orders if indicated.    Discussion today about general wellness and lifestyle habits:    Prevent falls and reduce trip hazards; Cautioned about securing or removing rugs.  Have a working fire alarm and carbon monoxide detector.  Engage in regular physical activity and social activities.    Follow-up: Return for appointment with Primary Care Provider as needed..

## 2024-03-18 ENCOUNTER — OFFICE VISIT (OUTPATIENT)
Dept: MEDICAL GROUP | Facility: PHYSICIAN GROUP | Age: 84
End: 2024-03-18
Payer: MEDICARE

## 2024-03-18 VITALS
DIASTOLIC BLOOD PRESSURE: 66 MMHG | TEMPERATURE: 97.7 F | SYSTOLIC BLOOD PRESSURE: 128 MMHG | RESPIRATION RATE: 16 BRPM | BODY MASS INDEX: 29.89 KG/M2 | WEIGHT: 201.8 LBS | HEART RATE: 72 BPM | OXYGEN SATURATION: 94 % | HEIGHT: 69 IN

## 2024-03-18 DIAGNOSIS — J30.2 SEASONAL ALLERGIES: ICD-10-CM

## 2024-03-18 DIAGNOSIS — J20.8 ACUTE BRONCHITIS DUE TO OTHER SPECIFIED ORGANISMS: ICD-10-CM

## 2024-03-18 DIAGNOSIS — G47.30 SLEEP APNEA WITH USE OF CONTINUOUS POSITIVE AIRWAY PRESSURE (CPAP): Chronic | ICD-10-CM

## 2024-03-18 DIAGNOSIS — R06.2 WHEEZING: ICD-10-CM

## 2024-03-18 DIAGNOSIS — R06.02 SHORTNESS OF BREATH: ICD-10-CM

## 2024-03-18 PROCEDURE — 99214 OFFICE O/P EST MOD 30 MIN: CPT | Mod: 25 | Performed by: INTERNAL MEDICINE

## 2024-03-18 PROCEDURE — 94640 AIRWAY INHALATION TREATMENT: CPT | Performed by: INTERNAL MEDICINE

## 2024-03-18 PROCEDURE — 3078F DIAST BP <80 MM HG: CPT | Performed by: INTERNAL MEDICINE

## 2024-03-18 PROCEDURE — 3074F SYST BP LT 130 MM HG: CPT | Performed by: INTERNAL MEDICINE

## 2024-03-18 RX ORDER — PREDNISONE 20 MG/1
40 TABLET ORAL DAILY
Qty: 10 TABLET | Refills: 0 | Status: SHIPPED | OUTPATIENT
Start: 2024-03-18 | End: 2024-03-23

## 2024-03-18 RX ORDER — IPRATROPIUM BROMIDE AND ALBUTEROL SULFATE 2.5; .5 MG/3ML; MG/3ML
3 SOLUTION RESPIRATORY (INHALATION) 4 TIMES DAILY
Qty: 90 ML | Refills: 11 | Status: SHIPPED | OUTPATIENT
Start: 2024-03-18

## 2024-03-18 RX ORDER — IPRATROPIUM BROMIDE AND ALBUTEROL SULFATE 2.5; .5 MG/3ML; MG/3ML
3 SOLUTION RESPIRATORY (INHALATION) ONCE
Status: COMPLETED | OUTPATIENT
Start: 2024-03-18 | End: 2024-03-18

## 2024-03-18 RX ORDER — ALBUTEROL SULFATE 90 UG/1
2 AEROSOL, METERED RESPIRATORY (INHALATION) EVERY 6 HOURS PRN
Qty: 54 G | Refills: 3 | OUTPATIENT
Start: 2024-03-18

## 2024-03-18 RX ORDER — AZITHROMYCIN 250 MG/1
250-500 TABLET, FILM COATED ORAL DAILY
Qty: 6 TABLET | Refills: 0 | Status: SHIPPED | OUTPATIENT
Start: 2024-03-18

## 2024-03-18 RX ADMIN — IPRATROPIUM BROMIDE AND ALBUTEROL SULFATE 3 ML: 2.5; .5 SOLUTION RESPIRATORY (INHALATION) at 10:41

## 2024-03-18 ASSESSMENT — FIBROSIS 4 INDEX: FIB4 SCORE: 4.45

## 2024-03-18 NOTE — ASSESSMENT & PLAN NOTE
Chronic ongoing problem, has had some challenges wearing it with current wheezing episode and woke up this morning feeling short of breath, will treat with nebulizers and steroids and hopefully we can get her more comfortable again with using the CPAP.

## 2024-03-18 NOTE — ASSESSMENT & PLAN NOTE
Chronic ongoing issue, currently well-controlled with current regimen, does not think that her seasonal allergies are contributing to current wheezing/bronchitis episode.

## 2024-03-18 NOTE — ASSESSMENT & PLAN NOTE
Decompensated issue, fair bit of wheezing on examination, it did improve on recheck after she completed DuoNeb treatment in clinic.  Will start her on prednisone 40 mg daily for 5 days and azithromycin 500 mg x 1 then 2 and 50 mg daily until complete.  Advised her to update me in 2 to 3 days and if not feeling better can consider chest x-ray and broadening treatment.  Because lung sounded better on recheck and there is no hypoxia, fever, or other symptoms to suggest pneumonia I think we are fine to start with above treatment.  He is appreciative for the visit and already felt somewhat better before leaving clinic.

## 2024-03-18 NOTE — TELEPHONE ENCOUNTER
Patient was oriented to the room. Call light in reach, instructed on use. Bed in lowest position and wheels are locked. Side rails are up for safety. Patient instructed not to get up without assistance.       Received request via: Patient    Was the patient seen in the last year in this department? Yes    Does the patient have an active prescription (recently filled or refills available) for medication(s) requested? No    Pharmacy Name: [facility]     Does the patient have detention Plus and need 100 day supply (blood pressure, diabetes and cholesterol meds only)? Medication is not for cholesterol, blood pressure or diabetes

## 2024-03-18 NOTE — PROGRESS NOTES
Subjective:   Chief Complaint/History of Present Illness:  Reina Munoz is a 83 y.o. female established patient who presents today to discuss medical problems as listed below. Reina is unaccompanied for today's visit.    Problem   Acute Bronchitis Due to Other Specified Organisms  Wheezing    She developed cough and bronchospasm last week, she was assessed in clinic on March 14 and had wheezing however this was for an assessment/preventative exam so no treatment was rendered.  She continues to feel short of breath and is out of nebulizers at home.  She is not hypoxic in clinic and her vitals are stable however she is coarse on exam.  She has history of bronchitis and wheezing intermittently over the years.  Last exacerbation was 1 to 2 years ago.  Reassuring pulmonary function testing in May 2022.  No fevers or chills, no ear pain, no sinus pain, sometimes has nasal drainage related to allergies but none currently.  No GI symptoms.  No rashes.     Seasonal Allergies    Highly allergic ragweed and takes OTC antihistamines and flonase     Sleep Apnea With Use of Continuous Positive Airway Pressure (Cpap)    She has history of obstructive sleep apnea treated with CPAP therapy and follows with sleep medicine.          Current Medications:  Current Outpatient Medications Ordered in Epic   Medication Sig Dispense Refill    predniSONE (DELTASONE) 20 MG Tab Take 2 Tablets by mouth every day for 5 days. 10 Tablet 0    azithromycin (ZITHROMAX) 250 MG Tab Take 1-2 Tablets by mouth every day. Per directions on packaging 6 Tablet 0    ipratropium-albuterol (DUONEB) 0.5-2.5 (3) MG/3ML nebulizer solution Take 3 mL by nebulization 4 times a day. 90 mL 11    albuterol 108 (90 Base) MCG/ACT Aero Soln inhalation aerosol Inhale 2 Puffs every 6 hours as needed for Shortness of Breath. 54 g 3    Multiple Vitamins-Minerals (IMMUNE ESSENTIALS DAILY) Cap Take  by mouth.      losartan (COZAAR) 25 MG Tab Take 0.5 Tablets by mouth every  "day. 50 Tablet 3    furosemide (LASIX) 40 MG Tab TAKE 1 TABLET BY MOUTH DAILY 100 Tablet 3    esomeprazole (NEXIUM) 40 MG delayed-release capsule Take 1 Capsule by mouth every morning before breakfast. 100 Capsule 3    rosuvastatin (CRESTOR) 5 MG Tab Take 1 Tablet by mouth every evening. 100 Tablet 3    levothyroxine (SYNTHROID) 25 MCG Tab Take 1 Tablet by mouth every morning on an empty stomach. 100 Tablet 3    Fexofenadine HCl (ALLEGRA ALLERGY PO) Take 1 Tablet by mouth every day.      fluticasone (FLONASE) 50 MCG/ACT nasal spray Administer 2 Sprays into affected nostril(S) every day.      ibuprofen (MOTRIN) 200 MG Tab Take 200 mg by mouth every 6 hours as needed.      Cholecalciferol (VITAMIN D) 125 MCG (5000 UT) Cap Take  by mouth.      Zinc 30 MG Cap Take  by mouth.      Omega-3 Fatty Acids (FISH OIL) 1000 MG Cap capsule Take 1,000 mg by mouth 3 times a day, with meals.       No current Epic-ordered facility-administered medications on file.          Objective:   Physical Exam:    Vitals: /66 (BP Location: Left arm, Patient Position: Sitting, BP Cuff Size: Adult)   Pulse 72   Temp 36.5 °C (97.7 °F) (Temporal)   Resp 16   Ht 1.753 m (5' 9\")   Wt 91.5 kg (201 lb 12.8 oz)   SpO2 94%    BMI: Body mass index is 29.8 kg/m².  Physical Exam  Constitutional:       General: She is not in acute distress.     Appearance: She is not toxic-appearing.      Comments: Appears mildly ill   HENT:      Right Ear: Ear canal normal. There is no impacted cerumen.      Left Ear: Ear canal normal. There is no impacted cerumen.      Nose: No congestion or rhinorrhea.      Mouth/Throat:      Pharynx: Posterior oropharyngeal erythema present. No oropharyngeal exudate.   Eyes:      General: No scleral icterus.        Right eye: No discharge.         Left eye: No discharge.      Conjunctiva/sclera: Conjunctivae normal.   Cardiovascular:      Rate and Rhythm: Normal rate and regular rhythm.      Pulses: Normal pulses. "   Pulmonary:      Breath sounds: No stridor. Wheezing present. No rales.      Comments: Coarse breath sounds throughout, improved after nebulizer, expiratory wheezing, dry cough  Musculoskeletal:      Right lower leg: No edema.      Left lower leg: Edema present.   Lymphadenopathy:      Cervical: No cervical adenopathy.   Skin:     General: Skin is warm and dry.      Findings: No rash.   Neurological:      Gait: Gait normal.   Psychiatric:         Mood and Affect: Mood normal.         Behavior: Behavior normal.         Thought Content: Thought content normal.         Judgment: Judgment normal.          Assessment and Plan:   Reina is a 83 y.o. female with the following:  Problem List Items Addressed This Visit       Acute bronchitis due to other specified organisms  Wheezing     New and decompensated issue, fair bit of wheezing on examination, it did improve on recheck after she completed DuoNeb treatment in clinic.  Will start her on prednisone 40 mg daily for 5 days and azithromycin 500 mg x 1 then 2 and 50 mg daily until complete.  Advised her to update me in 2 to 3 days and if not feeling better can consider chest x-ray and broadening treatment.  Because lung sounded better on recheck and there is no hypoxia, fever, or other symptoms to suggest pneumonia I think we are fine to start with above treatment.  She is appreciative for the visit and already felt somewhat better before leaving clinic. ER precautions provided. Will update me in a few days so we can decide on additional testing or treatment.         Relevant Medications    predniSONE (DELTASONE) 20 MG Tab    azithromycin (ZITHROMAX) 250 MG Tab    ipratropium-albuterol (DUONEB) 0.5-2.5 (3) MG/3ML nebulizer solution            ipratropium-albuterol (DUONEB) nebulizer solution (Completed) (Start on 3/18/2024 11:00 AM)    Seasonal allergies     Chronic ongoing issue, currently well-controlled with current regimen, does not think that her seasonal allergies  are contributing to current wheezing/bronchitis episode.         Sleep apnea with use of continuous positive airway pressure (CPAP) (Chronic)     Chronic ongoing problem, has had some challenges wearing it with current wheezing episode and woke up this morning feeling short of breath, will treat with nebulizers and steroids and hopefully we can get her more comfortable again with using the CPAP.              RTC: Return if symptoms worsen or fail to improve.    I spent a total of 25 minutes with record review, exam, communication with the patient, communication with other providers, and documentation of this encounter.    PLEASE NOTE: This dictation was created using voice recognition software. I have made every reasonable attempt to correct obvious errors, but I expect that there are errors of grammar and possibly content that I did not discover before finalizing the note.      Nayeli Finley, DO  Geriatric and Internal Medicine  [facility]

## 2024-03-27 ENCOUNTER — TELEPHONE (OUTPATIENT)
Dept: MEDICAL GROUP | Facility: PHYSICIAN GROUP | Age: 84
End: 2024-03-27
Payer: MEDICARE

## 2024-03-27 NOTE — TELEPHONE ENCOUNTER
Yes she is on a low dose so that would be fine to take an additional losartan 12.5 mg (1/2 tablet of 25 mg). If she stays consistently elevated then would have her start taking 1 full tablet of losartan 25 mg daily.

## 2024-04-09 ENCOUNTER — PATIENT OUTREACH (OUTPATIENT)
Dept: HEALTH INFORMATION MANAGEMENT | Facility: OTHER | Age: 84
End: 2024-04-09
Payer: MEDICARE

## 2024-04-09 NOTE — PROGRESS NOTES
ALVINA Kern contacted pt to check in and see if her pr her  were in need of any resources or assistance at this time. Pt did not answer, CHW left  with contact information.

## 2024-04-16 ENCOUNTER — PHARMACY VISIT (OUTPATIENT)
Dept: PHARMACY | Facility: MEDICAL CENTER | Age: 84
End: 2024-04-16
Payer: COMMERCIAL

## 2024-04-16 DIAGNOSIS — I10 ESSENTIAL HYPERTENSION: ICD-10-CM

## 2024-04-16 PROCEDURE — RXMED WILLOW AMBULATORY MEDICATION CHARGE: Performed by: INTERNAL MEDICINE

## 2024-04-16 RX ORDER — LOSARTAN POTASSIUM 25 MG/1
25 TABLET ORAL DAILY
Qty: 100 TABLET | Refills: 3 | Status: SHIPPED | OUTPATIENT
Start: 2024-04-16

## 2024-04-25 ENCOUNTER — OFFICE VISIT (OUTPATIENT)
Dept: MEDICAL GROUP | Facility: PHYSICIAN GROUP | Age: 84
End: 2024-04-25
Payer: MEDICARE

## 2024-04-25 ENCOUNTER — PHARMACY VISIT (OUTPATIENT)
Dept: PHARMACY | Facility: MEDICAL CENTER | Age: 84
End: 2024-04-25
Payer: COMMERCIAL

## 2024-04-25 VITALS
HEART RATE: 65 BPM | HEIGHT: 69 IN | OXYGEN SATURATION: 94 % | BODY MASS INDEX: 29.82 KG/M2 | DIASTOLIC BLOOD PRESSURE: 60 MMHG | TEMPERATURE: 97.9 F | WEIGHT: 201.3 LBS | SYSTOLIC BLOOD PRESSURE: 116 MMHG

## 2024-04-25 DIAGNOSIS — L98.9 SKIN LESION: ICD-10-CM

## 2024-04-25 DIAGNOSIS — I10 PRIMARY HYPERTENSION: ICD-10-CM

## 2024-04-25 DIAGNOSIS — R60.0 EDEMA OF LEFT FOOT: ICD-10-CM

## 2024-04-25 DIAGNOSIS — J06.9 UPPER RESPIRATORY TRACT INFECTION, UNSPECIFIED TYPE: ICD-10-CM

## 2024-04-25 DIAGNOSIS — G47.30 SLEEP APNEA WITH USE OF CONTINUOUS POSITIVE AIRWAY PRESSURE (CPAP): Chronic | ICD-10-CM

## 2024-04-25 DIAGNOSIS — J30.2 SEASONAL ALLERGIES: ICD-10-CM

## 2024-04-25 LAB
FLUAV RNA SPEC QL NAA+PROBE: NEGATIVE
FLUBV RNA SPEC QL NAA+PROBE: NEGATIVE
RSV RNA SPEC QL NAA+PROBE: NEGATIVE
SARS-COV-2 RNA RESP QL NAA+PROBE: NEGATIVE

## 2024-04-25 PROCEDURE — RXMED WILLOW AMBULATORY MEDICATION CHARGE: Performed by: STUDENT IN AN ORGANIZED HEALTH CARE EDUCATION/TRAINING PROGRAM

## 2024-04-25 PROCEDURE — 3074F SYST BP LT 130 MM HG: CPT | Performed by: STUDENT IN AN ORGANIZED HEALTH CARE EDUCATION/TRAINING PROGRAM

## 2024-04-25 PROCEDURE — 0241U POCT CEPHEID COV-2, FLU A/B, RSV - PCR: CPT | Performed by: STUDENT IN AN ORGANIZED HEALTH CARE EDUCATION/TRAINING PROGRAM

## 2024-04-25 PROCEDURE — 99214 OFFICE O/P EST MOD 30 MIN: CPT | Performed by: STUDENT IN AN ORGANIZED HEALTH CARE EDUCATION/TRAINING PROGRAM

## 2024-04-25 PROCEDURE — 3078F DIAST BP <80 MM HG: CPT | Performed by: STUDENT IN AN ORGANIZED HEALTH CARE EDUCATION/TRAINING PROGRAM

## 2024-04-25 RX ORDER — POTASSIUM CHLORIDE 750 MG/1
10 TABLET, FILM COATED, EXTENDED RELEASE ORAL
Qty: 60 TABLET | Refills: 2 | Status: SHIPPED | OUTPATIENT
Start: 2024-04-25

## 2024-04-25 RX ORDER — CLINDAMYCIN PHOSPHATE 10 MG/G
1 GEL TOPICAL 2 TIMES DAILY
Qty: 30 G | Refills: 1 | Status: SHIPPED | OUTPATIENT
Start: 2024-04-25 | End: 2024-05-02

## 2024-04-25 RX ORDER — AZITHROMYCIN 500 MG/1
500 TABLET, FILM COATED ORAL DAILY
Qty: 3 TABLET | Refills: 0 | Status: SHIPPED | OUTPATIENT
Start: 2024-04-25

## 2024-04-25 RX ORDER — FUROSEMIDE 40 MG/1
40 TABLET ORAL
COMMUNITY
Start: 2024-04-25

## 2024-04-25 RX ORDER — PREDNISONE 20 MG/1
40 TABLET ORAL DAILY
Qty: 10 TABLET | Refills: 0 | Status: SHIPPED | OUTPATIENT
Start: 2024-04-25 | End: 2024-04-30

## 2024-04-25 ASSESSMENT — FIBROSIS 4 INDEX: FIB4 SCORE: 4.45

## 2024-04-25 NOTE — PATIENT INSTRUCTIONS
-If oxygen saturation is lower than 88%, go to emergency department.  -take antibiotic (azithromycin) for 3 days and take prednisone for 5 days if your covid/rsv/flu test is negative.  -use allegra, flonase, and nebulizer (use duoneb 2-3 times a day)  -drink a lot of water, take lasix while recovering to prevent fluid building up in lungs. Take potassium pill when taking lasix.  -apply clindamycin to the skin lesion near left eye for 7 days. If it does not go away, let me know.

## 2024-04-25 NOTE — PROGRESS NOTES
"CC:  Diagnoses of Upper respiratory tract infection, unspecified type, Edema of left foot, Seasonal allergies, Primary hypertension, Sleep apnea with use of continuous positive airway pressure (CPAP), and Skin lesion were pertinent to this visit.    HISTORY OF THE PRESENT ILLNESS: Patient is a 83 y.o. female. This pleasant patient is here today for follow up visit.    Problem   Sleep Apnea With Use of Continuous Positive Airway Pressure (Cpap)    She has history of obstructive sleep apnea treated with CPAP therapy and follows with sleep medicine.  Instructed patient to continue using cpap, and she uses water for humifdification         She was having cough and bronchospasm in Mid March. She was treated with azithromycin and prednisone and got better.  She developed symptoms again 3 days ago.  She has a history of bronchitis exacerbation. She is coughing yellow phlegm, no hemoptysis. Ear pain.   No sick contact.  No fever, chills. Feels lethargic. No diarrhea.  Nebulizers help with cough.   States that allergy is bad currently too with sneezing, chest congestion. No pleurtic chest pain. No dyspnea.  Uses allegra and has flonase. Does not use netipot.    Skin growth between nose and left eye, looked like a pimple before.   occurred around 2 weeks  No increase in size.  No prior skin cancer history        ROS:   See above    /60 (BP Location: Left arm, Patient Position: Sitting, BP Cuff Size: Adult)   Pulse 65   Temp 36.6 °C (97.9 °F) (Temporal)   Ht 1.753 m (5' 9\")   Wt 91.3 kg (201 lb 4.8 oz)   SpO2 94%   BMI 29.73 kg/m² Body mass index is 29.73 kg/m².    Physical Exam  Vitals reviewed.   Constitutional:       General: She is not in acute distress.     Appearance: She is not ill-appearing or diaphoretic.   HENT:      Head: Normocephalic and atraumatic.      Right Ear: External ear normal.      Left Ear: External ear normal.      Nose: No rhinorrhea.      Mouth/Throat:      Pharynx: No oropharyngeal " exudate.   Eyes:      General: No scleral icterus.        Right eye: No discharge.         Left eye: No discharge.      Extraocular Movements: Extraocular movements intact.   Cardiovascular:      Rate and Rhythm: Normal rate and regular rhythm.      Heart sounds: Normal heart sounds. No murmur heard.  Pulmonary:      Effort: Pulmonary effort is normal. No respiratory distress.      Breath sounds: No stridor. Rhonchi present. No wheezing or rales.      Comments: Has ronchi, no wheezing  Abdominal:      General: There is no distension.      Palpations: Abdomen is soft. There is no mass.      Tenderness: There is no abdominal tenderness. There is no guarding or rebound.   Musculoskeletal:         General: No swelling.      Cervical back: No rigidity or tenderness.      Right lower leg: No edema.      Left lower leg: No edema.   Lymphadenopathy:      Cervical: No cervical adenopathy.   Skin:     Capillary Refill: Capillary refill takes less than 2 seconds.      Findings: Lesion present.      Comments: Has a skin lesion between nose and left eye, with a mixture of white and red spot,   Neurological:      General: No focal deficit present.      Mental Status: She is alert and oriented to person, place, and time. Mental status is at baseline.   Psychiatric:         Mood and Affect: Mood normal.         Behavior: Behavior normal.         Judgment: Judgment normal.           Note: I have reviewed all pertinent labs and diagnostic tests associated with this visit with specific comments listed under the assessment and plan below    Assessment and Plan  83 y.o. female with the following -    1. Upper respiratory tract infection, unspecified type  #asthma exacerbation  Acute, may have URI causing exacerbation of her asthma or could be from allergies.  -take prednisone 40 mg daily for 5 days and azithromycin 500 mg daily for 3 days  -continue duoneb up to 4 times a day  -take albuterol as needed    - POCT CoV-2, Flu A/B, RSV by  PCR    2. Edema of left foot  Chronic, stable, no swelling in legs today. Instructed patient to continue taking lasix 40 mg and potassium 10 meq as needed for leg swelling.  - furosemide (LASIX) 40 MG Tab; Take 1 Tablet by mouth 1 time a day as needed (leg swelling).    3. Seasonal allergies  Chronic, stable, instructed to continue using allegra and flonase. Can use netipot in the future.    4. Primary hypertension  Chronic, stable, bp in clinic 116/60.   Continue losartan 25 mg daily      5. Sleep apnea with use of continuous positive airway pressure (CPAP)  Chronic, stable, instructed to continue wearing cpap with humidification.       6.skin lesion  New problem, Has a skin lesion between nose and left eye, with a mixture of white and red spot, may be actinic keratosis but I gave her clindamycin to take for 7 days to prevent infection and wait to calm skin inflammation. If it does not go away, we can freeze it next visit      Gave instructions as below:   -If oxygen saturation is lower than 88%, go to emergency department.  -take antibiotic (azithromycin) for 3 days and take prednisone for 5 days if your covid/rsv/flu test is negative.  -use allegra, flonase, and nebulizer (use duoneb 2-3 times a day)  -drink a lot of water, take lasix while recovering to prevent fluid building up in lungs. Take potassium pill when taking lasix.  -apply clindamycin to the skin lesion near left eye for 7 days. If it does not go away, let me know.    I spent a total of 36 minutes with record review, exam, communication with the patient, communication with other providers, and documentation of this encounter.    Return if symptoms worsen or fail to improve.  Follow up with pcp as scheduled in June 2024    Please note that this dictation was created using voice recognition software. I have made every reasonable attempt to correct obvious errors, but I expect that there are errors of grammar and possibly content that I did not discover  before finalizing the note.

## 2024-04-30 ENCOUNTER — PHARMACY VISIT (OUTPATIENT)
Dept: PHARMACY | Facility: MEDICAL CENTER | Age: 84
End: 2024-04-30
Payer: COMMERCIAL

## 2024-04-30 PROCEDURE — RXMED WILLOW AMBULATORY MEDICATION CHARGE: Performed by: DENTIST

## 2024-04-30 RX ORDER — CLINDAMYCIN HYDROCHLORIDE 150 MG/1
150 CAPSULE ORAL 3 TIMES DAILY
Qty: 9 CAPSULE | Refills: 1 | OUTPATIENT
Start: 2024-04-30

## 2024-04-30 RX ORDER — IBUPROFEN 600 MG/1
600 TABLET ORAL EVERY 6 HOURS PRN
Qty: 10 TABLET | Refills: 1 | OUTPATIENT
Start: 2024-04-30

## 2024-05-30 DIAGNOSIS — E03.9 ACQUIRED HYPOTHYROIDISM: ICD-10-CM

## 2024-05-30 RX ORDER — LEVOTHYROXINE SODIUM 0.03 MG/1
25 TABLET ORAL
Qty: 100 TABLET | Refills: 3 | Status: SHIPPED | OUTPATIENT
Start: 2024-05-30

## 2024-06-03 ENCOUNTER — APPOINTMENT (OUTPATIENT)
Dept: MEDICAL GROUP | Facility: PHYSICIAN GROUP | Age: 84
End: 2024-06-03
Payer: MEDICARE

## 2024-06-03 ENCOUNTER — HOSPITAL ENCOUNTER (OUTPATIENT)
Dept: RADIOLOGY | Facility: MEDICAL CENTER | Age: 84
End: 2024-06-03
Attending: INTERNAL MEDICINE
Payer: MEDICARE

## 2024-06-03 ENCOUNTER — PHARMACY VISIT (OUTPATIENT)
Dept: PHARMACY | Facility: MEDICAL CENTER | Age: 84
End: 2024-06-03
Payer: COMMERCIAL

## 2024-06-03 VITALS
RESPIRATION RATE: 16 BRPM | WEIGHT: 202.7 LBS | SYSTOLIC BLOOD PRESSURE: 122 MMHG | HEART RATE: 61 BPM | BODY MASS INDEX: 30.02 KG/M2 | OXYGEN SATURATION: 94 % | TEMPERATURE: 97.1 F | DIASTOLIC BLOOD PRESSURE: 70 MMHG | HEIGHT: 69 IN

## 2024-06-03 DIAGNOSIS — J20.8 ACUTE BRONCHITIS DUE TO OTHER SPECIFIED ORGANISMS: ICD-10-CM

## 2024-06-03 DIAGNOSIS — N28.89 RIGHT RENAL MASS: ICD-10-CM

## 2024-06-03 PROCEDURE — 99213 OFFICE O/P EST LOW 20 MIN: CPT | Performed by: STUDENT IN AN ORGANIZED HEALTH CARE EDUCATION/TRAINING PROGRAM

## 2024-06-03 PROCEDURE — 3074F SYST BP LT 130 MM HG: CPT | Performed by: STUDENT IN AN ORGANIZED HEALTH CARE EDUCATION/TRAINING PROGRAM

## 2024-06-03 PROCEDURE — RXMED WILLOW AMBULATORY MEDICATION CHARGE: Performed by: STUDENT IN AN ORGANIZED HEALTH CARE EDUCATION/TRAINING PROGRAM

## 2024-06-03 PROCEDURE — 3078F DIAST BP <80 MM HG: CPT | Performed by: STUDENT IN AN ORGANIZED HEALTH CARE EDUCATION/TRAINING PROGRAM

## 2024-06-03 PROCEDURE — 76775 US EXAM ABDO BACK WALL LIM: CPT

## 2024-06-03 RX ORDER — AZITHROMYCIN 250 MG/1
500 TABLET, FILM COATED ORAL DAILY
Qty: 6 TABLET | Refills: 0 | Status: SHIPPED | OUTPATIENT
Start: 2024-06-03 | End: 2024-06-11

## 2024-06-03 RX ORDER — PREDNISONE 20 MG/1
40 TABLET ORAL DAILY
Qty: 10 TABLET | Refills: 0 | Status: SHIPPED | OUTPATIENT
Start: 2024-06-03 | End: 2024-06-11

## 2024-06-03 ASSESSMENT — FIBROSIS 4 INDEX: FIB4 SCORE: 4.45

## 2024-06-03 NOTE — PATIENT INSTRUCTIONS
-For now, use nebulizer 4 times a day, and if you still have wheezing or shortness of breath or cough during the day, use albuterol.  -take antibiotic(azithromycin) for 3 days and steroid (prednisone) for 5 days.

## 2024-06-03 NOTE — PROGRESS NOTES
"CC:  The encounter diagnosis was Acute bronchitis due to other specified organisms.    HISTORY OF THE PRESENT ILLNESS: Patient is a 83 y.o. female. This pleasant patient is here today for follow up visit. Patient is presenting again with 3 week history of upper respiratory symptoms.   No hemoptysis. Overall feels better. Has had runny nose and congestion. Nebulizer (duoneb) helps with cough. Allegra helps.  No fever, chills.      No problems updated.      ROS:   See HPI    /70 (BP Location: Right arm, Patient Position: Sitting, BP Cuff Size: Adult)   Pulse 61   Temp 36.2 °C (97.1 °F) (Temporal)   Resp 16   Ht 1.753 m (5' 9\")   Wt 91.9 kg (202 lb 11.2 oz)   SpO2 94%   BMI 29.93 kg/m² Body mass index is 29.93 kg/m².    Physical Exam  Vitals reviewed.   Constitutional:       General: She is not in acute distress.     Appearance: She is not ill-appearing or diaphoretic.   HENT:      Head: Normocephalic and atraumatic.      Right Ear: External ear normal.      Left Ear: External ear normal.      Nose: Congestion and rhinorrhea present.      Mouth/Throat:      Pharynx: No oropharyngeal exudate.   Eyes:      General: No scleral icterus.        Right eye: No discharge.         Left eye: No discharge.      Extraocular Movements: Extraocular movements intact.   Cardiovascular:      Rate and Rhythm: Normal rate and regular rhythm.      Heart sounds: Normal heart sounds. No murmur heard.  Pulmonary:      Effort: Pulmonary effort is normal. No respiratory distress.      Breath sounds: No stridor. Wheezing and rhonchi present. No rales.   Abdominal:      General: There is no distension.      Palpations: Abdomen is soft. There is no mass.      Tenderness: There is no abdominal tenderness. There is no guarding or rebound.   Musculoskeletal:      Cervical back: No rigidity or tenderness.      Right lower leg: No edema.      Left lower leg: No edema.   Lymphadenopathy:      Cervical: No cervical adenopathy.   Skin:     " Capillary Refill: Capillary refill takes less than 2 seconds.   Neurological:      General: No focal deficit present.      Mental Status: She is alert and oriented to person, place, and time. Mental status is at baseline.   Psychiatric:         Mood and Affect: Mood normal.         Behavior: Behavior normal.         Judgment: Judgment normal.         Note: I have reviewed all pertinent labs and diagnostic tests associated with this visit with specific comments listed under the assessment and plan below    Assessment and Plan  83 y.o. female with the following -    1. Acute bronchitis due to other specified organisms           Patient is presenting again with 3 week history of upper respiratory symptoms. Has been having runny nose, congestion.    No hemoptysis. Overall feels better. Nebulizer (duoneb) helps with cough. Allegra helps. No fever, chills.  I am treating with prednisone 40 mg daily for 5 days and azithromycin 500 mg daily for 3 days.  Encouraged using nebulizer 4 times a day and albuterol with acute exacerbation.    Orders Placed This Encounter    azithromycin (ZITHROMAX) 250 MG Tab    predniSONE (DELTASONE) 20 MG Tab       I spent a total of 29 minutes with record review, exam, communication with the patient, communication with other providers, and documentation of this encounter.    Return if symptoms worsen or fail to improve.  Follow up with PCP as scheduled in 1 week    Please note that this dictation was created using voice recognition software. I have made every reasonable attempt to correct obvious errors, but I expect that there are errors of grammar and possibly content that I did not discover before finalizing the note.

## 2024-06-11 ENCOUNTER — APPOINTMENT (OUTPATIENT)
Dept: MEDICAL GROUP | Facility: PHYSICIAN GROUP | Age: 84
End: 2024-06-11
Payer: MEDICARE

## 2024-06-11 VITALS
WEIGHT: 206.9 LBS | RESPIRATION RATE: 16 BRPM | DIASTOLIC BLOOD PRESSURE: 74 MMHG | SYSTOLIC BLOOD PRESSURE: 144 MMHG | HEIGHT: 69 IN | BODY MASS INDEX: 30.64 KG/M2 | OXYGEN SATURATION: 94 % | HEART RATE: 62 BPM | TEMPERATURE: 97.7 F

## 2024-06-11 DIAGNOSIS — R73.03 PREDIABETES: ICD-10-CM

## 2024-06-11 DIAGNOSIS — Z12.31 SCREENING MAMMOGRAM FOR BREAST CANCER: ICD-10-CM

## 2024-06-11 DIAGNOSIS — E03.9 ACQUIRED HYPOTHYROIDISM: ICD-10-CM

## 2024-06-11 DIAGNOSIS — K76.0 FATTY LIVER DISEASE, NONALCOHOLIC: ICD-10-CM

## 2024-06-11 DIAGNOSIS — I10 ESSENTIAL HYPERTENSION: ICD-10-CM

## 2024-06-11 DIAGNOSIS — N28.1 BENIGN CYST OF RIGHT KIDNEY: ICD-10-CM

## 2024-06-11 DIAGNOSIS — E78.5 DYSLIPIDEMIA: ICD-10-CM

## 2024-06-11 DIAGNOSIS — R60.0 EDEMA OF LEFT FOOT: ICD-10-CM

## 2024-06-11 PROBLEM — L03.032 CELLULITIS OF TOE OF LEFT FOOT: Status: RESOLVED | Noted: 2023-08-28 | Resolved: 2024-06-11

## 2024-06-11 PROBLEM — J20.8 ACUTE BRONCHITIS DUE TO OTHER SPECIFIED ORGANISMS: Status: RESOLVED | Noted: 2024-03-18 | Resolved: 2024-06-11

## 2024-06-11 PROCEDURE — 3078F DIAST BP <80 MM HG: CPT | Performed by: INTERNAL MEDICINE

## 2024-06-11 PROCEDURE — G2211 COMPLEX E/M VISIT ADD ON: HCPCS | Performed by: INTERNAL MEDICINE

## 2024-06-11 PROCEDURE — 3077F SYST BP >= 140 MM HG: CPT | Performed by: INTERNAL MEDICINE

## 2024-06-11 PROCEDURE — 99214 OFFICE O/P EST MOD 30 MIN: CPT | Performed by: INTERNAL MEDICINE

## 2024-06-11 PROCEDURE — RXMED WILLOW AMBULATORY MEDICATION CHARGE: Performed by: INTERNAL MEDICINE

## 2024-06-11 RX ORDER — TORSEMIDE 20 MG/1
20 TABLET ORAL DAILY
Qty: 100 TABLET | Refills: 3 | Status: SHIPPED | OUTPATIENT
Start: 2024-06-11

## 2024-06-11 RX ORDER — POTASSIUM CHLORIDE 750 MG/1
20 TABLET, FILM COATED, EXTENDED RELEASE ORAL DAILY
Qty: 200 TABLET | Refills: 3
Start: 2024-06-11

## 2024-06-11 RX ORDER — LEVOTHYROXINE SODIUM 0.03 MG/1
25 TABLET ORAL
Qty: 100 TABLET | Refills: 3 | Status: SHIPPED | OUTPATIENT
Start: 2024-06-11

## 2024-06-11 ASSESSMENT — FIBROSIS 4 INDEX: FIB4 SCORE: 4.45

## 2024-06-12 ENCOUNTER — PHARMACY VISIT (OUTPATIENT)
Dept: PHARMACY | Facility: MEDICAL CENTER | Age: 84
End: 2024-06-12
Payer: COMMERCIAL

## 2024-06-12 NOTE — PROGRESS NOTES
Subjective:   Chief Complaint/History of Present Illness:  Reina Munoz is a 83 y.o. female established patient who presents today to discuss medical problems as listed below. Reina is unaccompanied for today's visit.    Problem   Benign Cyst of Right Kidney    RUQ US (10/2023):  Small complex cyst at the upper pole RIGHT kidney measuring 9 mm.  Mass is not excluded.     Follow-up renal ultrasound from June 2024 shows 10 mm simple cyst upper pole of right kidney with benign features.     Edema of Left Foot    He has edema from the left > right foot up to the mid calf.  Likely secondary to warmer weather, more time on her feet, missing a few water pills due to medical appointments for her  as well as feeling like the furosemide is not as effective as previous. Slight dyspnea related to deconditioning, no hypoxia or wet cough. Last echocardiogram in 2021 shows mild elev in RVSP and mild MR. No recent BNP level.     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      Prediabetes     Latest Reference Range & Units 02/19/24 09:00   Glycohemoglobin 4.0 - 5.6 % 5.8 (H)   Estim. Avg Glu mg/dL 120     She has prediabetes for the past several years, no known history of type 2 diabetes.  This is treated with diet, no  Indication for pharmacotherapy at this time.     Essential Hypertension    She has mild hypertension which is well controlled on low-dose medical therapy.  No signs or symptoms orthostasis.  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  Saw Dr. Chung in the past at [place] [facility].  She notes cough/throat clearing and would like to try switching from ACEI to ARB.    Blood pressure in clinic 122-128/70-72    Current regimen: Losartan 12.5 mg daily  Previous regimen: Lisinopril 2.5 mg twice daily     Dyslipidemia     Latest Reference Range & Units 02/19/24  09:00   Cholesterol,Tot 100 - 199 mg/dL 159   Triglycerides 0 - 149 mg/dL 130   HDL >=40 mg/dL 57   LDL <100 mg/dL 76     She reports longstanding history of elevated cholesterol previously on simvastatin on rosuvastatin. History of elevated liver enzymes with ultrasound imaging in the past showing fatty liver disease versus cirrhosis.  She has aortic atherosclerosis incidentally on prior imaging but no history of blockages personally she was aware of.  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  Previously saw Dr. Chung, not currently with a cardiologist.     Acquired Hypothyroidism     Latest Reference Range & Units 02/19/24 09:00   TSH 0.380 - 5.330 uIU/mL 5.000   Free T-4 0.93 - 1.70 ng/dL 1.10     She reports multi decade history of low functioning thyroid on low-dose levothyroxine.  Tried a slightly increased dose in the past but it led to anxiousness.  Currently no signs or symptoms of overtreatment; worsening of edema and energy levels in June 2024 so will plan to recheck to ensure stability.    Current regimen: Levothyroxine 25 mcg daily     Acute Bronchitis Due to Other Specified Organisms (Resolved)    She developed cough and bronchospasm last week, she was assessed in clinic on March 14 and had wheezing however this was for an assessment/preventative exam so no treatment was rendered.  She continues to feel short of breath and is out of nebulizers at home.  She is not hypoxic in clinic and her vitals are stable however she is coarse on exam.  She has history of bronchitis and wheezing intermittently over the years.  Last exacerbation was 1 to 2 years ago.  Reassuring pulmonary function testing in May 2022.  No fevers or chills, no ear pain, no sinus pain, sometimes has nasal drainage related to allergies but none currently.  No GI symptoms.  No rashes.     Cellulitis of Toe of Left Foot (Resolved)    She has  swelling, redness, pain, and erythema of the left foot first MTP. She has dropped a bowl on the toe and there was bruising but her ROM was intact so fracture seemed less likely. Over the weekend the symptoms rapidly progressed and she made an appointment for evaluation. Denies history of gout. There was slight sloughing of skin just lateral to the area of swelling. No clear entry point for infection. Is taking tylenol and ibuprofen for pain.    She followed up in clinic 3 days later.  X-ray completed day of her last visit showed soft tissue swelling but no evidence of fracture or crystallization in the MTP.  She has had significant improvement since starting on antibiotics and steroids, there is still some swelling and redness but she is on the right track.  Water pill is made her pee more and seems to be helping mobilize the fluid but she is still edematous.          Current Medications:  Current Outpatient Medications Ordered in Epic   Medication Sig Dispense Refill    torsemide (DEMADEX) 20 MG Tab Take 1 Tablet by mouth every day. 100 Tablet 3    levothyroxine (SYNTHROID) 25 MCG Tab Take 1 Tablet by mouth every morning on an empty stomach. 100 Tablet 3    potassium chloride ER (KLOR-CON) 10 MEQ tablet Take 2 Tablets by mouth every day. 200 Tablet 3    ibuprofen (MOTRIN) 600 MG Tab Take 1 Tablet by mouth every 6 hours as needed for pain 10 Tablet 1    losartan (COZAAR) 25 MG Tab Take 1 Tablet by mouth every day. 100 Tablet 3    ipratropium-albuterol (DUONEB) 0.5-2.5 (3) MG/3ML nebulizer solution Take 3 mL by nebulization 4 times a day. 90 mL 11    albuterol 108 (90 Base) MCG/ACT Aero Soln inhalation aerosol Inhale 2 Puffs every 6 hours as needed for Shortness of Breath. 54 g 3    Multiple Vitamins-Minerals (IMMUNE ESSENTIALS DAILY) Cap Take  by mouth.      esomeprazole (NEXIUM) 40 MG delayed-release capsule Take 1 Capsule by mouth every morning before breakfast. 100 Capsule 3    rosuvastatin (CRESTOR) 5 MG Tab  "Take 1 Tablet by mouth every evening. 100 Tablet 3    Fexofenadine HCl (ALLEGRA ALLERGY PO) Take 1 Tablet by mouth every day.      fluticasone (FLONASE) 50 MCG/ACT nasal spray Administer 2 Sprays into affected nostril(S) every day.      ibuprofen (MOTRIN) 200 MG Tab Take 200 mg by mouth every 6 hours as needed.      Cholecalciferol (VITAMIN D) 125 MCG (5000 UT) Cap Take  by mouth.      Zinc 30 MG Cap Take  by mouth.      Omega-3 Fatty Acids (FISH OIL) 1000 MG Cap capsule Take 1,000 mg by mouth 3 times a day, with meals.       No current Epic-ordered facility-administered medications on file.          Objective:   Physical Exam:    Vitals: BP (!) 144/74 (BP Location: Left arm, Patient Position: Sitting, BP Cuff Size: Adult)   Pulse 62   Temp 36.5 °C (97.7 °F) (Temporal)   Resp 16   Ht 1.753 m (5' 9\")   Wt 93.8 kg (206 lb 14.4 oz)   SpO2 94%    BMI: Body mass index is 30.55 kg/m².  Physical Exam  Constitutional:       General: She is not in acute distress.     Appearance: Normal appearance. She is not ill-appearing.   Eyes:      General: No scleral icterus.     Conjunctiva/sclera: Conjunctivae normal.   Cardiovascular:      Rate and Rhythm: Normal rate and regular rhythm.   Pulmonary:      Effort: Pulmonary effort is normal. No respiratory distress.      Breath sounds: No wheezing or rhonchi.   Musculoskeletal:      Right lower leg: Edema present.      Left lower leg: Edema present.      Comments: Left > right LE edema, 2+ pretibial   Skin:     General: Skin is warm and dry.      Findings: No rash.   Neurological:      Gait: Gait normal.   Psychiatric:         Mood and Affect: Mood normal.         Behavior: Behavior normal.         Thought Content: Thought content normal.         Judgment: Judgment normal.          Assessment and Plan:   Reina is a 83 y.o. female with the following:  Problem List Items Addressed This Visit       Acquired hypothyroidism     Chronic ongoing problem, some worsening of tiredness " however she relates that to being a caregiver for her  and having a lot going on right now.  Continue current medical therapy with levothyroxine 25 mcg daily and update thyroid panel before follow-up in 2 months.         Relevant Medications    levothyroxine (SYNTHROID) 25 MCG Tab    Benign cyst of right kidney     Incidental finding this fall, follow-up testing reassuring, no further workup needed at this time.         Dyslipidemia     Chronic and stable problem, continue medical therapy with rosuvastatin 5 mg daily continue following liver enzymes due to history of fatty liver disease and likely cirrhosis contributing to transaminitis.         Edema of left foot     Chronic and decompensated problem.  Requires medication adjustment.  Stop furosemide and replace with torsemide 20 mg daily and increase potassium to 20 mEq daily.  Recheck labs in 6 to 8 weeks.  If edema not improving or shortness of breath would worsen notify me right away lab work done.  No other systemic signs of congestive heart failure on exam today.  Has transaminitis but likely WALLIS fibrosis which could also be contributing as well as other recent stressors as noted above in HPI.  She will keep me updated.         Relevant Medications    torsemide (DEMADEX) 20 MG Tab    potassium chloride ER (KLOR-CON) 10 MEQ tablet    Other Relevant Orders    CBC WITH DIFFERENTIAL    Comp Metabolic Panel    proBrain Natriuretic Peptide, NT    TSH    FREE THYROXINE    Essential hypertension     Chronic ongoing problem, did not take her blood pressure medicine before our appointment today which led to the mild elevation, continue losartan 12.5 mg daily.         Relevant Medications    torsemide (DEMADEX) 20 MG Tab    Fatty liver disease, nonalcoholic     Chronic ongoing issue, follow-up with GI as recommended, she is going to regular follow-up months of these new ongoing health concerns with her .         Prediabetes     Previous problem, stable,  recheck with blood work before follow-up in 2 months.         Relevant Orders    HEMOGLOBIN A1C     Other Visit Diagnoses       Screening mammogram for breast cancer        Relevant Orders    MA-SCREENING MAMMO BILAT W/TOMOSYNTHESIS W/CAD           Billing : secondary to the complexity of this patient's illnesses and their interactions.  All problems listed were discussed during the office visit, medications were evaluated and complexities were discussed as well as plan for the future.     RTC: Return in about 2 months (around 8/11/2024).    I spent a total of 32 minutes with record review, exam, communication with the patient, communication with other providers, and documentation of this encounter.    PLEASE NOTE: This dictation was created using voice recognition software. I have made every reasonable attempt to correct obvious errors, but I expect that there are errors of grammar and possibly content that I did not discover before finalizing the note.      Nayeli Finley, DO  Geriatric and Internal Medicine  [facility]

## 2024-06-12 NOTE — ASSESSMENT & PLAN NOTE
Chronic and decompensated problem.  Requires medication adjustment.  Stop furosemide and replace with torsemide 20 mg daily and increase potassium to 20 mEq daily.  Recheck labs in 6 to 8 weeks.  If edema not improving or shortness of breath would worsen notify me right away lab work done.  No other systemic signs of congestive heart failure on exam today.  Has transaminitis but likely WALLIS fibrosis which could also be contributing as well as other recent stressors as noted above in HPI.  She will keep me updated.

## 2024-06-12 NOTE — ASSESSMENT & PLAN NOTE
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  Continue current medical therapy with levothyroxine 25 mcg daily and update thyroid panel before follow-up in 2 months.

## 2024-06-12 NOTE — ASSESSMENT & PLAN NOTE
Chronic and stable problem, continue medical therapy with rosuvastatin 5 mg daily continue following liver enzymes due to history of fatty liver disease and likely cirrhosis contributing to transaminitis.

## 2024-06-12 NOTE — ASSESSMENT & PLAN NOTE
Incidental finding this fall, follow-up testing reassuring, no further workup needed at this time.

## 2024-06-12 NOTE — ASSESSMENT & PLAN NOTE
Chronic ongoing problem, did not take her blood pressure medicine before our appointment today which led to the mild elevation, continue losartan 12.5 mg daily.

## 2024-06-12 NOTE — ASSESSMENT & PLAN NOTE
Chronic ongoing issue, follow-up with GI as recommended, she is going to regular follow-up months of these new ongoing health concerns with her .

## 2024-07-01 PROCEDURE — RXMED WILLOW AMBULATORY MEDICATION CHARGE: Performed by: INTERNAL MEDICINE

## 2024-07-02 ENCOUNTER — PHARMACY VISIT (OUTPATIENT)
Dept: PHARMACY | Facility: MEDICAL CENTER | Age: 84
End: 2024-07-02
Payer: COMMERCIAL

## 2024-07-14 ENCOUNTER — APPOINTMENT (OUTPATIENT)
Dept: RADIOLOGY | Facility: MEDICAL CENTER | Age: 84
End: 2024-07-14
Attending: STUDENT IN AN ORGANIZED HEALTH CARE EDUCATION/TRAINING PROGRAM
Payer: MEDICARE

## 2024-07-14 ENCOUNTER — HOSPITAL ENCOUNTER (OUTPATIENT)
Facility: MEDICAL CENTER | Age: 84
End: 2024-07-15
Attending: STUDENT IN AN ORGANIZED HEALTH CARE EDUCATION/TRAINING PROGRAM | Admitting: INTERNAL MEDICINE
Payer: MEDICARE

## 2024-07-14 DIAGNOSIS — R06.02 SHORTNESS OF BREATH: ICD-10-CM

## 2024-07-14 DIAGNOSIS — R09.02 HYPOXIA: ICD-10-CM

## 2024-07-14 DIAGNOSIS — J96.01 ACUTE RESPIRATORY FAILURE WITH HYPOXIA (HCC): ICD-10-CM

## 2024-07-14 PROBLEM — J96.90 RESPIRATORY FAILURE (HCC): Status: ACTIVE | Noted: 2024-07-14

## 2024-07-14 LAB
ALBUMIN SERPL BCP-MCNC: 4.4 G/DL (ref 3.2–4.9)
ALBUMIN/GLOB SERPL: 1.6 G/DL
ALP SERPL-CCNC: 104 U/L (ref 30–99)
ALT SERPL-CCNC: 64 U/L (ref 2–50)
ANION GAP SERPL CALC-SCNC: 17 MMOL/L (ref 7–16)
AST SERPL-CCNC: 81 U/L (ref 12–45)
BASOPHILS # BLD AUTO: 0.9 % (ref 0–1.8)
BASOPHILS # BLD: 0.06 K/UL (ref 0–0.12)
BILIRUB SERPL-MCNC: 0.6 MG/DL (ref 0.1–1.5)
BUN SERPL-MCNC: 13 MG/DL (ref 8–22)
CALCIUM ALBUM COR SERPL-MCNC: 9.3 MG/DL (ref 8.5–10.5)
CALCIUM SERPL-MCNC: 9.6 MG/DL (ref 8.5–10.5)
CHLORIDE SERPL-SCNC: 102 MMOL/L (ref 96–112)
CO2 SERPL-SCNC: 22 MMOL/L (ref 20–33)
CREAT SERPL-MCNC: 1.01 MG/DL (ref 0.5–1.4)
D DIMER PPP IA.FEU-MCNC: 0.54 UG/ML (FEU) (ref 0–0.5)
EKG IMPRESSION: NORMAL
EKG IMPRESSION: NORMAL
EOSINOPHIL # BLD AUTO: 0.26 K/UL (ref 0–0.51)
EOSINOPHIL NFR BLD: 4 % (ref 0–6.9)
ERYTHROCYTE [DISTWIDTH] IN BLOOD BY AUTOMATED COUNT: 44.8 FL (ref 35.9–50)
ERYTHROCYTE [SEDIMENTATION RATE] IN BLOOD BY WESTERGREN METHOD: 15 MM/HOUR (ref 0–25)
FLUAV RNA SPEC QL NAA+PROBE: NEGATIVE
FLUBV RNA SPEC QL NAA+PROBE: NEGATIVE
GFR SERPLBLD CREATININE-BSD FMLA CKD-EPI: 56 ML/MIN/1.73 M 2
GLOBULIN SER CALC-MCNC: 2.7 G/DL (ref 1.9–3.5)
GLUCOSE SERPL-MCNC: 127 MG/DL (ref 65–99)
HCT VFR BLD AUTO: 42.9 % (ref 37–47)
HGB BLD-MCNC: 14.8 G/DL (ref 12–16)
IMM GRANULOCYTES # BLD AUTO: 0.03 K/UL (ref 0–0.11)
IMM GRANULOCYTES NFR BLD AUTO: 0.5 % (ref 0–0.9)
LYMPHOCYTES # BLD AUTO: 1.47 K/UL (ref 1–4.8)
LYMPHOCYTES NFR BLD: 22.9 % (ref 22–41)
MCH RBC QN AUTO: 31.8 PG (ref 27–33)
MCHC RBC AUTO-ENTMCNC: 34.5 G/DL (ref 32.2–35.5)
MCV RBC AUTO: 92.1 FL (ref 81.4–97.8)
MONOCYTES # BLD AUTO: 0.38 K/UL (ref 0–0.85)
MONOCYTES NFR BLD AUTO: 5.9 % (ref 0–13.4)
NEUTROPHILS # BLD AUTO: 4.22 K/UL (ref 1.82–7.42)
NEUTROPHILS NFR BLD: 65.8 % (ref 44–72)
NRBC # BLD AUTO: 0 K/UL
NRBC BLD-RTO: 0 /100 WBC (ref 0–0.2)
NT-PROBNP SERPL IA-MCNC: 66 PG/ML (ref 0–125)
PLATELET # BLD AUTO: 152 K/UL (ref 164–446)
PMV BLD AUTO: 11.7 FL (ref 9–12.9)
POTASSIUM SERPL-SCNC: 3.9 MMOL/L (ref 3.6–5.5)
PROCALCITONIN SERPL-MCNC: 0.16 NG/ML
PROT SERPL-MCNC: 7.1 G/DL (ref 6–8.2)
RBC # BLD AUTO: 4.66 M/UL (ref 4.2–5.4)
RSV RNA SPEC QL NAA+PROBE: NEGATIVE
SARS-COV-2 RNA RESP QL NAA+PROBE: NOTDETECTED
SODIUM SERPL-SCNC: 141 MMOL/L (ref 135–145)
TROPONIN T SERPL-MCNC: 16 NG/L (ref 6–19)
TSH SERPL DL<=0.005 MIU/L-ACNC: 4.52 UIU/ML (ref 0.38–5.33)
WBC # BLD AUTO: 6.4 K/UL (ref 4.8–10.8)

## 2024-07-14 PROCEDURE — 85025 COMPLETE CBC W/AUTO DIFF WBC: CPT

## 2024-07-14 PROCEDURE — 700102 HCHG RX REV CODE 250 W/ 637 OVERRIDE(OP): Performed by: INTERNAL MEDICINE

## 2024-07-14 PROCEDURE — 36415 COLL VENOUS BLD VENIPUNCTURE: CPT

## 2024-07-14 PROCEDURE — 700117 HCHG RX CONTRAST REV CODE 255: Performed by: STUDENT IN AN ORGANIZED HEALTH CARE EDUCATION/TRAINING PROGRAM

## 2024-07-14 PROCEDURE — 71045 X-RAY EXAM CHEST 1 VIEW: CPT

## 2024-07-14 PROCEDURE — 96374 THER/PROPH/DIAG INJ IV PUSH: CPT

## 2024-07-14 PROCEDURE — 0241U HCHG SARS-COV-2 COVID-19 NFCT DS RESP RNA 4 TRGT ED POC: CPT

## 2024-07-14 PROCEDURE — 85379 FIBRIN DEGRADATION QUANT: CPT

## 2024-07-14 PROCEDURE — 84484 ASSAY OF TROPONIN QUANT: CPT

## 2024-07-14 PROCEDURE — A9270 NON-COVERED ITEM OR SERVICE: HCPCS | Performed by: INTERNAL MEDICINE

## 2024-07-14 PROCEDURE — 96375 TX/PRO/DX INJ NEW DRUG ADDON: CPT

## 2024-07-14 PROCEDURE — 94640 AIRWAY INHALATION TREATMENT: CPT

## 2024-07-14 PROCEDURE — 700111 HCHG RX REV CODE 636 W/ 250 OVERRIDE (IP): Mod: JG | Performed by: INTERNAL MEDICINE

## 2024-07-14 PROCEDURE — G0378 HOSPITAL OBSERVATION PER HR: HCPCS

## 2024-07-14 PROCEDURE — 700101 HCHG RX REV CODE 250: Performed by: INTERNAL MEDICINE

## 2024-07-14 PROCEDURE — 85652 RBC SED RATE AUTOMATED: CPT

## 2024-07-14 PROCEDURE — 84145 PROCALCITONIN (PCT): CPT

## 2024-07-14 PROCEDURE — 83880 ASSAY OF NATRIURETIC PEPTIDE: CPT

## 2024-07-14 PROCEDURE — 84443 ASSAY THYROID STIM HORMONE: CPT

## 2024-07-14 PROCEDURE — 93005 ELECTROCARDIOGRAM TRACING: CPT | Performed by: STUDENT IN AN ORGANIZED HEALTH CARE EDUCATION/TRAINING PROGRAM

## 2024-07-14 PROCEDURE — 99223 1ST HOSP IP/OBS HIGH 75: CPT | Performed by: INTERNAL MEDICINE

## 2024-07-14 PROCEDURE — 99285 EMERGENCY DEPT VISIT HI MDM: CPT

## 2024-07-14 PROCEDURE — 93005 ELECTROCARDIOGRAM TRACING: CPT

## 2024-07-14 PROCEDURE — 80053 COMPREHEN METABOLIC PANEL: CPT

## 2024-07-14 PROCEDURE — 71275 CT ANGIOGRAPHY CHEST: CPT

## 2024-07-14 RX ORDER — POLYETHYLENE GLYCOL 3350 17 G/17G
1 POWDER, FOR SOLUTION ORAL
Status: DISCONTINUED | OUTPATIENT
Start: 2024-07-14 | End: 2024-07-15 | Stop reason: HOSPADM

## 2024-07-14 RX ORDER — LABETALOL HYDROCHLORIDE 5 MG/ML
10 INJECTION, SOLUTION INTRAVENOUS EVERY 4 HOURS PRN
Status: DISCONTINUED | OUTPATIENT
Start: 2024-07-14 | End: 2024-07-15 | Stop reason: HOSPADM

## 2024-07-14 RX ORDER — LEVOTHYROXINE SODIUM 0.03 MG/1
25 TABLET ORAL
Status: DISCONTINUED | OUTPATIENT
Start: 2024-07-15 | End: 2024-07-15 | Stop reason: HOSPADM

## 2024-07-14 RX ORDER — ONDANSETRON 4 MG/1
4 TABLET, ORALLY DISINTEGRATING ORAL EVERY 4 HOURS PRN
Status: DISCONTINUED | OUTPATIENT
Start: 2024-07-14 | End: 2024-07-15 | Stop reason: HOSPADM

## 2024-07-14 RX ORDER — HYDRALAZINE HYDROCHLORIDE 20 MG/ML
20 INJECTION INTRAMUSCULAR; INTRAVENOUS EVERY 6 HOURS PRN
Status: DISCONTINUED | OUTPATIENT
Start: 2024-07-14 | End: 2024-07-15 | Stop reason: HOSPADM

## 2024-07-14 RX ORDER — IPRATROPIUM BROMIDE AND ALBUTEROL SULFATE 2.5; .5 MG/3ML; MG/3ML
3 SOLUTION RESPIRATORY (INHALATION) 4 TIMES DAILY
Status: DISCONTINUED | OUTPATIENT
Start: 2024-07-14 | End: 2024-07-15 | Stop reason: HOSPADM

## 2024-07-14 RX ORDER — M-VIT,TX,IRON,MINS/CALC/FOLIC 27MG-0.4MG
1 TABLET ORAL DAILY
COMMUNITY

## 2024-07-14 RX ORDER — AMOXICILLIN 250 MG
2 CAPSULE ORAL EVERY EVENING
Status: DISCONTINUED | OUTPATIENT
Start: 2024-07-14 | End: 2024-07-15 | Stop reason: HOSPADM

## 2024-07-14 RX ORDER — OMEPRAZOLE 20 MG/1
20 CAPSULE, DELAYED RELEASE ORAL DAILY
Status: DISCONTINUED | OUTPATIENT
Start: 2024-07-15 | End: 2024-07-15 | Stop reason: HOSPADM

## 2024-07-14 RX ORDER — ROSUVASTATIN CALCIUM 10 MG/1
5 TABLET, COATED ORAL EVERY EVENING
Status: DISCONTINUED | OUTPATIENT
Start: 2024-07-14 | End: 2024-07-15 | Stop reason: HOSPADM

## 2024-07-14 RX ORDER — IBUPROFEN, ACETAMINOPHEN 125; 250 MG/1; MG/1
2 TABLET, FILM COATED ORAL EVERY 6 HOURS PRN
COMMUNITY

## 2024-07-14 RX ORDER — LOSARTAN POTASSIUM 50 MG/1
25 TABLET ORAL DAILY
Status: DISCONTINUED | OUTPATIENT
Start: 2024-07-15 | End: 2024-07-15 | Stop reason: HOSPADM

## 2024-07-14 RX ORDER — FUROSEMIDE 10 MG/ML
40 INJECTION INTRAMUSCULAR; INTRAVENOUS
Status: DISCONTINUED | OUTPATIENT
Start: 2024-07-14 | End: 2024-07-14

## 2024-07-14 RX ORDER — ENOXAPARIN SODIUM 100 MG/ML
40 INJECTION SUBCUTANEOUS DAILY
Status: DISCONTINUED | OUTPATIENT
Start: 2024-07-14 | End: 2024-07-15 | Stop reason: HOSPADM

## 2024-07-14 RX ORDER — ACETAMINOPHEN 325 MG/1
650 TABLET ORAL EVERY 6 HOURS PRN
Status: DISCONTINUED | OUTPATIENT
Start: 2024-07-14 | End: 2024-07-15 | Stop reason: HOSPADM

## 2024-07-14 RX ORDER — ONDANSETRON 2 MG/ML
4 INJECTION INTRAMUSCULAR; INTRAVENOUS EVERY 4 HOURS PRN
Status: DISCONTINUED | OUTPATIENT
Start: 2024-07-14 | End: 2024-07-15 | Stop reason: HOSPADM

## 2024-07-14 RX ORDER — ESOMEPRAZOLE MAGNESIUM 40 MG/1
40 CAPSULE, DELAYED RELEASE ORAL
Status: DISCONTINUED | OUTPATIENT
Start: 2024-07-15 | End: 2024-07-14

## 2024-07-14 RX ORDER — ALBUTEROL SULFATE 90 UG/1
2 AEROSOL, METERED RESPIRATORY (INHALATION) EVERY 6 HOURS PRN
Status: DISCONTINUED | OUTPATIENT
Start: 2024-07-14 | End: 2024-07-15 | Stop reason: HOSPADM

## 2024-07-14 RX ADMIN — LABETALOL HYDROCHLORIDE 10 MG: 5 INJECTION, SOLUTION INTRAVENOUS at 22:39

## 2024-07-14 RX ADMIN — IPRATROPIUM BROMIDE AND ALBUTEROL SULFATE 3 ML: 2.5; .5 SOLUTION RESPIRATORY (INHALATION) at 22:11

## 2024-07-14 RX ADMIN — FUROSEMIDE 40 MG: 10 INJECTION INTRAMUSCULAR; INTRAVENOUS at 22:40

## 2024-07-14 RX ADMIN — IOHEXOL 80 ML: 350 INJECTION, SOLUTION INTRAVENOUS at 19:41

## 2024-07-14 RX ADMIN — ROSUVASTATIN CALCIUM 5 MG: 10 TABLET, FILM COATED ORAL at 22:40

## 2024-07-14 SDOH — ECONOMIC STABILITY: TRANSPORTATION INSECURITY
IN THE PAST 12 MONTHS, HAS LACK OF RELIABLE TRANSPORTATION KEPT YOU FROM MEDICAL APPOINTMENTS, MEETINGS, WORK OR FROM GETTING THINGS NEEDED FOR DAILY LIVING?: NO

## 2024-07-14 ASSESSMENT — ENCOUNTER SYMPTOMS
SHORTNESS OF BREATH: 1
NAUSEA: 0
FEVER: 0
POLYDIPSIA: 0
HEADACHES: 0
NERVOUS/ANXIOUS: 0
COUGH: 1
CHILLS: 0
NECK PAIN: 0
WEIGHT LOSS: 0
DOUBLE VISION: 0
BLURRED VISION: 0
FLANK PAIN: 0
BRUISES/BLEEDS EASILY: 0
SPUTUM PRODUCTION: 0
HALLUCINATIONS: 0
PALPITATIONS: 0
PHOTOPHOBIA: 0
HEMOPTYSIS: 0
TREMORS: 0
FOCAL WEAKNESS: 0
SPEECH CHANGE: 0
VOMITING: 0
BACK PAIN: 0
HEARTBURN: 0
ORTHOPNEA: 0

## 2024-07-14 ASSESSMENT — SOCIAL DETERMINANTS OF HEALTH (SDOH)
WITHIN THE LAST YEAR, HAVE YOU BEEN AFRAID OF YOUR PARTNER OR EX-PARTNER?: NO
WITHIN THE PAST 12 MONTHS, YOU WORRIED THAT YOUR FOOD WOULD RUN OUT BEFORE YOU GOT THE MONEY TO BUY MORE: NEVER TRUE
WITHIN THE LAST YEAR, HAVE YOU BEEN KICKED, HIT, SLAPPED, OR OTHERWISE PHYSICALLY HURT BY YOUR PARTNER OR EX-PARTNER?: NO
WITHIN THE LAST YEAR, HAVE YOU BEEN HUMILIATED OR EMOTIONALLY ABUSED IN OTHER WAYS BY YOUR PARTNER OR EX-PARTNER?: NO
WITHIN THE LAST YEAR, HAVE YOU BEEN KICKED, HIT, SLAPPED, OR OTHERWISE PHYSICALLY HURT BY YOUR PARTNER OR EX-PARTNER?: NO
WITHIN THE LAST YEAR, HAVE YOU BEEN AFRAID OF YOUR PARTNER OR EX-PARTNER?: NO
WITHIN THE PAST 12 MONTHS, THE FOOD YOU BOUGHT JUST DIDN'T LAST AND YOU DIDN'T HAVE MONEY TO GET MORE: NEVER TRUE
WITHIN THE LAST YEAR, HAVE YOU BEEN HUMILIATED OR EMOTIONALLY ABUSED IN OTHER WAYS BY YOUR PARTNER OR EX-PARTNER?: NO
WITHIN THE LAST YEAR, HAVE TO BEEN RAPED OR FORCED TO HAVE ANY KIND OF SEXUAL ACTIVITY BY YOUR PARTNER OR EX-PARTNER?: NO
WITHIN THE LAST YEAR, HAVE TO BEEN RAPED OR FORCED TO HAVE ANY KIND OF SEXUAL ACTIVITY BY YOUR PARTNER OR EX-PARTNER?: NO
IN THE PAST 12 MONTHS, HAS THE ELECTRIC, GAS, OIL, OR WATER COMPANY THREATENED TO SHUT OFF SERVICE IN YOUR HOME?: NO

## 2024-07-14 ASSESSMENT — LIFESTYLE VARIABLES
AVERAGE NUMBER OF DAYS PER WEEK YOU HAVE A DRINK CONTAINING ALCOHOL: 0
HAVE YOU EVER FELT YOU SHOULD CUT DOWN ON YOUR DRINKING: NO
EVER FELT BAD OR GUILTY ABOUT YOUR DRINKING: NO
HOW MANY TIMES IN THE PAST YEAR HAVE YOU HAD 5 OR MORE DRINKS IN A DAY: 0
ON A TYPICAL DAY WHEN YOU DRINK ALCOHOL HOW MANY DRINKS DO YOU HAVE: 0
DOES PATIENT WANT TO STOP DRINKING: NO
HAVE PEOPLE ANNOYED YOU BY CRITICIZING YOUR DRINKING: NO
TOTAL SCORE: 0
EVER HAD A DRINK FIRST THING IN THE MORNING TO STEADY YOUR NERVES TO GET RID OF A HANGOVER: NO
ALCOHOL_USE: NO
TOTAL SCORE: 0
SUBSTANCE_ABUSE: 0
CONSUMPTION TOTAL: NEGATIVE
TOTAL SCORE: 0

## 2024-07-14 ASSESSMENT — FIBROSIS 4 INDEX
FIB4 SCORE: 5.4
FIB4 SCORE: 4.45

## 2024-07-14 ASSESSMENT — PAIN DESCRIPTION - PAIN TYPE: TYPE: ACUTE PAIN

## 2024-07-15 ENCOUNTER — PHARMACY VISIT (OUTPATIENT)
Dept: PHARMACY | Facility: MEDICAL CENTER | Age: 84
End: 2024-07-15
Payer: COMMERCIAL

## 2024-07-15 ENCOUNTER — APPOINTMENT (OUTPATIENT)
Dept: CARDIOLOGY | Facility: MEDICAL CENTER | Age: 84
End: 2024-07-15
Attending: INTERNAL MEDICINE
Payer: MEDICARE

## 2024-07-15 VITALS
DIASTOLIC BLOOD PRESSURE: 86 MMHG | BODY MASS INDEX: 30.54 KG/M2 | TEMPERATURE: 97.6 F | RESPIRATION RATE: 17 BRPM | HEART RATE: 52 BPM | OXYGEN SATURATION: 96 % | SYSTOLIC BLOOD PRESSURE: 159 MMHG | HEIGHT: 68 IN | WEIGHT: 201.5 LBS

## 2024-07-15 PROBLEM — J96.90 RESPIRATORY FAILURE (HCC): Status: RESOLVED | Noted: 2024-07-14 | Resolved: 2024-07-15

## 2024-07-15 PROBLEM — I10 ESSENTIAL HYPERTENSION: Status: RESOLVED | Noted: 2017-02-03 | Resolved: 2024-07-15

## 2024-07-15 LAB
ALBUMIN SERPL BCP-MCNC: 4.4 G/DL (ref 3.2–4.9)
ALBUMIN/GLOB SERPL: 1.7 G/DL
ALP SERPL-CCNC: 91 U/L (ref 30–99)
ALT SERPL-CCNC: 50 U/L (ref 2–50)
ANION GAP SERPL CALC-SCNC: 14 MMOL/L (ref 7–16)
AST SERPL-CCNC: 53 U/L (ref 12–45)
BASOPHILS # BLD AUTO: 0.6 % (ref 0–1.8)
BASOPHILS # BLD: 0.04 K/UL (ref 0–0.12)
BILIRUB SERPL-MCNC: 0.7 MG/DL (ref 0.1–1.5)
BUN SERPL-MCNC: 13 MG/DL (ref 8–22)
CALCIUM ALBUM COR SERPL-MCNC: 9.4 MG/DL (ref 8.5–10.5)
CALCIUM SERPL-MCNC: 9.7 MG/DL (ref 8.5–10.5)
CHLORIDE SERPL-SCNC: 101 MMOL/L (ref 96–112)
CO2 SERPL-SCNC: 24 MMOL/L (ref 20–33)
CREAT SERPL-MCNC: 0.88 MG/DL (ref 0.5–1.4)
EOSINOPHIL # BLD AUTO: 0.12 K/UL (ref 0–0.51)
EOSINOPHIL NFR BLD: 1.9 % (ref 0–6.9)
ERYTHROCYTE [DISTWIDTH] IN BLOOD BY AUTOMATED COUNT: 45.7 FL (ref 35.9–50)
GFR SERPLBLD CREATININE-BSD FMLA CKD-EPI: 66 ML/MIN/1.73 M 2
GLOBULIN SER CALC-MCNC: 2.6 G/DL (ref 1.9–3.5)
GLUCOSE SERPL-MCNC: 154 MG/DL (ref 65–99)
HCT VFR BLD AUTO: 42.6 % (ref 37–47)
HGB BLD-MCNC: 14.5 G/DL (ref 12–16)
IMM GRANULOCYTES # BLD AUTO: 0.03 K/UL (ref 0–0.11)
IMM GRANULOCYTES NFR BLD AUTO: 0.5 % (ref 0–0.9)
LV EJECT FRACT  99904: 65
LV EJECT FRACT MOD 2C 99903: 63.47
LV EJECT FRACT MOD 4C 99902: 55.29
LV EJECT FRACT MOD BP 99901: 60.67
LYMPHOCYTES # BLD AUTO: 1.2 K/UL (ref 1–4.8)
LYMPHOCYTES NFR BLD: 18.8 % (ref 22–41)
MCH RBC QN AUTO: 31.7 PG (ref 27–33)
MCHC RBC AUTO-ENTMCNC: 34 G/DL (ref 32.2–35.5)
MCV RBC AUTO: 93 FL (ref 81.4–97.8)
MONOCYTES # BLD AUTO: 0.18 K/UL (ref 0–0.85)
MONOCYTES NFR BLD AUTO: 2.8 % (ref 0–13.4)
NEUTROPHILS # BLD AUTO: 4.83 K/UL (ref 1.82–7.42)
NEUTROPHILS NFR BLD: 75.4 % (ref 44–72)
NRBC # BLD AUTO: 0 K/UL
NRBC BLD-RTO: 0 /100 WBC (ref 0–0.2)
PLATELET # BLD AUTO: 142 K/UL (ref 164–446)
PMV BLD AUTO: 11.9 FL (ref 9–12.9)
POTASSIUM SERPL-SCNC: 3.6 MMOL/L (ref 3.6–5.5)
PROT SERPL-MCNC: 7 G/DL (ref 6–8.2)
RBC # BLD AUTO: 4.58 M/UL (ref 4.2–5.4)
SODIUM SERPL-SCNC: 139 MMOL/L (ref 135–145)
WBC # BLD AUTO: 6.4 K/UL (ref 4.8–10.8)

## 2024-07-15 PROCEDURE — 94640 AIRWAY INHALATION TREATMENT: CPT

## 2024-07-15 PROCEDURE — 93306 TTE W/DOPPLER COMPLETE: CPT

## 2024-07-15 PROCEDURE — 700102 HCHG RX REV CODE 250 W/ 637 OVERRIDE(OP): Performed by: INTERNAL MEDICINE

## 2024-07-15 PROCEDURE — 99239 HOSP IP/OBS DSCHRG MGMT >30: CPT | Performed by: HOSPITALIST

## 2024-07-15 PROCEDURE — 80053 COMPREHEN METABOLIC PANEL: CPT

## 2024-07-15 PROCEDURE — RXMED WILLOW AMBULATORY MEDICATION CHARGE: Performed by: HOSPITALIST

## 2024-07-15 PROCEDURE — 700101 HCHG RX REV CODE 250: Performed by: INTERNAL MEDICINE

## 2024-07-15 PROCEDURE — A9270 NON-COVERED ITEM OR SERVICE: HCPCS | Performed by: INTERNAL MEDICINE

## 2024-07-15 PROCEDURE — 93306 TTE W/DOPPLER COMPLETE: CPT | Mod: 26 | Performed by: INTERNAL MEDICINE

## 2024-07-15 PROCEDURE — 85025 COMPLETE CBC W/AUTO DIFF WBC: CPT

## 2024-07-15 PROCEDURE — 36415 COLL VENOUS BLD VENIPUNCTURE: CPT

## 2024-07-15 PROCEDURE — G0378 HOSPITAL OBSERVATION PER HR: HCPCS

## 2024-07-15 PROCEDURE — 700111 HCHG RX REV CODE 636 W/ 250 OVERRIDE (IP): Performed by: INTERNAL MEDICINE

## 2024-07-15 RX ORDER — PREDNISONE 20 MG/1
20 TABLET ORAL DAILY
Qty: 4 TABLET | Refills: 0 | Status: SHIPPED | OUTPATIENT
Start: 2024-07-15 | End: 2024-07-17 | Stop reason: SDUPTHER

## 2024-07-15 RX ADMIN — LOSARTAN POTASSIUM 25 MG: 50 TABLET, FILM COATED ORAL at 05:16

## 2024-07-15 RX ADMIN — OMEPRAZOLE 20 MG: 20 CAPSULE, DELAYED RELEASE ORAL at 05:16

## 2024-07-15 RX ADMIN — IPRATROPIUM BROMIDE AND ALBUTEROL SULFATE 3 ML: 2.5; .5 SOLUTION RESPIRATORY (INHALATION) at 06:57

## 2024-07-15 RX ADMIN — PREDNISONE 50 MG: 20 TABLET ORAL at 00:16

## 2024-07-15 RX ADMIN — LEVOTHYROXINE SODIUM 25 MCG: 0.03 TABLET ORAL at 05:16

## 2024-07-15 RX ADMIN — IPRATROPIUM BROMIDE AND ALBUTEROL SULFATE 3 ML: 2.5; .5 SOLUTION RESPIRATORY (INHALATION) at 10:04

## 2024-07-16 ENCOUNTER — PATIENT OUTREACH (OUTPATIENT)
Dept: MEDICAL GROUP | Facility: PHYSICIAN GROUP | Age: 84
End: 2024-07-16
Payer: MEDICARE

## 2024-07-17 ENCOUNTER — OFFICE VISIT (OUTPATIENT)
Dept: MEDICAL GROUP | Facility: PHYSICIAN GROUP | Age: 84
End: 2024-07-17
Payer: MEDICARE

## 2024-07-17 VITALS
HEIGHT: 69 IN | SYSTOLIC BLOOD PRESSURE: 118 MMHG | RESPIRATION RATE: 16 BRPM | HEART RATE: 51 BPM | DIASTOLIC BLOOD PRESSURE: 62 MMHG | OXYGEN SATURATION: 94 % | WEIGHT: 199.2 LBS | BODY MASS INDEX: 29.51 KG/M2 | TEMPERATURE: 97.1 F

## 2024-07-17 DIAGNOSIS — I27.20 PULMONARY HYPERTENSION (HCC): ICD-10-CM

## 2024-07-17 DIAGNOSIS — Z09 HOSPITAL DISCHARGE FOLLOW-UP: Primary | ICD-10-CM

## 2024-07-17 DIAGNOSIS — R06.2 WHEEZING: ICD-10-CM

## 2024-07-17 DIAGNOSIS — J20.8 ACUTE BRONCHITIS DUE TO OTHER SPECIFIED ORGANISMS: ICD-10-CM

## 2024-07-17 PROCEDURE — RXMED WILLOW AMBULATORY MEDICATION CHARGE: Performed by: INTERNAL MEDICINE

## 2024-07-17 RX ORDER — AZITHROMYCIN 250 MG/1
250-500 TABLET, FILM COATED ORAL DAILY
Qty: 6 TABLET | Refills: 0 | Status: SHIPPED | OUTPATIENT
Start: 2024-07-17

## 2024-07-17 RX ORDER — IPRATROPIUM BROMIDE AND ALBUTEROL SULFATE 2.5; .5 MG/3ML; MG/3ML
3 SOLUTION RESPIRATORY (INHALATION) 4 TIMES DAILY
Qty: 90 ML | Refills: 11 | Status: SHIPPED | OUTPATIENT
Start: 2024-07-17

## 2024-07-17 RX ORDER — PREDNISONE 20 MG/1
40 TABLET ORAL DAILY
Qty: 10 TABLET | Refills: 0 | Status: SHIPPED | OUTPATIENT
Start: 2024-07-17 | End: 2024-07-23

## 2024-07-17 ASSESSMENT — FIBROSIS 4 INDEX: FIB4 SCORE: 4.28

## 2024-07-18 ENCOUNTER — PHARMACY VISIT (OUTPATIENT)
Dept: PHARMACY | Facility: MEDICAL CENTER | Age: 84
End: 2024-07-18
Payer: COMMERCIAL

## 2024-07-19 ENCOUNTER — PATIENT OUTREACH (OUTPATIENT)
Dept: HEALTH INFORMATION MANAGEMENT | Facility: OTHER | Age: 84
End: 2024-07-19
Payer: MEDICARE

## 2024-07-19 DIAGNOSIS — E78.5 DYSLIPIDEMIA: ICD-10-CM

## 2024-08-01 RX ORDER — POTASSIUM CHLORIDE 750 MG/1
10 TABLET, EXTENDED RELEASE ORAL
Qty: 60 TABLET | Refills: 2 | Status: SHIPPED | OUTPATIENT
Start: 2024-08-01 | End: 2024-08-14

## 2024-08-01 RX ORDER — POTASSIUM CHLORIDE 750 MG/1
10 TABLET, EXTENDED RELEASE ORAL
Qty: 60 TABLET | Refills: 2 | Status: CANCELLED | OUTPATIENT
Start: 2024-08-01

## 2024-08-14 ENCOUNTER — OFFICE VISIT (OUTPATIENT)
Dept: MEDICAL GROUP | Facility: PHYSICIAN GROUP | Age: 84
End: 2024-08-14
Payer: MEDICARE

## 2024-08-14 VITALS
TEMPERATURE: 97.8 F | DIASTOLIC BLOOD PRESSURE: 72 MMHG | HEART RATE: 65 BPM | OXYGEN SATURATION: 93 % | SYSTOLIC BLOOD PRESSURE: 120 MMHG | BODY MASS INDEX: 30.33 KG/M2 | HEIGHT: 69 IN | WEIGHT: 204.8 LBS

## 2024-08-14 DIAGNOSIS — R60.0 EDEMA OF LEFT FOOT: ICD-10-CM

## 2024-08-14 DIAGNOSIS — E03.9 ACQUIRED HYPOTHYROIDISM: ICD-10-CM

## 2024-08-14 DIAGNOSIS — I27.20 PULMONARY HYPERTENSION (HCC): ICD-10-CM

## 2024-08-14 DIAGNOSIS — I10 PRIMARY HYPERTENSION: ICD-10-CM

## 2024-08-14 PROCEDURE — RXMED WILLOW AMBULATORY MEDICATION CHARGE: Performed by: INTERNAL MEDICINE

## 2024-08-14 PROCEDURE — 3078F DIAST BP <80 MM HG: CPT | Performed by: INTERNAL MEDICINE

## 2024-08-14 PROCEDURE — 3074F SYST BP LT 130 MM HG: CPT | Performed by: INTERNAL MEDICINE

## 2024-08-14 PROCEDURE — 99214 OFFICE O/P EST MOD 30 MIN: CPT | Performed by: INTERNAL MEDICINE

## 2024-08-14 RX ORDER — TORSEMIDE 20 MG/1
40 TABLET ORAL DAILY
Qty: 200 TABLET | Refills: 3 | Status: SHIPPED | OUTPATIENT
Start: 2024-08-14

## 2024-08-14 RX ORDER — POTASSIUM CHLORIDE 750 MG/1
20 TABLET, EXTENDED RELEASE ORAL
Qty: 200 TABLET | Refills: 3 | Status: SHIPPED | OUTPATIENT
Start: 2024-08-14

## 2024-08-14 ASSESSMENT — FIBROSIS 4 INDEX: FIB4 SCORE: 4.28

## 2024-08-14 NOTE — ASSESSMENT & PLAN NOTE
Chronic decompensated issue, trial increased diuretic with torsemide 40 mg and potassium 20 mEq daily x5 days and will have MA call next week to follow up on edema and morning weights. If this does not work then consider trial with metolazone 30 minutes before diuretic dosing.

## 2024-08-14 NOTE — ASSESSMENT & PLAN NOTE
Chronic ongoing problem, continue medical therapy with levothyroxine 25 mcg daily, recent TSH last month is stable.

## 2024-08-14 NOTE — ASSESSMENT & PLAN NOTE
Chronic ongoing issue, contributes to fluid overload, RVSP 38 mm Hg, discussed importance of maintaining euvolemia and staying normotensive.

## 2024-08-14 NOTE — ASSESSMENT & PLAN NOTE
Chronic stable problem, well controlled blood pressure, continue losartan 25 mg daily and increase torsemide/potassium to 40mg/20mEq to try and diurese a few pounds.

## 2024-08-14 NOTE — PROGRESS NOTES
Subjective:   Chief Complaint/History of Present Illness:  Reina Munoz is a 83 y.o. female established patient who presents today to discuss medical problems as listed below. Reina is unaccompanied for today's visit.      Problem   Primary Hypertension    She has history of hypertension, well controlled with losartan 25 mg daily. Will continue focusing on improving volume status to assist with ongoing control of blood pressure. Also titrating diuertics.     Edema of Left Foot    She has longstanding edema from the left > right foot up to the mid calf.  Likely secondary to warmer weather, more time on her feet, missing a few water pills due to medical appointments for her  as well as feeling like the furosemide is not as effective as previous. Slight dyspnea related to deconditioning with wet cough and crackles. Echocardiogram in 2024 showed mild AS and pulmonary hypertension, exacerbation with hospitalization for IV diuresis in July 2024. Improved and then worsened again over the past 4 weeks.    Current regimen: torsemide 40 mg daily and potassium 20 mEq daily  Previous regimen: torsemide 20 mg daily and potassium 10 mEq daily     Pulmonary Hypertension (Hcc)    She has mild history of pulmonary hypertension evidenced by RVSP of 30 to 35 mmHg on echocardiogram in 2021, this is an improvement from previous echocardiogram demonstrating pressures of 45 mmHg in 2018.    Follow up echocardiogram 7/2024, RVSP 38 mmHg.      Acquired Hypothyroidism     Latest Reference Range & Units 07/14/24 17:43   TSH 0.380 - 5.330 uIU/mL 4.520     She reports multi decade history of low functioning thyroid on low-dose levothyroxine.  Tried a slightly increased dose in the past but it led to anxiousness.  Currently no signs or symptoms of overtreatment; worsening of edema and energy levels in June 2024 so will plan to recheck to ensure stability.    Current regimen: Levothyroxine 25 mcg daily          Current  "Medications:  Current Outpatient Medications Ordered in Epic   Medication Sig Dispense Refill    torsemide (DEMADEX) 20 MG Tab Take 2 Tablets by mouth every day. 200 Tablet 3    potassium chloride ER (KLOR-CON) 10 MEQ tablet Take 2 Tablets by mouth 1 time a day as needed (when taking lasix). 200 Tablet 3    ipratropium-albuterol (DUONEB) 0.5-2.5 (3) MG/3ML nebulizer solution Take 3 mL by nebulization 4 times a day. 90 mL 11    Ibuprofen-Acetaminophen (ADVIL DUAL ACTION) 125-250 MG Tab Take 2 Tablets by mouth every 6 hours as needed. Indications: Arthritis, Pain      therapeutic multivitamin-minerals (THERAGRAN-M) Tab Take 1 Tablet by mouth every day.      levothyroxine (SYNTHROID) 25 MCG Tab Take 1 Tablet by mouth every morning on an empty stomach. 100 Tablet 3    losartan (COZAAR) 25 MG Tab Take 1 Tablet by mouth every day. 100 Tablet 3    albuterol 108 (90 Base) MCG/ACT Aero Soln inhalation aerosol Inhale 2 Puffs every 6 hours as needed for Shortness of Breath. 54 g 3    esomeprazole (NEXIUM) 40 MG delayed-release capsule Take 1 Capsule by mouth every morning before breakfast. (Patient taking differently: Take 40 mg by mouth 1 time a day as needed. Indications: Heartburn) 100 Capsule 3    rosuvastatin (CRESTOR) 5 MG Tab Take 1 Tablet by mouth every evening. 100 Tablet 3    azithromycin (ZITHROMAX) 250 MG Tab Take 1-2 Tablets by mouth every day. Take 2 tablets on day 1 and then 1 tablet daily thereafter until completed (Patient not taking: Reported on 8/14/2024) 6 Tablet 0     No current Epic-ordered facility-administered medications on file.          Objective:   Physical Exam:    Vitals: /72 (BP Location: Left arm, Patient Position: Sitting, BP Cuff Size: Adult)   Pulse 65   Temp 36.6 °C (97.8 °F) (Temporal)   Ht 1.753 m (5' 9\")   Wt 92.9 kg (204 lb 12.8 oz)   SpO2 93%    BMI: Body mass index is 30.24 kg/m².  Physical Exam  Constitutional:       General: She is not in acute distress.     Appearance: " Normal appearance. She is not ill-appearing.   HENT:      Right Ear: External ear normal.      Left Ear: External ear normal.   Eyes:      General: No scleral icterus.     Conjunctiva/sclera: Conjunctivae normal.   Cardiovascular:      Rate and Rhythm: Normal rate and regular rhythm.      Pulses: Normal pulses.   Pulmonary:      Effort: Pulmonary effort is normal. No respiratory distress.      Breath sounds: Rales present.      Comments: Mild crackles, left > right, slightly wet cough  Abdominal:      General: Bowel sounds are normal.      Palpations: Abdomen is soft.   Musculoskeletal:      Right lower leg: Edema present.      Left lower leg: Edema present.      Comments: 1+ b/l pretibial and 2+ ankle bilateral   Skin:     General: Skin is warm and dry.   Psychiatric:         Mood and Affect: Mood normal.         Behavior: Behavior normal.         Thought Content: Thought content normal.         Judgment: Judgment normal.               Assessment and Plan:   Reina is a 83 y.o. female with the following:  Problem List Items Addressed This Visit       Acquired hypothyroidism     Chronic ongoing problem, continue medical therapy with levothyroxine 25 mcg daily, recent TSH last month is stable.         Edema of left foot     Chronic decompensated issue, trial increased diuretic with torsemide 40 mg and potassium 20 mEq daily x5 days and will have MA call next week to follow up on edema and morning weights. If this does not work then consider trial with metolazone 30 minutes before diuretic dosing.          Relevant Medications    torsemide (DEMADEX) 20 MG Tab    potassium chloride ER (KLOR-CON) 10 MEQ tablet    Primary hypertension     Chronic stable problem, well controlled blood pressure, continue losartan 25 mg daily and increase torsemide/potassium to 40mg/20mEq to try and diurese a few pounds.         Relevant Medications    torsemide (DEMADEX) 20 MG Tab    Pulmonary hypertension (HCC)     Chronic ongoing issue,  contributes to fluid overload, RVSP 38 mm Hg, discussed importance of maintaining euvolemia and staying normotensive.          Relevant Medications    torsemide (DEMADEX) 20 MG Tab          RTC: Return in about 4 months (around 12/14/2024).    I spent a total of 32 minutes with record review, exam, communication with the patient, communication with other providers, and documentation of this encounter.    Verbal consent was acquired by the patient to use [employer] ambient listening note generation during this visit Yes       PLEASE NOTE: This dictation was created using voice recognition software. I have made every reasonable attempt to correct obvious errors, but I expect that there are errors of grammar and possibly content that I did not discover before finalizing the note.      Nayeli Finley, DO  Geriatric and Internal Medicine  [facility]

## 2024-08-15 ENCOUNTER — PHARMACY VISIT (OUTPATIENT)
Dept: PHARMACY | Facility: MEDICAL CENTER | Age: 84
End: 2024-08-15
Payer: COMMERCIAL

## 2024-08-19 DIAGNOSIS — E78.5 DYSLIPIDEMIA: ICD-10-CM

## 2024-08-19 RX ORDER — ROSUVASTATIN CALCIUM 5 MG/1
5 TABLET, COATED ORAL EVERY EVENING
Qty: 100 TABLET | Refills: 3 | Status: SHIPPED | OUTPATIENT
Start: 2024-08-19

## 2024-08-19 NOTE — TELEPHONE ENCOUNTER
Received request via: Pharmacy    Was the patient seen in the last year in this department? Yes    Does the patient have an active prescription (recently filled or refills available) for medication(s) requested? No    Does the patient have alf Plus and need 100-day supply? (This applies to ALL medications) Yes, quantity updated to 100 days  
no

## 2024-08-22 ENCOUNTER — TELEPHONE (OUTPATIENT)
Dept: MEDICAL GROUP | Facility: PHYSICIAN GROUP | Age: 84
End: 2024-08-22
Payer: MEDICARE

## 2024-08-22 NOTE — TELEPHONE ENCOUNTER
----- Message from Medical Assistant Fortunato Hassan Ass't sent at 8/15/2024 11:18 AM PDT -----  Regarding: call  Call patient on Tuesday about double dose of lasix1. Caller Name: Reina Munoz                          Call Back Number: [phone] (home)         How would the patient prefer to be contacted with a response: Phone call OK to leave a detailed message    Called patient left a message    Asking her to My Chart or call us back about how she is doing on double dose of lasix

## 2024-08-26 ENCOUNTER — TELEPHONE (OUTPATIENT)
Dept: MEDICAL GROUP | Facility: PHYSICIAN GROUP | Age: 84
End: 2024-08-26
Payer: MEDICARE

## 2024-08-27 ENCOUNTER — TELEPHONE (OUTPATIENT)
Dept: MEDICAL GROUP | Facility: PHYSICIAN GROUP | Age: 84
End: 2024-08-27
Payer: MEDICARE

## 2024-08-27 NOTE — TELEPHONE ENCOUNTER
1. Caller Name: Reina Munoz                          Call Back Number: [phone] (home)         How would the patient prefer to be contacted with a response: Phone call OK to leave a detailed message    Patient called to say she is feeling better   Taking 40mg lasix and potassium edema going down Urinating a lot.  She reports to have a bit of a raspy voice and cough still.

## 2024-08-27 NOTE — TELEPHONE ENCOUNTER
1. Caller Name: Reina Munoz                          Call Back Number: [phone] (home)         How would the patient prefer to be contacted with a response: Phone call OK to leave a detailed message    Patient reports doing better but still has a raspy throat and cough  Will call us Thursday to update on the cough.  Her weight this am was 197  204lb on 8/14/24

## 2024-08-30 ENCOUNTER — HOSPITAL ENCOUNTER (EMERGENCY)
Facility: MEDICAL CENTER | Age: 84
End: 2024-08-30
Attending: EMERGENCY MEDICINE
Payer: MEDICARE

## 2024-08-30 ENCOUNTER — APPOINTMENT (OUTPATIENT)
Dept: RADIOLOGY | Facility: MEDICAL CENTER | Age: 84
End: 2024-08-30
Attending: EMERGENCY MEDICINE
Payer: MEDICARE

## 2024-08-30 ENCOUNTER — PHARMACY VISIT (OUTPATIENT)
Dept: PHARMACY | Facility: MEDICAL CENTER | Age: 84
End: 2024-08-30
Payer: COMMERCIAL

## 2024-08-30 ENCOUNTER — TELEPHONE (OUTPATIENT)
Dept: HEALTH INFORMATION MANAGEMENT | Facility: OTHER | Age: 84
End: 2024-08-30
Payer: MEDICARE

## 2024-08-30 VITALS
HEIGHT: 69 IN | RESPIRATION RATE: 18 BRPM | HEART RATE: 54 BPM | TEMPERATURE: 97.8 F | BODY MASS INDEX: 29.91 KG/M2 | SYSTOLIC BLOOD PRESSURE: 137 MMHG | OXYGEN SATURATION: 97 % | DIASTOLIC BLOOD PRESSURE: 65 MMHG | WEIGHT: 201.94 LBS

## 2024-08-30 DIAGNOSIS — J98.01 BRONCHOSPASM: ICD-10-CM

## 2024-08-30 LAB
EKG IMPRESSION: NORMAL
FLUAV RNA SPEC QL NAA+PROBE: NEGATIVE
FLUBV RNA SPEC QL NAA+PROBE: NEGATIVE
RSV RNA SPEC QL NAA+PROBE: NEGATIVE
SARS-COV-2 RNA RESP QL NAA+PROBE: NOTDETECTED
SPECIMEN SOURCE: NORMAL

## 2024-08-30 PROCEDURE — RXMED WILLOW AMBULATORY MEDICATION CHARGE: Performed by: EMERGENCY MEDICINE

## 2024-08-30 PROCEDURE — 93005 ELECTROCARDIOGRAM TRACING: CPT

## 2024-08-30 PROCEDURE — 700101 HCHG RX REV CODE 250: Performed by: EMERGENCY MEDICINE

## 2024-08-30 PROCEDURE — 71045 X-RAY EXAM CHEST 1 VIEW: CPT

## 2024-08-30 PROCEDURE — 0241U HCHG SARS-COV-2 COVID-19 NFCT DS RESP RNA 4 TRGT MIC: CPT

## 2024-08-30 PROCEDURE — 700111 HCHG RX REV CODE 636 W/ 250 OVERRIDE (IP): Performed by: EMERGENCY MEDICINE

## 2024-08-30 PROCEDURE — 93005 ELECTROCARDIOGRAM TRACING: CPT | Performed by: EMERGENCY MEDICINE

## 2024-08-30 PROCEDURE — 99284 EMERGENCY DEPT VISIT MOD MDM: CPT

## 2024-08-30 RX ORDER — IPRATROPIUM BROMIDE AND ALBUTEROL SULFATE 2.5; .5 MG/3ML; MG/3ML
3 SOLUTION RESPIRATORY (INHALATION)
Status: DISCONTINUED | OUTPATIENT
Start: 2024-08-30 | End: 2024-08-30 | Stop reason: HOSPADM

## 2024-08-30 RX ORDER — VIT C/HESPERIDIN/BIOFLAVONOIDS 500-100 MG
1 TABLET ORAL
COMMUNITY

## 2024-08-30 RX ORDER — PREDNISONE 10 MG/1
40 TABLET ORAL ONCE
Status: COMPLETED | OUTPATIENT
Start: 2024-08-30 | End: 2024-08-30

## 2024-08-30 RX ORDER — PREDNISONE 20 MG/1
40 TABLET ORAL DAILY
Qty: 8 TABLET | Refills: 0 | Status: SHIPPED | OUTPATIENT
Start: 2024-08-30 | End: 2024-09-03

## 2024-08-30 RX ORDER — FEXOFENADINE HCL 60 MG/1
60 TABLET, FILM COATED ORAL DAILY
COMMUNITY

## 2024-08-30 RX ORDER — AZELASTINE 1 MG/ML
1 SPRAY, METERED NASAL 2 TIMES DAILY
Qty: 30 ML | Refills: 0 | Status: SHIPPED | OUTPATIENT
Start: 2024-08-30

## 2024-08-30 RX ADMIN — PREDNISONE 40 MG: 10 TABLET ORAL at 10:45

## 2024-08-30 RX ADMIN — IPRATROPIUM BROMIDE AND ALBUTEROL SULFATE 3 ML: .5; 3 SOLUTION RESPIRATORY (INHALATION) at 10:33

## 2024-08-30 ASSESSMENT — FIBROSIS 4 INDEX: FIB4 SCORE: 4.28

## 2024-08-30 NOTE — TELEPHONE ENCOUNTER
"Pt called consulting RN line, states she has terrible allergies and can't breathe. Pt audibly short of breath over telephone. Pt states \"I've been sick\" and now says her nose is congested despite using nasal spray, she takes allegra but \"sneezes nonstop\". Ears are plugged and back of throat is bothering her. Pt states she doesn't wear O2, this WOB is not her baseline. Pt advised to go to ED to be evaluated. Pt states her  will drive her. Offered to call ambulance for pt, pt declined. Answered all questions, pt verbalized understanding. Pt states she is getting dressed and going to ED now.       "

## 2024-08-30 NOTE — ED PROVIDER NOTES
ED PHYSICIAN NOTE    CHIEF COMPLAINT  Chief Complaint   Patient presents with    Runny Nose    Other     Pt thinks she is having a seasonal allergy episode, runny nose, watery eye, sneezing.       EXTERNAL RECORDS REVIEWED  Inpatient Notes patient admitted to hospital in July of this year with volume overload.  Treated with diuresis.    HPI/ROS      Reina Munoz is a 83 y.o. female who presents runny nose, cough, shortness of breath, wheezing.  Symptoms started about a week ago.  Initially just runny nose itchiness watery eyes and some ear pressure.  She has a mild dry cough.  Symptoms seem to be worsening.  She has not had a fever or chills.  Feels slightly breathless.  She reports she has seasonal allergies and every spring and every fall she gets symptoms like this but this seems to be getting worse despite taking Allegra and using Flonase..  She spoke with a nurse and was advised to come to the ER for evaluation    PAST MEDICAL HISTORY  Past Medical History:   Diagnosis Date    Acute bronchitis due to other specified organisms 03/18/2024    She developed cough and bronchospasm last week, she was assessed in clinic on March 14 and had wheezing however this was for an assessment/preventative exam so no treatment was rendered.  She continues to feel short of breath and is out of nebulizers at home.  She is not hypoxic in clinic and her vitals are stable however she is coarse on exam.  She has history of bronchitis and wheezing intermitt    Cellulitis of toe of left foot 08/28/2023    She has swelling, redness, pain, and erythema of the left foot first MTP. She has dropped a bowl on the toe and there was bruising but her ROM was intact so fracture seemed less likely. Over the weekend the symptoms rapidly progressed and she made an appointment for evaluation. Denies history of gout. There was slight sloughing of skin just lateral to the area of swelling. No clear entry point for    Chronic meniscal tear of knee      left medial     CKD (chronic kidney disease) stage 3, GFR 30-59 ml/min 02/03/2022    Cough in adult 01/18/2022    Dyslipidemia     GERD (gastroesophageal reflux disease)     Hypothyroidism     Resolved 2018    Lab test positive for detection of COVID-19 virus 01/14/2022    Left leg swelling 03/14/2018    Left thyroid nodule 04/11/2019    Obesity (BMI 30.0-34.9) 04/04/2023    Pleomorphic adenoma of parotid gland 1992, 2004    recurrent, yearly MRI    RUQ pain 09/08/2021    Shortness of breath 12/13/2021    Sleep apnea     Urinary incontinence 05/17/2022       SOCIAL HISTORY  Social History     Tobacco Use    Smoking status: Never    Smokeless tobacco: Never   Vaping Use    Vaping status: Never Used   Substance Use Topics    Alcohol use: No     Alcohol/week: 0.0 oz    Drug use: No       CURRENT MEDICATIONS  Home Medications       Reviewed by Farzana Dowd (Pharmacy Tech) on 08/30/24 at 1107  Med List Status: Complete     Medication Last Dose Status   albuterol 108 (90 Base) MCG/ACT Aero Soln inhalation aerosol a week ago Active   Cholecalciferol (D3 PO) 8/29/2024 Active   esomeprazole (NEXIUM) 40 MG delayed-release capsule couple days ago Active   fexofenadine (ALLEGRA) 60 MG Tab 8/29/2024 Active   Ibuprofen-Acetaminophen (ADVIL DUAL ACTION) 125-250 MG Tab over a week ago Active   ipratropium-albuterol (DUONEB) 0.5-2.5 (3) MG/3ML nebulizer solution 8/29/2024 Active   levothyroxine (SYNTHROID) 25 MCG Tab 8/30/2024 Active   losartan (COZAAR) 25 MG Tab 8/29/2024 Active   potassium chloride ER (KLOR-CON) 10 MEQ tablet  Active   rosuvastatin (CRESTOR) 5 MG Tab 8/29/2024 Active   therapeutic multivitamin-minerals (THERAGRAN-M) Tab 8/29/2024 Active   torsemide (DEMADEX) 20 MG Tab 8/29/2024 Active   Zinc 30 MG Tab 8/29/2024 Active                  Audit from Redirected Encounters    **Home medications have not yet been reviewed for this encounter**         ALLERGIES  Allergies   Allergen Reactions    Doxycycline  "Nausea and Swelling     Rxn - 2018  Tongue swelling    Penicillins Rash     Rxn - years ago   Pt tolerated Augmentin 2018    Sulfa Drugs Rash     Rxn - many years ago to an unknown med       PHYSICAL EXAM  VITAL SIGNS: BP (!) 151/52   Pulse (!) 54   Temp 36.7 °C (98 °F) (Temporal)   Resp 16   Ht 1.753 m (5' 9\")   Wt 91.6 kg (201 lb 15.1 oz)   SpO2 94%   BMI 29.82 kg/m²    Constitutional: Awake and alert  HENT: Both tympanic membranes are clear no middle ear effusion.  Nares with clear rhinorrhea and mucosal edema.  No purulent discharge.  Pharynx is normal.  Normal voice.  Eyes: Normal inspection  Neck: Grossly normal range of motion.  Cardiovascular: Normal heart rate, Normal rhythm.  Symmetric peripheral pulses.   Thorax & Lungs: No respiratory distress, minimal diffuse wheezing.  No crackles or rhonchi.  Abdomen: Bowel sounds normal, soft, non-distended, nontender, no mass  Skin: No obvious rash.  Back: No tenderness, No CVA tenderness.   Extremities: No clubbing, cyanosis, edema, no Homans or cords.  Neurologic: Grossly normal   Psychiatric: Normal for situation     DIAGNOSTIC STUDIES / PROCEDURES  LABS/EKG  Results for orders placed or performed during the hospital encounter of 24   CoV-2, Flu A/B, And RSV by PCR (Cylex)    Specimen: Respirate   Result Value Ref Range    Influenza virus A RNA Negative Negative    Influenza virus B, PCR Negative Negative    RSV, PCR Negative Negative    SARS-CoV-2 by PCR NotDetected     SARS-CoV-2 Source NP Swab    EKG   Result Value Ref Range    Report       [facility] Emergency Dept.    Test Date:  2024  Pt Name:    NIA MCLEAN                Department: [facility]  MRN:        [id number]                      Room:  Gender:     Female                       Technician: MILES  :        [id number]                   Requested By:ER TRIAGE PROTOCOL  Order #:    [id number]                    Reading MD: DEEP WILLETT, " MD    Measurements  Intervals                                Axis  Rate:       72                           P:          55  MS:         141                          QRS:        -5  QRSD:       107                          T:          -9  QT:         432  QTc:        473    Interpretive Statements  Sinus rhythm  Premature ventricular contractions  Borderline repolarization abnormality  Compared to ECG 07/14/2024 16:52:10  No significant changes  Electronically Signed On 08- 10:19:29 PDT by DEEP WILLETT MD        I have independently interpreted this EKG as documented above    Rhythm strip interpretation-sinus rhythm    RADIOLOGY  I have independently interpreted the diagnostic imaging associated with this visit and am waiting the final reading from the radiologist.   My preliminary interpretation is as follows: Chest x-ray without infiltrate or acute abnormality    COURSE & MEDICAL DECISION MAKING    INITIAL ASSESSMENT, COURSE AND PLAN  Care Narrative: Patient presents with runny nose, congestion, cough.  Identified to have wheezing on examination.  She does have albuterol at home and uses this periodically.  Has had wheezing in the past.  She does not have any chest pain or olive shortness of breath.  She is not hypoxic.  No pleuritic pain or hemoptysis.  Differential includes allergies, asthma, viral illness, pneumonia.  Obtain chest x-ray treat with DuoNeb.  Will give steroid.    Patient reported significant improvement after DuoNeb.  Her vital signs remained stable.  Her chest x-ray is negative.  Viral panel negative.  Patient has bronchospasm undetermined etiology.  Could be allergy or viral illness I do not see any evidence of pneumonia systemic illness or toxicity.  No indication for blood work or additional imaging as she is systemically improved.  She will be treated with a total 5-day burst of prednisone.  As allergy is a possibility given her prescription for Astelin nasal spray.  She can use  over-the-counter antihistamine following prednisone.  Cautioned to return to the ER for difficulty breathing, worsening, not improving or concern    Interventions  Medications   ipratropium-albuterol (DUONEB) nebulizer solution (3 mL Nebulization Given 8/30/24 1033)   predniSONE (Deltasone) tablet 40 mg (40 mg Oral Given 8/30/24 1045)       DISPOSITION AND DISCUSSIONS    Escalation of care considered, and ultimately not performed:Laboratory analysis, diagnostic imaging, and acute inpatient care management, however at this time, the patient is most appropriate for outpatient management    Prescription drugs considered and/or prescribed:   Considered opiate prescription, but nonnarcotic analgesic is most appropriate  Prescribed   New Prescriptions    AZELASTINE (ASTELIN) 137 MCG/SPRAY NASAL SPRAY    Administer 1 Spray into affected nostril(S) 2 times a day.    PREDNISONE (DELTASONE) 20 MG TAB    Take 2 Tablets by mouth every day for 4 days.     Follow up  Nayeli Finley D.O.  [address]  [facility] [id number] [phone]          FINAL IMPRESSION  1.  Acute bronchospasm      This dictation was created using voice recognition software. The accuracy of the dictation is limited to the abilities of the software. I expect there may be some errors of grammar and possibly content. The nursing notes were reviewed and certain aspects of this information were incorporated into this note.    Electronically signed by: Sher Patterson M.D., 8/30/2024

## 2024-08-30 NOTE — ED NOTES
Pharmacy Medication Reconciliation      ~Medication reconciliation updated and complete per patient   ~Allergies have been verified and updated   ~No oral ABX within the last 30 days  ~Patient home pharmacy :  [facility] [phone]      ~Anticoagulants (rivaroxaban, apixaban, edoxaban, dabigatran, warfarin, enoxaparin) taken in the last 14 days? No

## 2024-08-30 NOTE — ED TRIAGE NOTES
Chief Complaint   Patient presents with    Runny Nose    Other     Pt thinks she is having a seasonal allergy episode, runny nose, watery eye, sneezing.

## 2024-08-30 NOTE — ED NOTES
"Pt states she has environmental allergies and has been having \"severe allergy attacks\" for the past two days. Pt states she has fluid around her heart.   "

## 2024-09-04 ENCOUNTER — OFFICE VISIT (OUTPATIENT)
Dept: MEDICAL GROUP | Facility: PHYSICIAN GROUP | Age: 84
End: 2024-09-04
Payer: MEDICARE

## 2024-09-04 VITALS
HEART RATE: 58 BPM | BODY MASS INDEX: 30.05 KG/M2 | SYSTOLIC BLOOD PRESSURE: 106 MMHG | HEIGHT: 69 IN | WEIGHT: 202.9 LBS | RESPIRATION RATE: 16 BRPM | DIASTOLIC BLOOD PRESSURE: 60 MMHG | OXYGEN SATURATION: 93 % | TEMPERATURE: 97.2 F

## 2024-09-04 DIAGNOSIS — R60.0 BILATERAL LEG EDEMA: ICD-10-CM

## 2024-09-04 DIAGNOSIS — J30.2 SEASONAL ALLERGIES: ICD-10-CM

## 2024-09-04 DIAGNOSIS — R60.0 EDEMA OF LEFT FOOT: ICD-10-CM

## 2024-09-04 PROCEDURE — 3078F DIAST BP <80 MM HG: CPT | Performed by: INTERNAL MEDICINE

## 2024-09-04 PROCEDURE — 99213 OFFICE O/P EST LOW 20 MIN: CPT | Performed by: INTERNAL MEDICINE

## 2024-09-04 PROCEDURE — 3074F SYST BP LT 130 MM HG: CPT | Performed by: INTERNAL MEDICINE

## 2024-09-04 RX ORDER — POTASSIUM CHLORIDE 750 MG/1
10 TABLET, EXTENDED RELEASE ORAL DAILY
Qty: 100 TABLET | Refills: 3
Start: 2024-09-04

## 2024-09-04 RX ORDER — TORSEMIDE 20 MG/1
20 TABLET ORAL DAILY
Qty: 100 TABLET | Refills: 3
Start: 2024-09-04

## 2024-09-04 ASSESSMENT — FIBROSIS 4 INDEX: FIB4 SCORE: 4.28

## 2024-09-04 NOTE — ASSESSMENT & PLAN NOTE
Chronic and decompensated issue, caused significant symptoms with sneezing and dyspnea, was worried about bronchitis or asthma exacerbation and presented to the ER. Improved with duoonebs, prednisone 40 mg x5 days, and azelastine. Agreeable to referral to allergist to update skin testing.

## 2024-09-04 NOTE — ASSESSMENT & PLAN NOTE
Chronic improved problem, will change torsemide and potassium back to maintenance dosing at 20 mg of torsemide and 10 mEq of potassium, she can double up as needed for worsening leg edema, weight gain, or dyspnea with wet cough. She is agreeable.

## 2024-09-04 NOTE — PROGRESS NOTES
Subjective:   Chief Complaint/History of Present Illness:  Riena Munoz is a 83 y.o. female established patient who presents today to discuss medical problems as listed below. Reina is unaccompanied for today's visit.    History of Present Illness  The patient presents for ER follow up appointment.    She experienced a sudden onset of illness, which led to an emergency room visit. There, she was diagnosed with decompensated allergies and treated with a duoneb breathing treatment, prednisone, and azelastine spray. She also had a COVID-19 test with negative results. Despite previous success with Allegra, it did not alleviate her symptoms this time. She is considering switching to Zyrtec. She has been using a nebulizer machine and has an emergency pack of prednisone at home. The azelastine spray has been effective, but she experienced a bloody nose today. She also mentions dryness in her mouth and a sore tongue, which she suspects might be side effects of the prednisone and azelastine. She reports no sore throat. She is interested in undergoing allergy testing. Still with hoarse voice but overall improving.    She is currently on torsemide 40 mg and potassium 20 mEq and is considering reducing the dosage. Her blood pressure reading today was in the 100s over 60s. She has been taking losartan daily, along with flaxseed and ila seeds to boost her immune system.        Problem   Bilateral Leg Edema    She has longstanding edema from the left > right foot up to the mid calf.  Likely secondary to warmer weather, more time on her feet, missing a few water pills due to medical appointments for her  as well as feeling like the furosemide is not as effective as previous. Slight dyspnea related to deconditioning with wet cough and crackles. Echocardiogram in 2024 showed mild AS and pulmonary hypertension, exacerbation with hospitalization for IV diuresis in July 2024. Improved and then worsened again over the past  4 weeks.    Edema better overall, will reduce torsemide and potassium back to maintenance dose and she will monitor for recurrence of leg edema.    Current regimen:  torsemide 20 mg daily and potassium 10 mEq daily  Previous regimen: torsemide 40 mg daily and potassium 20 mEq daily     Seasonal Allergies    Highly allergic ragweed and takes OTC antihistamines and flonase.    She had decompensated of her allergies prompting ER evaluation in early Sept 2024, improved with duoneb, azelastine, and oral prednisone. Interested in updating her allergy testing.          Current Medications:  Current Outpatient Medications Ordered in Epic   Medication Sig Dispense Refill    torsemide (DEMADEX) 20 MG Tab Take 1 Tablet by mouth every day. 100 Tablet 3    potassium chloride ER (KLOR-CON) 10 MEQ tablet Take 1 Tablet by mouth every day. 100 Tablet 3    Cholecalciferol (D3 PO) Take 1 Tablet by mouth every day.      Zinc 30 MG Tab Take 1 Tablet by mouth every day.      fexofenadine (ALLEGRA) 60 MG Tab Take 60 mg by mouth every day.      azelastine (ASTELIN) 137 MCG/SPRAY nasal spray Administer 1 Spray into affected nostril(S) 2 times a day. 30 mL 0    rosuvastatin (CRESTOR) 5 MG Tab Take 1 Tablet by mouth every evening. 100 Tablet 3    ipratropium-albuterol (DUONEB) 0.5-2.5 (3) MG/3ML nebulizer solution Take 3 mL by nebulization 4 times a day. 90 mL 11    Ibuprofen-Acetaminophen (ADVIL DUAL ACTION) 125-250 MG Tab Take 2 Tablets by mouth every 6 hours as needed (pain). Indications: Arthritis, Pain      therapeutic multivitamin-minerals (THERAGRAN-M) Tab Take 1 Tablet by mouth every day.      levothyroxine (SYNTHROID) 25 MCG Tab Take 1 Tablet by mouth every morning on an empty stomach. 100 Tablet 3    losartan (COZAAR) 25 MG Tab Take 1 Tablet by mouth every day. 100 Tablet 3    albuterol 108 (90 Base) MCG/ACT Aero Soln inhalation aerosol Inhale 2 Puffs every 6 hours as needed for Shortness of Breath. 54 g 3    esomeprazole (NEXIUM)  "40 MG delayed-release capsule Take 1 Capsule by mouth every morning before breakfast. (Patient taking differently: Take 40 mg by mouth 1 time a day as needed (stomach). Indications: Heartburn) 100 Capsule 3     No current [facility]-ordered facility-administered medications on file.          Objective:   Physical Exam:    Vitals: /60 (BP Location: Right arm, Patient Position: Sitting, BP Cuff Size: Adult)   Pulse (!) 58   Temp 36.2 °C (97.2 °F) (Temporal)   Resp 16   Ht 1.753 m (5' 9\")   Wt 92 kg (202 lb 14.4 oz)   SpO2 93%    BMI: Body mass index is 29.96 kg/m².  Physical Exam  Constitutional:       General: She is not in acute distress.     Appearance: Normal appearance. She is not ill-appearing.   HENT:      Right Ear: Ear canal and external ear normal. There is no impacted cerumen.      Left Ear: Ear canal and external ear normal. There is no impacted cerumen.      Nose:      Comments: Right medial nares with blood and mild ulceration, left lateral nares with slight blood present.     Mouth/Throat:      Comments: hoarseness  Eyes:      General: No scleral icterus.     Conjunctiva/sclera: Conjunctivae normal.   Cardiovascular:      Rate and Rhythm: Normal rate and regular rhythm.      Pulses: Normal pulses.   Pulmonary:      Effort: Pulmonary effort is normal. No respiratory distress.      Breath sounds: No wheezing or rhonchi.   Abdominal:      General: Bowel sounds are normal.      Palpations: Abdomen is soft.   Musculoskeletal:      Right lower leg: Edema present.      Left lower leg: Edema present.      Comments: 1+ b/l, left > right, improved   Skin:     General: Skin is warm and dry.      Findings: No rash.   Neurological:      Gait: Gait normal.   Psychiatric:         Mood and Affect: Mood normal.         Behavior: Behavior normal.         Thought Content: Thought content normal.         Judgment: Judgment normal.          Assessment & Plan  1. Seasonal allergies  2. Bloody nares  Allergy " exacerbation much better following duoneb, oral prednisone, and azelastine given by the ER. Symptoms of epistaxis, dry mouth, and sore tongue suggest she may have inflammation from the azelastine. Azelastine spray was recommended for use as needed instead of daily. She was advised to apply Vaseline or Aquaphor gel prior to using the spray to protect the nasal lining. Can use fluticasone inbetween use with azelastine. A referral was made for updated allergy testing.    2. Hypertension.  3. Leg edema  Blood pressure readings are within the normal range. The dosage of torsemide was reduced to 20 mg daily with potassium 10 mEq daily for maintenance and she can double up the dose as needed for worsened leg edema/fluid retention.       Follow-up  A follow-up appointment is scheduled for 12/11/2024.           Assessment and Plan:   Reina is a 83 y.o. female with the following:  Problem List Items Addressed This Visit       Bilateral leg edema     Chronic improved problem, will change torsemide and potassium back to maintenance dosing at 20 mg of torsemide and 10 mEq of potassium, she can double up as needed for worsening leg edema, weight gain, or dyspnea with wet cough. She is agreeable.         Relevant Medications    torsemide (DEMADEX) 20 MG Tab    potassium chloride ER (KLOR-CON) 10 MEQ tablet    Seasonal allergies     Chronic and decompensated issue, caused significant symptoms with sneezing and dyspnea, was worried about bronchitis or asthma exacerbation and presented to the ER. Improved with duoonebs, prednisone 40 mg x5 days, and azelastine. Agreeable to referral to allergist to update skin testing.         Relevant Orders    Referral to Allergy          RTC: Return in about 3 months (around 12/4/2024).    I spent a total of 22 minutes with record review, exam, communication with the patient, communication with other providers, and documentation of this encounter.    Verbal consent was acquired by the patient to  use [employer] ambient listening note generation during this visit Yes       PLEASE NOTE: This dictation was created using voice recognition software. I have made every reasonable attempt to correct obvious errors, but I expect that there are errors of grammar and possibly content that I did not discover before finalizing the note.      Nayeli Finley, DO  Geriatric and Internal Medicine  [facility]

## 2024-09-05 ENCOUNTER — TELEPHONE (OUTPATIENT)
Dept: HEALTH INFORMATION MANAGEMENT | Facility: OTHER | Age: 84
End: 2024-09-05
Payer: MEDICARE

## 2024-09-05 NOTE — TELEPHONE ENCOUNTER
Pt called nurse RHODA to ask question regarding her blood pressure medication.  Pt states she had visit with PCP yesterday and blood pressure was 106/60. Pt reports she checked a reading this am and result was 111/66. Pt report she is not feeling dizzy. Reports she is tired.  Pt questioning if she should continue to take her losartan 25 mg tonight.  Pt also takes torsemide 20 mg daily.  Update to PCP.     Plan:  Nurse will route message to PCP. Pt did agree to participate in CCM program. Nurse will contact pt tomorrow to complete intake assessment.     Nurse spoke to PCP.  Recommendation pt can decrease losartan to 1/2 tablet tonight.  Continue to monitor blood pressure readings. Nurse provided pt with PCP recommendation.

## 2024-09-06 ENCOUNTER — PATIENT OUTREACH (OUTPATIENT)
Dept: HEALTH INFORMATION MANAGEMENT | Facility: OTHER | Age: 84
End: 2024-09-06
Payer: MEDICARE

## 2024-09-06 DIAGNOSIS — I10 PRIMARY HYPERTENSION: ICD-10-CM

## 2024-09-06 DIAGNOSIS — E78.5 DYSLIPIDEMIA: ICD-10-CM

## 2024-09-06 DIAGNOSIS — R60.0 BILATERAL LEG EDEMA: ICD-10-CM

## 2024-09-06 NOTE — PROGRESS NOTES
Nurse CM outreach call to complete intake assessment for CCM program.  Pt has agreed to participate in services. Pt had recent visit with PCP after ER visit for acute bronchospasms.  Pt reports history of allergies.  Pt reports she has been feeling better since being on allergy medication, azelastine spray.  Pt was previously on prednisone for bronchospasms. Pt was recently referred to allergist. Pt will follow-up with referral and schedule appt once authorized. Pt reports history of HTN.  Pt currently taking Losartan for HTN.  Reports blood pressure this am was 119/69. Pt also takes torsemide 20 mg daily for lower extremity edema.  Pt has history of dyslipidemia.  Last lipid panel in normal range.  Pt reports she is now doing better and has resumed much of her normal routine activities. Pt lives with her spouse in a one level apartment. Pt reports supportive family members that live in the area.       INITIAL CARE MANAGEMENT CARE PLAN/ASSESSMENT       Medication Self-Management Goals:  Continue to take medications as directed     Reviewed medications listed below with patient.      Current Outpatient Medications:     torsemide (DEMADEX) 20 MG Tab, Take 1 Tablet by mouth every day., Disp: 100 Tablet, Rfl: 3    potassium chloride ER (KLOR-CON) 10 MEQ tablet, Take 1 Tablet by mouth every day., Disp: 100 Tablet, Rfl: 3    Cholecalciferol (D3 PO), Take 1 Tablet by mouth every day., Disp: , Rfl:     Zinc 30 MG Tab, Take 1 Tablet by mouth every day., Disp: , Rfl:     fexofenadine (ALLEGRA) 60 MG Tab, Take 60 mg by mouth every day., Disp: , Rfl:     azelastine (ASTELIN) 137 MCG/SPRAY nasal spray, Administer 1 Spray into affected nostril(S) 2 times a day., Disp: 30 mL, Rfl: 0    rosuvastatin (CRESTOR) 5 MG Tab, Take 1 Tablet by mouth every evening., Disp: 100 Tablet, Rfl: 3    ipratropium-albuterol (DUONEB) 0.5-2.5 (3) MG/3ML nebulizer solution, Take 3 mL by nebulization 4 times a day., Disp: 90 mL, Rfl: 11     Ibuprofen-Acetaminophen (ADVIL DUAL ACTION) 125-250 MG Tab, Take 2 Tablets by mouth every 6 hours as needed (pain). Indications: Arthritis, Pain, Disp: , Rfl:     therapeutic multivitamin-minerals (THERAGRAN-M) Tab, Take 1 Tablet by mouth every day., Disp: , Rfl:     levothyroxine (SYNTHROID) 25 MCG Tab, Take 1 Tablet by mouth every morning on an empty stomach., Disp: 100 Tablet, Rfl: 3    losartan (COZAAR) 25 MG Tab, Take 1 Tablet by mouth every day., Disp: 100 Tablet, Rfl: 3    albuterol 108 (90 Base) MCG/ACT Aero Soln inhalation aerosol, Inhale 2 Puffs every 6 hours as needed for Shortness of Breath., Disp: 54 g, Rfl: 3    esomeprazole (NEXIUM) 40 MG delayed-release capsule, Take 1 Capsule by mouth every morning before breakfast. (Patient taking differently: Take 40 mg by mouth 1 time a day as needed (stomach). Indications: Heartburn), Disp: 100 Capsule, Rfl: 3           Physical/Functional/Environmental Status:     Activities of Daily Living:  Bathing: independent   Dressing: independent  Grooming: independent  Mouth Care: independent  Toileting: independent  Climbing a Flight of Stairs: independent    Independent Activities of Daily Living:  Shopping: independent  Cooking: independent  Managing Medications: independent  Using the phone and looking up numbers: independent  Driving or using public transportation: independent  Managing Finances: independent        9/6/2024    11:18 AM 9/6/2024    11:22 AM   STEADI Fall Risk   STEADI Risk for Falling Score  1   One or more falls in the last year No No   Advised to use a cane or walker to get around safely  No   Feels unsteady when walking  No   Steadies self on furniture while walking at home  No   Worried about falling  No   Needs to push with hands when rising from a chair  No   Has trouble stepping up onto a curb / using stairs  No   Often has to rush to the toilet  No   Has lost some feeling in feet  No   Takes medicine that makes him/her feel lightheaded or  more tired than usual  Yes   Takes medicine to sleep or improve mood  No   Often feels sad or depressed  No         Goal:  Maintain safety and prevent falls    Social Determinants of Health       Financial Status:      Financial Resource Strain: Low Risk  (3/14/2024)    Overall Financial Resource Strain (CARDIA)     Difficulty of Paying Living Expenses: Not hard at all         Referred to CHW/SW:  NA       Transportation Status:      Transportation Needs: No Transportation Needs (7/14/2024)    PRAPARE - Transportation     Lack of Transportation (Medical): No     Lack of Transportation (Non-Medical): No        Referred to CHW/SW:  NA      Food Insecurity:      Food Insecurity: No Food Insecurity (7/14/2024)    Hunger Vital Sign     Worried About Running Out of Food in the Last Year: Never true     Ran Out of Food in the Last Year: Never true        Referred to CHW/SW:  NA       Housing Status:     Housing Stability: Low Risk  (7/14/2024)    Housing Stability Vital Sign     Unable to Pay for Housing in the Last Year: No     Number of Places Lived in the Last Year: 1     Unstable Housing in the Last Year: No        Referred to CHW/SW:  NA      Social Connections:     Social Connections: Not on file        Referred to CHW/SW:  NA      Mental/Behavioral/Psychosocial Status:        1/2/2024     1:00 PM 3/14/2024     9:00 AM 7/14/2024    10:58 PM   Depression Screen (PHQ-2/PHQ-9)   PHQ-2 Total Score   0   PHQ-2 Total Score 0 0        Interpretation of PHQ-9 Total Score   Score Severity   1-4 No Depression   5-9 Mild Depression   10-14 Moderate Depression   15-19 Moderately Severe Depression   20-27 Severe Depression         Review of Benefits    Do you have any questions about your benefits?  No /Pt reports she is working with representative at [facility]    Phone Number and Website provided for questions:  Pt reports she has been working with [facility] representative    [facility]:  [phone]    [email or web address]      Chronic Care Management Care Plan         Active Plans       Hypertension       Lifestyle Choices    Learn to Manage Calories  0 of 6   High Blood Pressure    Establish Plan for Regular Lab Work  0 of 4    Establish Regular Follow-Ups with PCP  0 of 3   Med Adherence    Consistently take Medications as Prescribed  0 of 4    Establish resources to assist with medical costs  0 of 1   Patient is Inactive    Exercise a designated amount of time daily  0 of 3   Patient is Hypertensive      0 of 8                             Discussion of Self Management of Care:   Pt will continue to remain active.  Make healthy food choices.      Barriers to Goals:  History of lower extremity edema.  Intermittent dizziness at times.   Next Scheduled patient outreach: One month  Nurse Care Coordinator:  Maritza Pandey  [phone]

## 2024-10-14 ENCOUNTER — PATIENT OUTREACH (OUTPATIENT)
Dept: HEALTH INFORMATION MANAGEMENT | Facility: OTHER | Age: 84
End: 2024-10-14
Payer: MEDICARE

## 2024-10-14 DIAGNOSIS — R60.0 BILATERAL LEG EDEMA: ICD-10-CM

## 2024-10-14 DIAGNOSIS — I10 PRIMARY HYPERTENSION: ICD-10-CM

## 2024-10-14 DIAGNOSIS — L98.9 SKIN LESION OF FACE: ICD-10-CM

## 2024-10-14 DIAGNOSIS — E78.5 DYSLIPIDEMIA: ICD-10-CM

## 2024-10-15 PROCEDURE — RXMED WILLOW AMBULATORY MEDICATION CHARGE: Performed by: INTERNAL MEDICINE

## 2024-10-22 ENCOUNTER — PHARMACY VISIT (OUTPATIENT)
Dept: PHARMACY | Facility: MEDICAL CENTER | Age: 84
End: 2024-10-22
Payer: COMMERCIAL

## 2024-10-25 NOTE — MR AVS SNAPSHOT
Reina Contreras Tammy   2017 2:40 PM   Office Visit   MRN: [id number]    Department:  [facility]   Dept Phone:  [phone]    Description:  Female : 1940   Provider:  Terrie Muller PA-C           Reason for Visit     Knee Pain Left knee; Pain x 4 days; something popped in knee cap      Allergies as of 2017     Allergen Noted Reactions    Penicillins 2016       Sulfa Drugs 2016         You were diagnosed with     Acute pain of left knee   [[id number]]         Vital Signs     Blood Pressure Pulse Temperature Oxygen Saturation Breastfeeding? Smoking Status    140/80 mmHg 53 36.4 °C (97.5 °F) 95% No Never Smoker       Basic Information     Date Of Birth Sex Race Ethnicity Preferred Language    1940 Female White Non- English      Your appointments     Sep 11, 2017  2:40 PM   [facility]    [address] [place] [id number] [phone]           You will be receiving a confirmation call a few days before your appointment from our automated call confirmation system.              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   2016 - Present    Elevated BP without diagnosis of hypertension R03.0   2/3/2017 - Present      Health Maintenance        Date Due Completion Dates    BONE DENSITY 2007 ---    MAMMOGRAM 2017 (Originally 1982) ---    IMM PNEUMOCOCCAL 65+ (ADULT) LOW/MEDIUM RISK SERIES (1 of 2 - PCV13) 2017 (Originally 2007) ---    IMM DTaP/Tdap/Td Vaccine (1 - Tdap) 2017 (Originally 1961) ---    IMM ZOSTER VACCINE 2017 (Originally 2002) ---    COLON CANCER SCREENING ANNUAL FIT 1/3/2018 1/3/2017    10/25/2024      Iglesia Shore   [address]  [place] [address]      Dear Colleague,    Thank you for referring your patient, Iglesia Shore, to the [facility]. Please see a copy of my visit note below.    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     A 1.6 cm RLL nodule was discovered on low-dose screening lung CT (22), located in the paraspinal region. Staging PET/CT (22) demonstrated a SUV max 2.2  FDG uptake associated with this nodule and no other findings. She also had an unremarkable brain MRI (22). She completed mediastinal staging with bronchoscopy and EBUS (22). FNA of a 4R node and a 11R node were negative. She was medically inoperable and underwent SBRT(EOT 2022).    The patient  also underwent a reconstructive bowel surgery on 2023.  She has had a bowel obstruction for about a month with only small amounts passing through. The patient noticed some blood passing after surgery. The patient is due for a follow up.    A follow up CT of chest (2023), showed an interval increased size of the previously seen at medial right lower lobe pulmonary nodule which now measures 1.9 x 0.6 cm, previously 1 x 0.5 cm. However, a follow up PET/CT (2023) showed no evidence of disease.    Chest CT(2024) showed:  1.  Focal scarring right lower lobe superior segment along the             Current Immunizations     No immunizations on file.      Below and/or attached are the medications your provider expects you to take. Review all of your home medications and newly ordered medications with your provider and/or pharmacist. Follow medication instructions as directed by your provider and/or pharmacist. Please keep your medication list with you and share with your provider. Update the information when medications are discontinued, doses are changed, or new medications (including over-the-counter products) are added; and carry medication information at all times in the event of emergency situations     Allergies:  PENICILLINS - (reactions not documented)     SULFA DRUGS - (reactions not documented)               Medications  Valid as of: June 12, 2017 -  4:04 PM    Generic Name Brand Name Tablet Size Instructions for use    Levothyroxine Sodium (Tab) SYNTHROID 75 MCG TAKE ONE TABLET BY MOUTH EVERY MORNING ON AN EMPTY STOMACH        Pantoprazole Sodium (Tablet Delayed Response) PROTONIX 40 MG TAKE ONE TABLET BY MOUTH DAILY        Simvastatin (Tab) ZOCOR 10 MG TAKE ONE TABLET BY MOUTH EVERY EVENING        .                 Medicines prescribed today were sent to:     [facility] #[id number] [place], [place] - [address] [address]    Phone: [phone] Fax: [phone]    Open 24 Hours?: No      Medication refill instructions:       If your prescription bottle indicates you have medication refills left, it is not necessary to call your provider’s office. Please contact your pharmacy and they will refill your medication.    If your prescription bottle indicates you do not have any refills left, you may request refills at any time through one of the following ways: The online [employer] system (except Urgent Care), by calling your provider’s office, or by asking your pharmacy to contact your provider’s office with a refill request. Medication refills are  "mediastinal pleura near a metallic fiducial. No new nodules    2.  Unchanged emphysema and chronic airway wall thickening consistent with lung and airway manifestations of COPD.  3.  Moderate to severe three-vessel atheromatous coronary calcifications.    4/19/24 CT Chest  MPRESSION:   1.  Unchanged focal scarring in the medial right lower lobe and near a fiducial marker.  2.  New areas of patchy and nodular consolidation within the anterior right upper lobe and left upper lobe suggestive of an infectious/inflammatory process.  3.  Severe coronary artery calcifications.    7/19/24 CT Chest  1. Unchanged focal fibrosis adjacent to metallic fiducial in the mediastinal pleura right lower lobe superior segment. No findings to suggest residual or recurrent tumor in the chest.  2.  Severe atheromatous coronary calcifications.  3.  Emphysema and central airway wall thickening. No superimposed acute process    10/23/24  FINDINGS:   LUNGS AND PLEURA: There are scattered 1 to 3 mm stable pulmonary nodules including right upper lobe, image 113 series 4. Posttreatment changes in the medial right lower lobe adjacent to the fiducial marker are stable in appearance. Underlying changes of   centrilobular. Stable nodular biapical pleural-parenchymal scarring.  MEDIASTINUM/AXILLAE: No significant mediastinal or hilar adenopathy.  CORONARY ARTERY CALCIFICATION: Severe.  UPPER ABDOMEN: Nonobstructing nephrolithiasis.  MUSCULOSKELETAL: No suspicious lytic or blastic lesions.                                                                 IMPRESSION:   1.  Stable findings without CT evidence of local recurrence or metastatic disease in the chest.    She has JOHNSON but is stable. She is able to walk 50 -100 feet before stopping due to \"claudications/neuropathy.\"   She is doing pulmomonary therapy twice weekly. This tires her out but she knows its good for her.         Pre SBRT 7/22/2022   6M post SBRT (2/13/23)       12M post (8/11/23)       "      17M post (1/12/24)     IMPRESSION: cNED     RECOMMENDATIONS: I reviewed the CT scan  By my review RLL is unchanged and reflects post radiation changes. Smoking cessation discussed. Smoking 1/2 ppd, not ready to quit  I recommend a F/U in 4 mo.     GHAZALA Rader  Department of Radiation Oncology  [facility]     10 minutes on the phone with the patient  1:00 to 1:25 pm      Again, thank you for allowing me to participate in the care of your patient.        Sincerely,        Macarena Martinez PA-C   processed only during regular business hours and may not be available until the next business day. Your provider may request additional information or to have a follow-up visit with you prior to refilling your medication.   *Please Note: Medication refills are assigned a new Rx number when refilled electronically. Your pharmacy may indicate that no refills were authorized even though a new prescription for the same medication is available at the pharmacy. Please request the medicine by name with the pharmacy before contacting your provider for a refill.        Your To Do List     Future Labs/Procedures Complete By Expires    DX-KNEE 3 VIEWS LEFT  As directed 6/12/2018         DEMANDIT Access Code: Activation code not generated  Current DEMANDIT Status: Active

## 2024-11-19 ENCOUNTER — PATIENT OUTREACH (OUTPATIENT)
Dept: HEALTH INFORMATION MANAGEMENT | Facility: OTHER | Age: 84
End: 2024-11-19
Payer: MEDICARE

## 2024-11-19 DIAGNOSIS — R60.0 BILATERAL LEG EDEMA: ICD-10-CM

## 2024-11-19 DIAGNOSIS — I10 PRIMARY HYPERTENSION: ICD-10-CM

## 2024-11-19 NOTE — PROGRESS NOTES
Nurse CM outreach call to pt for monthly CCM assessment.  Pt answered telephone.    Assessment    Pt reports she has been feeling well.  Reports she did recently get over a respiratory infection.  States she had a cough that has improved. Reports she used her nebulizer machine as instructed. Reports she does get short of breath with exertion. Reports this is not a new symptom. Pt reports she continues to take torsemide for lower extremity edema. Reports edema is stable and no worsening problems. Pt has history of HTN. Last blood pressure in clinic was 106/60. Pt reports she continues to take losartan as instructed. Pt reports she hasn't scheduled appt with dermatology but will do so after the first of the year.     Education and Self-Management of Care    Continue to monitor symptoms.  Notify nurse CM if needing sooner appt. Monitor lower extremities for any worsening edema.  Continue to monitor blood pressure readings. Reviewed upcoming appts.     Plan of Care and Goals    Reviewed care plan and goals  Goal:  Increase strength, maintain blood pressure in normal range. Pt would like to lose weight.   Barriers: history of lower extremity edema  Interventions: Fall precautions, increase activity as tolerated, follow heart healthy meal plan.     Start Date: 9/6/24      Barriers:    Chronic health conditions    Progress:    Continue to work on progress    Next outreach: One month  Nurse Care Coordinator:  Maritza Pandey  [phone]

## 2024-11-19 NOTE — CARE PLAN
Problem: Knowledge deficit of hypertension  Goal: Demonstrate Knowledge of Hypertension/long term goal  Outcome: Progressing

## 2024-12-10 ENCOUNTER — HOSPITAL ENCOUNTER (OUTPATIENT)
Dept: LAB | Facility: MEDICAL CENTER | Age: 84
End: 2024-12-10
Attending: INTERNAL MEDICINE
Payer: MEDICARE

## 2024-12-10 DIAGNOSIS — R60.0 EDEMA OF LEFT FOOT: ICD-10-CM

## 2024-12-10 DIAGNOSIS — R73.03 PREDIABETES: ICD-10-CM

## 2024-12-10 LAB
BASOPHILS # BLD AUTO: 0.8 % (ref 0–1.8)
BASOPHILS # BLD: 0.04 K/UL (ref 0–0.12)
EOSINOPHIL # BLD AUTO: 0.27 K/UL (ref 0–0.51)
EOSINOPHIL NFR BLD: 5.6 % (ref 0–6.9)
ERYTHROCYTE [DISTWIDTH] IN BLOOD BY AUTOMATED COUNT: 45.5 FL (ref 35.9–50)
EST. AVERAGE GLUCOSE BLD GHB EST-MCNC: 148 MG/DL
HBA1C MFR BLD: 6.8 % (ref 4–5.6)
HCT VFR BLD AUTO: 42.7 % (ref 37–47)
HGB BLD-MCNC: 14.6 G/DL (ref 12–16)
IMM GRANULOCYTES # BLD AUTO: 0.01 K/UL (ref 0–0.11)
IMM GRANULOCYTES NFR BLD AUTO: 0.2 % (ref 0–0.9)
LYMPHOCYTES # BLD AUTO: 2.28 K/UL (ref 1–4.8)
LYMPHOCYTES NFR BLD: 47.1 % (ref 22–41)
MCH RBC QN AUTO: 31.9 PG (ref 27–33)
MCHC RBC AUTO-ENTMCNC: 34.2 G/DL (ref 32.2–35.5)
MCV RBC AUTO: 93.4 FL (ref 81.4–97.8)
MONOCYTES # BLD AUTO: 0.33 K/UL (ref 0–0.85)
MONOCYTES NFR BLD AUTO: 6.8 % (ref 0–13.4)
NEUTROPHILS # BLD AUTO: 1.91 K/UL (ref 1.82–7.42)
NEUTROPHILS NFR BLD: 39.5 % (ref 44–72)
NRBC # BLD AUTO: 0 K/UL
NRBC BLD-RTO: 0 /100 WBC (ref 0–0.2)
PLATELET # BLD AUTO: 129 K/UL (ref 164–446)
PMV BLD AUTO: 12 FL (ref 9–12.9)
RBC # BLD AUTO: 4.57 M/UL (ref 4.2–5.4)
WBC # BLD AUTO: 4.8 K/UL (ref 4.8–10.8)

## 2024-12-10 PROCEDURE — 83880 ASSAY OF NATRIURETIC PEPTIDE: CPT

## 2024-12-10 PROCEDURE — 80053 COMPREHEN METABOLIC PANEL: CPT

## 2024-12-10 PROCEDURE — 84443 ASSAY THYROID STIM HORMONE: CPT

## 2024-12-10 PROCEDURE — 84439 ASSAY OF FREE THYROXINE: CPT

## 2024-12-10 PROCEDURE — 36415 COLL VENOUS BLD VENIPUNCTURE: CPT

## 2024-12-10 PROCEDURE — 85025 COMPLETE CBC W/AUTO DIFF WBC: CPT

## 2024-12-10 PROCEDURE — 83036 HEMOGLOBIN GLYCOSYLATED A1C: CPT

## 2024-12-11 ENCOUNTER — APPOINTMENT (OUTPATIENT)
Dept: MEDICAL GROUP | Facility: PHYSICIAN GROUP | Age: 84
End: 2024-12-11
Payer: MEDICARE

## 2024-12-11 LAB
ALBUMIN SERPL BCP-MCNC: 4.4 G/DL (ref 3.2–4.9)
ALBUMIN/GLOB SERPL: 1.6 G/DL
ALP SERPL-CCNC: 89 U/L (ref 30–99)
ALT SERPL-CCNC: 33 U/L (ref 2–50)
ANION GAP SERPL CALC-SCNC: 10 MMOL/L (ref 7–16)
AST SERPL-CCNC: 47 U/L (ref 12–45)
BILIRUB SERPL-MCNC: 0.9 MG/DL (ref 0.1–1.5)
BUN SERPL-MCNC: 16 MG/DL (ref 8–22)
CALCIUM ALBUM COR SERPL-MCNC: 9.5 MG/DL (ref 8.5–10.5)
CALCIUM SERPL-MCNC: 9.8 MG/DL (ref 8.5–10.5)
CHLORIDE SERPL-SCNC: 104 MMOL/L (ref 96–112)
CO2 SERPL-SCNC: 29 MMOL/L (ref 20–33)
CREAT SERPL-MCNC: 0.78 MG/DL (ref 0.5–1.4)
GFR SERPLBLD CREATININE-BSD FMLA CKD-EPI: 76 ML/MIN/1.73 M 2
GLOBULIN SER CALC-MCNC: 2.7 G/DL (ref 1.9–3.5)
GLUCOSE SERPL-MCNC: 108 MG/DL (ref 65–99)
NT-PROBNP SERPL IA-MCNC: <36 PG/ML (ref 0–125)
POTASSIUM SERPL-SCNC: 4.1 MMOL/L (ref 3.6–5.5)
PROT SERPL-MCNC: 7.1 G/DL (ref 6–8.2)
SODIUM SERPL-SCNC: 143 MMOL/L (ref 135–145)
T4 FREE SERPL-MCNC: 0.95 NG/DL (ref 0.93–1.7)
TSH SERPL-ACNC: 5.2 UIU/ML (ref 0.35–5.5)

## 2024-12-17 ENCOUNTER — APPOINTMENT (OUTPATIENT)
Dept: MEDICAL GROUP | Facility: PHYSICIAN GROUP | Age: 84
End: 2024-12-17
Payer: MEDICARE

## 2024-12-18 ENCOUNTER — TELEPHONE (OUTPATIENT)
Dept: MEDICAL GROUP | Facility: PHYSICIAN GROUP | Age: 84
End: 2024-12-18
Payer: MEDICARE

## 2024-12-18 ENCOUNTER — PHARMACY VISIT (OUTPATIENT)
Dept: PHARMACY | Facility: MEDICAL CENTER | Age: 84
End: 2024-12-18
Payer: COMMERCIAL

## 2024-12-18 DIAGNOSIS — J40 BRONCHITIS: ICD-10-CM

## 2024-12-18 PROCEDURE — RXMED WILLOW AMBULATORY MEDICATION CHARGE: Performed by: INTERNAL MEDICINE

## 2024-12-18 RX ORDER — AZITHROMYCIN 250 MG/1
250-500 TABLET, FILM COATED ORAL DAILY
Qty: 6 TABLET | Refills: 0 | Status: SHIPPED | OUTPATIENT
Start: 2024-12-18

## 2024-12-18 NOTE — TELEPHONE ENCOUNTER
1. Caller Name: Reina Munoz                          Call Back Number: [phone] (home)         How would the patient prefer to be contacted with a response: Phone call OK to leave a detailed message    Patient said thank you for Jay's medicine, she is having same symptoms can she get some antibiotic as well please

## 2024-12-20 ENCOUNTER — PATIENT OUTREACH (OUTPATIENT)
Dept: HEALTH INFORMATION MANAGEMENT | Facility: OTHER | Age: 84
End: 2024-12-20
Payer: MEDICARE

## 2024-12-20 DIAGNOSIS — R60.0 BILATERAL LEG EDEMA: ICD-10-CM

## 2024-12-20 DIAGNOSIS — K21.9 GASTROESOPHAGEAL REFLUX DISEASE WITHOUT ESOPHAGITIS: ICD-10-CM

## 2024-12-20 DIAGNOSIS — I10 PRIMARY HYPERTENSION: ICD-10-CM

## 2024-12-20 NOTE — CARE PLAN
Problem: Knowledge deficit of hypertension  Goal: Demonstrate Knowledge of Hypertension/long term goal  Outcome: Progressing  Intervention: Define hypertension in lay terms  Note: Pt monitoring blood pressure readings at home    Problem: Knowledge deficit of factors contributing to hypertension  Goal: Demonstrate factors that can contribute to high blood pressure/long term goal  Outcome: Progressing  Note: Patient has verbalized understanding of factors contributing to high blood pressure   Intervention: Educate patient on role of sodium in diet  Intervention: Educate patient on role of weight management

## 2024-12-23 ENCOUNTER — PATIENT OUTREACH (OUTPATIENT)
Dept: HEALTH INFORMATION MANAGEMENT | Facility: OTHER | Age: 84
End: 2024-12-23
Payer: MEDICARE

## 2024-12-23 RX ORDER — ESOMEPRAZOLE MAGNESIUM 40 MG/1
40 CAPSULE, DELAYED RELEASE ORAL
Qty: 100 CAPSULE | Refills: 3 | Status: SHIPPED | OUTPATIENT
Start: 2024-12-23

## 2025-01-09 ENCOUNTER — APPOINTMENT (OUTPATIENT)
Dept: MEDICAL GROUP | Facility: PHYSICIAN GROUP | Age: 85
End: 2025-01-09
Payer: MEDICARE

## 2025-01-09 VITALS
HEIGHT: 69 IN | DIASTOLIC BLOOD PRESSURE: 70 MMHG | SYSTOLIC BLOOD PRESSURE: 122 MMHG | WEIGHT: 202 LBS | HEART RATE: 57 BPM | RESPIRATION RATE: 16 BRPM | BODY MASS INDEX: 29.92 KG/M2 | OXYGEN SATURATION: 96 % | TEMPERATURE: 97.4 F

## 2025-01-09 DIAGNOSIS — Z12.31 ENCOUNTER FOR SCREENING MAMMOGRAM FOR BREAST CANCER: ICD-10-CM

## 2025-01-09 DIAGNOSIS — J45.40 MODERATE PERSISTENT REACTIVE AIRWAY DISEASE WITHOUT COMPLICATION: ICD-10-CM

## 2025-01-09 DIAGNOSIS — J30.2 SEASONAL ALLERGIES: ICD-10-CM

## 2025-01-09 DIAGNOSIS — R05.3 CHRONIC COUGH: ICD-10-CM

## 2025-01-09 DIAGNOSIS — R73.03 PREDIABETES: ICD-10-CM

## 2025-01-09 DIAGNOSIS — I27.20 PULMONARY HYPERTENSION (HCC): ICD-10-CM

## 2025-01-09 DIAGNOSIS — L98.9 SKIN LESION: ICD-10-CM

## 2025-01-09 DIAGNOSIS — R25.2 LEG CRAMP: ICD-10-CM

## 2025-01-09 PROBLEM — J45.909 REACTIVE AIRWAY DISEASE: Status: ACTIVE | Noted: 2025-01-09

## 2025-01-09 LAB
HBA1C MFR BLD: 6.1 % (ref ?–5.8)
POCT INT CON NEG: NEGATIVE
POCT INT CON POS: POSITIVE

## 2025-01-09 PROCEDURE — 3078F DIAST BP <80 MM HG: CPT | Performed by: INTERNAL MEDICINE

## 2025-01-09 PROCEDURE — 3074F SYST BP LT 130 MM HG: CPT | Performed by: INTERNAL MEDICINE

## 2025-01-09 PROCEDURE — 99214 OFFICE O/P EST MOD 30 MIN: CPT | Performed by: INTERNAL MEDICINE

## 2025-01-09 RX ORDER — FLUTICASONE FUROATE, UMECLIDINIUM BROMIDE AND VILANTEROL TRIFENATATE 100; 62.5; 25 UG/1; UG/1; UG/1
1 POWDER RESPIRATORY (INHALATION) DAILY
Qty: 60 EACH | Refills: 11 | Status: SHIPPED | OUTPATIENT
Start: 2025-01-09 | End: 2025-01-27

## 2025-01-09 ASSESSMENT — PATIENT HEALTH QUESTIONNAIRE - PHQ9: CLINICAL INTERPRETATION OF PHQ2 SCORE: 0

## 2025-01-09 ASSESSMENT — FIBROSIS 4 INDEX: FIB4 SCORE: 5.2

## 2025-01-10 PROCEDURE — RXMED WILLOW AMBULATORY MEDICATION CHARGE: Performed by: INTERNAL MEDICINE

## 2025-01-10 NOTE — PROGRESS NOTES
Subjective:   Chief Complaint/History of Present Illness:  Reina Munoz is a 84 y.o. female established patient who presents today to discuss medical problems as listed below. Reina is unaccompanied for today's visit.    History of Present Illness  The patient presents for evaluation of cough, wheezing, skin lesions, and leg cramps.    She reports experiencing a persistent cough and wheezing, which she has been managing with her nebulizer. She is uncertain if these symptoms are indicative of pneumonia or an allergic reaction. She has been attempting to contact the [facility] for further evaluation. She has not been using any maintenance inhalers and requires a refill of her nebulizer tubes. She also reports experiencing shortness of breath, with her oxygen saturation levels occasionally dropping to 94 or 96. She has not been using any maintenance inhalers and requires a refill of her nebulizer tubes.    She has a lesion located above her left eyebrow, which was previously evaluated by Dr. Lynn. He informed her that removal of the lesion would result in scarring, so she opted against the procedure. Additionally, she has another lesion that is causing itching. She has scheduled an appointment with Dr. Eagle for laser treatment of these lesions.    She has been experiencing leg cramps, which she attributes to her medication, Crestor. She has been managing these cramps with additional calcium supplements. She reports that the cramps occur during the day and at night, disrupting her sleep.    MEDICATIONS  Current: Mucinex, albuterol/ipratropium, Crestor    IMMUNIZATIONS  She has received the influenza vaccine.         Current Medications:  Current Outpatient Medications Ordered in Epic   Medication Sig Dispense Refill    fluticasone-umeclidinium-vilanterol (TRELEGY ELLIPTA) 100-62.5-25 mcg/act inhaler Inhale 1 Puff every day. 28 Each 11    esomeprazole (NEXIUM) 40 MG delayed-release capsule  "Take 1 Capsule by mouth every morning before breakfast. 100 Capsule 3    azithromycin (ZITHROMAX) 250 MG Tab Take 1-2 Tablets by mouth every day as directed. Take 2 pills on day 1 and 1 pill daily thereafter until complete 6 Tablet 0    torsemide (DEMADEX) 20 MG Tab Take 1 Tablet by mouth every day. 100 Tablet 3    potassium chloride ER (KLOR-CON) 10 MEQ tablet Take 1 Tablet by mouth every day. 100 Tablet 3    Cholecalciferol (D3 PO) Take 1 Tablet by mouth every day.      Zinc 30 MG Tab Take 1 Tablet by mouth every day.      fexofenadine (ALLEGRA) 60 MG Tab Take 60 mg by mouth every day.      azelastine (ASTELIN) 137 MCG/SPRAY nasal spray Administer 1 Spray into affected nostril(S) 2 times a day. 30 mL 0    rosuvastatin (CRESTOR) 5 MG Tab Take 1 Tablet by mouth every evening. 100 Tablet 3    ipratropium-albuterol (DUONEB) 0.5-2.5 (3) MG/3ML nebulizer solution Take 3 mL by nebulization 4 times a day. 90 mL 11    Ibuprofen-Acetaminophen (ADVIL DUAL ACTION) 125-250 MG Tab Take 2 Tablets by mouth every 6 hours as needed (pain). Indications: Arthritis, Pain      therapeutic multivitamin-minerals (THERAGRAN-M) Tab Take 1 Tablet by mouth every day.      levothyroxine (SYNTHROID) 25 MCG Tab Take 1 Tablet by mouth every morning on an empty stomach. 100 Tablet 3    losartan (COZAAR) 25 MG Tab Take 1 Tablet by mouth every day. 100 Tablet 3    albuterol 108 (90 Base) MCG/ACT Aero Soln inhalation aerosol Inhale 2 Puffs every 6 hours as needed for Shortness of Breath. 54 g 3     No current [facility]-ordered facility-administered medications on file.          Objective:   Physical Exam:    Vitals: /70 (BP Location: Right arm, Patient Position: Sitting, BP Cuff Size: Adult)   Pulse (!) 57   Temp 36.3 °C (97.4 °F)   Resp 16   Ht 1.753 m (5' 9\")   Wt 91.6 kg (202 lb)   SpO2 96%    BMI: Body mass index is 29.83 kg/m².  Physical Exam  Constitutional:       General: She is not in acute distress.     Appearance: Normal appearance. " She is not ill-appearing.   HENT:      Right Ear: External ear normal.      Left Ear: External ear normal.   Eyes:      General: No scleral icterus.     Conjunctiva/sclera: Conjunctivae normal.   Cardiovascular:      Rate and Rhythm: Normal rate and regular rhythm.      Pulses: Normal pulses.   Pulmonary:      Effort: Pulmonary effort is normal. No respiratory distress.      Breath sounds: No wheezing or rhonchi.   Abdominal:      General: Bowel sounds are normal. There is no distension.      Palpations: Abdomen is soft.   Musculoskeletal:      Comments: Trace LLE edema, pretibial   Skin:     General: Skin is warm and dry.      Findings: No rash.   Neurological:      Gait: Gait normal.   Psychiatric:         Mood and Affect: Mood normal.         Behavior: Behavior normal.         Thought Content: Thought content normal.         Judgment: Judgment normal.           Results  Laboratory Studies  Red blood cells, white blood cells, and protein levels are normal. Lymphocytes were slightly elevated. Platelet count is slightly lower than baseline. Liver enzymes (ALT, AST, alkaline phosphatase) are either normal or almost back to normal. Kidney function is at 76%. Electrolytes, sodium, potassium, calcium levels are normal. Blood sugar was 108. A1c is 6.1.    Imaging  X-ray shows mild edema and fluid collection in both lungs, but no significant change from previous x-ray.    Assessment & Plan  1. Cough and wheezing.  2. Chronic allergies with reactive airway disease  Her symptoms are suggestive of asthma with reactive airway disease, characterized by inflammation and phlegm production. The coughing is likely due to irritation from fluid accumulation at the lung bases. Her oxygen requirements remain minimal, necessitating only 1 to 2 liters on the machine. A pulmonary function test conducted in May 2022 revealed no severe lung abnormalities or damage. A prescription for a maintenance inhaler will be provided, to be used once  or twice daily. She is advised to rinse her mouth post-inhalation to prevent fungal or other infections. A refill of her nebulizer tubes will also be provided. Trial Trelegy 100-62.5-25 mcg 1 puff daily. Continue duonebs as needed.    3. Pulmonary hypertension, RVSP 38 mm Hg  Chronic and ongoing problem, volume status improved on torsemide 20 mg daily and potassium 10 mEq daily, continue at this time.     4. Skin lesions on forehead.  The lesions appear benign, exhibiting smooth texture, light coloration, and well-defined borders. She has an appointment with Dr. Eagle for laser treatment to minimize pigmentation.    5. Leg cramps.  She is advised to continue her current regimen of rosuvastatin 5 mg, to be taken nightly. She is also advised to incorporate magnesium and calcium into her diet to alleviate leg cramps.     6. Prediabetes  Prior lab draw in December 2024 showed A1c of 6.8, likely false elevation, recheck in clinic 1 month later showed level back down to 6.1. continue with periodic observation to ensure stability.      Assessment and Plan:   Reina is a 84 y.o. female with the following:  Problem List Items Addressed This Visit       Leg cramp    Prediabetes    Relevant Orders    POCT  A1C (Completed)    Pulmonary hypertension (HCC)    Relevant Medications    fluticasone-umeclidinium-vilanterol (TRELEGY ELLIPTA) 100-62.5-25 mcg/act inhaler    Reactive airway disease    Relevant Medications    fluticasone-umeclidinium-vilanterol (TRELEGY ELLIPTA) 100-62.5-25 mcg/act inhaler    Seasonal allergies     Other Visit Diagnoses       Encounter for screening mammogram for breast cancer        Relevant Orders    MA-SCREENING MAMMO BILAT W/TOMOSYNTHESIS W/CAD    Skin lesion        Chronic cough                   RTC: Return in about 3 months (around 4/9/2025).    I spent a total of 31 minutes with record review, exam, communication with the patient, communication with other providers, and documentation of this  encounter.    Verbal consent was acquired by the patient to use [employer] ambient listening note generation during this visit Yes       PLEASE NOTE: This dictation was created using voice recognition software. I have made every reasonable attempt to correct obvious errors, but I expect that there are errors of grammar and possibly content that I did not discover before finalizing the note.      Nayeli Finley, DO  Geriatric and Internal Medicine  [facility]

## 2025-01-13 ENCOUNTER — PHARMACY VISIT (OUTPATIENT)
Dept: PHARMACY | Facility: MEDICAL CENTER | Age: 85
End: 2025-01-13
Payer: COMMERCIAL

## 2025-01-16 ENCOUNTER — OFFICE VISIT (OUTPATIENT)
Dept: DERMATOLOGY | Facility: IMAGING CENTER | Age: 85
End: 2025-01-16
Payer: MEDICARE

## 2025-01-16 DIAGNOSIS — L82.0 INFLAMED SEBORRHEIC KERATOSIS: ICD-10-CM

## 2025-01-16 DIAGNOSIS — L29.9 ITCHING: ICD-10-CM

## 2025-01-16 PROCEDURE — 99213 OFFICE O/P EST LOW 20 MIN: CPT | Mod: 25 | Performed by: DERMATOLOGY

## 2025-01-16 PROCEDURE — 17110 DESTRUCTION B9 LES UP TO 14: CPT | Performed by: DERMATOLOGY

## 2025-01-16 NOTE — PROGRESS NOTES
CC: Lesion above bilateral eyebrows    Subjective:  Previously seen patient here for Sks    HPI/location: Above Bilateral eyebrows  Time present: Approx 1yr  Painful lesion: No  Itching lesion: Yes  Enlarging lesion: Yes    Light side, can see/feel and reports desire to remove  Right side, can only feel itching at the moment, by report    History of skin cancer: No  History of precancers/actinic keratoses: No  History of biopsies:No  History of blistering/severe sunburns:No  Family history of skin cancer:Yes, Details: Father, unknown  Family history of atypical moles:No     ROS: no fevers/chills. + itch.  No cough  Relevant PMH: hypothyroidism; pleomorphic adenoma of parotid gland  Social: NS; grew up in [place]    PE: Gen:WDWN female in NAD. Focal exam: Skin:waxy papule superior left eyebrow. No notable superficial skin changes superior to right eyebrow    A/P:  SK/ISK, face:  -LN2 25 sec X 2 cycles  X1 lesion  -f/u if persists prn    Itching, face:  -moisturizer use advised    F/u PRN    I have reviewed medications relevant to my specialty.

## 2025-01-24 ENCOUNTER — PATIENT OUTREACH (OUTPATIENT)
Dept: HEALTH INFORMATION MANAGEMENT | Facility: OTHER | Age: 85
End: 2025-01-24
Payer: MEDICARE

## 2025-01-24 DIAGNOSIS — E78.5 DYSLIPIDEMIA: ICD-10-CM

## 2025-01-24 DIAGNOSIS — I10 PRIMARY HYPERTENSION: ICD-10-CM

## 2025-01-24 NOTE — PROGRESS NOTES
"Nurse CM outreach call to pt for monthly CCM assessment.    Reason for Follow-Up:    Monthly personal care management follow-up.    Current Health Status:    Pt following in personal care management for history of HTN, dyslipidemia.     Medical Updates:  Pt reports \"I came down with something\".  Pt reports she is recovering from a respiratory virus. Pt reports she felt like the Trelegy she was taking may have made her symptoms worse. Pt states she stopped the medication about a week ago. Reports she does have a follow-up with allergist in the near future. Pt reports she has been having a lose cough and some congestion.  Denies feeling short of breath. Reports pulse oximetry reading has been 96%. Pt reports she is eating and taking fluids.     Medication Updates:  Pt reports she discontinued the Trelegy inhaler.    Symptoms:  Reports having a loose cough. Reports some congestion with whitish colored secretions. Pt reports she does feel she is improving. Pt has contact number for [facility] if any worsening symptoms and needing to be seen.    Plan of Care Goals and Progress:    Goal 1. Improved blood pressure      Progress:  Last blood pressure in clinic in good range, 122/70      Barriers:  Chronic  health conditions     Interventions:  Continue to take medications as directed. Follow heart healthy, low sodium meal plan     Education:  Medication review, continue to work on following healthy meal plan. Monitor blood pressure readings.      Patient's Concerns and Feedback (Self Management of Care):     Pt reports no concerns at this time.     Next Steps:     Follow-Up Plan:  Follow-up in approximately 4 weeks, around February 2025      Appointments:  Reviewed appts     Contact Information:  Call [phone] with any questions or concerns.                                 "
room air

## 2025-01-24 NOTE — CARE PLAN
Problem: Knowledge deficit of hypertension  Goal: Demonstrate Knowledge of Hypertension/long term goal  Outcome: Progressing     Problem: Knowledge deficit of factors contributing to hypertension  Goal: Demonstrate factors that can contribute to high blood pressure/long term goal  Outcome: Progressing

## 2025-01-30 PROCEDURE — RXMED WILLOW AMBULATORY MEDICATION CHARGE: Performed by: INTERNAL MEDICINE

## 2025-01-30 PROCEDURE — RXMED WILLOW AMBULATORY MEDICATION CHARGE: Performed by: FAMILY MEDICINE

## 2025-01-31 ENCOUNTER — PHARMACY VISIT (OUTPATIENT)
Dept: PHARMACY | Facility: MEDICAL CENTER | Age: 85
End: 2025-01-31
Payer: COMMERCIAL

## 2025-02-11 PROCEDURE — RXMED WILLOW AMBULATORY MEDICATION CHARGE: Performed by: INTERNAL MEDICINE

## 2025-02-13 PROCEDURE — RXMED WILLOW AMBULATORY MEDICATION CHARGE: Performed by: OPHTHALMOLOGY

## 2025-02-14 ENCOUNTER — PHARMACY VISIT (OUTPATIENT)
Dept: PHARMACY | Facility: MEDICAL CENTER | Age: 85
End: 2025-02-14
Payer: COMMERCIAL

## 2025-02-18 ENCOUNTER — TELEPHONE (OUTPATIENT)
Dept: MEDICAL GROUP | Facility: PHYSICIAN GROUP | Age: 85
End: 2025-02-18
Payer: MEDICARE

## 2025-02-18 DIAGNOSIS — J40 BRONCHITIS: ICD-10-CM

## 2025-02-18 NOTE — TELEPHONE ENCOUNTER
"1612: Pt returned phone call: Pt states she has been feeling \"sick\". Coughing and congested. Pt says she took 40mg of \"water pill\", has used nebulizer, incentive spirometer, pt states she is still coughing a lot. Pt states she is short of breath on exertion.   Pt states her  has home health, and home health RN listened to her lungs. States HH nurse said she \"heard something\" on the left lung.   Pt didn't sound in distress, talking in full sentences without effort.   Pt declining ER or UC visit. Pt requested first available  appointment on 3/10.   Pt advised to go to ED if she experiences worsening SOB, CP, dizziness, or lightheadedness.   Answered all questions. Pt verbalized understanding. No further needs at this time.       1531: First attempt to reach patient. LVM with call back number.   "

## 2025-02-18 NOTE — TELEPHONE ENCOUNTER
1. Caller Name: Reina Munoz                          Call Back Number: [phone] (home)         How would the patient prefer to be contacted with a response: Phone call OK to leave a detailed message    Patient called and said she is still sick  Maybe needs antibiotics.   Will send this on to Consulting RN

## 2025-02-19 NOTE — TELEPHONE ENCOUNTER
Sent in Augmentin to the [facility] pharmacy for her to try, has good coverage for sinuses and respiratory issues.

## 2025-02-20 PROCEDURE — RXMED WILLOW AMBULATORY MEDICATION CHARGE: Performed by: INTERNAL MEDICINE

## 2025-02-21 ENCOUNTER — PHARMACY VISIT (OUTPATIENT)
Dept: PHARMACY | Facility: MEDICAL CENTER | Age: 85
End: 2025-02-21
Payer: COMMERCIAL

## 2025-02-24 PROCEDURE — RXMED WILLOW AMBULATORY MEDICATION CHARGE: Performed by: INTERNAL MEDICINE

## 2025-02-25 ENCOUNTER — PATIENT OUTREACH (OUTPATIENT)
Dept: HEALTH INFORMATION MANAGEMENT | Facility: OTHER | Age: 85
End: 2025-02-25
Payer: MEDICARE

## 2025-02-25 DIAGNOSIS — I10 PRIMARY HYPERTENSION: ICD-10-CM

## 2025-02-25 DIAGNOSIS — R74.01 TRANSAMINITIS: ICD-10-CM

## 2025-02-25 DIAGNOSIS — D69.6 THROMBOCYTOPENIA (HCC): ICD-10-CM

## 2025-02-25 DIAGNOSIS — E78.5 DYSLIPIDEMIA: ICD-10-CM

## 2025-02-25 DIAGNOSIS — D72.820 LYMPHOCYTOSIS: ICD-10-CM

## 2025-02-25 DIAGNOSIS — E03.9 ACQUIRED HYPOTHYROIDISM: ICD-10-CM

## 2025-02-25 NOTE — CARE PLAN
Problem: Knowledge deficit of hypertension  Goal: Demonstrate Knowledge of Hypertension/long term goal  Outcome: Progressing     Problem: Knowledge deficit of factors contributing to hypertension  Goal: Demonstrate factors that can contribute to high blood pressure/long term goal  Outcome: Progressing     Problem: Knowledge deficit of self-monitoring blood pressure  Goal: Demonstrate the elements of self-monitoring blood pressure/long term goal  Intervention: Educate on importance of self-monitoring blood pressure    Problem: Knowledge deficit of hyperlipidemia  Goal: Patient will demonstrate an understanding of hyperlipidemia and its management/long term goal  Outcome: Progressing

## 2025-02-25 NOTE — PROGRESS NOTES
2/25/25 8:55 am: Nurse RHODA outreach call to pt for monthly CCM assessment.  VM left requesting a return call to nurse at [phone].    2/25/25 10:15 am: Nurse RHODA received return call from pt.  Monthly CCM assessment completed.    Reason for Follow-Up:    Monthly CCM assessment    Current Health Status:    Pt following in personal care management for history of HTN, and dyslipidemia.      Quarterly assessment completed. No changes to current care plan. Pt will continue to work on keeping blood pressure in normal range.     Medical Updates:  Pt reports she has been on antibiotics for respiratory infection. Reports feeling much better. States her cough has improved as well as her wheezing. Pt reports pulse oximetry readings have been in th 90's. Reports last reading was 93%.     Pt has history of HTN. Currently not monitoring home blood pressure readings. Last blood pressure was 122/70 in clinic on 1/9/25. Pt reports she is taking losartan for blood pressure. Pt reports she also takes torsemide 20 mg daily for lower extremity edema.     Medication Updates:  Pt reports no changes to current medication list.     Symptoms:  Reports no active symptoms.    Plan of Care Goals and Progress:    Goal 1. Maintain normal blood pressure     Progress:  Last reading in clinic in normal range.      Barriers:  Chronic health conditions     Interventions:  Continue to work on following heart healthy meal plan, limit sodium in diet. Follow-up with PCP as scheduled.     Education:  Check blood pressure readings at home intermittently. Continue to remain active as tolerated. Take medications as directed.     Goal 2. Maintain lipid panel in normal range     Progress:  Last lipid panel completed 2/2024 was in normal range      Barriers:  Elevated BMI     Interventions:  Continue to work on following heart healthy meal plan, take Crestor as ordered by PCP.     Education:  Remain active as tolerated, follow-up with PCP as scheduled.   Follow-up with PCP for routine labs.      Patient's Concerns and Feedback (Self Management of Care):     Pt reports no concerns today. Pt monitoring symptoms.     Next Steps:     Follow-Up Plan:  Follow-up in approximately 4 weeks, around March 2025      Appointments:  4/4/25 at 5:00 with Dr. Finley     Contact Information:  Call [phone] with any questions or concerns.

## 2025-03-04 ENCOUNTER — PHARMACY VISIT (OUTPATIENT)
Dept: PHARMACY | Facility: MEDICAL CENTER | Age: 85
End: 2025-03-04
Payer: COMMERCIAL

## 2025-03-06 PROCEDURE — RXMED WILLOW AMBULATORY MEDICATION CHARGE: Performed by: OPHTHALMOLOGY

## 2025-03-10 ENCOUNTER — PHARMACY VISIT (OUTPATIENT)
Dept: PHARMACY | Facility: MEDICAL CENTER | Age: 85
End: 2025-03-10
Payer: COMMERCIAL

## 2025-03-12 ENCOUNTER — HOSPITAL ENCOUNTER (EMERGENCY)
Facility: MEDICAL CENTER | Age: 85
End: 2025-03-12
Attending: EMERGENCY MEDICINE
Payer: MEDICARE

## 2025-03-12 ENCOUNTER — APPOINTMENT (OUTPATIENT)
Dept: RADIOLOGY | Facility: MEDICAL CENTER | Age: 85
End: 2025-03-12
Attending: EMERGENCY MEDICINE
Payer: MEDICARE

## 2025-03-12 ENCOUNTER — PHARMACY VISIT (OUTPATIENT)
Dept: PHARMACY | Facility: MEDICAL CENTER | Age: 85
End: 2025-03-12
Payer: COMMERCIAL

## 2025-03-12 VITALS
OXYGEN SATURATION: 93 % | WEIGHT: 202.82 LBS | BODY MASS INDEX: 30.74 KG/M2 | RESPIRATION RATE: 22 BRPM | TEMPERATURE: 98 F | SYSTOLIC BLOOD PRESSURE: 198 MMHG | HEART RATE: 81 BPM | DIASTOLIC BLOOD PRESSURE: 101 MMHG | HEIGHT: 68 IN

## 2025-03-12 DIAGNOSIS — J44.1 ACUTE EXACERBATION OF CHRONIC OBSTRUCTIVE PULMONARY DISEASE (COPD) (HCC): ICD-10-CM

## 2025-03-12 DIAGNOSIS — J22 LOWER RESPIRATORY TRACT INFECTION: ICD-10-CM

## 2025-03-12 LAB
ALBUMIN SERPL BCP-MCNC: 4.3 G/DL (ref 3.2–4.9)
ALBUMIN/GLOB SERPL: 1.4 G/DL
ALP SERPL-CCNC: 96 U/L (ref 30–99)
ALT SERPL-CCNC: 43 U/L (ref 2–50)
ANION GAP SERPL CALC-SCNC: 12 MMOL/L (ref 7–16)
AST SERPL-CCNC: 48 U/L (ref 12–45)
BASOPHILS # BLD AUTO: 0.7 % (ref 0–1.8)
BASOPHILS # BLD: 0.04 K/UL (ref 0–0.12)
BILIRUB SERPL-MCNC: 0.8 MG/DL (ref 0.1–1.5)
BUN SERPL-MCNC: 15 MG/DL (ref 8–22)
CALCIUM ALBUM COR SERPL-MCNC: 9.6 MG/DL (ref 8.5–10.5)
CALCIUM SERPL-MCNC: 9.8 MG/DL (ref 8.4–10.2)
CHLORIDE SERPL-SCNC: 105 MMOL/L (ref 96–112)
CO2 SERPL-SCNC: 24 MMOL/L (ref 20–33)
CREAT SERPL-MCNC: 0.94 MG/DL (ref 0.5–1.4)
EKG IMPRESSION: NORMAL
EOSINOPHIL # BLD AUTO: 0.43 K/UL (ref 0–0.51)
EOSINOPHIL NFR BLD: 7.9 % (ref 0–6.9)
ERYTHROCYTE [DISTWIDTH] IN BLOOD BY AUTOMATED COUNT: 47 FL (ref 35.9–50)
FLUAV RNA SPEC QL NAA+PROBE: NEGATIVE
FLUBV RNA SPEC QL NAA+PROBE: NEGATIVE
GFR SERPLBLD CREATININE-BSD FMLA CKD-EPI: 60 ML/MIN/1.73 M 2
GLOBULIN SER CALC-MCNC: 3 G/DL (ref 1.9–3.5)
GLUCOSE SERPL-MCNC: 118 MG/DL (ref 65–99)
HCT VFR BLD AUTO: 44 % (ref 37–47)
HGB BLD-MCNC: 14.7 G/DL (ref 12–16)
IMM GRANULOCYTES # BLD AUTO: 0.01 K/UL (ref 0–0.11)
IMM GRANULOCYTES NFR BLD AUTO: 0.2 % (ref 0–0.9)
LACTATE SERPL-SCNC: 1.3 MMOL/L (ref 0.5–2)
LACTATE SERPL-SCNC: 1.4 MMOL/L (ref 0.5–2)
LYMPHOCYTES # BLD AUTO: 1.55 K/UL (ref 1–4.8)
LYMPHOCYTES NFR BLD: 28.4 % (ref 22–41)
MAGNESIUM SERPL-MCNC: 2.2 MG/DL (ref 1.5–2.5)
MCH RBC QN AUTO: 31.1 PG (ref 27–33)
MCHC RBC AUTO-ENTMCNC: 33.4 G/DL (ref 32.2–35.5)
MCV RBC AUTO: 93 FL (ref 81.4–97.8)
MONOCYTES # BLD AUTO: 0.36 K/UL (ref 0–0.85)
MONOCYTES NFR BLD AUTO: 6.6 % (ref 0–13.4)
NEUTROPHILS # BLD AUTO: 3.06 K/UL (ref 1.82–7.42)
NEUTROPHILS NFR BLD: 56.2 % (ref 44–72)
NRBC # BLD AUTO: 0 K/UL
NRBC BLD-RTO: 0 /100 WBC (ref 0–0.2)
NT-PROBNP SERPL IA-MCNC: <36 PG/ML (ref 0–125)
PLATELET # BLD AUTO: 158 K/UL (ref 164–446)
PMV BLD AUTO: 11.2 FL (ref 9–12.9)
POTASSIUM SERPL-SCNC: 3.8 MMOL/L (ref 3.6–5.5)
PROT SERPL-MCNC: 7.3 G/DL (ref 6–8.2)
RBC # BLD AUTO: 4.73 M/UL (ref 4.2–5.4)
RSV RNA SPEC QL NAA+PROBE: NEGATIVE
SARS-COV-2 RNA RESP QL NAA+PROBE: NOTDETECTED
SODIUM SERPL-SCNC: 141 MMOL/L (ref 135–145)
SPECIMEN SOURCE: NORMAL
TROPONIN T SERPL-MCNC: 13 NG/L (ref 6–19)
TROPONIN T SERPL-MCNC: 14 NG/L (ref 6–19)
WBC # BLD AUTO: 5.5 K/UL (ref 4.8–10.8)

## 2025-03-12 PROCEDURE — 87040 BLOOD CULTURE FOR BACTERIA: CPT

## 2025-03-12 PROCEDURE — 83880 ASSAY OF NATRIURETIC PEPTIDE: CPT

## 2025-03-12 PROCEDURE — 93005 ELECTROCARDIOGRAM TRACING: CPT | Mod: TC | Performed by: EMERGENCY MEDICINE

## 2025-03-12 PROCEDURE — 36415 COLL VENOUS BLD VENIPUNCTURE: CPT

## 2025-03-12 PROCEDURE — 83605 ASSAY OF LACTIC ACID: CPT | Mod: 91

## 2025-03-12 PROCEDURE — 99285 EMERGENCY DEPT VISIT HI MDM: CPT

## 2025-03-12 PROCEDURE — 83735 ASSAY OF MAGNESIUM: CPT

## 2025-03-12 PROCEDURE — 96374 THER/PROPH/DIAG INJ IV PUSH: CPT

## 2025-03-12 PROCEDURE — 700111 HCHG RX REV CODE 636 W/ 250 OVERRIDE (IP): Mod: JZ | Performed by: EMERGENCY MEDICINE

## 2025-03-12 PROCEDURE — 71045 X-RAY EXAM CHEST 1 VIEW: CPT

## 2025-03-12 PROCEDURE — 0241U HCHG SARS-COV-2 COVID-19 NFCT DS RESP RNA 4 TRGT MIC: CPT

## 2025-03-12 PROCEDURE — 94640 AIRWAY INHALATION TREATMENT: CPT

## 2025-03-12 PROCEDURE — 85025 COMPLETE CBC W/AUTO DIFF WBC: CPT

## 2025-03-12 PROCEDURE — 700101 HCHG RX REV CODE 250: Performed by: EMERGENCY MEDICINE

## 2025-03-12 PROCEDURE — 93005 ELECTROCARDIOGRAM TRACING: CPT | Mod: TC

## 2025-03-12 PROCEDURE — 80053 COMPREHEN METABOLIC PANEL: CPT

## 2025-03-12 PROCEDURE — 94760 N-INVAS EAR/PLS OXIMETRY 1: CPT

## 2025-03-12 PROCEDURE — 84484 ASSAY OF TROPONIN QUANT: CPT | Mod: 91

## 2025-03-12 PROCEDURE — RXMED WILLOW AMBULATORY MEDICATION CHARGE: Performed by: EMERGENCY MEDICINE

## 2025-03-12 RX ORDER — METHYLPREDNISOLONE SODIUM SUCCINATE 125 MG/2ML
125 INJECTION, POWDER, LYOPHILIZED, FOR SOLUTION INTRAMUSCULAR; INTRAVENOUS ONCE
Status: COMPLETED | OUTPATIENT
Start: 2025-03-12 | End: 2025-03-12

## 2025-03-12 RX ORDER — ALBUTEROL SULFATE 90 UG/1
2 INHALANT RESPIRATORY (INHALATION) EVERY 6 HOURS PRN
Qty: 8.5 G | Refills: 0 | Status: SHIPPED | OUTPATIENT
Start: 2025-03-12

## 2025-03-12 RX ORDER — ACETAMINOPHEN 500 MG
1000 TABLET ORAL EVERY 6 HOURS PRN
COMMUNITY

## 2025-03-12 RX ORDER — ALBUTEROL SULFATE 5 MG/ML
2.5 SOLUTION RESPIRATORY (INHALATION)
Status: DISCONTINUED | OUTPATIENT
Start: 2025-03-12 | End: 2025-03-12 | Stop reason: HOSPADM

## 2025-03-12 RX ORDER — METHYLPREDNISOLONE 4 MG/1
TABLET ORAL
Qty: 21 EACH | Refills: 0 | Status: SHIPPED | OUTPATIENT
Start: 2025-03-12

## 2025-03-12 RX ADMIN — METHYLPREDNISOLONE SODIUM SUCCINATE 125 MG: 125 INJECTION, POWDER, FOR SOLUTION INTRAMUSCULAR; INTRAVENOUS at 14:01

## 2025-03-12 RX ADMIN — ALBUTEROL SULFATE 2.5 MG: 2.5 SOLUTION RESPIRATORY (INHALATION) at 14:11

## 2025-03-12 ASSESSMENT — HEART SCORE
ECG: NORMAL
HISTORY: SLIGHTLY SUSPICIOUS
AGE: 65+
HEART SCORE: 3
RISK FACTORS: 1-2 RISK FACTORS
TROPONIN: LESS THAN OR EQUAL TO NORMAL LIMIT

## 2025-03-12 ASSESSMENT — FIBROSIS 4 INDEX: FIB4 SCORE: 5.2

## 2025-03-12 NOTE — ED PROVIDER NOTES
"ED Provider Note    CHIEF COMPLAINT  Chief Complaint   Patient presents with    Shortness of Breath     With hx of COPD. States she does not use O2 at home and typically saturates about 94%. Pt. Placed on 1L NC for comfort. Reports cough and tactile fever recently. States she completed amoxicillin course on Friday.     Cough     Productive of white sputum.    Chest Pain     R       EXTERNAL RECORDS REVIEWED  Outpatient Notes patient was seen by her primary care physician January 9, 2025 for cough and wheezing as well as skin lesions and leg cramps.  It was noted the patient has tried to contact [facility] allergy clinic for further evaluation.  She has been experiencing shortness of breath but had not been using her maintenance inhalers and required refills of her nebulizer tubes.    HPI/ROS  LIMITATION TO HISTORY   Select: : None  OUTSIDE HISTORIAN(S):  Son at bedside but does not provide any additional history.    Reina Munoz is a 84 y.o. female who presents to the ER complaining of worsening shortness of breath and wheezing which has been going on for the last month or so.  Patient says that her doctor diagnosed her with COPD.  She uses CPAP at night.  She has been using albuterol and ipratropium at home.  Patient reports that her oxygen saturations are typically 92 to 94% on room air.  Today her O2 sats were 88-89 on room air.  Her  has oxygen at home so she decided to use his oxygen.  She complains of gradually worsening dyspnea on exertion with this wheezing.  She says \"they can hear me wheezing from across the room.\"  She has not had fevers or chills.  She coughs up some white phlegm.  She denies coughing up any foamy substance.  No hemoptysis.  She said that last night she had a little bit of discomfort in her right mid back.  It does not get worse when she coughs or takes a deep breath.  It does not get worse when she moves.  No hematuria.  No cloudy or foul-smelling urine.  No abdominal " pain.  No nausea or vomiting.  She says that her chest feels tight, but has for quite some time as a result of all the wheezing.  Patient says that she has her  next-door at dialysis so she came in here to get checked out.     PAST MEDICAL HISTORY   has a past medical history of Acute bronchitis due to other specified organisms (03/18/2024), Cellulitis of toe of left foot (08/28/2023), Chronic meniscal tear of knee, CKD (chronic kidney disease) stage 3, GFR 30-59 ml/min (02/03/2022), COPD (chronic obstructive pulmonary disease) ([facility]), Cough in adult (01/18/2022), Dyslipidemia, GERD (gastroesophageal reflux disease), Hypothyroidism, Lab test positive for detection of COVID-19 virus (01/14/2022), Left leg swelling (03/14/2018), Left thyroid nodule (04/11/2019), Obesity (BMI 30.0-34.9) (04/04/2023), Pleomorphic adenoma of parotid gland (1992, 2004), RUQ pain (09/08/2021), Shortness of breath (12/13/2021), Sleep apnea, and Urinary incontinence (05/17/2022).    SURGICAL HISTORY   has a past surgical history that includes parotidectomy (Left, 1992, 2004); cataract extraction with iol; and lumpectomy (Left).    FAMILY HISTORY  Family History   Problem Relation Age of Onset    Cancer Mother         multiple myeloma    Heart Disease Mother     Heart Attack Father     Stroke Father     Heart Disease Father     Hypertension Father     Heart Disease Brother     Lung Disease Brother     Stroke Sister     Cancer Sister         breast    Sleep Apnea Neg Hx     Diabetes Neg Hx        SOCIAL HISTORY  Social History     Tobacco Use    Smoking status: Never    Smokeless tobacco: Never   Vaping Use    Vaping status: Never Used   Substance and Sexual Activity    Alcohol use: No     Alcohol/week: 0.0 oz    Drug use: No    Sexual activity: Yes     Partners: Male     Comment: , [employer], 3 kids       CURRENT MEDICATIONS  Home Medications       Reviewed by Farzana Hanna (Pharmacy Tech) on 03/12/25 at 1401  Med List  "Status: Complete     Medication Last Dose Status   acetaminophen (TYLENOL) 500 MG Tab 3/10/2025 Active   albuterol 108 (90 Base) MCG/ACT Aero Soln inhalation aerosol 3/11/2025 Active   amoxicillin-clavulanate (AUGMENTIN) 875-125 MG Tab 2/24/2025 Active   azelastine (ASTELIN) 137 MCG/SPRAY nasal spray Not Taking Active   Cholecalciferol (D3 PO) 3/11/2025 Active   ciprofloxacin (CILOXIN) 0.3 % Solution New Rx Active   esomeprazole (NEXIUM) 40 MG delayed-release capsule 3/10/2025 Active   Fexofenadine HCl (ALLEGRA PO) 2/26/2025 Active   Ibuprofen-Acetaminophen (ADVIL DUAL ACTION) 125-250 MG Tab 2/12/2025 Active   ipratropium-albuterol (DUONEB) 0.5-2.5 (3) MG/3ML nebulizer solution 3/12/2025 Active   latanoprost (XALATAN) 0.005 % Solution 3/11/2025 Active   levothyroxine (SYNTHROID) 25 MCG Tab 3/12/2025 Active   losartan (COZAAR) 25 MG Tab 3/11/2025 Active   Multiple Vitamins-Minerals (ZINC PO) 3/11/2025 Active   potassium chloride ER (KLOR-CON) 10 MEQ tablet 3/10/2025 Active   prednisoLONE acetate (PRED FORTE) 1 % Suspension New Rx Active   rosuvastatin (CRESTOR) 5 MG Tab 3/11/2025 Active   therapeutic multivitamin-minerals (THERAGRAN-M) Tab 3/11/2025 Active   torsemide (DEMADEX) 20 MG Tab 3/10/2025 Active                    ALLERGIES  Allergies   Allergen Reactions    Doxycycline Nausea and Swelling     Rxn - 2018  Tongue swelling    Penicillins      Rxn - years ago, pt reports childhood allergie not sure what happens    Pt tolerated Augmentin 6/2018    Sulfa Drugs      Pt reports childhood allergie not sure what happens         PHYSICAL EXAM  VITAL SIGNS: BP (!) 198/101   Pulse 81   Temp 36.7 °C (98 °F) (Temporal)   Resp (!) 22   Ht 1.727 m (5' 8\")   Wt 92 kg (202 lb 13.2 oz)   SpO2 93%   BMI 30.84 kg/m²    Constitutional:  Well developed, well nourished; No acute distress   HENT: Normocephalic, Atraumatic, Bilateral external ears normal, Oropharynx moist, No erythema or exudates in posterior oropharynx. "   Eyes: PERRL, EOMI, Conjunctiva normal, No discharge.   Neck: Normal range of motion, supple, nontender  Lymphatic: No lymphadenopathy noted.   Cardiovascular: Normal heart rate, Normal rhythm, No murmurs, rubs or gallops   Thorax & Lungs: Expiratory wheezes throughout with prolonged expiratory phase.  No increased work of breathing, retractions or accessory muscle use.  No chest tenderness. No crepitus or subQ air  Abdomen: soft, good bowel sounds, no guarding no rebound, no masses, no pulsatile mass, no tenderness, no distention  Skin: Warm, Dry, No erythema, No rash.   Back: No tenderness, No CVA tenderness.   Extremities: 2+ dp and pt pulses bilateral LEs;  Nontender; trace pretibial edema  Neurologic: Alert & oriented x 4, clear speech,   Psychiatric: appropriate, normal affect     EKG/LABS  Results for orders placed or performed during the hospital encounter of 03/12/25   Blood Culture - Draw one from central line and one from peripheral site    Collection Time: 03/12/25 12:00 PM    Specimen: Peripheral; Blood   Result Value Ref Range    Significant Indicator NEG     Source BLD     Site PERIPHERAL     Culture Result       No Growth  Note: Blood cultures are incubated for 5 days and  are monitored continuously.Positive blood cultures  are called to the RN and reported as soon as  they are identified.  Blood culture testing and Gram stain, if indicated, are  performed at [facility],  [address] [place] [address].  Positive blood  cultures are sent to [facility],  [address] [place] [address], for organism identification  and susceptibility testing.     Blood Culture - Draw one from central line and one from peripheral site    Collection Time: 03/12/25 12:10 PM    Specimen: Line; Blood   Result Value Ref Range    Significant Indicator NEG     Source BLD     Site LINE     Culture Result       No Growth  Note: Blood cultures are incubated for 5 days and  are  monitored continuously.Positive blood cultures  are called to the RN and reported as soon as  they are identified.  Blood culture testing and Gram stain, if indicated, are  performed at [facility],  [address] [place] [address].  Positive blood  cultures are sent to [facility],  [address] [place] [address], for organism identification  and susceptibility testing.     Lactic Acid    Collection Time: 03/12/25 12:13 PM   Result Value Ref Range    Lactic Acid 1.3 0.5 - 2.0 mmol/L   CBC with Differential    Collection Time: 03/12/25 12:13 PM   Result Value Ref Range    WBC 5.5 4.8 - 10.8 K/uL    RBC 4.73 4.20 - 5.40 M/uL    Hemoglobin 14.7 12.0 - 16.0 g/dL    Hematocrit 44.0 37.0 - 47.0 %    MCV 93.0 81.4 - 97.8 fL    MCH 31.1 27.0 - 33.0 pg    MCHC 33.4 32.2 - 35.5 g/dL    RDW 47.0 35.9 - 50.0 fL    Platelet Count 158 (L) 164 - 446 K/uL    MPV 11.2 9.0 - 12.9 fL    Neutrophils-Polys 56.20 44.00 - 72.00 %    Lymphocytes 28.40 22.00 - 41.00 %    Monocytes 6.60 0.00 - 13.40 %    Eosinophils 7.90 (H) 0.00 - 6.90 %    Basophils 0.70 0.00 - 1.80 %    Immature Granulocytes 0.20 0.00 - 0.90 %    Nucleated RBC 0.00 0.00 - 0.20 /100 WBC    Neutrophils (Absolute) 3.06 1.82 - 7.42 K/uL    Lymphs (Absolute) 1.55 1.00 - 4.80 K/uL    Monos (Absolute) 0.36 0.00 - 0.85 K/uL    Eos (Absolute) 0.43 0.00 - 0.51 K/uL    Baso (Absolute) 0.04 0.00 - 0.12 K/uL    Immature Granulocytes (abs) 0.01 0.00 - 0.11 K/uL    NRBC (Absolute) 0.00 K/uL   Complete Metabolic Panel    Collection Time: 03/12/25 12:13 PM   Result Value Ref Range    Sodium 141 135 - 145 mmol/L    Potassium 3.8 3.6 - 5.5 mmol/L    Chloride 105 96 - 112 mmol/L    Co2 24 20 - 33 mmol/L    Anion Gap 12.0 7.0 - 16.0    Glucose 118 (H) 65 - 99 mg/dL    Bun 15 8 - 22 mg/dL    Creatinine 0.94 0.50 - 1.40 mg/dL    Calcium 9.8 8.4 - 10.2 mg/dL    Correct Calcium 9.6 8.5 - 10.5 mg/dL    AST(SGOT) 48 (H) 12 - 45 U/L    ALT(SGPT) 43 2 -  50 U/L    Alkaline Phosphatase 96 30 - 99 U/L    Total Bilirubin 0.8 0.1 - 1.5 mg/dL    Albumin 4.3 3.2 - 4.9 g/dL    Total Protein 7.3 6.0 - 8.2 g/dL    Globulin 3.0 1.9 - 3.5 g/dL    A-G Ratio 1.4 g/dL   proBrain Natriuretic Peptide, NT (BNP)    Collection Time: 25 12:13 PM   Result Value Ref Range    NT-proBNP <36 0 - 125 pg/mL   Troponins NOW    Collection Time: 25 12:13 PM   Result Value Ref Range    Troponin T 14 6 - 19 ng/L   CoV-2, Flu A/B, And RSV by PCR (Podclass)    Collection Time: 25 12:13 PM    Specimen: Respirate   Result Value Ref Range    Influenza virus A RNA Negative Negative    Influenza virus B, PCR Negative Negative    RSV, PCR Negative Negative    SARS-CoV-2 by PCR NotDetected     SARS-CoV-2 Source NP Swab    ESTIMATED GFR    Collection Time: 25 12:13 PM   Result Value Ref Range    GFR (CKD-EPI) 60 >60 mL/min/1.73 m 2   Magnesium    Collection Time: 25 12:13 PM   Result Value Ref Range    Magnesium 2.2 1.5 - 2.5 mg/dL   EKG    Collection Time: 25  1:51 PM   Result Value Ref Range    Report       [facility] Emergency Dept.    Test Date:  2025  Pt Name:    NIA MCLEAN                Department: [facility]  MRN:        [id number]                      Room:  Gender:     Female                       Technician: [id number]  :        [id number]                   Requested By:ER TRIAGE PROTOCOL  Order #:    [id number]                    Reading MD: Marlin Harper    Measurements  Intervals                                Axis  Rate:       70                           P:          47  IN:         146                          QRS:        -10  QRSD:       105                          T:          191  QT:         370  QTc:        400    Interpretive Statements  Sinus rhythm rate 70  Normal axis  Normal intervals  ST depression II, AVF, V5, V6 (unchanged from 24)  No ST elevation  Nonspecific repol abnormality, lateral leads  Compared to ECG 2024  "09:55:16  Ventricular premature complex(es) no longer present  Electronically Signed On 03- 13:51:20 PDT by Marlin Harper     Troponins in two (2) hours    Collection Time: 03/12/25  2:07 PM   Result Value Ref Range    Troponin T 13 6 - 19 ng/L   LACTIC ACID    Collection Time: 03/12/25  2:07 PM   Result Value Ref Range    Lactic Acid 1.4 0.5 - 2.0 mmol/L      I have independently interpreted this EKG    RADIOLOGY/PROCEDURES   I have independently interpreted the diagnostic imaging associated with this visit and am waiting the final reading from the radiologist.     My preliminary interpretation is as follows: ER MD is reviewed the patient's chest x-ray.  No obvious infiltrates.    Radiologist interpretation:  DX-CHEST-PORTABLE (1 VIEW)   Final Result      No radiographic evidence of acute cardiopulmonary process.          COURSE & MEDICAL DECISION MAKING    ASSESSMENT, COURSE AND PLAN  Care Narrative: Patient presents to the ER complaining of worsening wheezing and trouble breathing over the last 1 month.  Patient has been told she has COPD.  She just finished a course of Augmentin which is prescribed to her by her primary care physician.  Patient states \"you can hear me wheezing from across the room.\"  She has been using albuterol and Atrovent at home with some improvement.  However, she comes in here today to get checked because she does feel like she is not getting any better.  Upon arrival to the ER the patient had an O2 sat of 89% on room air.  She had diffuse expiratory wheezes on examination.  She was given a DuoNeb and some steroids and she is feeling significantly better.  Repeat examination reveals improved air movement.  She says she feels much better.  Ambulation pulse ox after treatment is 90%.  Initially I spoke with  to try to set patient up with home oxygen thinking that perhaps she would continue to be oxygen requiring.  Patient was adamant that she did not want to stay in the " hospital.  For this reason I involve case management early.  However, after the DuoNeb, patient is feeling much better and she is no longer hypoxic.  In fact, while sitting on the gurney, O2 sats are 93 to 94% on room air.  Patient has not had fevers or chills.  X-ray does not reveal pneumonia.  White count is normal.  Low suspicion for pneumonia.  Additionally, she just finished a course of antibiotics, no need for additional antibiotics.  EKG is nonacute.  Troponin is 13 after a month of symptoms.  BNP is less than 36.  At this time I do not think she has CHF exacerbation.  I placed a referral to pulmonary.  I think she needs to see a pulmonologist.  Patient is adamant that she wants to leave because she has to take care of her .  He is actually at dialysis next-door right now and she needs to go pick him up.  She is anxious to be discharged.  I will send her home with prescription for Medrol Dosepak as well as an albuterol inhaler.  She says she has DuoNeb treatments at home.  She is to continue those and she is also to do every 4 hour albuterol between the twice daily DuoNebs.  Upon discharge patient's blood pressure is high.  She says she will go home and take her blood pressure medicine.  Again she is anxious to leave.  She has to go  her  from dialysis.  She has no headache and no chest pain.  No visual changes.  No stroke symptoms.  At this time she is asymptomatic with that blood pressure.  She is delighted that she feels much better.  She says she has a good relationship with the nurses at her doctor's office and she will call tomorrow and says that she will have no problem getting a follow-up appoint with her doctor within the next few days.  Patient has been instructed to return to the ER immediately if she starts feeling worse in any way.  She has been given strict return precautions and discharge instructions and she understands treatment plan and follow-up.    CHEST PAIN:   HEART  "Score for Major Cardiac Events  HEART Score     History: Slightly suspicious  ECG: Normal  Age: 65+  Risk Factors: 1-2 risk factors  Troponin: Less than or equal to normal limit    Heart Score: 3    Total Score   0-3 Points = Low Score, risk of MACE 0.9-1.7%.  4-6 Points = Moderate Score, risk of MACE 12-16.6%  7-10 Points = High Score, risk of MACE 50-65%      Patient was reevaluated.  She says she feeling much better with the DuoNeb and the steroids.  Repeat lung exam reveals resolution of the wheezing.  She is moving much better air.  O2 sats are now 93 to 94% on room air.  She is no longer oxygen requiring.  She ambulated around the ER with O2 sat of 90% the entire time.  No hypoxia with ambulation.     Patient tells me that her blood pressure is a little high for her.  She thinks it is \"because she is in the ER.\"  She says her blood pressure usually runs in the 120s systolic at home.  She plans on taking her blood pressure medicine when she gets home.  She is anxious to leave because her  has done with his dialysis next-door at [facility] and she needs to go pick him up.    ADDITIONAL PROBLEMS MANAGED  Problem #1: Progressively worsening wheezing, coughing and shortness of breath x 1 month    DISPOSITION AND DISCUSSIONS  I have discussed management of the patient with the following physicians and MUKESH's: None    Discussion of management with other QHP or appropriate source(s): Case Management    immediately after I evaluate the patient, she was adamant that she did not want to stay in the hospital overnight.  She insisted on being able to go home.  I spoke with  in the event that patient needed home oxygen.  , Glenny, recommended doing the oxygen documentation and then she will help set up on oxygen and home health if necessary.    Escalation of care considered, and ultimately not performed:acute inpatient care management, however at this time, the patient is most appropriate for " outpatient management.  Patient's O2 sat was 90% with ambulation.  She has improved significantly with DuoNeb and steroids.  Her lungs are now clear.  She says she is moving much better air.  She says she feels much better.  O2 sats are 93 to 94% on room air.  No need for hospitalization, although patient was very clear that she did not want to be admitted regardless.    Barriers to care at this time, including but not limited to:  None known .     Decision tools and prescription drugs considered including, but not limited to: Antibiotics patient just finished a course of Augmentin 5 days ago.  No evidence of pneumonia on chest x-ray.  She has not had fevers or chills.  No purulent phlegm. .  White count is normal.  At this time no need for antibiotics.  Patient was reevaluated prior to discharge    FINAL DIAGNOSIS  1. Acute exacerbation of chronic obstructive pulmonary disease (COPD) (HCC) Acute   2. Lower respiratory tract infection Acute        This dictation has been created using voice recognition software. The accuracy of the dictation is limited by the abilities of the software. I expect there may be some errors of grammar and possibly content. I made every attempt to manually correct the errors within my dictation. However, errors related to voice recognition software may still exist and should be interpreted within the appropriate context.     Electronically signed by: Marlin Harper M.D., 3/12/2025 1:27 PM

## 2025-03-12 NOTE — DISCHARGE INSTRUCTIONS
Follow-up with your primary care physician within the next 1 to 2 days.  Please call today to schedule an appointment.    Use your DuoNeb medications twice a day.  You can also use your regular albuterol every 4 hours as needed.    Return to the ER for any worsening shortness of breath, worsening wheezing, worsening shortness of breath when you walk, fevers over 100.4, shaking chills, chest pain, coughing up blood, increased swelling in your legs, dizziness or lightheadedness, nausea, vomiting, or for any concerns.    Also follow-up with [facility].  A referral has been placed on your behalf.

## 2025-03-12 NOTE — ED NOTES
Patient given discharge instructions, verbalized understanding. PIV discontinued. Rx given for albuterol, medrol dosepak. Patient instructed to follow up with pulmonary and sleep medicine. Education provided to come to ER if symptoms worsen. Discharged in stable condition, able to walk out with steady gait to lobby for son to .

## 2025-03-12 NOTE — ED NOTES
Medication history reviewed with pt. Med rec is complete. Allergies reviewed, per pt  Interviewed pt with son at bedside with permission from pt.    Pt reports that she has not used her TRELEGY 100-62.5-25mcg in the last 30 days or longer.    Pt reports that she will start her CIPROFLOXACIN 0.3% and PREDNISOLONE 1% eye drops when she has eye surgery on 4/1/2025.     Pt started AUGMENTIN 875-125MG on 2/18/2025 for 7 day course, per pt reports that she did finish course of antibiotic     Pt is not on any anticoagulants      Dispense history available in EPIC? YES

## 2025-03-13 ENCOUNTER — TELEPHONE (OUTPATIENT)
Dept: HEALTH INFORMATION MANAGEMENT | Facility: OTHER | Age: 85
End: 2025-03-13
Payer: MEDICARE

## 2025-03-13 NOTE — TELEPHONE ENCOUNTER
Nurse RHODA received a message from [facility] coordinator that pt was requesting a call.  Nurse outreach call to pt.  Pt answered telephone. Reports she was in the ER last evening for shortness of breath and wheezing. Pt reports she was given IV steroid in the ER. Reports she feels so much better this am. Reports no wheezing and improved breathing. Denies feeling short of breath. States her oxygen levels have been in the 90's. Pt reports she was referred to a pulmonary provider for COPD.  Pt states she isn't sure she has ever been diagnosed with COPD.  States she will discuss with her PCP at her follow-up appt. Pt reports she did get new prescription for Medrol dose back and albuterol. Pt has no questions regarding her medications. Pt has nurse RHODA contact number if any care management needs.

## 2025-03-14 ENCOUNTER — PHARMACY VISIT (OUTPATIENT)
Dept: PHARMACY | Facility: MEDICAL CENTER | Age: 85
End: 2025-03-14
Payer: COMMERCIAL

## 2025-03-14 PROCEDURE — RXMED WILLOW AMBULATORY MEDICATION CHARGE: Performed by: EMERGENCY MEDICINE

## 2025-03-17 ENCOUNTER — OFFICE VISIT (OUTPATIENT)
Dept: MEDICAL GROUP | Facility: PHYSICIAN GROUP | Age: 85
End: 2025-03-17
Payer: MEDICARE

## 2025-03-17 ENCOUNTER — PHARMACY VISIT (OUTPATIENT)
Dept: PHARMACY | Facility: MEDICAL CENTER | Age: 85
End: 2025-03-17
Payer: COMMERCIAL

## 2025-03-17 VITALS
BODY MASS INDEX: 30.42 KG/M2 | SYSTOLIC BLOOD PRESSURE: 128 MMHG | DIASTOLIC BLOOD PRESSURE: 76 MMHG | OXYGEN SATURATION: 91 % | TEMPERATURE: 96.5 F | HEIGHT: 68 IN | RESPIRATION RATE: 16 BRPM | HEART RATE: 69 BPM | WEIGHT: 200.7 LBS

## 2025-03-17 DIAGNOSIS — J45.41 MODERATE PERSISTENT REACTIVE AIRWAY DISEASE WITH ACUTE EXACERBATION: ICD-10-CM

## 2025-03-17 DIAGNOSIS — I27.20 PULMONARY HYPERTENSION (HCC): ICD-10-CM

## 2025-03-17 DIAGNOSIS — D69.6 THROMBOCYTOPENIA (HCC): ICD-10-CM

## 2025-03-17 DIAGNOSIS — R74.01 TRANSAMINITIS: ICD-10-CM

## 2025-03-17 LAB
BACTERIA BLD CULT: NORMAL
BACTERIA BLD CULT: NORMAL
SIGNIFICANT IND 70042: NORMAL
SIGNIFICANT IND 70042: NORMAL
SITE SITE: NORMAL
SITE SITE: NORMAL
SOURCE SOURCE: NORMAL
SOURCE SOURCE: NORMAL

## 2025-03-17 PROCEDURE — RXMED WILLOW AMBULATORY MEDICATION CHARGE: Performed by: INTERNAL MEDICINE

## 2025-03-17 PROCEDURE — 3078F DIAST BP <80 MM HG: CPT | Performed by: INTERNAL MEDICINE

## 2025-03-17 PROCEDURE — 3074F SYST BP LT 130 MM HG: CPT | Performed by: INTERNAL MEDICINE

## 2025-03-17 PROCEDURE — 99214 OFFICE O/P EST MOD 30 MIN: CPT | Performed by: INTERNAL MEDICINE

## 2025-03-17 RX ORDER — MONTELUKAST SODIUM 10 MG/1
10 TABLET ORAL DAILY
Qty: 100 TABLET | Refills: 3 | Status: SHIPPED | OUTPATIENT
Start: 2025-03-17

## 2025-03-17 ASSESSMENT — FIBROSIS 4 INDEX: FIB4 SCORE: 3.8

## 2025-03-17 NOTE — PROGRESS NOTES
Subjective:   Chief Complaint/History of Present Illness:  Reina Munoz is a 84 y.o. female established patient who presents today to discuss medical problems as listed below. Reina is unaccompanied for today's visit.    History of Present Illness  The patient presents for evaluation of wheezing.    She reports that her condition has been deteriorating since last Monday, with a persistent decrease in oxygen saturation levels. She has previously utilized supplemental oxygen during illness, which she found beneficial. Her current oxygen saturation levels are within the normal range, fluctuating between 94 and 95 percent. She describes her cough as unusual, akin to a bronchitis cough, and not typical of a severe cold. She has undergone a chest x-ray due to discomfort in her right lung, which was reported as normal. She has also had an ultrasound of her kidneys. She has a history of smoking and expresses concern about the possibility of COPD. She has been experiencing high fevers and excessive nasal discharge for the past few years. She has not been taking Allegra regularly and is uncertain about its necessity during allergy season. She has been on a 6-day course of steroids and is apprehensive about continuing this treatment. She recalls an incident where she experienced an adverse reaction after taking Singulair, including redness and cracking of her tongue, which she managed by rinsing her mouth. She does not have a history of depression but admits to occasional anxiety. She has been using albuterol rescue inhaler and ipratropium nebulizer for symptom management.    She has a history of fluid retention but reports no recent episodes. She is currently on diuretic therapy.    She takes losartan at night for blood pressure management.    She has an order for a mammogram from January 2025 and plans to schedule it later in the year.    SOCIAL HISTORY  She has a history of smoking.    MEDICATIONS  Current: Losartan,  ipratropium/albuterol nebulizer solution, Allegra (as needed).     Current Medications:  Current Outpatient Medications Ordered in Epic   Medication Sig Dispense Refill    montelukast (SINGULAIR) 10 MG Tab Take 1 Tablet by mouth every day. 100 Tablet 3    Fexofenadine HCl (ALLEGRA PO) Take 1 Tablet by mouth 1 time a day as needed (For allergies).      acetaminophen (TYLENOL) 500 MG Tab Take 1,000 mg by mouth every 6 hours as needed (For headache).      methylPREDNISolone (MEDROL DOSEPAK) 4 MG Tablet Therapy Pack Use as directed on package 21 Each 0    albuterol 108 (90 Base) MCG/ACT Aero Soln inhalation aerosol Inhale 2 Puffs by mouth every 6 hours as needed for Shortness of Breath. 8.5 g 0    ciprofloxacin (CILOXIN) 0.3 % Solution Instill 1 drop into left eye four times a day 5 mL 1    prednisoLONE acetate (PRED FORTE) 1 % Suspension Instill 1 drop into left eye four times a day 10 mL 1    latanoprost (XALATAN) 0.005 % Solution Instill 1 drop into both eyes every night at bedtime (Patient taking differently: Administer 1 Drop into both eyes every evening.) 7.5 mL 3    esomeprazole (NEXIUM) 40 MG delayed-release capsule Take 1 Capsule by mouth every morning before breakfast. (Patient taking differently: Take 40 mg by mouth 1 time a day as needed. Indications: Heartburn) 100 Capsule 3    torsemide (DEMADEX) 20 MG Tab Take 1 Tablet by mouth every day. 100 Tablet 3    potassium chloride ER (KLOR-CON) 10 MEQ tablet Take 1 Tablet by mouth every day. 100 Tablet 3    Cholecalciferol (D3 PO) Take 1 Tablet by mouth every day.      Multiple Vitamins-Minerals (ZINC PO) Take 1 Tablet by mouth every day.      rosuvastatin (CRESTOR) 5 MG Tab Take 1 Tablet by mouth every evening. 100 Tablet 3    ipratropium-albuterol (DUONEB) 0.5-2.5 (3) MG/3ML nebulizer solution Use 3 mL by nebulization 4 times a day. (Patient taking differently: Take 3 mL by nebulization every four hours as needed for Shortness of Breath.) 90 mL 11     "Ibuprofen-Acetaminophen (ADVIL DUAL ACTION) 125-250 MG Tab Take 2 Tablets by mouth every 6 hours as needed (pain). Indications: Arthritis, Pain      therapeutic multivitamin-minerals (THERAGRAN-M) Tab Take 1 Tablet by mouth every day.      levothyroxine (SYNTHROID) 25 MCG Tab Take 1 Tablet by mouth every morning on an empty stomach. 100 Tablet 3    losartan (COZAAR) 25 MG Tab Take 1 Tablet by mouth every day. 100 Tablet 3    albuterol 108 (90 Base) MCG/ACT Aero Soln inhalation aerosol Inhale 2 Puffs every 6 hours as needed for Shortness of Breath. 54 g 3     No current [facility]-ordered facility-administered medications on file.          Objective:   Physical Exam:    Vitals: /76 (BP Location: Right arm, Patient Position: Sitting, BP Cuff Size: Adult)   Pulse 69   Temp 35.8 °C (96.5 °F) (Temporal)   Resp 16   Ht 1.727 m (5' 8\")   Wt 91 kg (200 lb 11.2 oz)   SpO2 91%    BMI: Body mass index is 30.52 kg/m².  Physical Exam  Constitutional:       General: She is not in acute distress.     Appearance: Normal appearance. She is not ill-appearing.   HENT:      Right Ear: External ear normal.      Left Ear: External ear normal.   Eyes:      General: No scleral icterus.     Conjunctiva/sclera: Conjunctivae normal.   Cardiovascular:      Rate and Rhythm: Normal rate and regular rhythm.      Pulses: Normal pulses.   Pulmonary:      Effort: Pulmonary effort is normal. No respiratory distress.      Breath sounds: No wheezing, rhonchi or rales.   Abdominal:      General: Bowel sounds are normal. There is no distension.      Palpations: Abdomen is soft.      Tenderness: There is no abdominal tenderness.   Musculoskeletal:      Right lower leg: No edema.      Left lower leg: No edema.   Skin:     General: Skin is warm and dry.      Findings: No rash.   Neurological:      Gait: Gait normal.   Psychiatric:         Mood and Affect: Mood normal.         Behavior: Behavior normal.         Thought Content: Thought content normal.  "        Judgment: Judgment normal.           Results  Laboratory Studies  Liver enzymes are not perfectly normal but holding steady.    Imaging  Chest CT from July 2024 shows a little bit of scarring in the lungs, possibly from an old infection or irritation. No abnormal fluid in the lung or nodules observed. A small pocket of air trapped in the lung is present. No significant damage to the lungs observed. Kidney ultrasound from June 2024 shows a simple cyst in the kidney, measuring 10 mm.    Testing  Lung function testing 3 years ago was reported as normal, with no evidence of COPD.    Assessment & Plan  1. Reactive airway disease, moderate persistent with acute exacerbation  The patient's wheezing is likely due to asthma, as indicated by the diffuse nature of the wheezing affecting her oxygenation. The chest CT from July 2024 did not show evidence of COPD, and her lung function test from three years ago was normal. She has a history of recurrent lung infections and pulmonary hypertension, which may contribute to her symptoms. She has been referred to a pulmonologist for further evaluation by the ER. A trial of montelukast 10 mg will be initiated, to be taken once daily in the evening. She will continue using her albuterol and ipratropium nebulizer as needed. She is advised to take Allegra during allergy season. Would like to hold off on maintenance inhaler at this time, seemed to have a reaction to Trelegy. Will recheck for her annual next month and can discuss adding a maintenance inhaler again at that time. No wheezing and normal oxygen in clinic today.    2. Pulmonary Hypertension.  The patient has a history of pulmonary hypertension, which may be contributing to her symptoms. Her blood pressure today is well-controlled at 120s over 70s. She will continue her current medication regimen, including losartan taken at night and torsemide to help maintain euvolemia.     3. Thrombocytopenia  4. Transaminitis  5.  Health maintenance   A mammogram order from January 2025 is available, and she can schedule it at her convenience. She will bring in her advanced directive on 04/08/2025 for review and completion. She plans to be Full Code at this time. Mild elevation of liver enzymes stable and ongoing, related to WALLIS. Low platelets also remain stable and likely related to underlying liver disease.      Follow-up  The patient will follow up next month.      Assessment and Plan:   Reina is a 84 y.o. female with the following:  Problem List Items Addressed This Visit       Pulmonary hypertension (HCC)    Reactive airway disease    Relevant Medications    montelukast (SINGULAIR) 10 MG Tab    Thrombocytopenia (HCC)    Transaminitis          RTC: Return in about 4 weeks (around 4/14/2025).    I spent a total of 34 minutes with record review, exam, communication with the patient, communication with other providers, and documentation of this encounter.    Verbal consent was acquired by the patient to use [employer] ambient listening note generation during this visit Yes       PLEASE NOTE: This dictation was created using voice recognition software. I have made every reasonable attempt to correct obvious errors, but I expect that there are errors of grammar and possibly content that I did not discover before finalizing the note.      Nayeli Finley, DO  Geriatric and Internal Medicine  [facility]

## 2025-03-18 PROCEDURE — RXMED WILLOW AMBULATORY MEDICATION CHARGE: Performed by: OPHTHALMOLOGY

## 2025-03-28 ENCOUNTER — PATIENT OUTREACH (OUTPATIENT)
Dept: HEALTH INFORMATION MANAGEMENT | Facility: OTHER | Age: 85
End: 2025-03-28
Payer: MEDICARE

## 2025-03-28 DIAGNOSIS — E78.5 DYSLIPIDEMIA: ICD-10-CM

## 2025-03-28 DIAGNOSIS — I10 PRIMARY HYPERTENSION: ICD-10-CM

## 2025-03-28 NOTE — PROGRESS NOTES
Nurse CM outreach call to pt for monthly CCM assessment.  Pt answered telephone.     Reason for Follow-Up:    Monthly CCM assessment    Current Health Status:    Pt following in personal care management program for history of dyslipidemia, and HTN.  Pt has history of asthma    Medical Updates:  Pt reports she is feeling well since her last visit with PCP.  Pt had ER visit earlier in the month for shortness of breath and cough.  Pt reports PCP started her on singular.  Pt reports no further problem with wheezing.  States she hasn't needed to use albuterol treatment.  Pt Has history of HTN. Reports blood pressure readings have been in a good range, 111/72, 115/70.  Pt reports she has held her losartan if blood pressure running low.  Pt states she has upcoming cataract surgery scheduled for next week. Pt reports no lower extremity edema. Reports she hasn't needed to take her torsemide lately.     Medication Updates:  Pt reports no changes to current medications    Symptoms:  Pt reports no active symptoms    Plan of Care Goals and Progress:    Goal 1. Maintain blood pressure in good range     Progress:  Pt reports blood pressure readings have been in a good range. Last reading 111/72.      Barriers:  Chronic health conditions     Interventions:  Continue to monitor blood pressure readings.  Maintain a healthy diet.     Education:  Limit sodium in diet. Bring log of blood pressure readings to appts.     Goal 2. Maintain labs in good range     Progress:  Last lipid panel was in a good range, pt taking crestor.       Barriers:  Chronic health conditions     Interventions:  Continue to take medications as directed     Education:  Follow heart healthy meal plan, remain active as tolerated      Patient's Concerns and Feedback (Self Management of Care):     Pt reports no concerns today.  States she will continue to monitor blood pressure    Next Steps:     Follow-Up Plan:  Follow-up in about 4 weeks, April  2025      Appointments:  4/8/25 at 9:40 Dr. Finley     Contact Information:  Call [phone] with any questions or concerns.

## 2025-03-28 NOTE — CARE PLAN
Problem: Knowledge deficit of hypertension  Goal: Demonstrate Knowledge of Hypertension/long term goal  Outcome: Progressing     Problem: Knowledge deficit of factors contributing to hypertension  Goal: Demonstrate factors that can contribute to high blood pressure/long term goal  Outcome: Progressing  Note: Patient has verbalized understanding of factors contributing to high blood pressure      Problem: Knowledge deficit of hyperlipidemia  Goal: Patient will demonstrate an understanding of hyperlipidemia and its management/long term goal  Outcome: Progressing

## 2025-03-31 ENCOUNTER — PHARMACY VISIT (OUTPATIENT)
Dept: PHARMACY | Facility: MEDICAL CENTER | Age: 85
End: 2025-03-31
Payer: COMMERCIAL

## 2025-03-31 PROCEDURE — RXMED WILLOW AMBULATORY MEDICATION CHARGE: Performed by: OPHTHALMOLOGY

## 2025-04-07 ENCOUNTER — RESULTS FOLLOW-UP (OUTPATIENT)
Dept: MEDICAL GROUP | Facility: PHYSICIAN GROUP | Age: 85
End: 2025-04-07

## 2025-04-07 ENCOUNTER — HOSPITAL ENCOUNTER (OUTPATIENT)
Dept: LAB | Facility: MEDICAL CENTER | Age: 85
End: 2025-04-07
Attending: INTERNAL MEDICINE
Payer: MEDICARE

## 2025-04-07 DIAGNOSIS — E78.5 DYSLIPIDEMIA: ICD-10-CM

## 2025-04-07 DIAGNOSIS — R74.01 TRANSAMINITIS: ICD-10-CM

## 2025-04-07 DIAGNOSIS — D69.6 THROMBOCYTOPENIA (HCC): ICD-10-CM

## 2025-04-07 DIAGNOSIS — D72.820 LYMPHOCYTOSIS: ICD-10-CM

## 2025-04-07 DIAGNOSIS — E03.9 ACQUIRED HYPOTHYROIDISM: ICD-10-CM

## 2025-04-07 LAB
25(OH)D3 SERPL-MCNC: 39 NG/ML (ref 30–100)
ALBUMIN SERPL BCP-MCNC: 4.2 G/DL (ref 3.2–4.9)
ALBUMIN/GLOB SERPL: 1.4 G/DL
ALP SERPL-CCNC: 90 U/L (ref 30–99)
ALT SERPL-CCNC: 36 U/L (ref 2–50)
ANION GAP SERPL CALC-SCNC: 11 MMOL/L (ref 7–16)
AST SERPL-CCNC: 41 U/L (ref 12–45)
BASOPHILS # BLD AUTO: 0.6 % (ref 0–1.8)
BASOPHILS # BLD: 0.03 K/UL (ref 0–0.12)
BILIRUB SERPL-MCNC: 0.8 MG/DL (ref 0.1–1.5)
BUN SERPL-MCNC: 16 MG/DL (ref 8–22)
CALCIUM ALBUM COR SERPL-MCNC: 9.5 MG/DL (ref 8.5–10.5)
CALCIUM SERPL-MCNC: 9.7 MG/DL (ref 8.5–10.5)
CHLORIDE SERPL-SCNC: 102 MMOL/L (ref 96–112)
CHOLEST SERPL-MCNC: 162 MG/DL (ref 100–199)
CO2 SERPL-SCNC: 28 MMOL/L (ref 20–33)
CREAT SERPL-MCNC: 1.03 MG/DL (ref 0.5–1.4)
EOSINOPHIL # BLD AUTO: 0.24 K/UL (ref 0–0.51)
EOSINOPHIL NFR BLD: 4.7 % (ref 0–6.9)
ERYTHROCYTE [DISTWIDTH] IN BLOOD BY AUTOMATED COUNT: 50.2 FL (ref 35.9–50)
FASTING STATUS PATIENT QL REPORTED: NORMAL
GFR SERPLBLD CREATININE-BSD FMLA CKD-EPI: 54 ML/MIN/1.73 M 2
GLOBULIN SER CALC-MCNC: 2.9 G/DL (ref 1.9–3.5)
GLUCOSE SERPL-MCNC: 112 MG/DL (ref 65–99)
HCT VFR BLD AUTO: 44.6 % (ref 37–47)
HDLC SERPL-MCNC: 55 MG/DL
HGB BLD-MCNC: 14.8 G/DL (ref 12–16)
IMM GRANULOCYTES # BLD AUTO: 0.01 K/UL (ref 0–0.11)
IMM GRANULOCYTES NFR BLD AUTO: 0.2 % (ref 0–0.9)
LDLC SERPL CALC-MCNC: 70 MG/DL
LYMPHOCYTES # BLD AUTO: 2.07 K/UL (ref 1–4.8)
LYMPHOCYTES NFR BLD: 40.8 % (ref 22–41)
MCH RBC QN AUTO: 31.6 PG (ref 27–33)
MCHC RBC AUTO-ENTMCNC: 33.2 G/DL (ref 32.2–35.5)
MCV RBC AUTO: 95.3 FL (ref 81.4–97.8)
MONOCYTES # BLD AUTO: 0.49 K/UL (ref 0–0.85)
MONOCYTES NFR BLD AUTO: 9.7 % (ref 0–13.4)
NEUTROPHILS # BLD AUTO: 2.23 K/UL (ref 1.82–7.42)
NEUTROPHILS NFR BLD: 44 % (ref 44–72)
NRBC # BLD AUTO: 0 K/UL
NRBC BLD-RTO: 0 /100 WBC (ref 0–0.2)
PLATELET # BLD AUTO: 161 K/UL (ref 164–446)
PMV BLD AUTO: 12 FL (ref 9–12.9)
POTASSIUM SERPL-SCNC: 4.1 MMOL/L (ref 3.6–5.5)
PROT SERPL-MCNC: 7.1 G/DL (ref 6–8.2)
RBC # BLD AUTO: 4.68 M/UL (ref 4.2–5.4)
SODIUM SERPL-SCNC: 141 MMOL/L (ref 135–145)
T4 FREE SERPL-MCNC: 1.11 NG/DL (ref 0.93–1.7)
TRIGL SERPL-MCNC: 183 MG/DL (ref 0–149)
TSH SERPL-ACNC: 4.93 UIU/ML (ref 0.38–5.33)
VIT B12 SERPL-MCNC: 787 PG/ML (ref 211–911)
WBC # BLD AUTO: 5.1 K/UL (ref 4.8–10.8)

## 2025-04-07 PROCEDURE — 82306 VITAMIN D 25 HYDROXY: CPT

## 2025-04-07 PROCEDURE — 80061 LIPID PANEL: CPT

## 2025-04-07 PROCEDURE — 85025 COMPLETE CBC W/AUTO DIFF WBC: CPT

## 2025-04-07 PROCEDURE — 36415 COLL VENOUS BLD VENIPUNCTURE: CPT

## 2025-04-07 PROCEDURE — 82607 VITAMIN B-12: CPT

## 2025-04-07 PROCEDURE — 80053 COMPREHEN METABOLIC PANEL: CPT

## 2025-04-07 PROCEDURE — 84439 ASSAY OF FREE THYROXINE: CPT

## 2025-04-07 PROCEDURE — 84443 ASSAY THYROID STIM HORMONE: CPT

## 2025-04-08 ENCOUNTER — APPOINTMENT (OUTPATIENT)
Dept: MEDICAL GROUP | Facility: PHYSICIAN GROUP | Age: 85
End: 2025-04-08
Payer: MEDICARE

## 2025-04-08 VITALS
TEMPERATURE: 97.4 F | BODY MASS INDEX: 30.66 KG/M2 | HEIGHT: 68 IN | OXYGEN SATURATION: 95 % | HEART RATE: 78 BPM | DIASTOLIC BLOOD PRESSURE: 60 MMHG | SYSTOLIC BLOOD PRESSURE: 116 MMHG | WEIGHT: 202.3 LBS

## 2025-04-08 DIAGNOSIS — E03.9 ACQUIRED HYPOTHYROIDISM: ICD-10-CM

## 2025-04-08 DIAGNOSIS — D69.6 THROMBOCYTOPENIA (HCC): ICD-10-CM

## 2025-04-08 DIAGNOSIS — Z78.9 POLST (PHYSICIAN ORDERS FOR LIFE-SUSTAINING TREATMENT): ICD-10-CM

## 2025-04-08 DIAGNOSIS — N18.31 CKD STAGE 3A, GFR 45-59 ML/MIN: ICD-10-CM

## 2025-04-08 DIAGNOSIS — I10 PRIMARY HYPERTENSION: ICD-10-CM

## 2025-04-08 DIAGNOSIS — E78.5 DYSLIPIDEMIA: ICD-10-CM

## 2025-04-08 DIAGNOSIS — J30.2 SEASONAL ALLERGIES: ICD-10-CM

## 2025-04-08 PROCEDURE — 3078F DIAST BP <80 MM HG: CPT | Performed by: INTERNAL MEDICINE

## 2025-04-08 PROCEDURE — 99214 OFFICE O/P EST MOD 30 MIN: CPT | Performed by: INTERNAL MEDICINE

## 2025-04-08 PROCEDURE — G2211 COMPLEX E/M VISIT ADD ON: HCPCS | Performed by: INTERNAL MEDICINE

## 2025-04-08 PROCEDURE — 3074F SYST BP LT 130 MM HG: CPT | Performed by: INTERNAL MEDICINE

## 2025-04-08 ASSESSMENT — FIBROSIS 4 INDEX: FIB4 SCORE: 3.48

## 2025-04-08 NOTE — PROGRESS NOTES
Subjective:   Chief Complaint/History of Present Illness:  Reina Munoz is a 84 y.o. female established patient who presents today to discuss medical problems as listed below. Reina is accompanied by her spouse.    History of Present Illness  The patient presents for evaluation of allergies, blood pressure management, and thyroid management.    She reports that her montelukast regimen has been effective in managing her allergies, which typically manifest from late April to June. She is considering the use of Allegra or Zyrtec on particularly severe days characterized by excessive sneezing. She does not report any symptoms of depression or anxiety. She has not received allergy injections with steroids in the past few years.    She underwent a surgical procedure one week prior to this visit and has been on a dual medication regimen since then. She plans to start tapering these medications today. She attributes her low blood pressure readings to this recent surgery and medication use. Her systolic blood pressure readings have been consistently around 111, 112, and 115. She has not been taking her prescribed losartan due to these low readings. She expresses concern about her heart rate, which has been recorded as 58, 59, and 57.    She is currently on a 25 mcg dose of thyroid medication, which she takes regularly, although she admits to occasionally missing a dose.    She had a mammogram in June 2023 and plans to skip it this year.    FAMILY HISTORY  Her older sister had breast cancer.    MEDICATIONS  Current: Montelukast, losartan         Current Medications:  Current Outpatient Medications Ordered in Epic   Medication Sig Dispense Refill    montelukast (SINGULAIR) 10 MG Tab Take 1 Tablet by mouth every day. 100 Tablet 3    Fexofenadine HCl (ALLEGRA PO) Take 1 Tablet by mouth 1 time a day as needed (For allergies).      acetaminophen (TYLENOL) 500 MG Tab Take 1,000 mg by mouth every 6 hours as needed (For  headache).      methylPREDNISolone (MEDROL DOSEPAK) 4 MG Tablet Therapy Pack Use as directed on package 21 Each 0    albuterol 108 (90 Base) MCG/ACT Aero Soln inhalation aerosol Inhale 2 Puffs by mouth every 6 hours as needed for Shortness of Breath. 8.5 g 0    ciprofloxacin (CILOXIN) 0.3 % Solution Instill 1 drop into left eye four times a day 5 mL 1    prednisoLONE acetate (PRED FORTE) 1 % Suspension Instill 1 drop into left eye four times a day 10 mL 1    latanoprost (XALATAN) 0.005 % Solution Instill 1 drop into both eyes every night at bedtime (Patient taking differently: Administer 1 Drop into both eyes every evening.) 7.5 mL 3    esomeprazole (NEXIUM) 40 MG delayed-release capsule Take 1 Capsule by mouth every morning before breakfast. (Patient taking differently: Take 40 mg by mouth 1 time a day as needed. Indications: Heartburn) 100 Capsule 3    torsemide (DEMADEX) 20 MG Tab Take 1 Tablet by mouth every day. 100 Tablet 3    potassium chloride ER (KLOR-CON) 10 MEQ tablet Take 1 Tablet by mouth every day. 100 Tablet 3    Cholecalciferol (D3 PO) Take 1 Tablet by mouth every day.      Multiple Vitamins-Minerals (ZINC PO) Take 1 Tablet by mouth every day.      rosuvastatin (CRESTOR) 5 MG Tab Take 1 Tablet by mouth every evening. 100 Tablet 3    ipratropium-albuterol (DUONEB) 0.5-2.5 (3) MG/3ML nebulizer solution Use 3 mL by nebulization 4 times a day. (Patient taking differently: Take 3 mL by nebulization every four hours as needed for Shortness of Breath.) 90 mL 11    Ibuprofen-Acetaminophen (ADVIL DUAL ACTION) 125-250 MG Tab Take 2 Tablets by mouth every 6 hours as needed (pain). Indications: Arthritis, Pain      therapeutic multivitamin-minerals (THERAGRAN-M) Tab Take 1 Tablet by mouth every day.      levothyroxine (SYNTHROID) 25 MCG Tab Take 1 Tablet by mouth every morning on an empty stomach. 100 Tablet 3    losartan (COZAAR) 25 MG Tab Take 1 Tablet by mouth every day. 100 Tablet 3    albuterol 108 (90  "Base) MCG/ACT Aero Soln inhalation aerosol Inhale 2 Puffs every 6 hours as needed for Shortness of Breath. 54 g 3     No current [facility]-ordered facility-administered medications on file.          Objective:   Physical Exam:    Vitals: /60   Pulse 78   Temp 36.3 °C (97.4 °F) (Temporal)   Ht 1.727 m (5' 8\")   Wt 91.8 kg (202 lb 4.8 oz)   SpO2 95%    BMI: Body mass index is 30.76 kg/m².  Physical Exam  Constitutional:       General: She is not in acute distress.     Appearance: Normal appearance. She is not ill-appearing.   HENT:      Right Ear: External ear normal.      Left Ear: External ear normal.   Eyes:      General: No scleral icterus.     Conjunctiva/sclera: Conjunctivae normal.   Cardiovascular:      Rate and Rhythm: Normal rate and regular rhythm.      Pulses: Normal pulses.   Pulmonary:      Effort: Pulmonary effort is normal. No respiratory distress.      Breath sounds: No wheezing or rhonchi.   Abdominal:      General: Bowel sounds are normal.      Palpations: Abdomen is soft.   Musculoskeletal:      Right lower leg: Edema present.      Left lower leg: Edema present.      Comments: 1+ pretibial b/l LE   Skin:     General: Skin is warm and dry.      Findings: No rash.   Psychiatric:         Mood and Affect: Mood normal.         Behavior: Behavior normal.         Thought Content: Thought content normal.         Judgment: Judgment normal.           Results  Laboratory Studies  White blood cells are normal. Eosinophils have returned to normal. Platelet count is slightly low but improved from previous. Kidney function is at 56%. Sodium, potassium, calcium levels are normal. Liver enzyme number has improved and is back into the normal range. Protein levels are good. Vitamin levels are good. Thyroid function has improved.    Assessment & Plan  1. Seasonal allergies  Her eosinophil count has normalized since the initiation of montelukast therapy. She was advised that she could supplement her montelukast " regimen with antihistamines such as Allegra or Zyrtec if necessary. The potential side effects of antihistamines, including dry eyes, dry mouth, and constipation, were discussed. She was also informed about the possibility of mood alterations with montelukast use.    2. Hypertension  Her blood pressure readings have been consistently around 111, 112, and 115. She has not been taking her prescribed losartan due to these low readings. She expresses concern about her heart rate, which has been recorded as 58, 59, and 57. She was advised to monitor her heart rate and report any instances of it dropping to the 30s or 40s.    3. Hypothyroidism   Her thyroid function has shown improvement. She is currently on a 25 mcg dose of thyroid medication, which she takes regularly, although she admits to occasionally missing a dose. She was advised to continue her current thyroid medication regimen.    4. CKD stage 3a  New issue, may be related to recent string of illness with more antibiotics and diuretics, continue with periodic evaluation to ensure stability.    5. Dyslipidemia  Chronic and ongoing issue, triglycerides still slightly above goal. Continue rosuvastatin 5 mg daily for ongoing treatment.    6. Thrombocytopenia  7. POLST- FULL CODE  She had a mammogram in June 2023 and plans to skip it this year. A copy of the POLST form will be provided to her, she wants to be Full Code, reviewed this in detail. Thrombocytopenia ongoing, she is asymptomatic, likely related to fatty liver disease.    Follow-up  The patient will follow up in 6 months.      Assessment and Plan:   Reina is a 84 y.o. female with the following:  Problem List Items Addressed This Visit       Acquired hypothyroidism    CKD stage 3a, GFR 45-59 ml/min    Dyslipidemia    POLST (Physician Orders for Life-Sustaining Treatment)    Primary hypertension    Seasonal allergies    Thrombocytopenia (HCC)          RTC: Return in about 6 months (around 10/8/2025).    I  spent a total of 34 minutes with record review, exam, communication with the patient, communication with other providers, and documentation of this encounter.    Verbal consent was acquired by the patient to use [employer] ambient listening note generation during this visit Yes     Billing : secondary to the complexity of this patient's illnesses and their interactions.  All problems listed were discussed during the office visit, medications were evaluated and complexities were discussed as well as plan for the future      PLEASE NOTE: This dictation was created using voice recognition software. I have made every reasonable attempt to correct obvious errors, but I expect that there are errors of grammar and possibly content that I did not discover before finalizing the note.      Nayeli Finley, DO  Geriatric and Internal Medicine  [facility]

## 2025-04-15 ENCOUNTER — TELEPHONE (OUTPATIENT)
Dept: HEALTH INFORMATION MANAGEMENT | Facility: OTHER | Age: 85
End: 2025-04-15
Payer: MEDICARE

## 2025-04-15 NOTE — TELEPHONE ENCOUNTER
Pt called and is requesting PCP send a new referral for her to see a cardiologist. Pt states she had seen a cardiologist in the past and wants to get re-established. Requesting referral to Dr. Bry Martinez. Nurse will route message to PCP.

## 2025-04-15 NOTE — TELEPHONE ENCOUNTER
Patient called back and states she doesn't need a referral to cardiology at this time. Pt states she will discuss with PCP at next appt.

## 2025-04-30 ENCOUNTER — PATIENT OUTREACH (OUTPATIENT)
Dept: HEALTH INFORMATION MANAGEMENT | Facility: OTHER | Age: 85
End: 2025-04-30
Payer: MEDICARE

## 2025-04-30 DIAGNOSIS — I10 PRIMARY HYPERTENSION: ICD-10-CM

## 2025-04-30 DIAGNOSIS — E78.5 DYSLIPIDEMIA: ICD-10-CM

## 2025-04-30 NOTE — CARE PLAN
Problem: Knowledge deficit of hypertension  Goal: Demonstrate Knowledge of Hypertension/long term goal  Outcome: Progressing     Problem: Knowledge deficit of factors contributing to hypertension  Goal: Demonstrate factors that can contribute to high blood pressure/long term goal  Outcome: Progressing  Note: Patient has verbalized understanding of factors contributing to high blood pressure      Problem: Knowledge Deficit with Chronic Kidney Disease process  Goal: Patient will maintain/demonstrate improvement in lab values/long term goal  Outcome: Progressing

## 2025-04-30 NOTE — PROGRESS NOTES
Nurse CM outreach call to pt for monthly CCM assessment.  Pt answered telephone.    Reason for Follow-Up:    Pt following in personal care management services for history of HTN and CKD.    Medical Updates:      Pt reports she has been feeling well.  States her allergies have improved since starting on montelukast.  Pt has history of HTN. Reports blood pressure readings have been in normal range. Reports around 125/70.  Pt states if she does get a high reading she will take her losartan. Reports she is also taking torsemide every other day for edema in left leg. Pt has history of dyslipidemia. Pt taking Crestor as recommended. Last lipid panel completed 4/7/25, triglycerides at 183.  Pt has history of CKD. Last GFR on 4/7/25 was 54.       Medication Updates:  Reports no changes to current medications    Symptoms:  Reports no active symptoms    Plan of Care Goals and Progress:    Goal 1. Maintain blood pressure in normal range     Progress:  Pt reports blood pressure readings have been in good range      Barriers:  Chronic health conditions     Interventions:  Continue to monitor home blood pressure and bring log of readings to appts.     Education:  Follow heart healthy meal plan, limit sodium in diet.    Goal 2. Improved fluid balance     Progress:  Pt reports edema in lower extremity has improved      Barriers:  Chronic health conditions     Interventions:  Continue to monitor edema in lower extremities. Follow-up with PCP as scheduled.     Education:  Stay hydrated, follow heart healthy meal plan, limit sodium in diet, monitor blood pressure readings.      Patient's Concerns and Feedback (Self Management of Care):     Pt reports no concerns today    Next Steps:     Follow-Up Plan:  Follow-up in 8 weeks, around June 2025      Appointments:  10/7/25 at 10:20 am with Dr. Finley     Contact Information:  Call [phone] with any questions or concerns.

## 2025-05-05 DIAGNOSIS — I10 ESSENTIAL HYPERTENSION: ICD-10-CM

## 2025-05-05 PROCEDURE — RXMED WILLOW AMBULATORY MEDICATION CHARGE: Performed by: INTERNAL MEDICINE

## 2025-05-05 RX ORDER — LOSARTAN POTASSIUM 25 MG/1
25 TABLET ORAL DAILY
Qty: 100 TABLET | Refills: 3 | Status: SHIPPED | OUTPATIENT
Start: 2025-05-05

## 2025-05-09 ENCOUNTER — PHARMACY VISIT (OUTPATIENT)
Dept: PHARMACY | Facility: MEDICAL CENTER | Age: 85
End: 2025-05-09
Payer: COMMERCIAL

## 2025-05-27 ENCOUNTER — TELEPHONE (OUTPATIENT)
Dept: MEDICAL GROUP | Facility: PHYSICIAN GROUP | Age: 85
End: 2025-05-27
Payer: MEDICARE

## 2025-05-27 NOTE — TELEPHONE ENCOUNTER
Pt called to update PCP that her blood pressure readings have been on the lower side at home. Reports last reading yesterday was 111/68. Pt reports similar readings in that range. Pt reports she hasn't been taking her torsemide. Reports minimal swelling in her feet. Pt reports she has stopped taking losartan due to lower blood pressure readings. Pt denies feeling dizzy or lightheaded. States she has been feeling good and wanted to update PCP on blood pressure.  Pt will continue to keep a log of readings and will bring to next appt. Pt states she will keep nurse CM updated on blood pressure readings. Nurse will route update to PCP.

## 2025-06-13 ENCOUNTER — PHARMACY VISIT (OUTPATIENT)
Dept: PHARMACY | Facility: MEDICAL CENTER | Age: 85
End: 2025-06-13
Payer: COMMERCIAL

## 2025-06-13 PROCEDURE — RXMED WILLOW AMBULATORY MEDICATION CHARGE: Performed by: OPHTHALMOLOGY

## 2025-06-27 ENCOUNTER — PATIENT OUTREACH (OUTPATIENT)
Dept: HEALTH INFORMATION MANAGEMENT | Facility: OTHER | Age: 85
End: 2025-06-27
Payer: MEDICARE

## 2025-06-27 DIAGNOSIS — E78.5 DYSLIPIDEMIA: ICD-10-CM

## 2025-06-27 DIAGNOSIS — I10 PRIMARY HYPERTENSION: Primary | ICD-10-CM

## 2025-06-27 NOTE — PROGRESS NOTES
6/27/25 10:45 am: Nurse CM outreach call to pt for monthly CCM assessment.  VM left requesting a return call to nurse at [phone].    6/27/25 11:15 am: Pt returned nurse CM call.  Monthly assessment completed.    Reason for Follow-Up:    Monthly CCM assessment    Current Health Status:    Pt following in personal care management for history of HTN, and dyslipidemia.    Medical Updates:  Pt reports she is doing well. Reports blood pressure readings in good range. Reports 119/70 range. Pt states she still has some swelling in her lower extremities. Reports she is taking torsemide as recommended by PCP. Pt reports she does get short of breath at times with exertion. Pt has history of dyslipidemia. Pt taking crestor for lipid management. Last lipid  panel completed 4/2025. Triglycerides were 183.    Medication Updates:  Pt reports no changes to medications.    Symptoms:  Reports no active symptoms.    Plan of Care Goals and Progress:    Goal 1.  Maintain blood pressure in normal range                 Progress:  Pt reports blood pressure readings have been in good range                            Barriers:  Chronic health conditions                 Interventions:  Continue to monitor home blood pressure and bring log of readings to appts.                 Education:  Follow heart healthy meal plan, limit sodium in diet.     Goal 2.  Improved fluid balance                 Progress:  Pt reports no current problem with edema in lower extremities.                             Barriers:  Chronic health conditions                 Interventions:  Continue to monitor edema in lower extremities. Follow-up with PCP as scheduled.                 Education:  Stay hydrated, follow heart healthy meal plan, limit sodium in diet, monitor blood pressure readings.       Patient's Concerns and Feedback (Self Management of Care):     Pt reports no concerns today    Next Steps:     Follow-Up Plan:  Follow-up in 8 weeks, August  2025      Appointments:  10/7/25 at 10:20 with PCP     Contact Information:  Call [phone] with any questions or concerns.

## 2025-06-27 NOTE — CARE PLAN
Problem: Knowledge deficit of hypertension  Goal: Demonstrate Knowledge of Hypertension/long term goal  Outcome: Progressing     Problem: Knowledge deficit of self-monitoring blood pressure  Goal: Demonstrate the elements of self-monitoring blood pressure/long term goal  Outcome: Progressing  Note:   -Patient has verbalized commitment to self-monitoring blood pressure at least once a week.    -Patient will log home blood pressures and bring the log to medical provider appointments.       Problem: Knowledge deficit of hyperlipidemia  Goal: Patient will demonstrate an understanding of hyperlipidemia and its management/long term goal  Outcome: Progressing

## 2025-07-14 DIAGNOSIS — I10 ESSENTIAL HYPERTENSION: ICD-10-CM

## 2025-07-14 PROCEDURE — RXMED WILLOW AMBULATORY MEDICATION CHARGE: Performed by: INTERNAL MEDICINE

## 2025-07-14 RX ORDER — LOSARTAN POTASSIUM 25 MG/1
25 TABLET ORAL DAILY
Qty: 100 TABLET | Refills: 1 | Status: SHIPPED | OUTPATIENT
Start: 2025-07-14

## 2025-07-14 NOTE — PROGRESS NOTES
"[facility]    Pt has seen a [facility] provider in past 12 months and in need of prescription refills per protocol.  A 100 day supply is provided whenever possible to improve adherence rates.     Lab Results   Component Value Date/Time    HBA1C 6.1 (A) 01/09/2025 07:10 AM      No results found for: \"MICROALBCALC\", \"MALBCRT\", \"MALBEXCR\", \"[id number]\", \"MICROALBUR\", \"MICRALB\", \"UMICROALBUM\", \"MICROALBTIM\"   Lab Results   Component Value Date/Time    ALKPHOSPHAT 90 04/07/2025 07:14 AM    ASTSGOT 41 04/07/2025 07:14 AM    ALTSGPT 36 04/07/2025 07:14 AM    TBILIRUBIN 0.8 04/07/2025 07:14 AM      Lab Results   Component Value Date/Time    SODIUM 141 04/07/2025 07:14 AM    POTASSIUM 4.1 04/07/2025 07:14 AM    CHLORIDE 102 04/07/2025 07:14 AM    CO2 28 04/07/2025 07:14 AM    GLUCOSE 112 (H) 04/07/2025 07:14 AM    BUN 16 04/07/2025 07:14 AM    CREATININE 1.03 04/07/2025 07:14 AM        Meds refilled:  Losartan    Pt agrees to auto refill.    She states she is going to reach out to Dr Finley on this medication in the future.  Her BP has come down since going from 12.5 mg to 25 mg. Denies s/s of hypotension. Medication refilled and pt aware that any dose changes by Dr Finley are still free for her in the future.    Reports systolic pressures frequently in 110's and 120's.  Again no s/s of hypotension.  I requested pt reach out to Dr Finley's office to discuss further.      Luis Miguel Rios, PharmD     CC  Dr. Finley.       "

## 2025-07-15 ENCOUNTER — PHARMACY VISIT (OUTPATIENT)
Dept: PHARMACY | Facility: MEDICAL CENTER | Age: 85
End: 2025-07-15
Payer: COMMERCIAL

## 2025-08-01 ENCOUNTER — NURSE TRIAGE (OUTPATIENT)
Dept: HEALTH INFORMATION MANAGEMENT | Facility: OTHER | Age: 85
End: 2025-08-01
Payer: MEDICARE

## 2025-08-11 ENCOUNTER — PHARMACY VISIT (OUTPATIENT)
Dept: PHARMACY | Facility: MEDICAL CENTER | Age: 85
End: 2025-08-11
Payer: COMMERCIAL

## 2025-08-11 ENCOUNTER — OFFICE VISIT (OUTPATIENT)
Dept: URGENT CARE | Facility: PHYSICIAN GROUP | Age: 85
End: 2025-08-11
Payer: MEDICARE

## 2025-08-11 ENCOUNTER — PATIENT OUTREACH (OUTPATIENT)
Dept: HEALTH INFORMATION MANAGEMENT | Facility: OTHER | Age: 85
End: 2025-08-11
Payer: MEDICARE

## 2025-08-11 ENCOUNTER — HOSPITAL ENCOUNTER (OUTPATIENT)
Dept: RADIOLOGY | Facility: MEDICAL CENTER | Age: 85
End: 2025-08-11
Attending: CHIROPRACTOR
Payer: MEDICARE

## 2025-08-11 VITALS
HEIGHT: 69 IN | DIASTOLIC BLOOD PRESSURE: 82 MMHG | RESPIRATION RATE: 20 BRPM | OXYGEN SATURATION: 94 % | HEART RATE: 75 BPM | TEMPERATURE: 97.5 F | BODY MASS INDEX: 30.36 KG/M2 | WEIGHT: 205 LBS | SYSTOLIC BLOOD PRESSURE: 134 MMHG

## 2025-08-11 DIAGNOSIS — M50.122 DISORDER OF INTERVERTEBRAL DISC AT C5-C6 LEVEL WITH RADICULOPATHY: ICD-10-CM

## 2025-08-11 DIAGNOSIS — J98.8 RESPIRATORY TRACT INFECTION: Primary | ICD-10-CM

## 2025-08-11 DIAGNOSIS — I10 PRIMARY HYPERTENSION: Primary | ICD-10-CM

## 2025-08-11 PROCEDURE — 3075F SYST BP GE 130 - 139MM HG: CPT

## 2025-08-11 PROCEDURE — 72040 X-RAY EXAM NECK SPINE 2-3 VW: CPT

## 2025-08-11 PROCEDURE — 99214 OFFICE O/P EST MOD 30 MIN: CPT | Mod: 25

## 2025-08-11 PROCEDURE — RXMED WILLOW AMBULATORY MEDICATION CHARGE

## 2025-08-11 PROCEDURE — 3079F DIAST BP 80-89 MM HG: CPT

## 2025-08-11 PROCEDURE — 94640 AIRWAY INHALATION TREATMENT: CPT

## 2025-08-11 PROCEDURE — 71046 X-RAY EXAM CHEST 2 VIEWS: CPT

## 2025-08-11 RX ORDER — IPRATROPIUM BROMIDE AND ALBUTEROL SULFATE 2.5; .5 MG/3ML; MG/3ML
3 SOLUTION RESPIRATORY (INHALATION) ONCE
Status: COMPLETED | OUTPATIENT
Start: 2025-08-11 | End: 2025-08-11

## 2025-08-11 RX ORDER — METHYLPREDNISOLONE 4 MG/1
TABLET ORAL
Qty: 21 TABLET | Refills: 0 | Status: SHIPPED | OUTPATIENT
Start: 2025-08-11

## 2025-08-11 RX ADMIN — IPRATROPIUM BROMIDE AND ALBUTEROL SULFATE 3 ML: 2.5; .5 SOLUTION RESPIRATORY (INHALATION) at 12:04

## 2025-08-11 ASSESSMENT — ENCOUNTER SYMPTOMS
CARDIOVASCULAR NEGATIVE: 1
WHEEZING: 1
FEVER: 0
COUGH: 1
SHORTNESS OF BREATH: 1
SORE THROAT: 0
EYES NEGATIVE: 1
CHILLS: 0
GASTROINTESTINAL NEGATIVE: 1
MUSCULOSKELETAL NEGATIVE: 1

## 2025-08-11 ASSESSMENT — FIBROSIS 4 INDEX: FIB4 SCORE: 3.48

## 2025-08-12 ENCOUNTER — APPOINTMENT (OUTPATIENT)
Dept: RADIOLOGY | Facility: MEDICAL CENTER | Age: 85
End: 2025-08-12
Attending: EMERGENCY MEDICINE
Payer: MEDICARE

## 2025-08-12 ENCOUNTER — HOSPITAL ENCOUNTER (EMERGENCY)
Facility: MEDICAL CENTER | Age: 85
End: 2025-08-12
Attending: EMERGENCY MEDICINE
Payer: MEDICARE

## 2025-08-12 ENCOUNTER — PHARMACY VISIT (OUTPATIENT)
Dept: PHARMACY | Facility: MEDICAL CENTER | Age: 85
End: 2025-08-12
Payer: COMMERCIAL

## 2025-08-12 VITALS
DIASTOLIC BLOOD PRESSURE: 76 MMHG | RESPIRATION RATE: 16 BRPM | HEIGHT: 69 IN | BODY MASS INDEX: 30.79 KG/M2 | TEMPERATURE: 97.5 F | SYSTOLIC BLOOD PRESSURE: 165 MMHG | HEART RATE: 62 BPM | WEIGHT: 207.89 LBS | OXYGEN SATURATION: 97 %

## 2025-08-12 DIAGNOSIS — J44.1 ACUTE EXACERBATION OF CHRONIC OBSTRUCTIVE PULMONARY DISEASE (COPD) (HCC): Primary | ICD-10-CM

## 2025-08-12 LAB
ALBUMIN SERPL BCP-MCNC: 4.2 G/DL (ref 3.2–4.9)
ALBUMIN/GLOB SERPL: 1.6 G/DL
ALP SERPL-CCNC: 110 U/L (ref 30–99)
ALT SERPL-CCNC: 56 U/L (ref 2–50)
ANION GAP SERPL CALC-SCNC: 15 MMOL/L (ref 7–16)
AST SERPL-CCNC: 50 U/L (ref 12–45)
BASOPHILS # BLD AUTO: 0.6 % (ref 0–1.8)
BASOPHILS # BLD: 0.03 K/UL (ref 0–0.12)
BILIRUB SERPL-MCNC: 0.7 MG/DL (ref 0.1–1.5)
BUN SERPL-MCNC: 14 MG/DL (ref 8–22)
CALCIUM ALBUM COR SERPL-MCNC: 9.6 MG/DL (ref 8.5–10.5)
CALCIUM SERPL-MCNC: 9.8 MG/DL (ref 8.5–10.5)
CHLORIDE SERPL-SCNC: 106 MMOL/L (ref 96–112)
CO2 SERPL-SCNC: 19 MMOL/L (ref 20–33)
CREAT SERPL-MCNC: 1 MG/DL (ref 0.5–1.4)
EKG IMPRESSION: NORMAL
EOSINOPHIL # BLD AUTO: 0 K/UL (ref 0–0.51)
EOSINOPHIL NFR BLD: 0 % (ref 0–6.9)
ERYTHROCYTE [DISTWIDTH] IN BLOOD BY AUTOMATED COUNT: 46.5 FL (ref 35.9–50)
FLUAV RNA SPEC QL NAA+PROBE: NEGATIVE
FLUBV RNA SPEC QL NAA+PROBE: NEGATIVE
GFR SERPLBLD CREATININE-BSD FMLA CKD-EPI: 56 ML/MIN/1.73 M 2
GLOBULIN SER CALC-MCNC: 2.7 G/DL (ref 1.9–3.5)
GLUCOSE SERPL-MCNC: 189 MG/DL (ref 65–99)
HCT VFR BLD AUTO: 41.4 % (ref 37–47)
HGB BLD-MCNC: 14.2 G/DL (ref 12–16)
IMM GRANULOCYTES # BLD AUTO: 0.08 K/UL (ref 0–0.11)
IMM GRANULOCYTES NFR BLD AUTO: 1.6 % (ref 0–0.9)
LYMPHOCYTES # BLD AUTO: 0.71 K/UL (ref 1–4.8)
LYMPHOCYTES NFR BLD: 14.6 % (ref 22–41)
MCH RBC QN AUTO: 32 PG (ref 27–33)
MCHC RBC AUTO-ENTMCNC: 34.3 G/DL (ref 32.2–35.5)
MCV RBC AUTO: 93.2 FL (ref 81.4–97.8)
MONOCYTES # BLD AUTO: 0.08 K/UL (ref 0–0.85)
MONOCYTES NFR BLD AUTO: 1.6 % (ref 0–13.4)
NEUTROPHILS # BLD AUTO: 3.96 K/UL (ref 1.82–7.42)
NEUTROPHILS NFR BLD: 81.6 % (ref 44–72)
NRBC # BLD AUTO: 0 K/UL
NRBC BLD-RTO: 0 /100 WBC (ref 0–0.2)
NT-PROBNP SERPL IA-MCNC: 122 PG/ML (ref 0–125)
PLATELET # BLD AUTO: 146 K/UL (ref 164–446)
PMV BLD AUTO: 11.5 FL (ref 9–12.9)
POTASSIUM SERPL-SCNC: 4.1 MMOL/L (ref 3.6–5.5)
PROT SERPL-MCNC: 6.9 G/DL (ref 6–8.2)
RBC # BLD AUTO: 4.44 M/UL (ref 4.2–5.4)
RSV RNA SPEC QL NAA+PROBE: NEGATIVE
SARS-COV-2 RNA RESP QL NAA+PROBE: NOTDETECTED
SODIUM SERPL-SCNC: 140 MMOL/L (ref 135–145)
SPECIMEN SOURCE: NORMAL
TROPONIN T SERPL-MCNC: 12 NG/L (ref 6–19)
TSH SERPL DL<=0.005 MIU/L-ACNC: 1.99 UIU/ML (ref 0.38–5.33)
WBC # BLD AUTO: 4.9 K/UL (ref 4.8–10.8)

## 2025-08-12 PROCEDURE — 94640 AIRWAY INHALATION TREATMENT: CPT

## 2025-08-12 PROCEDURE — 94760 N-INVAS EAR/PLS OXIMETRY 1: CPT

## 2025-08-12 PROCEDURE — 700101 HCHG RX REV CODE 250: Performed by: EMERGENCY MEDICINE

## 2025-08-12 PROCEDURE — 80053 COMPREHEN METABOLIC PANEL: CPT

## 2025-08-12 PROCEDURE — 84443 ASSAY THYROID STIM HORMONE: CPT

## 2025-08-12 PROCEDURE — 83880 ASSAY OF NATRIURETIC PEPTIDE: CPT

## 2025-08-12 PROCEDURE — 93005 ELECTROCARDIOGRAM TRACING: CPT | Mod: TC | Performed by: EMERGENCY MEDICINE

## 2025-08-12 PROCEDURE — 84484 ASSAY OF TROPONIN QUANT: CPT

## 2025-08-12 PROCEDURE — RXMED WILLOW AMBULATORY MEDICATION CHARGE: Performed by: INTERNAL MEDICINE

## 2025-08-12 PROCEDURE — 700102 HCHG RX REV CODE 250 W/ 637 OVERRIDE(OP): Performed by: EMERGENCY MEDICINE

## 2025-08-12 PROCEDURE — A9270 NON-COVERED ITEM OR SERVICE: HCPCS | Performed by: EMERGENCY MEDICINE

## 2025-08-12 PROCEDURE — 99284 EMERGENCY DEPT VISIT MOD MDM: CPT

## 2025-08-12 PROCEDURE — 93005 ELECTROCARDIOGRAM TRACING: CPT | Mod: TC

## 2025-08-12 PROCEDURE — 85025 COMPLETE CBC W/AUTO DIFF WBC: CPT

## 2025-08-12 PROCEDURE — 700117 HCHG RX CONTRAST REV CODE 255: Performed by: EMERGENCY MEDICINE

## 2025-08-12 PROCEDURE — 87637 SARSCOV2&INF A&B&RSV AMP PRB: CPT

## 2025-08-12 PROCEDURE — 71275 CT ANGIOGRAPHY CHEST: CPT

## 2025-08-12 RX ORDER — IPRATROPIUM BROMIDE AND ALBUTEROL SULFATE 2.5; .5 MG/3ML; MG/3ML
3 SOLUTION RESPIRATORY (INHALATION) ONCE
Status: COMPLETED | OUTPATIENT
Start: 2025-08-12 | End: 2025-08-12

## 2025-08-12 RX ORDER — LOSARTAN POTASSIUM 25 MG/1
25 TABLET ORAL ONCE
Status: COMPLETED | OUTPATIENT
Start: 2025-08-12 | End: 2025-08-12

## 2025-08-12 RX ADMIN — IOHEXOL 60 ML: 350 INJECTION, SOLUTION INTRAVENOUS at 21:46

## 2025-08-12 RX ADMIN — IPRATROPIUM BROMIDE AND ALBUTEROL SULFATE 3 ML: .5; 2.5 SOLUTION RESPIRATORY (INHALATION) at 19:01

## 2025-08-12 RX ADMIN — LOSARTAN POTASSIUM 25 MG: 25 TABLET, FILM COATED ORAL at 22:21

## 2025-08-12 ASSESSMENT — HEART SCORE
HISTORY: SLIGHTLY SUSPICIOUS
ECG: NON-SPECIFIC REPOLARIZATION DISTURBANCE
RISK FACTORS: 1-2 RISK FACTORS
TROPONIN: LESS THAN OR EQUAL TO NORMAL LIMIT
HEART SCORE: 4
AGE: 65+

## 2025-08-12 ASSESSMENT — FIBROSIS 4 INDEX: FIB4 SCORE: 3.48

## 2025-08-14 ENCOUNTER — PATIENT OUTREACH (OUTPATIENT)
Dept: HEALTH INFORMATION MANAGEMENT | Facility: OTHER | Age: 85
End: 2025-08-14
Payer: MEDICARE

## 2025-08-14 DIAGNOSIS — I10 PRIMARY HYPERTENSION: Primary | ICD-10-CM

## 2025-08-14 DIAGNOSIS — E78.5 DYSLIPIDEMIA: ICD-10-CM

## 2025-08-19 ENCOUNTER — APPOINTMENT (OUTPATIENT)
Dept: MEDICAL GROUP | Facility: PHYSICIAN GROUP | Age: 85
End: 2025-08-19
Payer: MEDICARE

## 2025-08-20 ENCOUNTER — OFFICE VISIT (OUTPATIENT)
Dept: MEDICAL GROUP | Facility: PHYSICIAN GROUP | Age: 85
End: 2025-08-20
Payer: MEDICARE

## 2025-08-20 VITALS
BODY MASS INDEX: 29.68 KG/M2 | TEMPERATURE: 97.2 F | OXYGEN SATURATION: 94 % | SYSTOLIC BLOOD PRESSURE: 128 MMHG | DIASTOLIC BLOOD PRESSURE: 72 MMHG | HEART RATE: 68 BPM | WEIGHT: 201 LBS

## 2025-08-20 DIAGNOSIS — I71.21 ANEURYSM OF ASCENDING AORTA WITHOUT RUPTURE (HCC): Primary | ICD-10-CM

## 2025-08-20 DIAGNOSIS — D69.6 THROMBOCYTOPENIA (HCC): ICD-10-CM

## 2025-08-20 DIAGNOSIS — J44.1 COPD EXACERBATION (HCC): ICD-10-CM

## 2025-08-20 DIAGNOSIS — E78.5 DYSLIPIDEMIA: ICD-10-CM

## 2025-08-20 DIAGNOSIS — K74.60 HEPATIC CIRRHOSIS, UNSPECIFIED HEPATIC CIRRHOSIS TYPE, UNSPECIFIED WHETHER ASCITES PRESENT (HCC): ICD-10-CM

## 2025-08-20 DIAGNOSIS — R74.8 ELEVATED LIVER ENZYMES: ICD-10-CM

## 2025-08-20 DIAGNOSIS — R60.0 BILATERAL LEG EDEMA: ICD-10-CM

## 2025-08-20 DIAGNOSIS — R73.03 PREDIABETES: ICD-10-CM

## 2025-08-20 DIAGNOSIS — K76.0 FATTY LIVER DISEASE, NONALCOHOLIC: ICD-10-CM

## 2025-08-20 PROCEDURE — 3074F SYST BP LT 130 MM HG: CPT | Performed by: INTERNAL MEDICINE

## 2025-08-20 PROCEDURE — 99214 OFFICE O/P EST MOD 30 MIN: CPT | Performed by: INTERNAL MEDICINE

## 2025-08-20 PROCEDURE — 3078F DIAST BP <80 MM HG: CPT | Performed by: INTERNAL MEDICINE

## 2025-08-20 ASSESSMENT — FIBROSIS 4 INDEX: FIB4 SCORE: 3.75

## 2025-10-07 ENCOUNTER — APPOINTMENT (OUTPATIENT)
Dept: MEDICAL GROUP | Facility: PHYSICIAN GROUP | Age: 85
End: 2025-10-07
Payer: MEDICARE